# Patient Record
Sex: FEMALE | Race: WHITE | NOT HISPANIC OR LATINO | Employment: FULL TIME | URBAN - METROPOLITAN AREA
[De-identification: names, ages, dates, MRNs, and addresses within clinical notes are randomized per-mention and may not be internally consistent; named-entity substitution may affect disease eponyms.]

---

## 2017-01-30 ENCOUNTER — OFFICE VISIT (OUTPATIENT)
Dept: FAMILY MEDICINE | Facility: CLINIC | Age: 57
End: 2017-01-30
Payer: COMMERCIAL

## 2017-01-30 VITALS
HEIGHT: 61 IN | HEART RATE: 83 BPM | SYSTOLIC BLOOD PRESSURE: 124 MMHG | OXYGEN SATURATION: 97 % | DIASTOLIC BLOOD PRESSURE: 75 MMHG | TEMPERATURE: 99.2 F

## 2017-01-30 DIAGNOSIS — J01.90 ACUTE SINUSITIS WITH SYMPTOMS > 10 DAYS: Primary | ICD-10-CM

## 2017-01-30 DIAGNOSIS — B37.31 CANDIDIASIS OF VAGINA: ICD-10-CM

## 2017-01-30 PROCEDURE — 99213 OFFICE O/P EST LOW 20 MIN: CPT | Performed by: PHYSICIAN ASSISTANT

## 2017-01-30 RX ORDER — FLUCONAZOLE 150 MG/1
150 TABLET ORAL ONCE
Qty: 1 TABLET | Refills: 0 | Status: SHIPPED | OUTPATIENT
Start: 2017-01-30 | End: 2017-01-30

## 2017-01-30 RX ORDER — DOXYCYCLINE 100 MG/1
100 CAPSULE ORAL 2 TIMES DAILY
Qty: 20 CAPSULE | Refills: 0 | Status: SHIPPED | OUTPATIENT
Start: 2017-01-30 | End: 2017-05-30

## 2017-01-30 NOTE — MR AVS SNAPSHOT
"              After Visit Summary   1/30/2017    Nidia Acosta    MRN: [id number]           Patient Information     Date Of Birth          1960        Visit Information        Provider Department      1/30/2017 3:00 PM Lillian Harden PA-C [facility]        Today's Diagnoses     Acute sinusitis with symptoms > 10 days    -  1     Candidiasis of vagina            Follow-ups after your visit        Who to contact     Normal or non-critical lab and imaging results will be communicated to you by wireLawyerhart, letter or phone within 4 business days after the clinic has received the results. If you do not hear from us within 7 days, please contact the clinic through [employer] or phone. If you have a critical or abnormal lab result, we will notify you by phone as soon as possible.  Submit refill requests through [employer] or call your pharmacy and they will forward the refill request to us. Please allow 3 business days for your refill to be completed.          If you need to speak with a  for additional information , please call: [phone]             Additional Information About Your Visit        [employer] Information     [employer] gives you secure access to your electronic health record. If you see a primary care provider, you can also send messages to your care team and make appointments. If you have questions, please call your primary care clinic.  If you do not have a primary care provider, please call [phone] and they will assist you.        Care EveryWhere ID     This is your Care EveryWhere ID. This could be used by other organizations to access your [facility] medical records  [id number]        Your Vitals Were     Pulse Temperature Height Pulse Oximetry Last Period       83 99.2  F (37.3  C) (Tympanic) 5' 1\" (1.549 m) 97% 07/29/2002        Blood Pressure from Last 3 Encounters:   01/30/17 124/75   05/04/16 120/76   07/31/15 122/69    Weight from Last 3 Encounters: "   05/04/16 201 lb (91.173 kg)   05/22/15 203 lb (92.08 kg)   04/14/15 203 lb (92.08 kg)              Today, you had the following     No orders found for display         Today's Medication Changes          These changes are accurate as of: 1/30/17  3:27 PM.  If you have any questions, ask your nurse or doctor.               Start taking these medicines.        Dose/Directions    doxycycline 100 MG capsule   Commonly known as:  VIBRAMYCIN   Used for:  Acute sinusitis with symptoms > 10 days   Started by:  Lillian Harden PA-C        Dose:  100 mg   Take 1 capsule (100 mg) by mouth 2 times daily   Quantity:  20 capsule   Refills:  0         These medicines have changed or have updated prescriptions.        Dose/Directions    * fluconazole 150 MG tablet   Commonly known as:  DIFLUCAN   This may have changed:  Another medication with the same name was added. Make sure you understand how and when to take each.   Used for:  Candidal vulvovaginitis   Changed by:  Merlene Reyes MD        Take 1 at onset of symptoms and 1 at end of antibiotics   Quantity:  2 tablet   Refills:  0       * fluconazole 150 MG tablet   Commonly known as:  DIFLUCAN   This may have changed:  You were already taking a medication with the same name, and this prescription was added. Make sure you understand how and when to take each.   Used for:  Candidiasis of vagina   Changed by:  Lillian Harden PA-C        Dose:  150 mg   Take 1 tablet (150 mg) by mouth once for 1 dose   Quantity:  1 tablet   Refills:  0       * Notice:  This list has 2 medication(s) that are the same as other medications prescribed for you. Read the directions carefully, and ask your doctor or other care provider to review them with you.         Where to get your medicines      These medications were sent to [facility] - [place], [place] - [address] [address]     Phone:  [phone]    - doxycycline 100  MG capsule  - fluconazole 150 MG tablet             Primary Care Provider Office Phone # Fax #    Merlene Reyes [phone]       [facility] [address] [facility] [address]        Thank you!     Thank you for choosing [facility]  for your care. Our goal is always to provide you with excellent care. Hearing back from our patients is one way we can continue to improve our services. Please take a few minutes to complete the written survey that you may receive in the mail after your visit with us. Thank you!             Your Updated Medication List - Protect others around you: Learn how to safely use, store and throw away your medicines at www.disposemymeds.org.          This list is accurate as of: 1/30/17  3:27 PM.  Always use your most recent med list.                   Brand Name Dispense Instructions for use    citalopram 20 MG tablet    celeXA    90 tablet    Take 1 tablet (20 mg) by mouth daily       CLARITIN PO      AllerClear Brand       doxycycline 100 MG capsule    VIBRAMYCIN    20 capsule    Take 1 capsule (100 mg) by mouth 2 times daily       * fluconazole 150 MG tablet    DIFLUCAN    2 tablet    Take 1 at onset of symptoms and 1 at end of antibiotics       * fluconazole 150 MG tablet    DIFLUCAN    1 tablet    Take 1 tablet (150 mg) by mouth once for 1 dose       levothyroxine 100 MCG tablet    SYNTHROID    90 tablet    Take 1 tablet (100 mcg) by mouth every morning       Multi-vitamin Tabs tablet   Generic drug:  multivitamin, therapeutic with minerals      Take 1 tablet by mouth daily. When remembers       simvastatin 20 MG tablet    ZOCOR    90 tablet    Take 1 tablet (20 mg) by mouth At Bedtime       valACYclovir 500 MG tablet    VALTREX    90 tablet    Take 1 tablet (500 mg) by mouth daily Use as needed daily prn       * Notice:  This list has 2 medication(s) that are the same as other medications prescribed for you. Read the directions carefully, and ask  your doctor or other care provider to review them with you.

## 2017-01-30 NOTE — PROGRESS NOTES
SUBJECTIVE:                                                    Nidia Acosta is a 56 year old female who presents to clinic today for the following health issues:      ENT Symptoms             Symptoms: cc Present Absent Comment   Fever/Chills  x  Possible, off and on    Fatigue  x     Muscle Aches  x  Upper back, from coughing    Eye Irritation  x  Pressure    Sneezing  x     Nasal Danny/Drg  x     Sinus Pressure/Pain  x     Loss of smell  x     Dental pain  x     Sore Throat  x  Mild   Swollen Glands   x    Ear Pain/Fullness  x  Off and on    Cough  x     Wheeze   x    Chest Pain       Shortness of breath  x  Sometimes    Rash   x    Other   x      Symptom duration:  1 month    Symptom severity:  moderate    Treatments tried:  Mucinex, essential oils    Contacts:  Yes     Has tried several over the counter medications other than sudafed as it makes her feel jittery  No improvement, getting irritable because she has been feeling sick for so long  Entire office has had similar symptoms but hers are hanging on      She also has a knot on her left thumb, pain in the knuckle when she touches it. It has grown in size recently, she has had it for almost a year.   She did apply firm pressure at one point and it disappeared but then recurred a while later bigger in size  Not painful  Just figured she would mention it while here    Problem list and histories reviewed & adjusted, as indicated.  Additional history: as documented    Current Outpatient Prescriptions   Medication Sig Dispense Refill     citalopram (CELEXA) 20 MG tablet Take 1 tablet (20 mg) by mouth daily 90 tablet 1     Loratadine (CLARITIN PO) AllerClear Brand       simvastatin (ZOCOR) 20 MG tablet Take 1 tablet (20 mg) by mouth At Bedtime 90 tablet 3     levothyroxine (SYNTHROID) 100 MCG tablet Take 1 tablet (100 mcg) by mouth every morning 90 tablet 3     valACYclovir (VALTREX) 500 MG tablet Take 1 tablet (500 mg) by mouth daily Use as needed daily  "prn 90 tablet 3     fluconazole (DIFLUCAN) 150 MG tablet Take 1 at onset of symptoms and 1 at end of antibiotics 2 tablet 0     Multiple Vitamin (MULTI-VITAMIN) per tablet Take 1 tablet by mouth daily. When remembers       Allergies   Allergen Reactions     Bees Anaphylaxis     Codeine Difficulty breathing     Penicillin [Esters] Difficulty breathing     Seasonal Allergies        ROS:  Remainder of ROS obtained and found to be negative other than that which was documented above      OBJECTIVE:                                                    /75 mmHg  Pulse 83  Temp(Src) 99.2  F (37.3  C) (Tympanic)  Ht 5' 1\" (1.549 m)  Wt   SpO2 97%  LMP 07/29/2002  There is no weight on file to calculate BMI.  GENERAL: healthy, alert and no distress  EYES: Eyes grossly normal to inspection  HENT: ear canals and TM's normal, nose and mouth without ulcers or lesions  NECK: no adenopathy  RESP: lungs clear to auscultation - no rales, rhonchi or wheezes  CV: regular rates and rhythm, normal S1 S2, no S3 or S4 and no murmur, click or rub    Diagnostic Test Results:  none      ASSESSMENT/PLAN:                                                    (J01.90) Acute sinusitis with symptoms > 10 days  (primary encounter diagnosis)  Comment: given sx duration with lack of improvement, treat with abx and continue symptomatic management  Plan: doxycycline (VIBRAMYCIN) 100 MG capsule            (B37.3) Candidiasis of vagina  Comment: prone to yeast infections with abx  Plan: fluconazole (DIFLUCAN) 150 MG tablet                Lillian Harden PA-C  [facility]    "

## 2017-04-21 ENCOUNTER — TELEPHONE (OUTPATIENT)
Dept: FAMILY MEDICINE | Facility: CLINIC | Age: 57
End: 2017-04-21

## 2017-04-21 NOTE — TELEPHONE ENCOUNTER
Panel Management Review      Patient has the following on her problem list:     Depression / Dysthymia review  PHQ-9 SCORE 1/6/2014 3/12/2015 5/4/2016   Total Score 5 3 -   Total Score - - 6     CALI-7 SCORE 1/6/2014 5/4/2016   Total Score 2 -   Total Score - 7      Patient is due for:  PHQ9 and CALI      Composite cancer screening  Chart review shows that this patient is due/due soon for the following Mammogram  Summary:    Patient is due/failing the following:   MAMMOGRAM and PHQ9    Action needed:   Patient needs to do PHQ9. and Patient needs referral/order: Mammogram    Type of outreach:    Sent [employer] message.    Questions for provider review:    None                                                                                                                                    Bebe Anne CMA

## 2017-05-10 ENCOUNTER — HOSPITAL ENCOUNTER (OUTPATIENT)
Dept: MAMMOGRAPHY | Facility: CLINIC | Age: 57
Discharge: HOME | End: 2017-05-10
Attending: FAMILY MEDICINE | Admitting: FAMILY MEDICINE
Payer: COMMERCIAL

## 2017-05-10 DIAGNOSIS — Z12.31 VISIT FOR SCREENING MAMMOGRAM: ICD-10-CM

## 2017-05-10 PROCEDURE — G0202 SCR MAMMO BI INCL CAD: HCPCS

## 2017-05-10 NOTE — TELEPHONE ENCOUNTER
PHQ-9 SCORE 3/12/2015 5/4/2016 5/10/2017   Total Score 3 - -   Total Score MyChart - - 3 (Minimal depression)   Total Score - 6 -     CALI-7 SCORE 1/6/2014 5/4/2016 5/10/2017   Total Score 2 - -   Total Score - - 5 (mild anxiety)   Total Score - 7 -       Bebe Anne CMA

## 2017-05-30 ENCOUNTER — OFFICE VISIT (OUTPATIENT)
Dept: FAMILY MEDICINE | Facility: CLINIC | Age: 57
End: 2017-05-30
Payer: COMMERCIAL

## 2017-05-30 VITALS
BODY MASS INDEX: 39.08 KG/M2 | WEIGHT: 207 LBS | HEART RATE: 79 BPM | DIASTOLIC BLOOD PRESSURE: 78 MMHG | HEIGHT: 61 IN | SYSTOLIC BLOOD PRESSURE: 116 MMHG

## 2017-05-30 DIAGNOSIS — B37.31 CANDIDAL VULVOVAGINITIS: ICD-10-CM

## 2017-05-30 DIAGNOSIS — A60.00 HERPES SIMPLEX INFECTION OF GENITOURINARY SYSTEM: ICD-10-CM

## 2017-05-30 DIAGNOSIS — F43.10 PTSD (POST-TRAUMATIC STRESS DISORDER): ICD-10-CM

## 2017-05-30 DIAGNOSIS — Z00.00 ROUTINE GENERAL MEDICAL EXAMINATION AT A HEALTH CARE FACILITY: ICD-10-CM

## 2017-05-30 DIAGNOSIS — E03.4 HYPOTHYROIDISM DUE TO ACQUIRED ATROPHY OF THYROID: ICD-10-CM

## 2017-05-30 DIAGNOSIS — E78.5 HYPERLIPIDEMIA LDL GOAL <130: ICD-10-CM

## 2017-05-30 DIAGNOSIS — Z11.59 NEED FOR HEPATITIS C SCREENING TEST: Primary | ICD-10-CM

## 2017-05-30 DIAGNOSIS — F43.21 ADJUSTMENT DISORDER WITH DEPRESSED MOOD: ICD-10-CM

## 2017-05-30 LAB
LDLC SERPL DIRECT ASSAY-MCNC: 101 MG/DL
TSH SERPL DL<=0.005 MIU/L-ACNC: 1.19 MU/L (ref 0.4–4)

## 2017-05-30 PROCEDURE — 36415 COLL VENOUS BLD VENIPUNCTURE: CPT | Performed by: FAMILY MEDICINE

## 2017-05-30 PROCEDURE — 99396 PREV VISIT EST AGE 40-64: CPT | Performed by: FAMILY MEDICINE

## 2017-05-30 PROCEDURE — 84443 ASSAY THYROID STIM HORMONE: CPT | Performed by: FAMILY MEDICINE

## 2017-05-30 PROCEDURE — 83721 ASSAY OF BLOOD LIPOPROTEIN: CPT | Performed by: FAMILY MEDICINE

## 2017-05-30 PROCEDURE — 86803 HEPATITIS C AB TEST: CPT | Performed by: FAMILY MEDICINE

## 2017-05-30 RX ORDER — SIMVASTATIN 20 MG
20 TABLET ORAL AT BEDTIME
Qty: 90 TABLET | Refills: 3 | Status: SHIPPED | OUTPATIENT
Start: 2017-05-30 | End: 2018-06-25

## 2017-05-30 RX ORDER — LEVOTHYROXINE SODIUM 100 UG/1
100 TABLET ORAL EVERY MORNING
Qty: 90 TABLET | Refills: 3 | Status: SHIPPED | OUTPATIENT
Start: 2017-05-30 | End: 2018-06-17

## 2017-05-30 RX ORDER — FLUCONAZOLE 150 MG/1
TABLET ORAL
Qty: 2 TABLET | Refills: 0 | Status: SHIPPED | OUTPATIENT
Start: 2017-05-30 | End: 2018-01-21

## 2017-05-30 RX ORDER — CITALOPRAM HYDROBROMIDE 20 MG/1
20 TABLET ORAL DAILY
Qty: 90 TABLET | Refills: 1 | Status: SHIPPED | OUTPATIENT
Start: 2017-05-30 | End: 2018-06-25

## 2017-05-30 RX ORDER — VALACYCLOVIR HYDROCHLORIDE 500 MG/1
500 TABLET, FILM COATED ORAL DAILY
Qty: 90 TABLET | Refills: 3 | Status: SHIPPED | OUTPATIENT
Start: 2017-05-30 | End: 2018-06-17

## 2017-05-30 NOTE — PATIENT INSTRUCTIONS

## 2017-05-30 NOTE — MR AVS SNAPSHOT
After Visit Summary   5/30/2017    Nidia Acosta    MRN: [id number]           Patient Information     Date Of Birth          1960        Visit Information        Provider Department      5/30/2017 1:30 PM Merlene Reyes MD [facility]        Today's Diagnoses     Need for hepatitis C screening test    -  1    Routine general medical examination at a health care facility        PTSD (post-traumatic stress disorder)        Adjustment disorder with depressed mood        Hyperlipidemia LDL goal <130        Hypothyroidism due to acquired atrophy of thyroid        Herpes simplex infection of genitourinary system        Candidal vulvovaginitis          Care Instructions      Preventive Health Recommendations  Female Ages 50 - 64    Yearly exam: See your health care provider every year in order to  o Review health changes.   o Discuss preventive care.    o Review your medicines if your doctor has prescribed any.      Get a Pap test every three years (unless you have an abnormal result and your provider advises testing more often).    If you get Pap tests with HPV test, you only need to test every 5 years, unless you have an abnormal result.     You do not need a Pap test if your uterus was removed (hysterectomy) and you have not had cancer.    You should be tested each year for STDs (sexually transmitted diseases) if you're at risk.     Have a mammogram every 1 to 2 years.    Have a colonoscopy at age 50, or have a yearly FIT test (stool test). These exams screen for colon cancer.      Have a cholesterol test every 5 years, or more often if advised.    Have a diabetes test (fasting glucose) every three years. If you are at risk for diabetes, you should have this test more often.     If you are at risk for osteoporosis (brittle bone disease), think about having a bone density scan (DEXA).    Shots: Get a flu shot each year. Get a tetanus shot every 10 years.    Nutrition:     Eat at  least 5 servings of fruits and vegetables each day.    Eat whole-grain bread, whole-wheat pasta and brown rice instead of white grains and rice.    Talk to your provider about Calcium and Vitamin D.     Lifestyle    Exercise at least 150 minutes a week (30 minutes a day, 5 days a week). This will help you control your weight and prevent disease.    Limit alcohol to one drink per day.    No smoking.     Wear sunscreen to prevent skin cancer.     See your dentist every six months for an exam and cleaning.    See your eye doctor every 1 to 2 years.    Will send the labs when they are back with any recommendations.           Follow-ups after your visit        Who to contact     Normal or non-critical lab and imaging results will be communicated to you by Asteel[employer], letter or phone within 4 business days after the clinic has received the results. If you do not hear from us within 7 days, please contact the clinic through [employer] or phone. If you have a critical or abnormal lab result, we will notify you by phone as soon as possible.  Submit refill requests through [employer] or call your pharmacy and they will forward the refill request to us. Please allow 3 business days for your refill to be completed.          If you need to speak with a  for additional information , please call: [phone]             Additional Information About Your Visit        [employer] Information     [employer] gives you secure access to your electronic health record. If you see a primary care provider, you can also send messages to your care team and make appointments. If you have questions, please call your primary care clinic.  If you do not have a primary care provider, please call [phone] and they will assist you.        Care EveryWhere ID     This is your Care EveryWhere ID. This could be used by other organizations to access your [facility] medical records  [id number]        Your Vitals Were     Pulse Height Last Period BMI  "(Body Mass Index)          79 5' 1\" (1.549 m) 07/29/2002 39.11 kg/m2         Blood Pressure from Last 3 Encounters:   05/30/17 116/78   01/30/17 124/75   05/04/16 120/76    Weight from Last 3 Encounters:   05/30/17 207 lb (93.9 kg)   05/04/16 201 lb (91.2 kg)   05/22/15 203 lb (92.1 kg)              We Performed the Following     Hepatitis C Screen Reflex to HCV RNA Quant and Genotype     LDL cholesterol direct     TSH WITH FREE T4 REFLEX          Where to get your medicines      These medications were sent to [id number] - [place], [place] - [address]   [address] [facility] [address]     Phone:  [phone]     citalopram 20 MG tablet    fluconazole 150 MG tablet    levothyroxine 100 MCG tablet    simvastatin 20 MG tablet    valACYclovir 500 MG tablet          Primary Care Provider Office Phone # Fax #    Merlene Reyes [phone]       [facility] [address] [facility] [address]        Thank you!     Thank you for choosing [facility]  for your care. Our goal is always to provide you with excellent care. Hearing back from our patients is one way we can continue to improve our services. Please take a few minutes to complete the written survey that you may receive in the mail after your visit with us. Thank you!             Your Updated Medication List - Protect others around you: Learn how to safely use, store and throw away your medicines at www.disposemymeds.org.          This list is accurate as of: 5/30/17  2:19 PM.  Always use your most recent med list.                   Brand Name Dispense Instructions for use    citalopram 20 MG tablet    celeXA    90 tablet    Take 1 tablet (20 mg) by mouth daily       CLARITIN PO      AllerClear Brand       fluconazole 150 MG tablet    DIFLUCAN    2 tablet    Take 1 at onset of symptoms and 1 at end of antibiotics       levothyroxine 100 MCG tablet    SYNTHROID    90 tablet    Take 1 tablet (100 mcg) by " mouth every morning       * MISC NATURAL PRODUCTS PO      PD 80/20       * MISC NATURAL PRODUCTS PO      All natural for bones, ligaments, muscles etc..       * MISC NATURAL PRODUCTS PO      Essential Oils for calming, stress       simvastatin 20 MG tablet    ZOCOR    90 tablet    Take 1 tablet (20 mg) by mouth At Bedtime       SULFAZINE PO      Over all health OTC all natural medication       valACYclovir 500 MG tablet    VALTREX    90 tablet    Take 1 tablet (500 mg) by mouth daily Use as needed daily prn       * Notice:  This list has 3 medication(s) that are the same as other medications prescribed for you. Read the directions carefully, and ask your doctor or other care provider to review them with you.

## 2017-05-30 NOTE — PROGRESS NOTES
SUBJECTIVE:     CC: Nidia Acosta is an 57 year old woman who presents for preventive health visit.     Answers for HPI/ROS submitted by the patient on 5/30/2017   Annual Exam:  Getting at least 3 servings of Calcium per day:: NO  Bi-annual eye exam:: Yes  Dental care twice a year:: Yes  Sleep apnea or symptoms of sleep apnea:: Daytime drowsiness  Diet:: Regular (no restrictions)  Taking medications regularly:: Yes  Additional concerns today:: No  PHQ-2 Score: 0      Back on the celexa she is doing some natural products her son has recommended she feels the best she has in a long time     Today's PHQ-2 Score:   PHQ-2 ( 1999 Pfizer) 5/30/2017 5/4/2016   Q1: Little interest or pleasure in doing things 0 1   Q2: Feeling down, depressed or hopeless 0 1   PHQ-2 Score 0 2   Q1: Little interest or pleasure in doing things Not at all -   Q2: Feeling down, depressed or hopeless Not at all -   PHQ-2 Score 0 -       Abuse: Current or Past(Physical, Sexual or Emotional)- No  Do you feel safe in your environment - Yes    Social History   Substance Use Topics     Smoking status: Former Smoker     Packs/day: 0.50     Years: 5.00     Types: Cigarettes     Quit date: 1/1/1986     Smokeless tobacco: Never Used     Alcohol use Yes      Comment: 1-5 drinks weekly     The patient does not drink >3 drinks per day nor >7 drinks per week.    Recent Labs   Lab Test  03/20/15   0900  01/06/14   0850   CHOL  166  178   HDL  51  47*   LDL  81  103   TRIG  170*  138   CHOLHDLRATIO  3.3  4.0       Reviewed orders with patient.  Reviewed health maintenance and updated orders accordingly - Yes    Mammo Decision Support:  Patient over age 50, mutual decision to screen reflected in health maintenance.    Pertinent mammograms are reviewed under the imaging tab.    Lab Results   Component Value Date    PAP NIL 08/23/2007    PAP NIL 08/26/2005     History of abnormal Pap smear: Status post benign hysterectomy. Health Maintenance and Surgical  History updated.    Reviewed and updated as needed this visit by clinical staff  Tobacco  Allergies  Meds  Med Hx  Surg Hx  Fam Hx  Soc Hx        Reviewed and updated as needed this visit by Provider        Past Medical History:   Diagnosis Date     Arthritis     paternal & maternal     Backache, unspecified     lumbar back pain, degen disk disease     Congestive heart failure (H)     paternal     COPD (chronic obstructive pulmonary disease) (H)     I get bronchitis frequently     Depressive disorder     numerous yrs ago diagnosed with PTSD     Diabetes (H)     maternal & paternal     Heart disease     Paternal side     Hypertension     Paternal side     Leiomyoma of uterus, unspecified 2003    s/p LAVH     Other genital herpes     HSV-2     Thyroid disease     Me     Unspecified sinusitis (chronic)       Past Surgical History:   Procedure Laterality Date     COLONOSCOPY  4/5/2013    Procedure: COLONOSCOPY;  Colonoscopy  ;  Surgeon: Laura Ruff MD;  Location: [facility]     HYSTERECTOMY, PAP NO LONGER INDICATED  7/2003    [facility] for fibroids     LAPAROSCOPIC LYSIS ADHESIONS  1985     LASIK  10/2004     TONSILLECTOMY & ADENOIDECTOMY  age 18     TUBAL LIGATION  4/1994       ROS:  C: NEGATIVE for fever, chills, change in weight  I: NEGATIVE for worrisome rashes, moles or lesions  E: NEGATIVE for vision changes or irritation  ENT: NEGATIVE for ear, mouth and throat problems  R: NEGATIVE for significant cough or SOB  B: NEGATIVE for masses, tenderness or discharge  CV: NEGATIVE for chest pain, palpitations or peripheral edema  GI: NEGATIVE for nausea, abdominal pain, heartburn, or change in bowel habits  : NEGATIVE for unusual urinary or vaginal symptoms. No vaginal bleeding.  M: NEGATIVE for significant arthralgias or myalgia  N: NEGATIVE for weakness, dizziness or paresthesias  P: NEGATIVE for changes in mood or affect     Problem list, Medication list, Allergies, and Medical/Social/Surgical histories  "reviewed in [facility] and updated as appropriate.  OBJECTIVE:     /78  Pulse 79  Ht 5' 1\" (1.549 m)  Wt 207 lb (93.9 kg)  LMP 07/29/2002  BMI 39.11 kg/m2  EXAM:  GENERAL APPEARANCE: healthy, alert and no distress  HENT: ear canals and TM's normal and nose and mouth without ulcers or lesions  NECK: no adenopathy, no asymmetry, masses, or scars and thyroid normal to palpation  RESP: lungs clear to auscultation - no rales, rhonchi or wheezes  BREAST: normal without masses, tenderness or nipple discharge and no palpable axillary masses or adenopathy  CV: regular rates and rhythm, normal S1 S2, no S3 or S4 and no murmur, click or rub  ABDOMEN: soft, nontender, without hepatosplenomegaly or masses and bowel sounds normal  MS: extremities normal- no gross deformities noted  SKIN: no suspicious lesions or rashes  PSYCH: mentation appears normal and affect normal/bright  MENTAL STATUS EXAM:  Appearance/Behavior: No apparent distress, Neatly groomed, Dressed appropriately for weather and Appears stated age  Speech: Normal  Mood/Affect: normal affect  Insight: Adequate    ASSESSMENT/PLAN:         ICD-10-CM    1. Need for hepatitis C screening test Z11.59 Hepatitis C Screen Reflex to HCV RNA Quant and Genotype   2. Routine general medical examination at a health care facility Z00.00    3. PTSD (post-traumatic stress disorder) F43.10 citalopram (CELEXA) 20 MG tablet   4. Adjustment disorder with depressed mood F43.21 citalopram (CELEXA) 20 MG tablet   5. Hyperlipidemia LDL goal <130 E78.5 simvastatin (ZOCOR) 20 MG tablet     LDL cholesterol direct   6. Hypothyroidism due to acquired atrophy of thyroid E03.4 TSH WITH FREE T4 REFLEX     levothyroxine (SYNTHROID) 100 MCG tablet   7. Herpes simplex infection of genitourinary system A60.00 valACYclovir (VALTREX) 500 MG tablet   8. Candidal vulvovaginitis B37.3 fluconazole (DIFLUCAN) 150 MG tablet         COUNSELING:   Reviewed preventive health counseling, as reflected in " "patient instructions         reports that she quit smoking about 31 years ago. Her smoking use included Cigarettes. She has a 2.50 pack-year smoking history. She has never used smokeless tobacco.    Estimated body mass index is 39.11 kg/(m^2) as calculated from the following:    Height as of this encounter: 5' 1\" (1.549 m).    Weight as of this encounter: 207 lb (93.9 kg).   Weight management plan: Discussed healthy diet and exercise guidelines and patient will follow up in 12 months in clinic to re-evaluate.    Counseling Resources:  ATP IV Guidelines  Pooled Cohorts Equation Calculator  Breast Cancer Risk Calculator  FRAX Risk Assessment  ICSI Preventive Guidelines  Dietary Guidelines for Americans, 2010  USDA's MyPlate  ASA Prophylaxis  Lung CA Screening    Merlene Reyes MD  [facility]  "

## 2017-05-31 LAB — HCV AB SERPL QL IA: NORMAL

## 2017-06-01 NOTE — PROGRESS NOTES
Nidia,  Your lab results were normal/stable. Please feel free to my chart or call the office with questions. Merlene Reyes M.D.

## 2017-06-28 ENCOUNTER — OFFICE VISIT (OUTPATIENT)
Dept: ORTHOPEDICS | Facility: CLINIC | Age: 57
End: 2017-06-28
Payer: OTHER MISCELLANEOUS

## 2017-06-28 VITALS
HEIGHT: 61 IN | BODY MASS INDEX: 39.08 KG/M2 | DIASTOLIC BLOOD PRESSURE: 79 MMHG | SYSTOLIC BLOOD PRESSURE: 131 MMHG | WEIGHT: 207 LBS

## 2017-06-28 DIAGNOSIS — M77.51 RETROCALCANEAL BURSITIS, RIGHT: Primary | ICD-10-CM

## 2017-06-28 DIAGNOSIS — M70.62 TROCHANTERIC BURSITIS OF LEFT HIP: ICD-10-CM

## 2017-06-28 DIAGNOSIS — M76.61 ACHILLES TENDINITIS, RIGHT LEG: ICD-10-CM

## 2017-06-28 PROCEDURE — 99204 OFFICE O/P NEW MOD 45 MIN: CPT | Performed by: FAMILY MEDICINE

## 2017-06-28 NOTE — PROGRESS NOTES
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    Injury: Gradual onset of worsening right achilles tendon over last 3 weeks (6/3/17) after falling and landing on left hip/ankle while at work.  Pain located over right achilles tendon body/insertion, nonradiating.  Additional Features:  Positive: swelling, grinding and painful walking.  Symptoms are better with Ibuprofen and Rest.  Symptoms are worse with: going from sit to stand, prolonged walking, ankle DF.  Other evaluation and/or treatments so far consists of: Ice, Ibuprofen, Rest and chiropractic.  Prior imaging: No recent imaging completed.  Prior History of related problems: none    Second complaint of left lateral hip pain following above mentioned fall.  Pain located over deep lateral hip.  Pain is worse with lying on left side, going from sit to stand.  Currently treating with chiropractic and rest with intermittent relief.  Previous h/o radiating low back pain and hip following a fall ~ 1 year ago - improved with physical therapy.    Social History: works as  @ [employer]    Review of Systems  Musculoskeletal: as above  Remainder of review of systems is negative including constitutional, CV, pulmonary, GI, Skin and Neurologic except as noted in HPI or medical history.    Past Medical History:   Diagnosis Date     Arthritis     paternal & maternal     Backache, unspecified     lumbar back pain, degen disk disease     Congestive heart failure (H)     paternal     COPD (chronic obstructive pulmonary disease) (H)     I get bronchitis frequently     Depressive disorder     numerous yrs ago diagnosed with PTSD     Diabetes (H)     maternal & paternal     Heart disease     Paternal side     Hypertension     Paternal side     Leiomyoma of uterus, unspecified  "2003    s/p LAVH     Other genital herpes     HSV-2     Thyroid disease     Me     Unspecified sinusitis (chronic)      Past Surgical History:   Procedure Laterality Date     COLONOSCOPY  4/5/2013    Procedure: COLONOSCOPY;  Colonoscopy  ;  Surgeon: Laura Ruff MD;  Location: [facility]     HYSTERECTOMY, PAP NO LONGER INDICATED  7/2003    [facility] for fibroids     LAPAROSCOPIC LYSIS ADHESIONS  1985     LASIK  10/2004     TONSILLECTOMY & ADENOIDECTOMY  age 18     TUBAL LIGATION  4/1994     Objective  /79  Ht 5' 1\" (1.549 m)  Wt 207 lb (93.9 kg)  LMP 07/29/2002  BMI 39.11 kg/m2    General: healthy, alert and in no distress    HEENT: no scleral icterus or conjunctival erythema   Skin: no suspicious lesions or rash. No jaundice.   CV: regular rhythm by palpation, 2+ distal pulses, no pedal edema    Resp: normal respiratory effort without conversational dyspnea   Psych: normal mood and affect    Gait: ***antalgic, appropriate coordination and balance   Neuro: normal light touch sensory exam of the extremities. Motor strength as noted below     {RIGHT/LEFT:061518::\"Bilateral\"} Ankle/Foot Exam:    Inspection:  {ankle inspection:037158::\"     no visible ecchymosis\",\"     no visible edema or effusion\"}    Foot inspection:  {foot inspection:103604::\"     no deformity noted\"}    ROM:   {ankle ROM:731029::\"     full ROM with dorsiflexion, plantarflexion, inversion and eversion\"}    Tender:  {ankel tender:464731}    Non-Tender:  {ankle nontender:187441::\"     remainder of foot and ankle\"}    Skin:  {skin tests:137677::\"     capillary refill less than 2 seconds\",\"     well perfused\"}    Special Tests:  {ankle special tests:178103}    Gait:  {gait:395840}    Proprioception:   {propriorception:650137}      Radiology:  ***    Assessment:  No diagnosis found.    Plan:  Discussed the assessment with the patient.  {FSOC Plan:514137::\"Follow up: ***\"}        "

## 2017-06-28 NOTE — MR AVS SNAPSHOT
After Visit Summary   6/28/2017    Nidia Acosta    MRN: [id number]           Patient Information     Date Of Birth          1960        Visit Information        Provider Department      6/28/2017 5:20 PM Joel Rascon DO [facility]        Today's Diagnoses     Retrocalcaneal bursitis, right    -  1    Achilles tendinitis, right leg        Trochanteric bursitis of left hip          Care Instructions    Wear boot full time for one week  Consider crutches vs cane vs knee-scooter in future if needed  Consider Physical Therapy based on improvement, will see her next week for close follow up  Can also use crutches only without boot if pain-free  Be aware the boot may irritate your left hip, so consider using a cane on your left side  See my attached handouts for information on your diagnoses  Consider Voltaren (diclofenac) gel in the future, similar to ibuprofen but no side effects  Consider injection into retrocalcaneal bursa +/- hip bursa if symptoms do not improve              Follow-ups after your visit        Your next 10 appointments already scheduled     Jul 06, 2017  4:00 PM CDT   Return Visit with Joel Rascon DO   [facility] ([facility])    [address] [address] [phone]              Who to contact     If you have questions or need follow up information about today's clinic visit or your schedule please contact [facility] directly at [phone].  Normal or non-critical lab and imaging results will be communicated to you by MyChart, letter or phone within 4 business days after the clinic has received the results. If you do not hear from us within 7 days, please contact the clinic through MyChart or phone. If you have a critical or abnormal lab result, we will notify you by phone as soon as possible.  Submit refill  "requests through [employer] or call your pharmacy and they will forward the refill request to us. Please allow 3 business days for your refill to be completed.          Additional Information About Your Visit        [employer] Information     [employer] gives you secure access to your electronic health record. If you see a primary care provider, you can also send messages to your care team and make appointments. If you have questions, please call your primary care clinic.  If you do not have a primary care provider, please call [phone] and they will assist you.        Care EveryWhere ID     This is your Care EveryWhere ID. This could be used by other organizations to access your [facility] medical records  [id number]        Your Vitals Were     Height Last Period BMI (Body Mass Index)             5' 1\" (1.549 m) 07/29/2002 39.11 kg/m2          Blood Pressure from Last 3 Encounters:   06/28/17 131/79   05/30/17 116/78   01/30/17 124/75    Weight from Last 3 Encounters:   06/28/17 207 lb (93.9 kg)   05/30/17 207 lb (93.9 kg)   05/04/16 201 lb (91.2 kg)              Today, you had the following     No orders found for display       Primary Care Provider Office Phone # Fax #    Merlene Reyes [phone]       [facility] [address] [facility] [address]        Equal Access to Services     KODY SUN AH: Hadii aad ku hadasho Soomaali, waaxda luqadaha, qaybta kaalmada adeegyada, joaquin figuerao. So [facility] [phone].    ATENCIÓN: Si habla español, tiene a shea disposición servicios gratuitos de asistencia lingüística. Abdon al [phone].    We comply with applicable federal civil rights laws and [place] laws. We do not discriminate on the basis of race, color, national origin, age, disability sex, sexual orientation or gender identity.            Thank you!     Thank you for choosing [facility]  for your care. Our goal is always to " provide you with excellent care. Hearing back from our patients is one way we can continue to improve our services. Please take a few minutes to complete the written survey that you may receive in the mail after your visit with us. Thank you!             Your Updated Medication List - Protect others around you: Learn how to safely use, store and throw away your medicines at www.disposemymeds.org.          This list is accurate as of: 6/28/17  6:20 PM.  Always use your most recent med list.                   Brand Name Dispense Instructions for use Diagnosis    citalopram 20 MG tablet    celeXA    90 tablet    Take 1 tablet (20 mg) by mouth daily    PTSD (post-traumatic stress disorder), Adjustment disorder with depressed mood       CLARITIN PO      AllerClear Brand        fluconazole 150 MG tablet    DIFLUCAN    2 tablet    Take 1 at onset of symptoms and 1 at end of antibiotics    Candidal vulvovaginitis       levothyroxine 100 MCG tablet    SYNTHROID    90 tablet    Take 1 tablet (100 mcg) by mouth every morning    Hypothyroidism due to acquired atrophy of thyroid       * MISC NATURAL PRODUCTS PO      PD 80/20        * MISC NATURAL PRODUCTS PO      All natural for bones, ligaments, muscles etc..        * MISC NATURAL PRODUCTS PO      Essential Oils for calming, stress        simvastatin 20 MG tablet    ZOCOR    90 tablet    Take 1 tablet (20 mg) by mouth At Bedtime    Hyperlipidemia LDL goal <130       SULFAZINE PO      Over all health OTC all natural medication        valACYclovir 500 MG tablet    VALTREX    90 tablet    Take 1 tablet (500 mg) by mouth daily Use as needed daily prn    Herpes simplex infection of genitourinary system       * Notice:  This list has 3 medication(s) that are the same as other medications prescribed for you. Read the directions carefully, and ask your doctor or other care provider to review them with you.

## 2017-06-28 NOTE — NURSING NOTE
"Chief Complaint   Patient presents with     Musculoskeletal Problem     right achilles tendon pain > 3.5 weeks       Initial /79  Ht 5' 1\" (1.549 m)  Wt 207 lb (93.9 kg)  LMP 07/29/2002  BMI 39.11 kg/m2 Estimated body mass index is 39.11 kg/(m^2) as calculated from the following:    Height as of this encounter: 5' 1\" (1.549 m).    Weight as of this encounter: 207 lb (93.9 kg).  Medication Reconciliation: complete     Pola Rosenberg, ANGY  "

## 2017-06-28 NOTE — PATIENT INSTRUCTIONS
Wear boot full time for one week  Consider crutches vs cane vs knee-scooter in future if needed  Consider Physical Therapy based on improvement, will see her next week for close follow up  Can also use crutches only without boot if pain-free  Be aware the boot may irritate your left hip, so consider using a cane on your left side  See my attached handouts for information on your diagnoses  Consider Voltaren (diclofenac) gel in the future, similar to ibuprofen but no side effects  Consider injection into retrocalcaneal bursa +/- hip bursa if symptoms do not improve  Try tumeric with Black pepper as a natural antiinflammatory

## 2017-07-03 ENCOUNTER — TELEPHONE (OUTPATIENT)
Dept: ORTHOPEDICS | Facility: CLINIC | Age: 57
End: 2017-07-03

## 2017-07-03 NOTE — PROGRESS NOTES
Nidia Acosta  :  1960  DOS: 2017  MRN: [id number]    Sports Medicine Clinic Visit    PCP: Mrelene Reyes    Nidia Acosta is a 57 year old female who is seen as an AIC patient presenting with right achilles tendon pain.    Injury: Gradual onset of worsening right achilles tendon over last 3 weeks (6/3/17) after falling and landing on left hip/ankle while at work.  Pain located over right achilles tendon body/insertion, nonradiating.  Additional Features:  Positive: swelling, grinding and painful walking.  Symptoms are better with Ibuprofen and Rest.  Symptoms are worse with: going from sit to stand, prolonged walking, ankle DF.  Other evaluation and/or treatments so far consists of: Ice, Ibuprofen, Rest and chiropractic.  Prior imaging: No recent imaging completed.  Prior History of related problems: none    Second complaint of left lateral hip pain following above mentioned fall.  Pain located over deep lateral hip.  Pain is worse with lying on left side, going from sit to stand.  Currently treating with chiropractic and rest with intermittent relief.  Previous h/o radiating low back pain and hip following a fall ~ 1 year ago - improved with physical therapy.    Social History: works as  @ [employer]    Review of Systems  Musculoskeletal: as above  Remainder of review of systems is negative including constitutional, CV, pulmonary, GI, Skin and Neurologic except as noted in HPI or medical history.    Past Medical History:   Diagnosis Date     Arthritis     paternal & maternal     Backache, unspecified     lumbar back pain, degen disk disease     Congestive heart failure (H)     paternal     COPD (chronic obstructive pulmonary disease) (H)     I get bronchitis frequently     Depressive disorder     numerous yrs ago diagnosed with PTSD     Diabetes (H)     maternal & paternal     Heart disease     Paternal side     Hypertension     Paternal side     Leiomyoma of uterus, unspecified  "2003    s/p LAVH     Other genital herpes     HSV-2     Thyroid disease     Me     Unspecified sinusitis (chronic)      Past Surgical History:   Procedure Laterality Date     COLONOSCOPY  4/5/2013    Procedure: COLONOSCOPY;  Colonoscopy  ;  Surgeon: Laura Ruff MD;  Location: [facility]     HYSTERECTOMY, PAP NO LONGER INDICATED  7/2003    [facility] for fibro[facility]     LAPAROSCOPIC LYSIS ADHESIONS  1985     LASIK  10/2004     TONSILLECTOMY & ADENOIDECTOMY  age 18     TUBAL LIGATION  4/1994     Objective  /79  Ht 5' 1\" (1.549 m)  Wt 207 lb (93.9 kg)  LMP 07/29/2002  BMI 39.11 kg/m2    General: healthy, alert and in no distress    HEENT: no scleral icterus or conjunctival erythema   Skin: no suspicious lesions or rash. No jaundice.   CV: regular rhythm by palpation, 2+ distal pulses, no pedal edema    Resp: normal respiratory effort without conversational dyspnea   Psych: normal mood and affect    Gait: antalgic, appropriate coordination and balance   Neuro: normal light touch sensory exam of the extremities. Motor strength as noted below     Right Ankle/Foot Exam:    Inspection:       no visible ecchymosis       no visible edema or effusion    Foot inspection:       no deformity noted       pes planus, mild    ROM:        full ROM with dorsiflexion, plantarflexion, inversion and eversion, painful passive and active dorsiflexion    Tender:       Achilles tendon and retrocalcaneal bursa    Non-Tender:       remainder of foot and ankle       lateral malleolus       lateral ankle ligaments       deltoid ligament       posterior edge of lateral malleolus       proximal 5th metatarsal       dorsal tibiotalar joint       tarsal navicular       medial malleolus       distal tibiofibular joint       proximal 1st and 2nd intermetatarsal ligament    Skin:       well perfused       capillary refill less than 2 seconds    Special Tests:       neg (-) anterior drawer        neg (-) talar tilt        neg (-) external " rotation testing        Neg beasley test    Gait:       antalgic gait    Proprioception:        Deferred    Left hip exam    Inspection:        no edema or ecchymosis in hip area    ROM:       Full active and passive ROM       painful adduction and ER, mild and localized over lateral hip    Strength:        flexion 5/5       extension 5/5       abduction 5/5       adduction 4/5 due to pain    Tender:        greater trochanter       SI joint mild b/l but chronic, known and unrelated    Non Tender:        remainder of hip area       illiac crest       ASIS       pubis    Sensation:        grossly intact in hip and thigh    Skin:       well perfused       capillary refill brisk    Special Tests:        neg (-) J LUIS       neg (-) FADIR       neg (-) scour       positive (+) Santo    Radiology:  No imaging performed today    Assessment:  1. Retrocalcaneal bursitis, right    2. Achilles tendinitis, right leg    3. Trochanteric bursitis of left hip        Plan:  Discussed the assessment with the patient.  Follow up: 1-2 weeks, based on improvement  Footwear choices reviewed  Use of short 1-2 wk course of NSAIDs reviewed, dosing and precautions reviewed if utilized  RICE reviewed  Hip and ankle exercises provided for when improving  Low impact activity reviewed  Walking boot and assistive device use reviewed for immobilization, rest  Consider US guided retrocalcaneal bursa injection or injection over greater trochanteric bursa in future if not improved or worsening  Offered PT today, defer for now but likely to use in the future  We discussed modified progressive pain-free activity as tolerated  Home handouts provided and supportive care reviewed  All questions were answered today  Contact us with additional questions or concerns  Signs and sx of concern reviewed      Joel Rascon DO, CAJOSH  Primary Care Sports Medicine  [facility]

## 2017-07-03 NOTE — TELEPHONE ENCOUNTER
Called pt to follow up on her AIC visit for R achilles tendon and L hip pain. She reports that she has been wearing the ortho boot on R foot, and estimates her pain is about 90% improved, and she has been using the diclofenac also for pain relief. She did say that the boot tends to irritate her L hip, but said that it's impractical to use a cane to compensate as she is a  for her part time job. She seemed pleased with her progress so far, and said she plans to come to her follow up appt. 7/6.

## 2017-07-06 ENCOUNTER — OFFICE VISIT (OUTPATIENT)
Dept: ORTHOPEDICS | Facility: CLINIC | Age: 57
End: 2017-07-06
Payer: OTHER MISCELLANEOUS

## 2017-07-06 VITALS
DIASTOLIC BLOOD PRESSURE: 76 MMHG | BODY MASS INDEX: 39.08 KG/M2 | HEIGHT: 61 IN | WEIGHT: 207 LBS | SYSTOLIC BLOOD PRESSURE: 130 MMHG

## 2017-07-06 DIAGNOSIS — M77.51 RETROCALCANEAL BURSITIS, RIGHT: Primary | ICD-10-CM

## 2017-07-06 DIAGNOSIS — M70.62 TROCHANTERIC BURSITIS OF LEFT HIP: ICD-10-CM

## 2017-07-06 DIAGNOSIS — M76.61 ACHILLES TENDINITIS, RIGHT LEG: ICD-10-CM

## 2017-07-06 PROCEDURE — 20610 DRAIN/INJ JOINT/BURSA W/O US: CPT | Mod: LT | Performed by: FAMILY MEDICINE

## 2017-07-06 PROCEDURE — 99214 OFFICE O/P EST MOD 30 MIN: CPT | Mod: 25 | Performed by: FAMILY MEDICINE

## 2017-07-06 NOTE — PATIENT INSTRUCTIONS
You have been to [facility] physical therapy, [phone].  Please schedule with Lul Jordan or Monae Waldron.    Trochanteric bursa injection completed today.  Work restrictions provided.

## 2017-07-06 NOTE — LETTER
July 6, 2017      Nidia Acosta  [address] [facility] [address]        Nidia was seen in my office today for follow up from an injury that occurred at work on 6/3/2017. She can continue to work at this time, but I am recommending only sitting or desk work for the next 2 weeks until our next follow up visit.  She will also start in physical therapy when approved for her achilles injury and bursitis.  Steroid injection is an option in the future for symptoms that do not improve.  Updated recommendations will be provided as needed based on her progress.        Joel Soares, , CAQ  Primary Care Sports Medicine  [facility]

## 2017-07-06 NOTE — PROGRESS NOTES
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    Injury: Gradual onset of worsening right achilles tendon over last 3 weeks (6/3/17) after falling and landing on left hip/ankle while at work.  Pain located over right achilles tendon body/insertion, nonradiating.  Additional Features:  Positive: swelling, grinding and painful walking.  Symptoms are better with Ibuprofen and Rest.  Symptoms are worse with: going from sit to stand, prolonged walking, ankle DF.  Other evaluation and/or treatments so far consists of: Ice, Ibuprofen, Rest and chiropractic.  Prior imaging: No recent imaging completed.  Prior History of related problems: none    Second complaint of left lateral hip pain following above mentioned fall.  Pain located over deep lateral hip.  Pain is worse with lying on left side, going from sit to stand.  Currently treating with chiropractic and rest with intermittent relief.  Previous h/o radiating low back pain and hip following a fall ~ 1 year ago - improved with physical therapy.    Social History: works as  @ [employer]    Interim History - 2017  Since last visit on 17 found patient has ~ 90% relief of her achilles tendon pain.  She has been compliant with CAM boot.  No interim injury.       Review of Systems  Musculoskeletal: as above  Remainder of review of systems is negative including constitutional, CV, pulmonary, GI, Skin and Neurologic except as noted in HPI or medical history.    Past Medical History:   Diagnosis Date     Arthritis     paternal & maternal     Backache, unspecified     lumbar back pain, degen disk disease     Congestive heart failure (H)     paternal     COPD (chronic obstructive pulmonary disease) (H)     I get bronchitis frequently     Depressive disorder      "numerous yrs ago diagnosed with PTSD     Diabetes (H)     maternal & paternal     Heart disease     Paternal side     Hypertension     Paternal side     Leiomyoma of uterus, unspecified 2003    s/p LAVH     Other genital herpes     HSV-2     Thyroid disease     Me     Unspecified sinusitis (chronic)      Past Surgical History:   Procedure Laterality Date     COLONOSCOPY  4/5/2013    Procedure: COLONOSCOPY;  Colonoscopy  ;  Surgeon: Laura Ruff MD;  Location: [facility]     HYSTERECTOMY, PAP NO LONGER INDICATED  7/2003    LAVH for fibroids     LAPAROSCOPIC LYSIS ADHESIONS  1985     LASIK  10/2004     TONSILLECTOMY & ADENOIDECTOMY  age 18     TUBAL LIGATION  4/1994     Objective  /76  Ht 5' 1\" (1.549 m)  Wt 207 lb (93.9 kg)  LMP 07/29/2002  BMI 39.11 kg/m2    General: healthy, alert and in no distress    HEENT: no scleral icterus or conjunctival erythema   Skin: no suspicious lesions or rash. No jaundice.   CV: regular rhythm by palpation, 2+ distal pulses, no pedal edema    Resp: normal respiratory effort without conversational dyspnea   Psych: normal mood and affect    Gait: antalgic, appropriate coordination and balance   Neuro: normal light touch sensory exam of the extremities. Motor strength as noted below     Right Ankle/Foot Exam:    Inspection:       no visible ecchymosis       no visible edema or effusion    Foot inspection:       no deformity noted       pes planus, mild    ROM:        full ROM with dorsiflexion, plantarflexion, inversion and eversion, painful passive and active dorsiflexion, improved    Tender:       Achilles tendon and retrocalcaneal bursa, improved but still present    Non-Tender:       remainder of foot and ankle       lateral malleolus       lateral ankle ligaments       deltoid ligament       posterior edge of lateral malleolus       proximal 5th metatarsal       dorsal tibiotalar joint       tarsal navicular       medial malleolus       distal tibiofibular " joint       proximal 1st and 2nd intermetatarsal ligament    Skin:       well perfused       capillary refill less than 2 seconds    Special Tests:       neg (-) anterior drawer        neg (-) talar tilt        neg (-) external rotation testing        Neg beasley test    Gait:       nonantalgic gait in walking boot    Proprioception:        Deferred    Left hip exam    Inspection:        no edema or ecchymosis in hip area    ROM:       Full active and passive ROM       painful adduction and ER, mild and localized over lateral hip    Strength:        flexion 5/5       extension 5/5       abduction 5/5       adduction 4/5 due to pain    Tender:        greater trochanter       SI joint mild b/l but chronic, known and unrelated    Non Tender:        remainder of hip area       illiac crest       ASIS       pubis    Sensation:        grossly intact in hip and thigh    Skin:       well perfused       capillary refill brisk    Special Tests:        neg (-) J LUIS       neg (-) FADIR       neg (-) scour       positive (+) Santo    Procedure  After risks, benefits and complications of steroid injection were discussed with the patient, a consent form was signed and the patient elected to proceed.  Using sterile technique, the area was first prepped with betadyne and an alcohol swab.  A 25 gauge  needle was used to inject a mixture of 40 mg Kenalog 2 ml of 0.5% marcaine and 2 ml of 1% lidocaine into the greater trochanteric bursa on the left.  The patient tolerated the procedure well without complications.      Radiology:  No imaging performed today    Assessment:  1. Retrocalcaneal bursitis, right    2. Achilles tendinitis, right leg    3. Trochanteric bursitis of left hip        Plan:  Discussed the assessment with the patient.  Follow up: 2-3 weeks  Footwear choices reviewed again, start to wean boot progressively as tolerated  Limit NSAIDs if possible  RICE reviewed  Hip and ankle exercises reviewed for when improving, PT  ordered  Low impact activity reviewed  Walking boot and assistive device use reviewed for immobilization, rest, pain control  Consider US guided retrocalcaneal bursa injection for labile sx  CSI to greater trochanteric bursa today  Work letter provided with restrictions  Supportive care reviewed  All questions were answered today  Contact us with additional questions or concerns  Signs and sx of concern reviewed      Joel Rascon DO, CAQ  Primary Care Sports Medicine  [facility]

## 2017-07-06 NOTE — NURSING NOTE
"Chief Complaint   Patient presents with     Musculoskeletal Problem     f/u right achilles tendon and LBP        Initial /76  Ht 5' 1\" (1.549 m)  Wt 207 lb (93.9 kg)  LMP 07/29/2002  BMI 39.11 kg/m2 Estimated body mass index is 39.11 kg/(m^2) as calculated from the following:    Height as of this encounter: 5' 1\" (1.549 m).    Weight as of this encounter: 207 lb (93.9 kg).  Medication Reconciliation: complete     Pola Rosenberg ATC  "

## 2017-07-06 NOTE — MR AVS SNAPSHOT
"              After Visit Summary   7/6/2017    Nidia Acosta    MRN: [id number]           Patient Information     Date Of Birth          1960        Visit Information        Provider Department      7/6/2017 4:00 PM Joel Rascon, DO [facility]        Today's Diagnoses     Retrocalcaneal bursitis, right    -  1    Achilles tendinitis, right leg        Trochanteric bursitis of left hip          Care Instructions    You have been to [facility] physical therapy, [phone].  Please schedule with Lul Jordan or Monae Waldron.    Trochanteric bursa injection completed today.  Work restrictions provided.          Follow-ups after your visit        Additional Services     PHYSICAL THERAPY REFERRAL       *This therapy referral will be filtered to a centralized scheduling office at [facility] and the patient will receive a call to schedule an appointment at a [facility] location most convenient for them. *     [facility] provides Physical Therapy evaluation and treatment and many specialty services across the [facility] system.  If requesting a specialty program, please choose from the list below.    If you have not heard from the scheduling office within 2 business days, please call [phone] for all locations, with the exception of [place], please call [phone].  Treatment: Evaluation & Treatment  Special Instructions/Modalities: work with Lul Jordan or Monae Waldron  Special Programs: None    Please be aware that coverage of these services is subject to the terms and limitations of your health insurance plan.  Call member services at your health plan with any benefit or coverage questions.      **Note to Provider:  If you are referring outside of [facility] for the therapy appointment, please list the name of the location in the \"special instructions\" above, print the referral and give to the patient to schedule the " "appointment.                  Who to contact     If you have questions or need follow up information about today's clinic visit or your schedule please contact [facility] directly at [phone].  Normal or non-critical lab and imaging results will be communicated to you by MyChart, letter or phone within 4 business days after the clinic has received the results. If you do not hear from us within 7 days, please contact the clinic through MyChart or phone. If you have a critical or abnormal lab result, we will notify you by phone as soon as possible.  Submit refill requests through [employer] or call your pharmacy and they will forward the refill request to us. Please allow 3 business days for your refill to be completed.          Additional Information About Your Visit        [employer] Information     [employer] gives you secure access to your electronic health record. If you see a primary care provider, you can also send messages to your care team and make appointments. If you have questions, please call your primary care clinic.  If you do not have a primary care provider, please call [phone] and they will assist you.        Care EveryWhere ID     This is your Care EveryWhere ID. This could be used by other organizations to access your [place] medical records  [id number]        Your Vitals Were     Height Last Period BMI (Body Mass Index)             5' 1\" (1.549 m) 07/29/2002 39.11 kg/m2          Blood Pressure from Last 3 Encounters:   07/06/17 130/76   06/28/17 131/79   05/30/17 116/78    Weight from Last 3 Encounters:   07/06/17 207 lb (93.9 kg)   06/28/17 207 lb (93.9 kg)   05/30/17 207 lb (93.9 kg)              We Performed the Following     PHYSICAL THERAPY REFERRAL        Primary Care Provider Office Phone # Fax #    Merlene Reyes [phone]       [facility] [address] [facility] [address]        Equal Access to Services     KODY SUN" AH: Hadii lora ayalamarieyocasta Checo, waaxda luqadaha, qaybta kawill feliz, joaquin gerin hayaamicaela milesoneil allen joe figueroa. So [facility] [phone].    ATENCIÓN: Si cherylla jonnathan, tiene a shea disposición servicios gratuitos de asistencia lingüística. Llame al [phone].    We comply with applicable federal civil rights laws and [place] laws. We do not discriminate on the basis of race, color, national origin, age, disability sex, sexual orientation or gender identity.            Thank you!     Thank you for choosing [facility]  for your care. Our goal is always to provide you with excellent care. Hearing back from our patients is one way we can continue to improve our services. Please take a few minutes to complete the written survey that you may receive in the mail after your visit with us. Thank you!             Your Updated Medication List - Protect others around you: Learn how to safely use, store and throw away your medicines at www.disposemymeds.org.          This list is accurate as of: 7/6/17  5:10 PM.  Always use your most recent med list.                   Brand Name Dispense Instructions for use Diagnosis    citalopram 20 MG tablet    celeXA    90 tablet    Take 1 tablet (20 mg) by mouth daily    PTSD (post-traumatic stress disorder), Adjustment disorder with depressed mood       CLARITIN PO      AllerClear Brand        fluconazole 150 MG tablet    DIFLUCAN    2 tablet    Take 1 at onset of symptoms and 1 at end of antibiotics    Candidal vulvovaginitis       levothyroxine 100 MCG tablet    SYNTHROID    90 tablet    Take 1 tablet (100 mcg) by mouth every morning    Hypothyroidism due to acquired atrophy of thyroid       * MISC NATURAL PRODUCTS PO      PD 80/20        * MISC NATURAL PRODUCTS PO      All natural for bones, ligaments, muscles etc..        * MISC NATURAL PRODUCTS PO      Essential Oils for calming, stress        simvastatin 20 MG tablet    ZOCOR    90 tablet     Take 1 tablet (20 mg) by mouth At Bedtime    Hyperlipidemia LDL goal <130       SULFAZINE PO      Over all health OTC all natural medication        valACYclovir 500 MG tablet    VALTREX    90 tablet    Take 1 tablet (500 mg) by mouth daily Use as needed daily prn    Herpes simplex infection of genitourinary system       * Notice:  This list has 3 medication(s) that are the same as other medications prescribed for you. Read the directions carefully, and ask your doctor or other care provider to review them with you.

## 2017-07-07 RX ORDER — TRIAMCINOLONE ACETONIDE 40 MG/ML
40 INJECTION, SUSPENSION INTRA-ARTICULAR; INTRAMUSCULAR ONCE
Qty: 1 ML | Refills: 0 | OUTPATIENT
Start: 2017-07-07 | End: 2017-07-07

## 2017-07-14 ENCOUNTER — HOSPITAL ENCOUNTER (OUTPATIENT)
Dept: PHYSICAL THERAPY | Facility: CLINIC | Age: 57
Setting detail: THERAPIES SERIES
End: 2017-07-14
Attending: FAMILY MEDICINE
Payer: OTHER MISCELLANEOUS

## 2017-07-14 PROCEDURE — 97161 PT EVAL LOW COMPLEX 20 MIN: CPT | Mod: GP | Performed by: PHYSICAL THERAPIST

## 2017-07-14 PROCEDURE — 97110 THERAPEUTIC EXERCISES: CPT | Mod: GP | Performed by: PHYSICAL THERAPIST

## 2017-07-14 PROCEDURE — 40000718 ZZHC STATISTIC PT DEPARTMENT ORTHO VISIT: Performed by: PHYSICAL THERAPIST

## 2017-07-14 NOTE — PROGRESS NOTES
" PHYSICAL THERAPY INITIAL EVALUATION  07/14/17 1300   General Information   Type of Visit Initial OP Ortho PT Evaluation   Start of Care Date 07/14/17   Referring Physician Dr. Rascon   Patient/Family Goals Statement \"ready to be done\"    Orders Evaluate and Treat   Date of Order 07/06/17   Insurance Type Other   Insurance Comments/Visits Authorized Work Comp   Medical Diagnosis retrocalcaneal bursitis, right. achilles tendonitis, right. 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 Was going up the stairs and fell landing on left hip and shoulder. Had a cortisone injection in the left hip last week and now able to lay on L side without pain. States she was in walking boot on June 6th all day. Since last week, starting to wean out of it. Went hour and half without today.     Impairments A. Pain;B. Decreased WB tolerance;C. Swelling;H. Impaired gait   Functional Limitations perform activities of daily living;perform required work activities;perform desired leisure / sports activities   Symptom Location R foot, L hip   How/Where did it occur At work   Onset date of current episode/exacerbation 06/03/17   Chronicity New   Pain rating (0-10 point scale) Best (/10);Worst (/10)   Best (/10) 2-3   Worst (/10) 5   Pain quality C. Aching;F. Stabbing;D. Burning   Pain/symptoms exacerbated by B. Walking;M. Other   Pain exacerbation comment stairs, elliptical, exercise ball activities    Prior Level of Function   Prior Level of Function-Mobility independent   Prior Level of Function-ADLs independent   Current Level of Function   Patient role/employment history A. Employed   Employment Comments still working, no-walking restriction " until 7/20   Current equipment-Gait/Locomotion None   Fall Risk Screen   Fall screen completed by PT   Per patient - Fall 2 or more times in past year? Yes   Per patient - Fall with injury in past year? No   Timed Up and Go score (seconds) 9.6   Is patient a fall risk? No   Functional Scales   Functional Scales Other   Other Scales  LEFS: 34/80   Ankle/Foot Objective Findings   Side (if bilateral, select both right and left) Right;Left   Gait/Locomotion antalgic, decreased WB R, decrease step length left, decrease stance time right   Balance/ Proprioception (Single Leg Stance) normal   Windlass Test -   Anterior Drawer Test -   Posterior Drawer Test -   Talar Tilt Test -   Palpation very tender midsubstance achilles R, prox calf R, ATFL left    Accessory Motion/Joint Mobility mild hypomobile rear foot R   Right DF (Knee Ext) AROM -3, pain achilles   Right PF AROM 45, pain achilles   Right Calcanceal Inversion AROM 20, pain medial ankle   Right Calcaneal Eversion AROM 17   Left DF (Knee Ext) AROM 10   Left PF AROM 60   Left Calcanceal Inversion AROM 40   Left Calcaneal Eversion AROM 20   Hip Objective Findings   Side (if bilateral, select both right and left) Right;Left   Femoral Nerve Stretch Test -   Gluteal Tendopathy Test + 20 deg abduction   Straight Leg Raise Test + for low back pain    Scour Test -   J LUIS Test caused ant groin pain   FADIR Test -   Palpation tender iliopsoas B, gerater trochanter bursae, glute med/min tendon and muscle bellies   Johann Flexibility Test + L   Obers/ITB Flexibility mild tight L   Right Hamstring Flexibility mild tight   Right Piriformis Flexibility mild tight   Right Prone Quad Flexibility mod tight   Left Hip Flexion PROM WNL   Left Hip Abduction PROM WNL   Left Hip Adduction PROM WNL   Left Hip ER PROM WNL   Left Hip IR PROM WNL   Left Hip Ext PROM WNL   Left Hip Abduction Strength 4/5, mild pain   Left Hamstring Flexibility mild tight   Left Piriformis Flexibility mild tight    Left Prone Quad Flexibility mod tight   Planned Therapy Interventions   Planned Therapy Interventions balance training;gait training;joint mobilization;manual therapy;neuromuscular re-education;ROM;strengthening;stretching   Clinical Impression   Criteria for Skilled Therapeutic Interventions Met yes, treatment indicated   PT Diagnosis left hip pain, right ankle pain   Influenced by the following impairments pain, decreased ROM, decreased strength, impaired gait   Functional limitations due to impairments walking, stairs, squatting   Clinical Presentation Stable/Uncomplicated   Clinical Presentation Rationale improving symptoms   Clinical Decision Making (Complexity) Low complexity   Therapy Frequency 2 times/Week   Predicted Duration of Therapy Intervention (days/wks) 2-3 weeks tapering to 1x/week for 3 weeks, 1x every other week for 2 weeks   Risk & Benefits of therapy have been explained Yes   Patient, Family & other staff in agreement with plan of care Yes   Clinical Impression Comments Patient presenting with signs and symptoms consistent with R achilles midsubstance tendonitis, retrocalcaneal bursae not tender today, and left hip greater trochanteric bursitis   Education Assessment   Preferred Learning Style Listening;Demonstration;Pictures/video   Barriers to Learning No barriers   ORTHO GOALS   PT Ortho Eval Goals 1;2;3;4   Ortho Goal 1   Goal Identifier 1   Goal Description Patient will be able to walk 2 blocks without a limp and pain 2/10 or less.   Target Date 08/11/17   Ortho Goal 2   Goal Identifier 2   Goal Description Patient will be able to walk 1 mile for fitness without a limp and pain 2/10 or less.   Target Date 09/08/17   Ortho Goal 3   Goal Identifier 3   Goal Description Patient will be able to ascend/descend stairs, alternating pattern, no rail, demonstrating normal gait mechanics and pain 2/10 or less.   Target Date 08/25/17   Ortho Goal 4   Goal Identifier 4   Goal Description Patient will  be able to return to full unrestricted work with pain 2/10 or less at end of day.   Target Date 08/11/17   Total Evaluation Time   Total Evaluation Time 35       Please contact me with any questions or concerns.  Thank you for your referral.    Monae Waldron, PT, DPT, OCS  Physical Therapist, Orthopedic Certified Specialist  [facility]  [phone]

## 2017-07-17 ENCOUNTER — HOSPITAL ENCOUNTER (OUTPATIENT)
Dept: PHYSICAL THERAPY | Facility: CLINIC | Age: 57
Setting detail: THERAPIES SERIES
End: 2017-07-17
Attending: FAMILY MEDICINE
Payer: OTHER MISCELLANEOUS

## 2017-07-17 PROCEDURE — 97110 THERAPEUTIC EXERCISES: CPT | Mod: GP | Performed by: PHYSICAL THERAPIST

## 2017-07-17 PROCEDURE — 40000718 ZZHC STATISTIC PT DEPARTMENT ORTHO VISIT: Performed by: PHYSICAL THERAPIST

## 2017-07-17 PROCEDURE — 97140 MANUAL THERAPY 1/> REGIONS: CPT | Mod: GP | Performed by: PHYSICAL THERAPIST

## 2017-07-20 ENCOUNTER — OFFICE VISIT (OUTPATIENT)
Dept: ORTHOPEDICS | Facility: CLINIC | Age: 57
End: 2017-07-20
Payer: OTHER MISCELLANEOUS

## 2017-07-20 VITALS
HEIGHT: 61 IN | SYSTOLIC BLOOD PRESSURE: 128 MMHG | WEIGHT: 207 LBS | DIASTOLIC BLOOD PRESSURE: 78 MMHG | BODY MASS INDEX: 39.08 KG/M2

## 2017-07-20 DIAGNOSIS — M70.62 TROCHANTERIC BURSITIS OF LEFT HIP: ICD-10-CM

## 2017-07-20 DIAGNOSIS — M77.51 RETROCALCANEAL BURSITIS, RIGHT: ICD-10-CM

## 2017-07-20 DIAGNOSIS — M76.61 ACHILLES TENDINITIS, RIGHT LEG: Primary | ICD-10-CM

## 2017-07-20 PROCEDURE — 99213 OFFICE O/P EST LOW 20 MIN: CPT | Performed by: FAMILY MEDICINE

## 2017-07-20 NOTE — LETTER
[facility]  [address]  [address]  [place] [address]  Phone: [phone]  Fax: [phone]    July 20, 2017      Nidia Acosta  [address] [facility] [address]        Nidia was seen in my office today for follow up from an injury that occurred at work on 6/3/2017. She can continue to work at this time, but I am recommending only sitting or desk work for the next 4 weeks on Sundays only.  She can return to full duty without restriction on Tuesdays and Saturdays.  After 4 weeks we will plan for her to return to full duty on all three days.  She will continue in physical therapy for now as well.  She will follow up with me in 4 weeks if needed.  Updated recommendations will be provided as needed based on her progress.  If she does not return to me within 3 months, than her case can be closed with determination that she has met MMI without partial or permanent disability.        Joel Soares, , CAQ  Primary Care Sports Medicine  [facility]

## 2017-07-20 NOTE — PROGRESS NOTES
Nidia Acosta  :  1960  DOS: 2017  MRN: [id number]    Sports Medicine Clinic Visit    PCP: Merlene Reyes    Nidia Acosta is a 57 year old female who is seen as an AIC patient presenting with right achilles tendon pain.    Injury: Gradual onset of worsening right achilles tendon over last 3 weeks (6/3/17) after falling and landing on left hip/ankle while at work.  Pain located over right achilles tendon body/insertion, nonradiating.  Additional Features:  Positive: swelling, grinding and painful walking.  Symptoms are better with Ibuprofen and Rest.  Symptoms are worse with: going from sit to stand, prolonged walking, ankle DF.  Other evaluation and/or treatments so far consists of: Ice, Ibuprofen, Rest and chiropractic.  Prior imaging: No recent imaging completed.  Prior History of related problems: none    Second complaint of left lateral hip pain following above mentioned fall.  Pain located over deep lateral hip.  Pain is worse with lying on left side, going from sit to stand.  Currently treating with chiropractic and rest with intermittent relief.  Previous h/o radiating low back pain and hip following a fall ~ 1 year ago - improved with physical therapy.    Social History: works as  @ [email or web address]    Interim History - 2017  Since last visit on 17 found patient has ~ 90% relief of her achilles tendon pain.  She has been compliant with CAM boot.  No interim injury.       Interim History - 2017  Since last visit on 2017 patient has only mild right achilles tendon pain today, minimal left hip pain.  Left hip trochanteric bursa injection completed on  provided good relief.  Notes that she has been weaning from CAM boot over the last 5 days.  No new injury in the interim.      Review of Systems  Musculoskeletal: as above  Remainder of review of systems is negative including constitutional, CV, pulmonary, GI, Skin and Neurologic except as noted in HPI or  "medical history.    Past Medical History:   Diagnosis Date     Arthritis     paternal & maternal     Backache, unspecified     lumbar back pain, degen disk disease     Congestive heart failure (H)     paternal     COPD (chronic obstructive pulmonary disease) (H)     I get bronchitis frequently     Depressive disorder     numerous yrs ago diagnosed with PTSD     Diabetes (H)     maternal & paternal     Heart disease     Paternal side     Hypertension     Paternal side     Leiomyoma of uterus, unspecified 2003    s/p LAVH     Other genital herpes     HSV-2     Thyroid disease     Me     Unspecified sinusitis (chronic)      Past Surgical History:   Procedure Laterality Date     COLONOSCOPY  4/5/2013    Procedure: COLONOSCOPY;  Colonoscopy  ;  Surgeon: Laura Ruff MD;  Location: [facility]     HYSTERECTOMY, PAP NO LONGER INDICATED  7/2003    LAVH for fibroids     LAPAROSCOPIC LYSIS ADHESIONS  1985     LASIK  10/2004     TONSILLECTOMY & ADENOIDECTOMY  age 18     TUBAL LIGATION  4/1994     Objective  /78  Ht 5' 1\" (1.549 m)  Wt 207 lb (93.9 kg)  LMP 07/29/2002  BMI 39.11 kg/m2    General: healthy, alert and in no distress    HEENT: no scleral icterus or conjunctival erythema   Skin: no suspicious lesions or rash. No jaundice.   CV: regular rhythm by palpation, 2+ distal pulses, no pedal edema    Resp: normal respiratory effort without conversational dyspnea   Psych: normal mood and affect    Gait: minimally antalgic, appropriate coordination and balance   Neuro: normal light touch sensory exam of the extremities. Motor strength as noted below     Right Ankle/Foot Exam:    Inspection:       no visible ecchymosis       no visible edema or effusion    Foot inspection:       no deformity noted       pes planus, mild    ROM:        full ROM with dorsiflexion, plantarflexion, inversion and eversion, painful passive and active dorsiflexion, improved    Tender:       Achilles tendon and retrocalcaneal bursa, " improved, very mild over bursa only    Non-Tender:       remainder of foot and ankle       lateral malleolus       lateral ankle ligaments       deltoid ligament       posterior edge of lateral malleolus       proximal 5th metatarsal       dorsal tibiotalar joint       tarsal navicular       medial malleolus       distal tibiofibular joint       proximal 1st and 2nd intermetatarsal ligament    Skin:       well perfused       capillary refill less than 2 seconds    Special Tests:       neg (-) anterior drawer        neg (-) talar tilt        neg (-) external rotation testing        Neg beasley test    Gait:       Minimally antalgic gait out of walking boot    Proprioception:        Deferred    Left hip exam    Inspection:        no edema or ecchymosis in hip area    ROM:       Full active and passive ROM       painful adduction and ER, mild and localized over lateral hip, improved    Strength:        flexion 5/5       extension 5/5       abduction 5/5       adduction 4/5 due to pain improved    Tender:        greater trochanter improved       SI joint mild b/l but chronic, known and unrelated    Non Tender:        remainder of hip area       illiac crest       ASIS       pubis    Sensation:        grossly intact in hip and thigh    Skin:       well perfused       capillary refill brisk    Special Tests:        neg (-) J LUIS       neg (-) FADIR       neg (-) scour    Radiology:  No imaging performed today    Assessment:  1. Achilles tendinitis, right leg    2. Retrocalcaneal bursitis, right    3. Trochanteric bursitis of left hip        Plan:  Discussed the assessment with the patient.  Follow up: 1-2 mo prn  Footwear choices reviewed again, start to wean boot progressively as tolerated  Limit NSAIDs if possible  RICE reviewed  Hip and ankle exercises reviewed for when improving, PT ongoing  Low impact activity reviewed  Walking boot and assistive device use reviewed for immobilization, rest, pain control, no longer  needed  Consider US guided retrocalcaneal bursa injection for labile sx, but doubt will be needed  CSI to greater trochanteric bursa had good relief  Work letter provided with restrictions, which were decreased and can return to full duty in 4 weeks if she continues to improve  Supportive care reviewed  All questions were answered today  Contact us with additional questions or concerns  Signs and sx of concern reviewed      Joel Rascon DO, CAQ  Primary Care Sports Medicine  [facility]

## 2017-07-20 NOTE — NURSING NOTE
"Chief Complaint   Patient presents with     Musculoskeletal Problem     f/u right achilles tendon and left lateral hip pain       Initial /78  Ht 5' 1\" (1.549 m)  Wt 207 lb (93.9 kg)  LMP 07/29/2002  BMI 39.11 kg/m2 Estimated body mass index is 39.11 kg/(m^2) as calculated from the following:    Height as of this encounter: 5' 1\" (1.549 m).    Weight as of this encounter: 207 lb (93.9 kg).  Medication Reconciliation: complete     Pola Rosenberg, ANGY  "

## 2017-07-20 NOTE — MR AVS SNAPSHOT
After Visit Summary   7/20/2017    Nidia Acosta    MRN: [id number]           Patient Information     Date Of Birth          1960        Visit Information        Provider Department      7/20/2017 4:20 PM Joel Rascon,  [facility]        Today's Diagnoses     Achilles tendinitis, right leg    -  1    Retrocalcaneal bursitis, right        Trochanteric bursitis of left hip           Follow-ups after your visit        Your next 10 appointments already scheduled     Jul 25, 2017  4:30 PM CDT   Ortho Treatment with Monae Waldron, PT   [facility] ([facility])    [address]  [address]  [place] [address]   [phone]            Jul 27, 2017  2:30 PM CDT   Ortho Treatment with Monae Waldron, PT   [facility] ([facility])    [address]  [address]  [place] [address]   [phone]            Aug 01, 2017  4:30 PM CDT   Ortho Treatment with Monae Waldron PT   [facility] ([facility])    [address]  [address]  [place] [address]   [phone]            Aug 03, 2017  4:30 PM CDT   Ortho Treatment with Monae Waldron, PT   [facility] ([facility])    [address]  [address]  [place] [address]   [phone]              Who to contact     If you have questions or need follow up information about today's clinic visit or your schedule please contact [facility] directly at [phone].  Normal or non-critical lab and imaging results will be communicated to you by MyChart, letter or phone within 4 business days after the clinic has received the results. If you do not hear from us within 7 days, please contact the clinic through MyChart or phone. If you have a critical or abnormal lab result, we will notify you by phone as soon as  "possible.  Submit refill requests through [employer] or call your pharmacy and they will forward the refill request to us. Please allow 3 business days for your refill to be completed.          Additional Information About Your Visit        [facility] Information     [employer] gives you secure access to your electronic health record. If you see a primary care provider, you can also send messages to your care team and make appointments. If you have questions, please call your primary care clinic.  If you do not have a primary care provider, please call [phone] and they will assist you.        Care EveryWhere ID     This is your Care EveryWhere ID. This could be used by other organizations to access your [facility] medical records  [id number]        Your Vitals Were     Height Last Period BMI (Body Mass Index)             5' 1\" (1.549 m) 07/29/2002 39.11 kg/m2          Blood Pressure from Last 3 Encounters:   07/20/17 128/78   07/06/17 130/76   06/28/17 131/79    Weight from Last 3 Encounters:   07/20/17 207 lb (93.9 kg)   07/06/17 207 lb (93.9 kg)   06/28/17 207 lb (93.9 kg)              Today, you had the following     No orders found for display       Primary Care Provider Office Phone # Fax #    Merlene Reyes [phone]       [facility] [address] [facility] [address]        Equal Access to Services     KODY SUN AH: Hadii lora moraeso Sotin, waaxda luqadaha, qaybta kaalmada trini, joaquin diaz . So [facility] [phone].    ATENCIÓN: Si habla español, tiene a shea disposición servicios gratuitos de asistencia lingüística. Abdon al [phone].    We comply with applicable federal civil rights laws and [place] laws. We do not discriminate on the basis of race, color, national origin, age, disability sex, sexual orientation or gender identity.            Thank you!     Thank you for choosing [facility]  for your " care. Our goal is always to provide you with excellent care. Hearing back from our patients is one way we can continue to improve our services. Please take a few minutes to complete the written survey that you may receive in the mail after your visit with us. Thank you!             Your Updated Medication List - Protect others around you: Learn how to safely use, store and throw away your medicines at www.disposemymeds.org.          This list is accurate as of: 7/20/17 11:59 PM.  Always use your most recent med list.                   Brand Name Dispense Instructions for use Diagnosis    citalopram 20 MG tablet    celeXA    90 tablet    Take 1 tablet (20 mg) by mouth daily    PTSD (post-traumatic stress disorder), Adjustment disorder with depressed mood       CLARITIN PO      AllerClear Brand        fluconazole 150 MG tablet    DIFLUCAN    2 tablet    Take 1 at onset of symptoms and 1 at end of antibiotics    Candidal vulvovaginitis       levothyroxine 100 MCG tablet    SYNTHROID    90 tablet    Take 1 tablet (100 mcg) by mouth every morning    Hypothyroidism due to acquired atrophy of thyroid       * MISC NATURAL PRODUCTS PO      PD 80/20        * MISC NATURAL PRODUCTS PO      All natural for bones, ligaments, muscles etc..        * MISC NATURAL PRODUCTS PO      Essential Oils for calming, stress        simvastatin 20 MG tablet    ZOCOR    90 tablet    Take 1 tablet (20 mg) by mouth At Bedtime    Hyperlipidemia LDL goal <130       SULFAZINE PO      Over all health OTC all natural medication        valACYclovir 500 MG tablet    VALTREX    90 tablet    Take 1 tablet (500 mg) by mouth daily Use as needed daily prn    Herpes simplex infection of genitourinary system       * Notice:  This list has 3 medication(s) that are the same as other medications prescribed for you. Read the directions carefully, and ask your doctor or other care provider to review them with you.

## 2017-07-21 ENCOUNTER — HOSPITAL ENCOUNTER (OUTPATIENT)
Dept: PHYSICAL THERAPY | Facility: CLINIC | Age: 57
Setting detail: THERAPIES SERIES
End: 2017-07-21
Attending: FAMILY MEDICINE
Payer: OTHER MISCELLANEOUS

## 2017-07-21 PROCEDURE — 40000718 ZZHC STATISTIC PT DEPARTMENT ORTHO VISIT: Performed by: PHYSICAL THERAPIST

## 2017-07-21 PROCEDURE — 97140 MANUAL THERAPY 1/> REGIONS: CPT | Mod: GP | Performed by: PHYSICAL THERAPIST

## 2017-07-21 PROCEDURE — 97110 THERAPEUTIC EXERCISES: CPT | Mod: GP | Performed by: PHYSICAL THERAPIST

## 2017-07-25 ENCOUNTER — HOSPITAL ENCOUNTER (OUTPATIENT)
Dept: PHYSICAL THERAPY | Facility: CLINIC | Age: 57
Setting detail: THERAPIES SERIES
End: 2017-07-25
Attending: FAMILY MEDICINE
Payer: OTHER MISCELLANEOUS

## 2017-07-25 PROCEDURE — 97110 THERAPEUTIC EXERCISES: CPT | Mod: GP | Performed by: PHYSICAL THERAPIST

## 2017-07-25 PROCEDURE — 97140 MANUAL THERAPY 1/> REGIONS: CPT | Mod: GP | Performed by: PHYSICAL THERAPIST

## 2017-07-25 PROCEDURE — 40000718 ZZHC STATISTIC PT DEPARTMENT ORTHO VISIT: Performed by: PHYSICAL THERAPIST

## 2017-07-27 ENCOUNTER — HOSPITAL ENCOUNTER (OUTPATIENT)
Dept: PHYSICAL THERAPY | Facility: CLINIC | Age: 57
Setting detail: THERAPIES SERIES
End: 2017-07-27
Attending: FAMILY MEDICINE
Payer: OTHER MISCELLANEOUS

## 2017-07-27 PROCEDURE — 97140 MANUAL THERAPY 1/> REGIONS: CPT | Mod: GP | Performed by: PHYSICAL THERAPIST

## 2017-07-27 PROCEDURE — 97112 NEUROMUSCULAR REEDUCATION: CPT | Mod: GP | Performed by: PHYSICAL THERAPIST

## 2017-07-27 PROCEDURE — 40000718 ZZHC STATISTIC PT DEPARTMENT ORTHO VISIT: Performed by: PHYSICAL THERAPIST

## 2017-08-01 ENCOUNTER — HOSPITAL ENCOUNTER (OUTPATIENT)
Dept: PHYSICAL THERAPY | Facility: CLINIC | Age: 57
Setting detail: THERAPIES SERIES
End: 2017-08-01
Attending: FAMILY MEDICINE
Payer: OTHER MISCELLANEOUS

## 2017-08-01 PROCEDURE — 40000718 ZZHC STATISTIC PT DEPARTMENT ORTHO VISIT: Performed by: PHYSICAL THERAPIST

## 2017-08-01 PROCEDURE — 97140 MANUAL THERAPY 1/> REGIONS: CPT | Mod: GP | Performed by: PHYSICAL THERAPIST

## 2017-08-01 PROCEDURE — 97110 THERAPEUTIC EXERCISES: CPT | Mod: GP | Performed by: PHYSICAL THERAPIST

## 2017-08-03 ENCOUNTER — HOSPITAL ENCOUNTER (OUTPATIENT)
Dept: PHYSICAL THERAPY | Facility: CLINIC | Age: 57
Setting detail: THERAPIES SERIES
End: 2017-08-03
Attending: FAMILY MEDICINE
Payer: OTHER MISCELLANEOUS

## 2017-08-03 PROCEDURE — 40000718 ZZHC STATISTIC PT DEPARTMENT ORTHO VISIT: Performed by: PHYSICAL THERAPIST

## 2017-08-03 PROCEDURE — 97140 MANUAL THERAPY 1/> REGIONS: CPT | Mod: GP | Performed by: PHYSICAL THERAPIST

## 2017-08-16 ENCOUNTER — HOSPITAL ENCOUNTER (OUTPATIENT)
Dept: PHYSICAL THERAPY | Facility: CLINIC | Age: 57
Setting detail: THERAPIES SERIES
End: 2017-08-16
Attending: FAMILY MEDICINE
Payer: OTHER MISCELLANEOUS

## 2017-08-16 PROCEDURE — 97140 MANUAL THERAPY 1/> REGIONS: CPT | Mod: GP | Performed by: PHYSICAL THERAPIST

## 2017-08-16 PROCEDURE — 97110 THERAPEUTIC EXERCISES: CPT | Mod: GP | Performed by: PHYSICAL THERAPIST

## 2017-08-16 PROCEDURE — 40000718 ZZHC STATISTIC PT DEPARTMENT ORTHO VISIT: Performed by: PHYSICAL THERAPIST

## 2017-08-23 ENCOUNTER — HOSPITAL ENCOUNTER (OUTPATIENT)
Dept: PHYSICAL THERAPY | Facility: CLINIC | Age: 57
Setting detail: THERAPIES SERIES
End: 2017-08-23
Attending: FAMILY MEDICINE
Payer: OTHER MISCELLANEOUS

## 2017-08-23 PROCEDURE — 97140 MANUAL THERAPY 1/> REGIONS: CPT | Mod: GP | Performed by: PHYSICAL THERAPIST

## 2017-08-23 PROCEDURE — 40000718 ZZHC STATISTIC PT DEPARTMENT ORTHO VISIT: Performed by: PHYSICAL THERAPIST

## 2017-08-23 PROCEDURE — 97110 THERAPEUTIC EXERCISES: CPT | Mod: GP | Performed by: PHYSICAL THERAPIST

## 2017-12-26 NOTE — ADDENDUM NOTE
Encounter addended by: Monae Waldron, PT on: 12/26/2017  4:09 PM<BR>     Actions taken: Sign clinical note, Flowsheet accepted, Episode resolved

## 2017-12-26 NOTE — PROGRESS NOTES
PHYSICAL THERAPY DISCHARGE NOTE  08/23/17 1600   Signing Clinician's Name / Credentials   Signing clinician's name / credentials Monae Waldron, PT, DPT, OCS   Session Number   Session Number 9/9 WC   Progress Note/Recertification   Progress Note Due Date 09/08/17   Adult Goals   PT Ortho Eval Goals 1;2;3;4   Ortho Goal 1   Goal Identifier 1   Goal Description Patient will be able to walk 2 blocks without a limp and pain 2/10 or less.   Target Date 08/11/17   Date Met 08/03/17   Ortho Goal 2   Goal Identifier 2   Goal Description Patient will be able to walk 1 mile for fitness without a limp and pain 2/10 or less.   Target Date 09/08/17   Date Met (has not yet tried)   Ortho Goal 3   Goal Identifier 3   Goal Description Patient will be able to ascend/descend stairs, alternating pattern, no rail, demonstrating normal gait mechanics and pain 2/10 or less.   Target Date 08/25/17   Date Met 08/16/17   Ortho Goal 4   Goal Identifier 4   Goal Description Patient will be able to return to full unrestricted work with pain 2/10 or less at end of day.   Target Date 08/11/17   Date Met 08/16/17   Subjective Report   Subjective Report Patient reports working yesterday, no pain in the ankle today unless she pushes on one tender spot. Overall she feels she is about 90% better.    Objective Measures   Objective Measures Objective Measure 1   Objective Measure 1   Objective Measure Palpation   Details no longer tender to palpation of the achilles tendon    Treatment Interventions   Interventions Therapeutic Procedure/Exercise;Manual Therapy;Neuromuscular Re-education   Therapeutic Procedure/exercise   Minutes 20   Skilled Intervention HEP instruction, strength to decrease pain and improve function   Patient Response tolerated well without soreness   Treatment Detail progressed SL clamshell to addition of RTB x 20 B, standing wall gastroc stretch 30' x 2 B, and standing soleus stretch 30' x 2. Eccentricl heel raises x 20.  Progressed ankle 4 way to GTB x 15 each.   Manual Therapy   Minutes 10   Skilled Intervention STM, CFM to increase blood flow and tissue extensibility to decrease pain with walking    Patient Response non-tender today, minimal tightness   Treatment Detail longitudinal CFM to achilles tendon, STM to gastroc-soleus complex   Education   Learner Patient   Readiness Eager   Method Booklet/handout;Explanation;Demonstration   Response Verbalizes Understanding;Demonstrates Understanding   Plan   Home program above ex   Updates to plan of care patient is doing really well, overall about 90% better and no longer tender along achilles tendon. Pt will continue exercises on her own and call PT if more visits needed   Plan for next session hold chart 30 days   Total Session Time   Timed Code Treatment Minutes 30   Total Treatment Time (sum of timed and untimed services) 30, te mt     UPDATE 12/26/17: patient did not need to return to physical therapy. Discharge from skilled PT services.        Please contact me with any questions or concerns.  Thank you for your referral.    Monae Waldron, PT, DPT, OCS  Physical Therapist, Orthopedic Certified Specialist  [facility]  [phone]

## 2018-01-21 ENCOUNTER — APPOINTMENT (OUTPATIENT)
Dept: GENERAL RADIOLOGY | Facility: CLINIC | Age: 58
End: 2018-01-21
Attending: EMERGENCY MEDICINE
Payer: COMMERCIAL

## 2018-01-21 ENCOUNTER — HOSPITAL ENCOUNTER (EMERGENCY)
Facility: CLINIC | Age: 58
Discharge: HOME | End: 2018-01-21
Attending: EMERGENCY MEDICINE | Admitting: EMERGENCY MEDICINE
Payer: COMMERCIAL

## 2018-01-21 VITALS
OXYGEN SATURATION: 98 % | SYSTOLIC BLOOD PRESSURE: 133 MMHG | RESPIRATION RATE: 16 BRPM | TEMPERATURE: 98.1 F | DIASTOLIC BLOOD PRESSURE: 75 MMHG

## 2018-01-21 DIAGNOSIS — M54.50 ACUTE LEFT-SIDED LOW BACK PAIN WITHOUT SCIATICA: ICD-10-CM

## 2018-01-21 DIAGNOSIS — R07.89 LEFT-SIDED CHEST WALL PAIN: ICD-10-CM
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
## 2018-01-21 PROCEDURE — 71101 X-RAY EXAM UNILAT RIBS/CHEST: CPT | Mod: LT

## 2018-01-21 PROCEDURE — 99284 EMERGENCY DEPT VISIT MOD MDM: CPT | Mod: Z6 | Performed by: EMERGENCY MEDICINE

## 2018-01-21 PROCEDURE — 99283 EMERGENCY DEPT VISIT LOW MDM: CPT | Mod: 25 | Performed by: EMERGENCY MEDICINE

## 2018-01-21 ASSESSMENT — ENCOUNTER SYMPTOMS
SHORTNESS OF BREATH: 0
ABDOMINAL PAIN: 0
FEVER: 0

## 2018-01-21 NOTE — LETTER
January 21, 2018      To Whom It May Concern:      Nidia Acosta was seen in our Emergency Department today, 01/21/18.  I expect her condition to improve over the next 2-3 days.  She may return to work/school when improved.    Sincerely,        Kwabena Mancuso MD
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
## 2018-01-21 NOTE — ED AVS SNAPSHOT
[facility] Emergency Department    [address] [address]    Phone:  [phone]    Fax:  [phone]                                       Nidia Acosta   MRN: [id number]    Department:  [facility] Emergency Department   Date of Visit:  1/21/2018           After Visit Summary Signature Page     I have received my discharge instructions, and my questions have been answered. I have discussed any challenges I see with this plan with the nurse or doctor.    ..........................................................................................................................................  Patient/Patient Representative Signature      ..........................................................................................................................................  Patient Representative Print Name and Relationship to Patient    ..................................................               ................................................  Date                                            Time    ..........................................................................................................................................  Reviewed by Signature/Title    ...................................................              ..............................................  Date                                                            Time
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
## 2018-06-25 ENCOUNTER — OFFICE VISIT (OUTPATIENT)
Dept: FAMILY MEDICINE | Facility: CLINIC | Age: 58
End: 2018-06-25
Payer: COMMERCIAL

## 2018-06-25 VITALS
DIASTOLIC BLOOD PRESSURE: 75 MMHG | WEIGHT: 203 LBS | HEART RATE: 81 BPM | BODY MASS INDEX: 38.33 KG/M2 | SYSTOLIC BLOOD PRESSURE: 124 MMHG | HEIGHT: 61 IN | TEMPERATURE: 98.3 F

## 2018-06-25 DIAGNOSIS — Z01.818 PREOP GENERAL PHYSICAL EXAM: Primary | ICD-10-CM

## 2018-06-25 DIAGNOSIS — E03.4 HYPOTHYROIDISM DUE TO ACQUIRED ATROPHY OF THYROID: ICD-10-CM

## 2018-06-25 DIAGNOSIS — M48.02 CERVICAL STENOSIS OF SPINAL CANAL: ICD-10-CM

## 2018-06-25 LAB
ERYTHROCYTE [DISTWIDTH] IN BLOOD BY AUTOMATED COUNT: 12.2 % (ref 10–15)
HCT VFR BLD AUTO: 40.8 % (ref 35–47)
HGB BLD-MCNC: 13.5 G/DL (ref 11.7–15.7)
MCH RBC QN AUTO: 30.1 PG (ref 26.5–33)
MCHC RBC AUTO-ENTMCNC: 33.1 G/DL (ref 31.5–36.5)
MCV RBC AUTO: 91 FL (ref 78–100)
PLATELET # BLD AUTO: 260 10E9/L (ref 150–450)
RBC # BLD AUTO: 4.49 10E12/L (ref 3.8–5.2)
WBC # BLD AUTO: 8.1 10E9/L (ref 4–11)

## 2018-06-25 PROCEDURE — 99214 OFFICE O/P EST MOD 30 MIN: CPT | Performed by: FAMILY MEDICINE

## 2018-06-25 PROCEDURE — 85027 COMPLETE CBC AUTOMATED: CPT | Performed by: FAMILY MEDICINE

## 2018-06-25 PROCEDURE — 84439 ASSAY OF FREE THYROXINE: CPT | Performed by: FAMILY MEDICINE

## 2018-06-25 PROCEDURE — 84443 ASSAY THYROID STIM HORMONE: CPT | Performed by: FAMILY MEDICINE

## 2018-06-25 PROCEDURE — 36415 COLL VENOUS BLD VENIPUNCTURE: CPT | Performed by: FAMILY MEDICINE

## 2018-06-25 PROCEDURE — 93000 ELECTROCARDIOGRAM COMPLETE: CPT | Performed by: FAMILY MEDICINE

## 2018-06-25 NOTE — LETTER
[facility]  [address] [facility] [address]  [phone]        November 5, 2018    Nidia Acosta  [address] [facility] [address]              Dear Nidia Acosta    This is to remind you that your lab is due.    You may call our office at [phone] to schedule an appointment.    Please disregard this notice if you have already had your labs drawn or made an appointment.        Sincerely,        Merlene Reyes MD
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
## 2018-06-25 NOTE — MR AVS SNAPSHOT
After Visit Summary   6/25/2018    Nidia Acosta    MRN: [id number]           Patient Information     Date Of Birth          1960        Visit Information        Provider Department      6/25/2018 5:30 PM Merlene Reyes MD [facility]        Today's Diagnoses     Preop general physical exam    -  1    Cervical stenosis of spinal canal        BMI 38.0-38.9,adult        Hypothyroidism due to acquired atrophy of thyroid          Care Instructions      Before Your Surgery      Call your surgeon if there is any change in your health. This includes signs of a cold or flu (such as a sore throat, runny nose, cough, rash or fever).    Do not smoke, drink alcohol or take over the counter medicine (unless your surgeon or primary care doctor tells you to) for the 24 hours before and after surgery.    If you take prescribed drugs: Follow your doctor s orders about which medicines to take and which to stop until after surgery.    Eating and drinking prior to surgery: follow the instructions from your surgeon    Take a shower or bath the night before surgery. Use the soap your surgeon gave you to gently clean your skin. If you do not have soap from your surgeon, use your regular soap. Do not shave or scrub the surgery site.  Wear clean pajamas and have clean sheets on your bed.           Follow-ups after your visit        Who to contact     Normal or non-critical lab and imaging results will be communicated to you by "[employer]hart, letter or phone within 4 business days after the clinic has received the results. If you do not hear from us within 7 days, please contact the clinic through "[employer]hart or phone. If you have a critical or abnormal lab result, we will notify you by phone as soon as possible.  Submit refill requests through [employer] or call your pharmacy and they will forward the refill request to us. Please allow 3 business days for your refill to be completed.          If you need to speak  "with a  for additional information , please call: [phone]             Additional Information About Your Visit        [employer] Information     [employer] gives you secure access to your electronic health record. If you see a primary care provider, you can also send messages to your care team and make appointments. If you have questions, please call your primary care clinic.  If you do not have a primary care provider, please call [phone] and they will assist you.        Care EveryWhere ID     This is your Care EveryWhere ID. This could be used by other organizations to access your [facility] medical records  [id number]        Your Vitals Were     Pulse Temperature Height Last Period BMI (Body Mass Index)       81 98.3  F (36.8  C) (Tympanic) 5' 1\" (1.549 m) 07/29/2002 38.36 kg/m2        Blood Pressure from Last 3 Encounters:   06/25/18 124/75   01/21/18 133/75   07/20/17 128/78    Weight from Last 3 Encounters:   06/25/18 203 lb (92.1 kg)   07/20/17 207 lb (93.9 kg)   07/06/17 207 lb (93.9 kg)              We Performed the Following     CBC with platelets     EKG 12-lead complete w/read - Clinics     TSH with free T4 reflex          Today's Medication Changes          These changes are accurate as of 6/25/18  6:44 PM.  If you have any questions, ask your nurse or doctor.               These medicines have changed or have updated prescriptions.        Dose/Directions    * MISC NATURAL PRODUCTS PO   This may have changed:  Another medication with the same name was removed. Continue taking this medication, and follow the directions you see here.   Changed by:  Merlene Reyes MD        Dose:  1 capsule   Take 1 capsule by mouth daily PD 80/20 - supports endocrine function   Refills:  0       * MISC NATURAL PRODUCTS PO   This may have changed:  Another medication with the same name was removed. Continue taking this medication, and follow the directions you see here.   Changed by:  Merlene Reyes " MD [place]        Dose:  1 capsule   Take 1 capsule by mouth daily Agiles - All natural for bones, ligaments, muscles etc..   Refills:  0       * Notice:  This list has 2 medication(s) that are the same as other medications prescribed for you. Read the directions carefully, and ask your doctor or other care provider to review them with you.             Primary Care Provider Office Phone # Fax #    Merlene Reyes [phone] [address] [facility] [address]        Equal Access to Services     [facility] CenterRICHARD : Hadii aad ku hadasho Soomaali, waaxda luqadaha, qaybta kaalmada adeegyada, waxay idiin hayaan adeoneil niñoaraomkar diaz . So [facility] [phone].    ATENCIÓN: Si habla español, tiene a shea disposición servicios gratuitos de asistencia lingüística. Llame al [phone].    We comply with applicable federal civil rights laws and [place] laws. We do not discriminate on the basis of race, color, national origin, age, disability, sex, sexual orientation, or gender identity.            Thank you!     Thank you for choosing [facility] [place] [facility]  for your care. Our goal is always to provide you with excellent care. Hearing back from our patients is one way we can continue to improve our services. Please take a few minutes to complete the written survey that you may receive in the mail after your visit with us. Thank you!             Your Updated Medication List - Protect others around you: Learn how to safely use, store and throw away your medicines at www.disposemymeds.org.          This list is accurate as of 6/25/18  6:44 PM.  Always use your most recent med list.                   Brand Name Dispense Instructions for use Diagnosis    CLARITIN PO      Take 10 mg by mouth daily AllerClear Brand        levothyroxine 100 MCG tablet    SYNTHROID/LEVOTHROID    30 tablet    TAKE ONE TABLET BY MOUTH IN THE MORNING    Hypothyroidism due to acquired atrophy of thyroid       * MISC NATURAL PRODUCTS PO       Take 1 capsule by mouth daily PD 80/20 - supports endocrine function        * MISC NATURAL PRODUCTS PO      Take 1 capsule by mouth daily Agiles - All natural for bones, ligaments, muscles etc..        * NONFORMULARY      Take 4 capsules by mouth daily Sulfurzyme -  combines wolfberry with MSM, a naturally occurring organic form of dietary sulfur needed by our bodies every day to maintain the structure of proteins, protect cells and cell membranes, replenish the connections between cells, and preserve the molecular framework of connective tissue.        * NONFORMULARY      Take 1 tablet by mouth daily Thyromine - The compounds that make Thyromine such an efficient thyroid supplement are: Adrenal powder from bovine, Rhea extract, Guggal tree extract, Suzanne extract, Piper Longum extract, thyroid powder from bovine and L-Tyrosine.        valACYclovir 500 MG tablet    VALTREX    30 tablet    Take 1 tablet (500 mg) by mouth daily NO REFILLS UNTIL OFFICE VISIT    Herpes simplex infection of genitourinary system       * Notice:  This list has 4 medication(s) that are the same as other medications prescribed for you. Read the directions carefully, and ask your doctor or other care provider to review them with you.

## 2018-06-26 LAB
T4 FREE SERPL-MCNC: 1.35 NG/DL (ref 0.76–1.46)
TSH SERPL DL<=0.005 MIU/L-ACNC: 4.63 MU/L (ref 0.4–4)

## 2018-07-01 RX ORDER — LEVOTHYROXINE SODIUM 112 UG/1
112 TABLET ORAL DAILY
Qty: 90 TABLET | Refills: 3 | Status: SHIPPED | OUTPATIENT
Start: 2018-07-01 | End: 2019-10-23

## 2018-07-29 DIAGNOSIS — A60.00 HERPES SIMPLEX INFECTION OF GENITOURINARY SYSTEM: ICD-10-CM

## 2018-07-30 NOTE — TELEPHONE ENCOUNTER
"ValACYclovir 500MG TAB        Last Written Prescription Date:  6/20/18  Last Fill Quantity: 30,   # refills: 0  Last Office Visit: 6/25/18  Future Office visit:       Requested Prescriptions   Pending Prescriptions Disp Refills     valACYclovir (VALTREX) 500 MG tablet [Pharmacy Med Name: ValACYclovir 500MG  TAB] 30 tablet 0     Sig: TAKE 1 TABLET BY MOUTH ONCE DAILY NEEDS  APPOINTMENT  FOR  FURTHER  REFILLS    Antivirals for Herpes Protocol Failed    7/29/2018 10:06 AM       Failed - Normal serum creatinine on file in past 12 months    Recent Labs   Lab Test  03/20/15   0900   CR  0.64            Passed - Patient is age 12 or older       Passed - Recent (12 mo) or future (30 days) visit within the authorizing provider's specialty    Patient had office visit in the last 12 months or has a visit in the next 30 days with authorizing provider or within the authorizing provider's specialty.  See \"Patient Info\" tab in inbasket, or \"Choose Columns\" in Meds & Orders section of the refill encounter.              "

## 2018-07-31 ENCOUNTER — HOSPITAL ENCOUNTER (OUTPATIENT)
Dept: MAMMOGRAPHY | Facility: CLINIC | Age: 58
Discharge: HOME | End: 2018-07-31
Attending: FAMILY MEDICINE | Admitting: FAMILY MEDICINE
Payer: COMMERCIAL

## 2018-07-31 DIAGNOSIS — Z12.31 VISIT FOR SCREENING MAMMOGRAM: ICD-10-CM

## 2018-07-31 PROCEDURE — 77067 SCR MAMMO BI INCL CAD: CPT

## 2018-08-01 RX ORDER — VALACYCLOVIR HYDROCHLORIDE 500 MG/1
500 TABLET, FILM COATED ORAL DAILY
Qty: 90 TABLET | Refills: 1 | Status: SHIPPED | OUTPATIENT
Start: 2018-08-01 | End: 2019-02-11

## 2018-09-05 ENCOUNTER — E-VISIT (OUTPATIENT)
Dept: FAMILY MEDICINE | Facility: CLINIC | Age: 58
End: 2018-09-05
Payer: COMMERCIAL

## 2018-09-05 ENCOUNTER — MYC MEDICAL ADVICE (OUTPATIENT)
Dept: FAMILY MEDICINE | Facility: CLINIC | Age: 58
End: 2018-09-05

## 2018-09-05 DIAGNOSIS — F32.0 MILD MAJOR DEPRESSION (H): Primary | ICD-10-CM

## 2018-09-05 PROCEDURE — 99444 ZZC PHYSICIAN ONLINE EVALUATION & MANAGEMENT SERVICE: CPT | Performed by: FAMILY MEDICINE

## 2018-09-05 RX ORDER — VENLAFAXINE HYDROCHLORIDE 37.5 MG/1
CAPSULE, EXTENDED RELEASE ORAL
Qty: 46 CAPSULE | Refills: 1 | Status: SHIPPED | OUTPATIENT
Start: 2018-09-05 | End: 2018-12-27

## 2018-09-05 ASSESSMENT — ANXIETY QUESTIONNAIRES
GAD7 TOTAL SCORE: 6
6. BECOMING EASILY ANNOYED OR IRRITABLE: SEVERAL DAYS
7. FEELING AFRAID AS IF SOMETHING AWFUL MIGHT HAPPEN: NOT AT ALL
GAD7 TOTAL SCORE: 6
3. WORRYING TOO MUCH ABOUT DIFFERENT THINGS: SEVERAL DAYS
7. FEELING AFRAID AS IF SOMETHING AWFUL MIGHT HAPPEN: NOT AT ALL
GAD7 TOTAL SCORE: 6
2. NOT BEING ABLE TO STOP OR CONTROL WORRYING: SEVERAL DAYS
5. BEING SO RESTLESS THAT IT IS HARD TO SIT STILL: SEVERAL DAYS
1. FEELING NERVOUS, ANXIOUS, OR ON EDGE: SEVERAL DAYS
4. TROUBLE RELAXING: SEVERAL DAYS

## 2018-09-05 ASSESSMENT — PATIENT HEALTH QUESTIONNAIRE - PHQ9
10. IF YOU CHECKED OFF ANY PROBLEMS, HOW DIFFICULT HAVE THESE PROBLEMS MADE IT FOR YOU TO DO YOUR WORK, TAKE CARE OF THINGS AT HOME, OR GET ALONG WITH OTHER PEOPLE: SOMEWHAT DIFFICULT
SUM OF ALL RESPONSES TO PHQ QUESTIONS 1-9: 4
SUM OF ALL RESPONSES TO PHQ QUESTIONS 1-9: 4

## 2018-09-06 ASSESSMENT — PATIENT HEALTH QUESTIONNAIRE - PHQ9: SUM OF ALL RESPONSES TO PHQ QUESTIONS 1-9: 4

## 2018-09-06 ASSESSMENT — ANXIETY QUESTIONNAIRES: GAD7 TOTAL SCORE: 6

## 2018-12-10 ENCOUNTER — TELEPHONE (OUTPATIENT)
Dept: FAMILY MEDICINE | Facility: CLINIC | Age: 58
End: 2018-12-10

## 2018-12-18 ENCOUNTER — TELEPHONE (OUTPATIENT)
Dept: FAMILY MEDICINE | Facility: CLINIC | Age: 58
End: 2018-12-18

## 2018-12-18 DIAGNOSIS — E03.4 HYPOTHYROIDISM DUE TO ACQUIRED ATROPHY OF THYROID: ICD-10-CM

## 2018-12-18 DIAGNOSIS — F32.0 MILD MAJOR DEPRESSION (H): Primary | ICD-10-CM

## 2018-12-18 NOTE — TELEPHONE ENCOUNTER
Reason for Call:  Other appointment    Detailed comments:  Nidia is due for labs but wondering does she just need labs or does she need to be seen also.  Last office visit was 6/25/18 for a pre op.  She doesn't want to come in twice for labs and then to find out she needs office visit.  Please review and advise. Thank you..Tita Vann    Phone Number Patient can be reached at: Home number on file:  [phone]    Best Time: any time    Can we leave a detailed message on this number? YES - Personal office phone you can leave detailed message.      Call taken on 12/18/2018 at 11:04 AM by Tita Vann

## 2018-12-18 NOTE — TELEPHONE ENCOUNTER
I will order the labs she will need to be seen also . If she can get the lab 1-2 days before the office visit they will be back and we can discuss change dose of medications et., Merlene Reyes M.D.

## 2018-12-21 ENCOUNTER — TELEPHONE (OUTPATIENT)
Dept: FAMILY MEDICINE | Facility: CLINIC | Age: 58
End: 2018-12-21

## 2018-12-21 DIAGNOSIS — F32.0 MILD MAJOR DEPRESSION (H): ICD-10-CM

## 2018-12-21 DIAGNOSIS — E03.4 HYPOTHYROIDISM DUE TO ACQUIRED ATROPHY OF THYROID: ICD-10-CM

## 2018-12-21 LAB
ANION GAP SERPL CALCULATED.3IONS-SCNC: 5 MMOL/L (ref 3–14)
BUN SERPL-MCNC: 14 MG/DL (ref 7–30)
CALCIUM SERPL-MCNC: 8.6 MG/DL (ref 8.5–10.1)
CHLORIDE SERPL-SCNC: 106 MMOL/L (ref 94–109)
CO2 SERPL-SCNC: 30 MMOL/L (ref 20–32)
CREAT SERPL-MCNC: 0.76 MG/DL (ref 0.52–1.04)
GFR SERPL CREATININE-BSD FRML MDRD: 88 ML/MIN/{1.73_M2}
GLUCOSE SERPL-MCNC: 88 MG/DL (ref 70–99)
POTASSIUM SERPL-SCNC: 4.5 MMOL/L (ref 3.4–5.3)
SODIUM SERPL-SCNC: 141 MMOL/L (ref 133–144)
TSH SERPL DL<=0.005 MIU/L-ACNC: 0.68 MU/L (ref 0.4–4)

## 2018-12-21 PROCEDURE — 36415 COLL VENOUS BLD VENIPUNCTURE: CPT | Performed by: FAMILY MEDICINE

## 2018-12-21 PROCEDURE — 80048 BASIC METABOLIC PNL TOTAL CA: CPT | Performed by: FAMILY MEDICINE

## 2018-12-21 PROCEDURE — 84443 ASSAY THYROID STIM HORMONE: CPT | Performed by: FAMILY MEDICINE

## 2018-12-21 NOTE — TELEPHONE ENCOUNTER
Nidia notified and scheduled for labs today and appointment with Dr. Reyes on 27th of December..Tita Vann

## 2018-12-27 ENCOUNTER — OFFICE VISIT (OUTPATIENT)
Dept: FAMILY MEDICINE | Facility: CLINIC | Age: 58
End: 2018-12-27
Payer: COMMERCIAL

## 2018-12-27 VITALS
WEIGHT: 206.4 LBS | BODY MASS INDEX: 38.97 KG/M2 | TEMPERATURE: 98.2 F | DIASTOLIC BLOOD PRESSURE: 64 MMHG | HEIGHT: 61 IN | SYSTOLIC BLOOD PRESSURE: 108 MMHG | HEART RATE: 88 BPM

## 2018-12-27 DIAGNOSIS — N76.0 BACTERIAL VAGINOSIS: ICD-10-CM

## 2018-12-27 DIAGNOSIS — B96.89 BACTERIAL VAGINOSIS: ICD-10-CM

## 2018-12-27 DIAGNOSIS — R39.89 URINARY PROBLEM: Primary | ICD-10-CM

## 2018-12-27 DIAGNOSIS — F32.0 MILD MAJOR DEPRESSION (H): ICD-10-CM

## 2018-12-27 DIAGNOSIS — F41.1 GAD (GENERALIZED ANXIETY DISORDER): ICD-10-CM

## 2018-12-27 LAB
ALBUMIN UR-MCNC: NEGATIVE MG/DL
APPEARANCE UR: CLEAR
BACTERIA #/AREA URNS HPF: ABNORMAL /HPF
BILIRUB UR QL STRIP: NEGATIVE
COLOR UR AUTO: YELLOW
GLUCOSE UR STRIP-MCNC: NEGATIVE MG/DL
HGB UR QL STRIP: NEGATIVE
KETONES UR STRIP-MCNC: NEGATIVE MG/DL
LEUKOCYTE ESTERASE UR QL STRIP: ABNORMAL
NITRATE UR QL: NEGATIVE
PH UR STRIP: 7 PH (ref 5–7)
RBC #/AREA URNS AUTO: ABNORMAL /HPF
SOURCE: ABNORMAL
SP GR UR STRIP: 1.01 (ref 1–1.03)
SPECIMEN SOURCE: ABNORMAL
UROBILINOGEN UR STRIP-ACNC: 0.2 EU/DL (ref 0.2–1)
WBC #/AREA URNS AUTO: ABNORMAL /HPF
WET PREP SPEC: ABNORMAL

## 2018-12-27 PROCEDURE — 99214 OFFICE O/P EST MOD 30 MIN: CPT | Performed by: FAMILY MEDICINE

## 2018-12-27 PROCEDURE — 87210 SMEAR WET MOUNT SALINE/INK: CPT | Performed by: FAMILY MEDICINE

## 2018-12-27 PROCEDURE — 81001 URINALYSIS AUTO W/SCOPE: CPT | Performed by: FAMILY MEDICINE

## 2018-12-27 RX ORDER — METRONIDAZOLE 7.5 MG/G
1 GEL VAGINAL 2 TIMES DAILY
Qty: 70 G | Refills: 0 | Status: SHIPPED | OUTPATIENT
Start: 2018-12-27 | End: 2019-03-05

## 2018-12-27 RX ORDER — VENLAFAXINE HYDROCHLORIDE 37.5 MG/1
CAPSULE, EXTENDED RELEASE ORAL
Qty: 91 CAPSULE | Refills: 1 | Status: SHIPPED | OUTPATIENT
Start: 2018-12-27 | End: 2018-12-28

## 2018-12-27 ASSESSMENT — ANXIETY QUESTIONNAIRES
2. NOT BEING ABLE TO STOP OR CONTROL WORRYING: SEVERAL DAYS
7. FEELING AFRAID AS IF SOMETHING AWFUL MIGHT HAPPEN: NOT AT ALL
GAD7 TOTAL SCORE: 6
1. FEELING NERVOUS, ANXIOUS, OR ON EDGE: SEVERAL DAYS
5. BEING SO RESTLESS THAT IT IS HARD TO SIT STILL: NOT AT ALL
6. BECOMING EASILY ANNOYED OR IRRITABLE: MORE THAN HALF THE DAYS
3. WORRYING TOO MUCH ABOUT DIFFERENT THINGS: SEVERAL DAYS

## 2018-12-27 ASSESSMENT — PATIENT HEALTH QUESTIONNAIRE - PHQ9
SUM OF ALL RESPONSES TO PHQ QUESTIONS 1-9: 1
5. POOR APPETITE OR OVEREATING: SEVERAL DAYS

## 2018-12-27 ASSESSMENT — MIFFLIN-ST. JEOR: SCORE: 1463.6

## 2018-12-27 NOTE — PROGRESS NOTES
SUBJECTIVE:                                                    Nidia Acosta is a 58 year old female who presents to clinic today for the following health issues:    *Having some urinary concerns, urine leaking 5-6 days after urinating.     Depression and Anxiety Follow-Up    Status since last visit: Improved but ran out a couple weeks ago     Other associated symptoms:None    Complicating factors: work stress    Significant life event: No     Current substance abuse: None    PHQ 5/4/2016 9/5/2018 12/27/2018   PHQ-9 Total Score 6 4 1   Q9: Suicide Ideation Not at all Not at all Not at all     CALI-7 SCORE 5/10/2017 9/5/2018 12/27/2018   Total Score - - -   Total Score 5 (mild anxiety) 6 (mild anxiety) -   Total Score - 6 6     In the past two weeks have you had thoughts of suicide or self-harm?  No.    Do you have concerns about your personal safety or the safety of others?   No  PHQ-9  English  PHQ-9   Any Language  CALI-7  Suicide Assessment Five-step Evaluation and Treatment (SAFE-T)  Hypothyroidism Follow-up      Since last visit, patient describes the following symptoms: Weight stable, no hair loss, no skin changes, no constipation, no loose stools          Problem list and histories reviewed & adjusted, as indicated.  Additional history:   Has had the urinary   Has not had pain   Has odor feels like she is leaking       Patient Active Problem List   Diagnosis     Other genital herpes     Disturbance in sleep behavior     Hypothyroidism due to acquired atrophy of thyroid     Allergic rhinitis     Obesity     Decreased libido     Subdural hemorrhage (H)     Mild major depression (H)     Hyperlipidemia LDL goal <130     Insomnia     H/O: hysterectomy     POD (perioral dermatitis)     PTSD (post-traumatic stress disorder)     BMI 38.0-38.9,adult     Past Surgical History:   Procedure Laterality Date     COLONOSCOPY  4/5/2013    Procedure: COLONOSCOPY;  Colonoscopy  ;  Surgeon: Laura Ruff MD;   "Location: [facility]     HYSTERECTOMY, PAP NO LONGER INDICATED  2003    LAV for fibroids     LAPAROSCOPIC LYSIS ADHESIONS  1985     LASIK  10/2004     TONSILLECTOMY & ADENOIDECTOMY  age 18     TUBAL LIGATION  1994       Social History     Tobacco Use     Smoking status: Former Smoker     Packs/day: 0.50     Years: 5.00     Pack years: 2.50     Types: Cigarettes     Last attempt to quit: 1986     Years since quittin.0     Smokeless tobacco: Never Used   Substance Use Topics     Alcohol use: Yes     Comment: 1-5 drinks weekly     Family History   Problem Relation Age of Onset     Arthritis Mother      Allergies Mother         otc meds     Depression Mother      Alzheimer Disease Mother      Hyperlipidemia Mother      Osteoporosis Mother      Diabetes Mother      Hypertension Father         on med     C.A.D. Father         has had 2 MI\"S, possible surgery     Alcohol/Drug Father      Arthritis Father      Hyperlipidemia Father      Substance Abuse Father      Alcohol/Drug Brother      Substance Abuse Brother      Heart Disease Maternal Grandmother      Osteoporosis Maternal Grandmother      Heart Disease Maternal Grandfather      Heart Disease Paternal Grandmother      Diabetes Paternal Grandmother      Obesity Paternal Grandmother      Heart Disease Paternal Grandfather      Alcohol/Drug Son         son in recovery     Hypertension Brother      Hyperlipidemia Brother      Substance Abuse Other         Son           ROS:  Constitutional, HEENT, cardiovascular, pulmonary, gi and gu systems are negative, except as otherwise noted.    OBJECTIVE:                                                    /64   Pulse 88   Temp 98.2  F (36.8  C) (Tympanic)   Ht 1.549 m (5' 1\")   Wt 93.6 kg (206 lb 6.4 oz)   LMP 2002   BMI 39.00 kg/m   Body mass index is 39 kg/m .   GENERAL: healthy, alert, well nourished, well hydrated, no distress  ABDOMEN: soft, no tenderness, no  hepatosplenomegaly, no masses, normal " bowel sounds     MENTAL STATUS EXAM:    1. Clinical observations: Nidia was clean and was adequately groomed. Nidia's emotional presentation was open and cooperative. She spoke clear and articulate. She maintained good eye contact and she was cooperative in answering questions.   2. She appeared to be well-oriented in all spheres with coherent, logical, goal directed and relevent thinking.   3. Thought content: She denies no abnormal thought process.   4. Affect and mood: Nidia's affect is described as normal/appropriate and tearful and her emotional attitude was open and cooperative. She reports the following sypmtoms: sad feeling or depressed, gain of weight, too much worry, fear of losing control, nervous or tense feeling and panicky.    5. Sensorium and cognition: She was in contact with reality and oriented to time, place and person.  She demonstrated no impairment in immediate, recent, or remote memory. Her insight was adequate and her  intelligence appeared to be average.      Results for orders placed or performed in visit on 12/27/18   UA reflex to Microscopic and Culture   Result Value Ref Range    Color Urine Yellow     Appearance Urine Clear     Glucose Urine Negative NEG^Negative mg/dL    Bilirubin Urine Negative NEG^Negative    Ketones Urine Negative NEG^Negative mg/dL    Specific Gravity Urine 1.010 1.003 - 1.035    Blood Urine Negative NEG^Negative    pH Urine 7.0 5.0 - 7.0 pH    Protein Albumin Urine Negative NEG^Negative mg/dL    Urobilinogen Urine 0.2 0.2 - 1.0 EU/dL    Nitrite Urine Negative NEG^Negative    Leukocyte Esterase Urine Small (A) NEG^Negative    Source Midstream Urine    Urine Microscopic   Result Value Ref Range    WBC Urine 0 - 5 OTO5^0 - 5 /HPF    RBC Urine O - 2 OTO2^O - 2 /HPF    Bacteria Urine Few (A) NEG^Negative /HPF   Wet prep   Result Value Ref Range    Specimen Description Vagina     Wet Prep No Trichomonas seen     Wet Prep No yeast seen     Wet Prep Clue cells seen (A)         ASSESSMENT/PLAN:                                                      1. Urinary problem    - UA reflex to Microscopic and Culture  - Urine Microscopic  - Wet prep    2. Bacterial vaginosis  Will treat   - metroNIDAZOLE (METROGEL) 0.75 % vaginal gel; Place 1 applicator (5 g) vaginally 2 times daily for 5 days  Dispense: 70 g; Refill: 0    3. Mild major depression (H)  gpo back on this   - venlafaxine (EFFEXOR-XR) 37.5 MG 24 hr capsule; Take 1 capsule daily for 14 days  Dispense: 91 capsule; Refill: 1    4. CALI (generalized anxiety disorder)    - venlafaxine (EFFEXOR-XR) 37.5 MG 24 hr capsule; Take 1 capsule daily for 14 days  Dispense: 91 capsule; Refill: 1    5. BMI 38.0-38.9,adult  Working on it        reports that she quit smoking about 33 years ago. Her smoking use included cigarettes. She has a 2.50 pack-year smoking history. she has never used smokeless tobacco.      Weight management plan: Discussed healthy diet and exercise guidelines    Merlene Reyes M.D.  [facility]

## 2018-12-28 DIAGNOSIS — F41.1 GAD (GENERALIZED ANXIETY DISORDER): ICD-10-CM

## 2018-12-28 DIAGNOSIS — F32.0 MILD MAJOR DEPRESSION (H): ICD-10-CM

## 2018-12-28 RX ORDER — VENLAFAXINE HYDROCHLORIDE 37.5 MG/1
37.5 CAPSULE, EXTENDED RELEASE ORAL DAILY
Qty: 91 CAPSULE | Refills: 1 | Status: SHIPPED | OUTPATIENT
Start: 2018-12-28 | End: 2019-06-11

## 2018-12-29 ASSESSMENT — ANXIETY QUESTIONNAIRES: GAD7 TOTAL SCORE: 6

## 2019-02-04 ENCOUNTER — MYC MEDICAL ADVICE (OUTPATIENT)
Dept: FAMILY MEDICINE | Facility: CLINIC | Age: 59
End: 2019-02-04

## 2019-02-04 DIAGNOSIS — B96.89 BV (BACTERIAL VAGINOSIS): Primary | ICD-10-CM

## 2019-02-04 DIAGNOSIS — N76.0 BV (BACTERIAL VAGINOSIS): Primary | ICD-10-CM

## 2019-02-04 RX ORDER — METRONIDAZOLE 500 MG/1
500 TABLET ORAL 2 TIMES DAILY
Qty: 14 TABLET | Refills: 0 | Status: SHIPPED | OUTPATIENT
Start: 2019-02-04 | End: 2019-03-05

## 2019-02-11 DIAGNOSIS — A60.00 HERPES SIMPLEX INFECTION OF GENITOURINARY SYSTEM: ICD-10-CM

## 2019-02-12 RX ORDER — VALACYCLOVIR HYDROCHLORIDE 500 MG/1
TABLET, FILM COATED ORAL
Qty: 90 TABLET | Refills: 1 | Status: SHIPPED | OUTPATIENT
Start: 2019-02-12 | End: 2019-10-25

## 2019-02-12 NOTE — TELEPHONE ENCOUNTER
"ValACYclovir 500MG  TAB      Last Written Prescription Date:  8/1/18  Last Fill Quantity: 90,   # refills: 1  Last Office Visit: 12/27/18  Future Office visit:       Requested Prescriptions   Pending Prescriptions Disp Refills     valACYclovir (VALTREX) 500 MG tablet [Pharmacy Med Name: ValACYclovir 500MG  TAB] 90 tablet 1     Sig: TAKE 1 TABLET BY MOUTH ONCE DAILY    Antivirals for Herpes Protocol Passed - 2/11/2019  5:37 PM       Passed - Patient is age 12 or older       Passed - Recent (12 mo) or future (30 days) visit within the authorizing provider's specialty    Patient had office visit in the last 12 months or has a visit in the next 30 days with authorizing provider or within the authorizing provider's specialty.  See \"Patient Info\" tab in inbasket, or \"Choose Columns\" in Meds & Orders section of the refill encounter.             Passed - Medication is active on med list       Passed - Normal serum creatinine on file in past 12 months    Recent Labs   Lab Test 12/21/18  1128   CR 0.76               "

## 2019-03-05 ENCOUNTER — OFFICE VISIT (OUTPATIENT)
Dept: FAMILY MEDICINE | Facility: CLINIC | Age: 59
End: 2019-03-05
Payer: COMMERCIAL

## 2019-03-05 VITALS
TEMPERATURE: 98.1 F | WEIGHT: 206 LBS | DIASTOLIC BLOOD PRESSURE: 73 MMHG | SYSTOLIC BLOOD PRESSURE: 120 MMHG | HEIGHT: 61 IN | HEART RATE: 74 BPM | RESPIRATION RATE: 14 BRPM | BODY MASS INDEX: 38.89 KG/M2

## 2019-03-05 DIAGNOSIS — R82.90 NONSPECIFIC FINDING ON EXAMINATION OF URINE: ICD-10-CM

## 2019-03-05 DIAGNOSIS — L91.8 SKIN TAG: ICD-10-CM

## 2019-03-05 DIAGNOSIS — N94.89 VAGINAL BURNING: ICD-10-CM

## 2019-03-05 DIAGNOSIS — N76.0 BV (BACTERIAL VAGINOSIS): Primary | ICD-10-CM

## 2019-03-05 DIAGNOSIS — R35.0 URINE FREQUENCY: ICD-10-CM

## 2019-03-05 DIAGNOSIS — B96.89 BV (BACTERIAL VAGINOSIS): Primary | ICD-10-CM

## 2019-03-05 LAB
ALBUMIN UR-MCNC: NEGATIVE MG/DL
APPEARANCE UR: CLEAR
BACTERIA #/AREA URNS HPF: ABNORMAL /HPF
BILIRUB UR QL STRIP: NEGATIVE
COLOR UR AUTO: YELLOW
GLUCOSE UR STRIP-MCNC: NEGATIVE MG/DL
HGB UR QL STRIP: NEGATIVE
KETONES UR STRIP-MCNC: NEGATIVE MG/DL
LEUKOCYTE ESTERASE UR QL STRIP: ABNORMAL
NITRATE UR QL: NEGATIVE
NON-SQ EPI CELLS #/AREA URNS LPF: ABNORMAL /LPF
PH UR STRIP: 7 PH (ref 5–7)
RBC #/AREA URNS AUTO: ABNORMAL /HPF
SOURCE: ABNORMAL
SP GR UR STRIP: 1.01 (ref 1–1.03)
SPECIMEN SOURCE: ABNORMAL
UROBILINOGEN UR STRIP-ACNC: 0.2 EU/DL (ref 0.2–1)
WBC #/AREA URNS AUTO: ABNORMAL /HPF
WET PREP SPEC: ABNORMAL

## 2019-03-05 PROCEDURE — 81001 URINALYSIS AUTO W/SCOPE: CPT | Performed by: PHYSICIAN ASSISTANT

## 2019-03-05 PROCEDURE — 99214 OFFICE O/P EST MOD 30 MIN: CPT | Mod: 25 | Performed by: PHYSICIAN ASSISTANT

## 2019-03-05 PROCEDURE — 87086 URINE CULTURE/COLONY COUNT: CPT | Performed by: PHYSICIAN ASSISTANT

## 2019-03-05 PROCEDURE — 87210 SMEAR WET MOUNT SALINE/INK: CPT | Performed by: PHYSICIAN ASSISTANT

## 2019-03-05 PROCEDURE — 11200 RMVL SKIN TAGS UP TO&INC 15: CPT | Performed by: PHYSICIAN ASSISTANT

## 2019-03-05 RX ORDER — METRONIDAZOLE 7.5 MG/G
1 GEL VAGINAL DAILY
Qty: 70 G | Refills: 0 | Status: SHIPPED | OUTPATIENT
Start: 2019-03-05 | End: 2019-08-19

## 2019-03-05 ASSESSMENT — MIFFLIN-ST. JEOR: SCORE: 1461.79

## 2019-03-05 NOTE — PROGRESS NOTES
SUBJECTIVE:   Nidia Acosta is a 58 year old female who presents to clinic today for the following health issues:      Vaginal Symptoms  Onset: Last week sometime     Description:  Vaginal Discharge: white   Itching (Pruritis): no   Burning sensation:  YES  Odor: no     Accompanying Signs & Symptoms:  Pain with Urination: YES  Abdominal Pain: no   Fever: no     History:   Sexually active: YES  New Partner: no   Possibility of Pregnancy:  No    Precipitating factors:   Recent Antibiotic Use: no     Alleviating factors:  None    Therapies Tried and outcome: Diflucan     Concern - Skin Tag   Onset: Had it for years     Description:   Skin tag on her left upper thigh     Intensity: moderate    Progression of Symptoms:  worsening    Accompanying Signs & Symptoms:  None    Previous history of similar problem:   Yes    Precipitating factors:   Worsened by: None    Alleviating factors:  Improved by: None    Therapies Tried and outcome: None    Problem list and histories reviewed & adjusted, as indicated.  Additional history:      Was just treated for a BV infection in December - had been to [place] and in the hot tub so wondering if that was the cause  She just got back from the [place] so was doing a lot of swimming there  Did well with the metrogel - did not like the oral version of hte medication    Current Outpatient Medications   Medication Sig Dispense Refill     levothyroxine (SYNTHROID/LEVOTHROID) 112 MCG tablet Take 1 tablet (112 mcg) by mouth daily 90 tablet 3     Loratadine (CLARITIN PO) Take 10 mg by mouth daily AllerClear Brand        metroNIDAZOLE (METROGEL) 0.75 % vaginal gel Place 1 applicator (5 g) vaginally daily 70 g 0     MISC NATURAL PRODUCTS PO Take 1 capsule by mouth daily PD 80/20 - supports endocrine function       MISC NATURAL PRODUCTS PO Take 1 capsule by mouth daily Agiles - All natural for bones, ligaments, muscles etc..       NONFORMULARY Take 4 capsules by mouth  "daily Sulfurzyme -  combines wolfberry with MSM, a naturally occurring organic form of dietary sulfur needed by our bodies every day to maintain the structure of proteins, protect cells and cell membranes, replenish the connections between cells, and preserve the molecular framework of connective tissue.       NONFORMULARY Take 1 tablet by mouth daily Thyromine - The compounds that make Thyromine such an efficient thyroid supplement are: Adrenal powder from bovine, Ginger extract, Guggal tree extract, Suzanne extract, Piper Longum extract, thyroid powder from bovine and L-Tyrosine.       valACYclovir (VALTREX) 500 MG tablet TAKE 1 TABLET BY MOUTH ONCE DAILY 90 tablet 1     venlafaxine (EFFEXOR-XR) 37.5 MG 24 hr capsule Take 1 capsule (37.5 mg) by mouth daily 91 capsule 1     Allergies   Allergen Reactions     Bees Anaphylaxis     Codeine Difficulty breathing     Penicillin [Penicillins] Difficulty breathing     Seasonal Allergies      BP Readings from Last 3 Encounters:   03/05/19 120/73   12/27/18 108/64   06/25/18 124/75    Wt Readings from Last 3 Encounters:   03/05/19 93.4 kg (206 lb)   12/27/18 93.6 kg (206 lb 6.4 oz)   06/25/18 92.1 kg (203 lb)                    Reviewed and updated as needed this visit by clinical staff       Reviewed and updated as needed this visit by Provider         ROS:  Remainder of ROS obtained and found to be negative other than that which was documented above      OBJECTIVE:     /73   Pulse 74   Temp 98.1  F (36.7  C) (Tympanic)   Resp 14   Ht 1.549 m (5' 1\")   Wt 93.4 kg (206 lb)   LMP 07/29/2002   BMI 38.92 kg/m    Body mass index is 38.92 kg/m .  GENERAL: healthy, alert and no distress  ABDOMEN: soft, nontender and bowel sounds normal  SKIN: small skin tag on inner left thigh    Diagnostic Test Results:  UA RESULTS:  Recent Labs   Lab Test 03/05/19  1134   COLOR Yellow   APPEARANCE Clear   URINEGLC Negative   URINEBILI Negative   URINEKETONE Negative   SG 1.015   UBLD " Negative   URINEPH 7.0   PROTEIN Negative   UROBILINOGEN 0.2   NITRITE Negative   LEUKEST Large*   RBCU O - 2   WBCU 0 - 5     Wet prep: clue cells present    ASSESSMENT/PLAN:     (N76.0,  B96.89) BV (bacterial vaginosis)  (primary encounter diagnosis)  Comment:   Plan: metroNIDAZOLE (METROGEL) 0.75 % vaginal gel            (R35.0) Urine frequency  Comment:   Plan: *UA reflex to Microscopic and Culture ([place]         and [facility] (except [place] and         [place]), Urine Microscopic            (N94.9) Vaginal burning  Comment:   Plan: Wet prep            (R82.90) Nonspecific finding on examination of urine  Comment:   Plan: Urine Culture Aerobic Bacterial            (L91.8) Skin tag  Comment: small skin tag on inner left thigh. Patient requesting to have it removed as it was getting caught on things and irritated  Area cleaned. Skin tag grabbed with tweezers and using scissors cut off at base. Trace drop of blood. Covered with bandaid  Plan: REMOVAL OF SKIN TAGS, FIRST 15                Lillian Harden PA-C  [facility]

## 2019-03-06 LAB
BACTERIA SPEC CULT: NORMAL
Lab: NORMAL
SPECIMEN SOURCE: NORMAL

## 2019-06-08 DIAGNOSIS — F32.0 MILD MAJOR DEPRESSION (H): ICD-10-CM

## 2019-06-08 DIAGNOSIS — F41.1 GAD (GENERALIZED ANXIETY DISORDER): ICD-10-CM

## 2019-06-10 NOTE — TELEPHONE ENCOUNTER
"VENLAFAXINE ER 37.5MG CAP      Last Written Prescription Date:  12/28/18  Last Fill Quantity: 91,   # refills: 1  Last Office Visit: 3/5/19  Future Office visit:       Requested Prescriptions   Pending Prescriptions Disp Refills     venlafaxine (EFFEXOR-XR) 37.5 MG 24 hr capsule [Pharmacy Med Name: VENLAFAXINE ER 37.5MG CAP] 91 capsule 1     Sig: TAKE 2 CAPSULES BY MOUTH ONCE DAILY       Serotonin-Norepinephrine Reuptake Inhibitors  Passed - 6/8/2019  7:49 AM        Passed - Blood pressure under 140/90 in past 12 months     BP Readings from Last 3 Encounters:   03/05/19 120/73   12/27/18 108/64   06/25/18 124/75                 Passed - PHQ-9 score of less than 5 in past 6 months     Please review last PHQ-9 score.           Passed - Medication is active on med list        Passed - Patient is age 18 or older        Passed - No active pregnancy on record        Passed - Normal serum creatinine on file in past 12 months     Recent Labs   Lab Test 12/21/18  1128   CR 0.76             Passed - No positive pregnancy test in past 12 months        Passed - Recent (6 mo) or future (30 days) visit within the authorizing provider's specialty     Patient had office visit in the last 6 months or has a visit in the next 30 days with authorizing provider or within the authorizing provider's specialty.  See \"Patient Info\" tab in inbasket, or \"Choose Columns\" in Meds & Orders section of the refill encounter.              "

## 2019-06-10 NOTE — TELEPHONE ENCOUNTER
Pt was ordered the following at [facility] 12/28/18:  3. Mild major depression (H)  gpo back on this   - venlafaxine (EFFEXOR-XR) 37.5 MG 24 hr capsule; Take 1 capsule daily for 14 days  Dispense: 91 capsule; Refill: 1    Request below is to take 2 caps (75mg) po daily.    PHQ-9 SCORE 5/10/2017 9/5/2018 12/27/2018   PHQ-9 Total Score - - -   PHQ-9 Total Score MyChart 3 (Minimal depression) 4 (Minimal depression) -   PHQ-9 Total Score - 4 1     CALI-7 SCORE 5/10/2017 9/5/2018 12/27/2018   Total Score - - -   Total Score 5 (mild anxiety) 6 (mild anxiety) -   Total Score - 6 6       Pt is due for Physical Exam around 6/27/19.  [employer] message sent to pt.    Meredith GERONIMO RN

## 2019-06-17 RX ORDER — VENLAFAXINE HYDROCHLORIDE 37.5 MG/1
CAPSULE, EXTENDED RELEASE ORAL
Qty: 91 CAPSULE | Refills: 1 | Status: SHIPPED | OUTPATIENT
Start: 2019-06-17 | End: 2019-06-17

## 2019-07-22 ENCOUNTER — NURSE TRIAGE (OUTPATIENT)
Dept: FAMILY MEDICINE | Facility: CLINIC | Age: 59
End: 2019-07-22

## 2019-07-22 ENCOUNTER — APPOINTMENT (OUTPATIENT)
Dept: ULTRASOUND IMAGING | Facility: CLINIC | Age: 59
End: 2019-07-22
Attending: EMERGENCY MEDICINE
Payer: COMMERCIAL

## 2019-07-22 ENCOUNTER — HOSPITAL ENCOUNTER (EMERGENCY)
Facility: CLINIC | Age: 59
Discharge: HOME | End: 2019-07-22
Attending: EMERGENCY MEDICINE | Admitting: EMERGENCY MEDICINE
Payer: COMMERCIAL

## 2019-07-22 VITALS
SYSTOLIC BLOOD PRESSURE: 141 MMHG | WEIGHT: 206 LBS | HEART RATE: 87 BPM | OXYGEN SATURATION: 99 % | TEMPERATURE: 98.6 F | BODY MASS INDEX: 38.92 KG/M2 | DIASTOLIC BLOOD PRESSURE: 78 MMHG | RESPIRATION RATE: 18 BRPM

## 2019-07-22 DIAGNOSIS — K80.20 CALCULUS OF GALLBLADDER WITHOUT CHOLECYSTITIS WITHOUT OBSTRUCTION: ICD-10-CM

## 2019-07-22 DIAGNOSIS — K80.50 BILIARY COLIC: ICD-10-CM

## 2019-07-22 LAB
ALBUMIN SERPL-MCNC: 3.7 G/DL (ref 3.4–5)
ALBUMIN UR-MCNC: NEGATIVE MG/DL
ALP SERPL-CCNC: 93 U/L (ref 40–150)
ALT SERPL W P-5'-P-CCNC: 28 U/L (ref 0–50)
ANION GAP SERPL CALCULATED.3IONS-SCNC: 7 MMOL/L (ref 3–14)
APPEARANCE UR: CLEAR
AST SERPL W P-5'-P-CCNC: 18 U/L (ref 0–45)
BASOPHILS # BLD AUTO: 0.1 10E9/L (ref 0–0.2)
BASOPHILS NFR BLD AUTO: 0.7 %
BILIRUB SERPL-MCNC: 0.3 MG/DL (ref 0.2–1.3)
BILIRUB UR QL STRIP: NEGATIVE
BUN SERPL-MCNC: 13 MG/DL (ref 7–30)
CALCIUM SERPL-MCNC: 8.8 MG/DL (ref 8.5–10.1)
CHLORIDE SERPL-SCNC: 106 MMOL/L (ref 94–109)
CO2 SERPL-SCNC: 26 MMOL/L (ref 20–32)
COLOR UR AUTO: YELLOW
CREAT SERPL-MCNC: 0.64 MG/DL (ref 0.52–1.04)
DIFFERENTIAL METHOD BLD: NORMAL
EOSINOPHIL # BLD AUTO: 0.1 10E9/L (ref 0–0.7)
EOSINOPHIL NFR BLD AUTO: 1 %
ERYTHROCYTE [DISTWIDTH] IN BLOOD BY AUTOMATED COUNT: 11.9 % (ref 10–15)
GFR SERPL CREATININE-BSD FRML MDRD: >90 ML/MIN/{1.73_M2}
GLUCOSE SERPL-MCNC: 105 MG/DL (ref 70–99)
GLUCOSE UR STRIP-MCNC: NEGATIVE MG/DL
HCT VFR BLD AUTO: 41.6 % (ref 35–47)
HGB BLD-MCNC: 13.1 G/DL (ref 11.7–15.7)
HGB UR QL STRIP: NEGATIVE
IMM GRANULOCYTES # BLD: 0 10E9/L (ref 0–0.4)
IMM GRANULOCYTES NFR BLD: 0.4 %
KETONES UR STRIP-MCNC: NEGATIVE MG/DL
LEUKOCYTE ESTERASE UR QL STRIP: NEGATIVE
LIPASE SERPL-CCNC: 101 U/L (ref 73–393)
LYMPHOCYTES # BLD AUTO: 2.6 10E9/L (ref 0.8–5.3)
LYMPHOCYTES NFR BLD AUTO: 28.4 %
MCH RBC QN AUTO: 28.3 PG (ref 26.5–33)
MCHC RBC AUTO-ENTMCNC: 31.5 G/DL (ref 31.5–36.5)
MCV RBC AUTO: 90 FL (ref 78–100)
MONOCYTES # BLD AUTO: 0.6 10E9/L (ref 0–1.3)
MONOCYTES NFR BLD AUTO: 6.4 %
NEUTROPHILS # BLD AUTO: 5.7 10E9/L (ref 1.6–8.3)
NEUTROPHILS NFR BLD AUTO: 63.1 %
NITRATE UR QL: NEGATIVE
NRBC # BLD AUTO: 0 10*3/UL
NRBC BLD AUTO-RTO: 0 /100
PH UR STRIP: 6 PH (ref 5–7)
PLATELET # BLD AUTO: 271 10E9/L (ref 150–450)
POTASSIUM SERPL-SCNC: 4 MMOL/L (ref 3.4–5.3)
PROT SERPL-MCNC: 7.2 G/DL (ref 6.8–8.8)
RBC # BLD AUTO: 4.63 10E12/L (ref 3.8–5.2)
SODIUM SERPL-SCNC: 139 MMOL/L (ref 133–144)
SOURCE: NORMAL
SP GR UR STRIP: 1.02 (ref 1–1.03)
UROBILINOGEN UR STRIP-MCNC: 0 MG/DL (ref 0–2)
WBC # BLD AUTO: 9.1 10E9/L (ref 4–11)

## 2019-07-22 PROCEDURE — 96375 TX/PRO/DX INJ NEW DRUG ADDON: CPT | Performed by: EMERGENCY MEDICINE

## 2019-07-22 PROCEDURE — 81003 URINALYSIS AUTO W/O SCOPE: CPT | Performed by: EMERGENCY MEDICINE

## 2019-07-22 PROCEDURE — 96361 HYDRATE IV INFUSION ADD-ON: CPT | Performed by: EMERGENCY MEDICINE

## 2019-07-22 PROCEDURE — 85025 COMPLETE CBC W/AUTO DIFF WBC: CPT | Performed by: EMERGENCY MEDICINE

## 2019-07-22 PROCEDURE — 99285 EMERGENCY DEPT VISIT HI MDM: CPT | Mod: Z6 | Performed by: EMERGENCY MEDICINE

## 2019-07-22 PROCEDURE — 76705 ECHO EXAM OF ABDOMEN: CPT

## 2019-07-22 PROCEDURE — 25000128 H RX IP 250 OP 636: Performed by: EMERGENCY MEDICINE

## 2019-07-22 PROCEDURE — 80053 COMPREHEN METABOLIC PANEL: CPT | Performed by: EMERGENCY MEDICINE

## 2019-07-22 PROCEDURE — 96374 THER/PROPH/DIAG INJ IV PUSH: CPT | Performed by: EMERGENCY MEDICINE

## 2019-07-22 PROCEDURE — 99285 EMERGENCY DEPT VISIT HI MDM: CPT | Mod: 25 | Performed by: EMERGENCY MEDICINE

## 2019-07-22 PROCEDURE — 83690 ASSAY OF LIPASE: CPT | Performed by: EMERGENCY MEDICINE

## 2019-07-22 RX ORDER — ONDANSETRON 2 MG/ML
4 INJECTION INTRAMUSCULAR; INTRAVENOUS ONCE
Status: COMPLETED | OUTPATIENT
Start: 2019-07-22 | End: 2019-07-22

## 2019-07-22 RX ORDER — KETOROLAC TROMETHAMINE 30 MG/ML
30 INJECTION, SOLUTION INTRAMUSCULAR; INTRAVENOUS ONCE
Status: COMPLETED | OUTPATIENT
Start: 2019-07-22 | End: 2019-07-22

## 2019-07-22 RX ORDER — HYDROMORPHONE HYDROCHLORIDE 1 MG/ML
0.5 INJECTION, SOLUTION INTRAMUSCULAR; INTRAVENOUS; SUBCUTANEOUS ONCE
Status: COMPLETED | OUTPATIENT
Start: 2019-07-22 | End: 2019-07-22

## 2019-07-22 RX ORDER — HYDROCODONE BITARTRATE AND ACETAMINOPHEN 5; 325 MG/1; MG/1
1-2 TABLET ORAL EVERY 4 HOURS PRN
Qty: 15 TABLET | Refills: 0 | Status: SHIPPED | OUTPATIENT
Start: 2019-07-22 | End: 2019-11-11

## 2019-07-22 RX ORDER — SODIUM CHLORIDE, SODIUM LACTATE, POTASSIUM CHLORIDE, CALCIUM CHLORIDE 600; 310; 30; 20 MG/100ML; MG/100ML; MG/100ML; MG/100ML
1000 INJECTION, SOLUTION INTRAVENOUS CONTINUOUS
Status: DISCONTINUED | OUTPATIENT
Start: 2019-07-22 | End: 2019-07-22 | Stop reason: HOSPADM

## 2019-07-22 RX ORDER — SODIUM CHLORIDE 9 MG/ML
1000 INJECTION, SOLUTION INTRAVENOUS CONTINUOUS
Status: DISCONTINUED | OUTPATIENT
Start: 2019-07-22 | End: 2019-07-22 | Stop reason: HOSPADM

## 2019-07-22 RX ADMIN — KETOROLAC TROMETHAMINE 30 MG: 30 INJECTION, SOLUTION INTRAMUSCULAR at 09:50

## 2019-07-22 RX ADMIN — ONDANSETRON 4 MG: 2 INJECTION INTRAMUSCULAR; INTRAVENOUS at 09:48

## 2019-07-22 RX ADMIN — HYDROMORPHONE HYDROCHLORIDE 0.5 MG: 1 INJECTION, SOLUTION INTRAMUSCULAR; INTRAVENOUS; SUBCUTANEOUS at 09:53

## 2019-07-22 RX ADMIN — SODIUM CHLORIDE 1000 ML: 9 INJECTION, SOLUTION INTRAVENOUS at 09:45

## 2019-07-22 NOTE — TELEPHONE ENCOUNTER
"    Reason for Disposition    SEVERE abdominal pain (e.g., excruciating)    Additional Information    Negative: Passed out (i.e., fainted, collapsed and was not responding)    Negative: Shock suspected (e.g., cold/pale/clammy skin, too weak to stand, low BP, rapid pulse)    Negative: Sounds like a life-threatening emergency to the triager    Negative: Chest pain    Negative: Pain is mainly in upper abdomen (if needed ask: 'is it mainly above the belly button?')    Negative: Abdominal pain and pregnant > 20 weeks    Negative: Abdominal pain and pregnant < 20 weeks    Answer Assessment - Initial Assessment Questions  1. LOCATION: \"Where does it hurt?\"       Right sided abdominal pain below the ribs  2. RADIATION: \"Does the pain shoot anywhere else?\" (e.g., chest, back)      no  3. ONSET: \"When did the pain begin?\" (e.g., minutes, hours or days ago)       Started suddenly at 3 am this morning, constant pain described as sharp  4. SUDDEN: \"Gradual or sudden onset?\"      sudden  5. PATTERN \"Does the pain come and go, or is it constant?\"     - If constant: \"Is it getting better, staying the same, or worsening?\"       (Note: Constant means the pain never goes away completely; most serious pain is constant and it progresses)      - If intermittent: \"How long does it last?\" \"Do you have pain now?\"      (Note: Intermittent means the pain goes away completely between bouts)      constant  6. SEVERITY: \"How bad is the pain?\"  (e.g., Scale 1-10; mild, moderate, or severe)    - MILD (1-3): doesn't interfere with normal activities, abdomen soft and not tender to touch     - MODERATE (4-7): interferes with normal activities or awakens from sleep, tender to touch     - SEVERE (8-10): excruciating pain, doubled over, unable to do any normal activities       Rates 9/10  7. RECURRENT SYMPTOM: \"Have you ever had this type of abdominal pain before?\" If so, ask: \"When was the last time?\" and \"What happened that time?\"       Yes reports " "this is the 3rd episode, reports last episode was last December, thought it was related to eating fried foods  8. CAUSE: \"What do you think is causing the abdominal pain?\"      Patient ate fried foods yesterday, thinks it could be from this  9. RELIEVING/AGGRAVATING FACTORS: \"What makes it better or worse?\" (e.g., movement, antacids, bowel movement)      Ongoing pain, states she did have 3 small bowel movements last night, but pain still not better  10. OTHER SYMPTOMS: \"Has there been any vomiting, diarrhea, constipation, or urine problems?\"        Feels like she has the dry heaves, no emeisi  11. PREGNANCY: \"Is there any chance you are pregnant?\" \"When was your last menstrual period?\"        na    Protocols used: ABDOMINAL PAIN - FEMALE-A-OH      "

## 2019-07-22 NOTE — ED PROVIDER NOTES
History     Chief Complaint   Patient presents with     Abdominal Pain     righ mid abd pain since 0300     HPI  Nidia Acosta is a 59 year old female who presents emergency department for evaluation of right upper quadrant abdominal pain. Sudden onset of sharp, severe, well localized and nonradiating pain at ~ 0300 am this morning. No pain relief with essential oils.  Similar episodes x 3 past, with no previous evaluation. Last episode was several months ago and resolved spontaneously after several hours.  No fever chills or vomiting.  Has felt constipated but last BM this morning.  No signs or symptoms of GI bleeding.  No UTI signs or symptoms or hematuria.  No back or flank pain.    Allergies:  Allergies   Allergen Reactions     Bees Anaphylaxis     Codeine Difficulty breathing     Penicillin [Penicillins] Difficulty breathing     Seasonal Allergies        Problem List:    Patient Active Problem List    Diagnosis Date Noted     BMI 38.0-38.9,adult 06/25/2018     Priority: Medium     PTSD (post-traumatic stress disorder) 04/02/2014     Priority: Medium     Traumatic experiences related to murder of her sister a number of years ago, contributing to her depression.         H/O: hysterectomy 03/18/2013     Priority: Medium     Benign reasons.  Ovaries in tact.         POD (perioral dermatitis) 03/18/2013     Priority: Medium     Hyperlipidemia LDL goal <130 11/21/2011     Priority: Medium     Insomnia 11/21/2011     Priority: Medium     Mild major depression (H) 04/04/2011     Priority: Medium     Subdural hemorrhage (H) 08/26/2009     Priority: Medium     Bilateral, drained. Summer 2009.  See notes in EPIC.  Subacute.   Diagnosis updated by automated process. Provider to review and confirm.       Decreased libido 11/10/2006     Priority: Medium     Obesity 08/28/2006     Priority: Medium     Problem list name updated by automated process. Provider to review       Allergic rhinitis 01/23/2006     Priority:  "Medium     Problem list name updated by automated process. Provider to review       Other genital herpes 07/22/2005     Priority: Medium     Disturbance in sleep behavior 07/22/2005     Priority: Medium     Problem list name updated by automated process. Provider to review       Hypothyroidism due to acquired atrophy of thyroid 07/22/2005     Priority: Medium     Problem list name updated by automated process. Provider to review          Past Medical History:    Past Medical History:   Diagnosis Date     Arthritis      Backache, unspecified      Congestive heart failure (H)      COPD (chronic obstructive pulmonary disease) (H)      Depressive disorder      Diabetes (H)      Heart disease      Hypertension      Leiomyoma of uterus, unspecified 2003     Other genital herpes      Thyroid disease      Unspecified sinusitis (chronic)        Past Surgical History:    Past Surgical History:   Procedure Laterality Date     COLONOSCOPY  4/5/2013    Procedure: COLONOSCOPY;  Colonoscopy  ;  Surgeon: Laura Ruff MD;  Location: [facility]     HYSTERECTOMY, PAP NO LONGER INDICATED  7/2003    [facility] for fibroids     LAPAROSCOPIC LYSIS ADHESIONS  1985     LASIK  10/2004     TONSILLECTOMY & ADENOIDECTOMY  age 18     TUBAL LIGATION  4/1994       Family History:    Family History   Problem Relation Age of Onset     Arthritis Mother      Allergies Mother         otc meds     Depression Mother      Alzheimer Disease Mother      Hyperlipidemia Mother      Osteoporosis Mother      Diabetes Mother      Hypertension Father         on med     C.A.D. Father         has had 2 MI\"S, possible surgery     Alcohol/Drug Father      Arthritis Father      Hyperlipidemia Father      Substance Abuse Father      Alcohol/Drug Brother      Substance Abuse Brother      Heart Disease Maternal Grandmother      Osteoporosis Maternal Grandmother      Heart Disease Maternal Grandfather      Heart Disease Paternal Grandmother      Diabetes Paternal " Grandmother      Obesity Paternal Grandmother      Heart Disease Paternal Grandfather      Alcohol/Drug Son         son in recovery     Hypertension Brother      Hyperlipidemia Brother      Substance Abuse Other         Son       Social History:  Marital Status:   [4]  Social History     Tobacco Use     Smoking status: Former Smoker     Packs/day: 0.50     Years: 5.00     Pack years: 2.50     Types: Cigarettes     Last attempt to quit: 1986     Years since quittin.5     Smokeless tobacco: Never Used   Substance Use Topics     Alcohol use: Yes     Comment: 1-5 drinks weekly     Drug use: No        Medications:      HYDROcodone-acetaminophen (NORCO) 5-325 MG tablet   levothyroxine (SYNTHROID/LEVOTHROID) 112 MCG tablet   Loratadine (CLARITIN PO)   metroNIDAZOLE (METROGEL) 0.75 % vaginal gel   MISC NATURAL PRODUCTS PO   MISC NATURAL PRODUCTS PO   NONFORMULARY   NONFORMULARY   valACYclovir (VALTREX) 500 MG tablet   venlafaxine (EFFEXOR-XR) 37.5 MG 24 hr capsule         Review of Systems  As mentioned above in the history present illness.  All other systems were reviewed and are negative.      Physical Exam   BP: 141/78  Pulse: 87  Temp: 98.6  F (37  C)  Resp: 18  Weight: 93.4 kg (206 lb)  SpO2: 99 %      Physical Exam   Constitutional: She is oriented to person, place, and time. She appears well-developed and well-nourished. No distress.   HENT:   Head: Normocephalic and atraumatic.   Mouth/Throat: Oropharynx is clear and moist.   Eyes: Conjunctivae and EOM are normal. No scleral icterus.   Neck: Normal range of motion. Neck supple. No tracheal deviation present.   Cardiovascular: Normal rate, regular rhythm and normal heart sounds. Exam reveals no gallop and no friction rub.   No murmur heard.  Pulmonary/Chest: Effort normal and breath sounds normal. No respiratory distress. She has no wheezes. She has no rales.   Abdominal: Soft. Bowel sounds are normal. She exhibits no distension, no abdominal bruit  and no pulsatile midline mass. There is tenderness in the right upper quadrant. There is positive Leyva's sign. There is no rigidity, no rebound, no guarding, no CVA tenderness and no tenderness at McBurney's point.   Obese   Musculoskeletal: Normal range of motion. She exhibits no edema or tenderness.   Neurological: She is alert and oriented to person, place, and time. Coordination normal.   Skin: Skin is warm and dry. No rash noted. She is not diaphoretic. No erythema. No pallor.   Psychiatric: She has a normal mood and affect. Her behavior is normal.   Nursing note and vitals reviewed.      ED Course        Procedures          Results for orders placed or performed during the hospital encounter of 07/22/19   US Abdomen Limited    Narrative    RIGHT UPPER QUADRANT ULTRASOUND 7/22/2019 10:51 AM    HISTORY:  Right upper quadrant pain.    COMPARISON: None.    FINDINGS:    Gallbladder: Gallstones are present, but there is no evidence of  cholecystitis.    Bile ducts:   CHD is normal diameter.  No intrahepatic biliary  dilatation.    Liver:   Normal.     Pancreas:  Obscured by intestinal gas.     Right kidney:   Normal.       Impression    IMPRESSION:  Cholelithiasis without sonographic evidence of acute  cholecystitis.     WAI MCGHEE MD   CBC with platelets differential   Result Value Ref Range    WBC 9.1 4.0 - 11.0 10e9/L    RBC Count 4.63 3.8 - 5.2 10e12/L    Hemoglobin 13.1 11.7 - 15.7 g/dL    Hematocrit 41.6 35.0 - 47.0 %    MCV 90 78 - 100 fl    MCH 28.3 26.5 - 33.0 pg    MCHC 31.5 31.5 - 36.5 g/dL    RDW 11.9 10.0 - 15.0 %    Platelet Count 271 150 - 450 10e9/L    Diff Method Automated Method     % Neutrophils 63.1 %    % Lymphocytes 28.4 %    % Monocytes 6.4 %    % Eosinophils 1.0 %    % Basophils 0.7 %    % Immature Granulocytes 0.4 %    Nucleated RBCs 0 0 /100    Absolute Neutrophil 5.7 1.6 - 8.3 10e9/L    Absolute Lymphocytes 2.6 0.8 - 5.3 10e9/L    Absolute Monocytes 0.6 0.0 - 1.3 10e9/L    Absolute  Eosinophils 0.1 0.0 - 0.7 10e9/L    Absolute Basophils 0.1 0.0 - 0.2 10e9/L    Abs Immature Granulocytes 0.0 0 - 0.4 10e9/L    Absolute Nucleated RBC 0.0    Comprehensive metabolic panel   Result Value Ref Range    Sodium 139 133 - 144 mmol/L    Potassium 4.0 3.4 - 5.3 mmol/L    Chloride 106 94 - 109 mmol/L    Carbon Dioxide 26 20 - 32 mmol/L    Anion Gap 7 3 - 14 mmol/L    Glucose 105 (H) 70 - 99 mg/dL    Urea Nitrogen 13 7 - 30 mg/dL    Creatinine 0.64 0.52 - 1.04 mg/dL    GFR Estimate >90 >60 mL/min/[1.73_m2]    GFR Estimate If Black >90 >60 mL/min/[1.73_m2]    Calcium 8.8 8.5 - 10.1 mg/dL    Bilirubin Total 0.3 0.2 - 1.3 mg/dL    Albumin 3.7 3.4 - 5.0 g/dL    Protein Total 7.2 6.8 - 8.8 g/dL    Alkaline Phosphatase 93 40 - 150 U/L    ALT 28 0 - 50 U/L    AST 18 0 - 45 U/L   Lipase   Result Value Ref Range    Lipase 101 73 - 393 U/L   UA reflex to Microscopic   Result Value Ref Range    Color Urine Yellow     Appearance Urine Clear     Glucose Urine Negative NEG^Negative mg/dL    Bilirubin Urine Negative NEG^Negative    Ketones Urine Negative NEG^Negative mg/dL    Specific Gravity Urine 1.019 1.003 - 1.035    Blood Urine Negative NEG^Negative    pH Urine 6.0 5.0 - 7.0 pH    Protein Albumin Urine Negative NEG^Negative mg/dL    Urobilinogen mg/dL 0.0 0.0 - 2.0 mg/dL    Nitrite Urine Negative NEG^Negative    Leukocyte Esterase Urine Negative NEG^Negative    Source Midstream Urine        Medications   0.9% sodium chloride BOLUS (0 mLs Intravenous Stopped 7/22/19 3150)   HYDROmorphone (PF) (DILAUDID) injection 0.5 mg (0.5 mg Intravenous Given 7/22/19 0993)   ondansetron (ZOFRAN) injection 4 mg (4 mg Intravenous Given 7/22/19 0906)   ketorolac (TORADOL) injection 30 mg (30 mg Intravenous Given 7/22/19 4636)       Assessments & Plan (with Medical Decision Making)   Episodic right upper quadrant abdominal pain with recurrence this morning with evaluation remarkable for right upper quad ultrasound evaluation with  gallstones but no evidence of cholecystitis or biliary obstruction.  LFTs and lipase normal.  Pain adequately controlled with Dilaudid and Toradol.  She is comfortable with discharge home with instructions for supportive care and surgery clinic follow-up in the near future.  Rx Norco use for pain refractory to NSAID.  She will return for worsening symptoms, fever, or new problems or concerns.    I have reviewed the nursing notes.    I have reviewed the findings, diagnosis, plan and need for follow up with the patient.       Medication List      Started    HYDROcodone-acetaminophen 5-325 MG tablet  Commonly known as:  NORCO  1-2 tablets, Oral, EVERY 4 HOURS PRN, maximum 6 tablet(s) per day            Final diagnoses:   Calculus of gallbladder without cholecystitis without obstruction   Biliary colic       7/22/2019   [facility] EMERGENCY DEPARTMENT     Mihir Neal MD  07/28/19 8897

## 2019-07-22 NOTE — ED AVS SNAPSHOT
[facility] Emergency Department  [address] [address]  Phone:  [phone]  Fax:  [phone]                                    Nidia Acosta   MRN: [id number]    Department:  [facility] Emergency Department   Date of Visit:  7/22/2019           After Visit Summary Signature Page    I have received my discharge instructions, and my questions have been answered. I have discussed any challenges I see with this plan with the nurse or doctor.    ..........................................................................................................................................  Patient/Patient Representative Signature      ..........................................................................................................................................  Patient Representative Print Name and Relationship to Patient    ..................................................               ................................................  Date                                   Time    ..........................................................................................................................................  Reviewed by Signature/Title    ...................................................              ..............................................  Date                                               Time          22EPIC Rev 08/18

## 2019-08-04 ENCOUNTER — HOSPITAL ENCOUNTER (EMERGENCY)
Facility: CLINIC | Age: 59
Discharge: HOME | End: 2019-08-05
Attending: EMERGENCY MEDICINE | Admitting: EMERGENCY MEDICINE
Payer: COMMERCIAL

## 2019-08-04 DIAGNOSIS — K80.50 BILIARY COLIC: ICD-10-CM

## 2019-08-04 PROCEDURE — 76705 ECHO EXAM OF ABDOMEN: CPT | Mod: 26 | Performed by: EMERGENCY MEDICINE

## 2019-08-04 PROCEDURE — 96361 HYDRATE IV INFUSION ADD-ON: CPT | Performed by: EMERGENCY MEDICINE

## 2019-08-04 PROCEDURE — 99285 EMERGENCY DEPT VISIT HI MDM: CPT | Mod: 25 | Performed by: EMERGENCY MEDICINE

## 2019-08-04 PROCEDURE — 76705 ECHO EXAM OF ABDOMEN: CPT | Performed by: EMERGENCY MEDICINE

## 2019-08-04 PROCEDURE — 96374 THER/PROPH/DIAG INJ IV PUSH: CPT | Performed by: EMERGENCY MEDICINE

## 2019-08-04 ASSESSMENT — MIFFLIN-ST. JEOR: SCORE: 1456.79

## 2019-08-04 NOTE — ED AVS SNAPSHOT
[facility] Emergency Department  [address] [address]  Phone:  [phone]  Fax:  [phone]                                    Nidia Acosta   MRN: [id number]    Department:  [facility] Emergency Department   Date of Visit:  8/4/2019           After Visit Summary Signature Page    I have received my discharge instructions, and my questions have been answered. I have discussed any challenges I see with this plan with the nurse or doctor.    ..........................................................................................................................................  Patient/Patient Representative Signature      ..........................................................................................................................................  Patient Representative Print Name and Relationship to Patient    ..................................................               ................................................  Date                                   Time    ..........................................................................................................................................  Reviewed by Signature/Title    ...................................................              ..............................................  Date                                               Time          22EPIC Rev 08/18

## 2019-08-05 VITALS
BODY MASS INDEX: 38.89 KG/M2 | HEART RATE: 85 BPM | WEIGHT: 206 LBS | HEIGHT: 61 IN | RESPIRATION RATE: 16 BRPM | DIASTOLIC BLOOD PRESSURE: 75 MMHG | TEMPERATURE: 98.3 F | SYSTOLIC BLOOD PRESSURE: 136 MMHG | OXYGEN SATURATION: 97 %

## 2019-08-05 LAB
ALBUMIN SERPL-MCNC: 4 G/DL (ref 3.4–5)
ALBUMIN UR-MCNC: NEGATIVE MG/DL
ALP SERPL-CCNC: 94 U/L (ref 40–150)
ALT SERPL W P-5'-P-CCNC: 30 U/L (ref 0–50)
ANION GAP SERPL CALCULATED.3IONS-SCNC: 7 MMOL/L (ref 3–14)
APPEARANCE UR: CLEAR
AST SERPL W P-5'-P-CCNC: 20 U/L (ref 0–45)
BASOPHILS # BLD AUTO: 0.1 10E9/L (ref 0–0.2)
BASOPHILS NFR BLD AUTO: 0.6 %
BILIRUB SERPL-MCNC: 0.2 MG/DL (ref 0.2–1.3)
BILIRUB UR QL STRIP: NEGATIVE
BUN SERPL-MCNC: 18 MG/DL (ref 7–30)
CALCIUM SERPL-MCNC: 9.2 MG/DL (ref 8.5–10.1)
CHLORIDE SERPL-SCNC: 107 MMOL/L (ref 94–109)
CO2 SERPL-SCNC: 27 MMOL/L (ref 20–32)
COLOR UR AUTO: YELLOW
CREAT SERPL-MCNC: 0.87 MG/DL (ref 0.52–1.04)
DIFFERENTIAL METHOD BLD: NORMAL
EOSINOPHIL # BLD AUTO: 0.2 10E9/L (ref 0–0.7)
EOSINOPHIL NFR BLD AUTO: 2.3 %
ERYTHROCYTE [DISTWIDTH] IN BLOOD BY AUTOMATED COUNT: 11.9 % (ref 10–15)
GFR SERPL CREATININE-BSD FRML MDRD: 74 ML/MIN/{1.73_M2}
GLUCOSE SERPL-MCNC: 98 MG/DL (ref 70–99)
GLUCOSE UR STRIP-MCNC: NEGATIVE MG/DL
HCT VFR BLD AUTO: 41.9 % (ref 35–47)
HGB BLD-MCNC: 13.3 G/DL (ref 11.7–15.7)
HGB UR QL STRIP: NEGATIVE
IMM GRANULOCYTES # BLD: 0 10E9/L (ref 0–0.4)
IMM GRANULOCYTES NFR BLD: 0.3 %
KETONES UR STRIP-MCNC: NEGATIVE MG/DL
LEUKOCYTE ESTERASE UR QL STRIP: ABNORMAL
LIPASE SERPL-CCNC: 169 U/L (ref 73–393)
LYMPHOCYTES # BLD AUTO: 3.6 10E9/L (ref 0.8–5.3)
LYMPHOCYTES NFR BLD AUTO: 45.6 %
MCH RBC QN AUTO: 28.5 PG (ref 26.5–33)
MCHC RBC AUTO-ENTMCNC: 31.7 G/DL (ref 31.5–36.5)
MCV RBC AUTO: 90 FL (ref 78–100)
MONOCYTES # BLD AUTO: 0.6 10E9/L (ref 0–1.3)
MONOCYTES NFR BLD AUTO: 7.1 %
NEUTROPHILS # BLD AUTO: 3.5 10E9/L (ref 1.6–8.3)
NEUTROPHILS NFR BLD AUTO: 44.1 %
NITRATE UR QL: NEGATIVE
NRBC # BLD AUTO: 0 10*3/UL
NRBC BLD AUTO-RTO: 0 /100
PH UR STRIP: 6 PH (ref 5–7)
PLATELET # BLD AUTO: 259 10E9/L (ref 150–450)
POTASSIUM SERPL-SCNC: 3.9 MMOL/L (ref 3.4–5.3)
PROT SERPL-MCNC: 7.4 G/DL (ref 6.8–8.8)
RBC # BLD AUTO: 4.66 10E12/L (ref 3.8–5.2)
RBC #/AREA URNS AUTO: 2 /HPF (ref 0–2)
SODIUM SERPL-SCNC: 141 MMOL/L (ref 133–144)
SOURCE: ABNORMAL
SP GR UR STRIP: 1.02 (ref 1–1.03)
UROBILINOGEN UR STRIP-MCNC: 0 MG/DL (ref 0–2)
WBC # BLD AUTO: 7.9 10E9/L (ref 4–11)
WBC #/AREA URNS AUTO: 47 /HPF (ref 0–5)

## 2019-08-05 PROCEDURE — 81003 URINALYSIS AUTO W/O SCOPE: CPT | Performed by: EMERGENCY MEDICINE

## 2019-08-05 PROCEDURE — 80053 COMPREHEN METABOLIC PANEL: CPT | Performed by: EMERGENCY MEDICINE

## 2019-08-05 PROCEDURE — 25800030 ZZH RX IP 258 OP 636: Performed by: EMERGENCY MEDICINE

## 2019-08-05 PROCEDURE — 25000128 H RX IP 250 OP 636: Performed by: EMERGENCY MEDICINE

## 2019-08-05 PROCEDURE — 85025 COMPLETE CBC W/AUTO DIFF WBC: CPT | Performed by: EMERGENCY MEDICINE

## 2019-08-05 PROCEDURE — 87086 URINE CULTURE/COLONY COUNT: CPT | Performed by: EMERGENCY MEDICINE

## 2019-08-05 PROCEDURE — 83690 ASSAY OF LIPASE: CPT | Performed by: EMERGENCY MEDICINE

## 2019-08-05 RX ORDER — HYDROMORPHONE HYDROCHLORIDE 1 MG/ML
0.5 INJECTION, SOLUTION INTRAMUSCULAR; INTRAVENOUS; SUBCUTANEOUS
Status: DISCONTINUED | OUTPATIENT
Start: 2019-08-05 | End: 2019-08-05 | Stop reason: HOSPADM

## 2019-08-05 RX ADMIN — SODIUM CHLORIDE, POTASSIUM CHLORIDE, SODIUM LACTATE AND CALCIUM CHLORIDE 1000 ML: 600; 310; 30; 20 INJECTION, SOLUTION INTRAVENOUS at 01:11

## 2019-08-05 RX ADMIN — HYDROMORPHONE HYDROCHLORIDE 0.5 MG: 1 INJECTION, SOLUTION INTRAMUSCULAR; INTRAVENOUS; SUBCUTANEOUS at 01:11

## 2019-08-05 NOTE — ED PROVIDER NOTES
History     Chief Complaint   Patient presents with     Abdominal Pain     RUQ pain 2 hrs/ Dx with Gallstones 2 weeks ago     HPI  Nidia Acosta is a 59 year old female who presents for right upper quadrant abdominal pain.  Symptoms started about 2 hours prior to arrival.  She has felt hot and cold but has not taken her temperature.  She has nausea but no vomiting.  She was seen 2 weeks ago and diagnosed with biliary colic and she says this feels similar.  She had a prior tubal ligation, no other abdominal surgeries.  She denies chest pain, difficulty breathing, dysuria, urinary frequency, or rash.    Allergies:  Allergies   Allergen Reactions     Bees Anaphylaxis     Codeine Difficulty breathing     Penicillin [Penicillins] Difficulty breathing     Seasonal Allergies        Problem List:    Patient Active Problem List    Diagnosis Date Noted     BMI 38.0-38.9,adult 06/25/2018     Priority: Medium     PTSD (post-traumatic stress disorder) 04/02/2014     Priority: Medium     Traumatic experiences related to murder of her sister a number of years ago, contributing to her depression.         H/O: hysterectomy 03/18/2013     Priority: Medium     Benign reasons.  Ovaries in tact.         POD (perioral dermatitis) 03/18/2013     Priority: Medium     Hyperlipidemia LDL goal <130 11/21/2011     Priority: Medium     Insomnia 11/21/2011     Priority: Medium     Mild major depression (H) 04/04/2011     Priority: Medium     Subdural hemorrhage (H) 08/26/2009     Priority: Medium     Bilateral, drained. Summer 2009.  See notes in EPIC.  Subacute.   Diagnosis updated by automated process. Provider to review and confirm.       Decreased libido 11/10/2006     Priority: Medium     Obesity 08/28/2006     Priority: Medium     Problem list name updated by automated process. Provider to review       Allergic rhinitis 01/23/2006     Priority: Medium     Problem list name updated by automated process. Provider to review        "Other genital herpes 07/22/2005     Priority: Medium     Disturbance in sleep behavior 07/22/2005     Priority: Medium     Problem list name updated by automated process. Provider to review       Hypothyroidism due to acquired atrophy of thyroid 07/22/2005     Priority: Medium     Problem list name updated by automated process. Provider to review          Past Medical History:    Past Medical History:   Diagnosis Date     Arthritis      Backache, unspecified      Congestive heart failure (H)      COPD (chronic obstructive pulmonary disease) (H)      Depressive disorder      Diabetes (H)      Heart disease      Hypertension      Leiomyoma of uterus, unspecified 2003     Other genital herpes      Thyroid disease      Unspecified sinusitis (chronic)        Past Surgical History:    Past Surgical History:   Procedure Laterality Date     COLONOSCOPY  4/5/2013    Procedure: COLONOSCOPY;  Colonoscopy  ;  Surgeon: Laura Ruff MD;  Location: [facility]     HYSTERECTOMY, PAP NO LONGER INDICATED  7/2003    LAVH for fibroids     LAPAROSCOPIC LYSIS ADHESIONS  1985     LASIK  10/2004     TONSILLECTOMY & ADENOIDECTOMY  age 18     TUBAL LIGATION  4/1994       Family History:    Family History   Problem Relation Age of Onset     Arthritis Mother      Allergies Mother         otc meds     Depression Mother      Alzheimer Disease Mother      Hyperlipidemia Mother      Osteoporosis Mother      Diabetes Mother      Hypertension Father         on med     C.A.D. Father         has had 2 MI\"S, possible surgery     Alcohol/Drug Father      Arthritis Father      Hyperlipidemia Father      Substance Abuse Father      Alcohol/Drug Brother      Substance Abuse Brother      Heart Disease Maternal Grandmother      Osteoporosis Maternal Grandmother      Heart Disease Maternal Grandfather      Heart Disease Paternal Grandmother      Diabetes Paternal Grandmother      Obesity Paternal Grandmother      Heart Disease Paternal Grandfather  " "    Alcohol/Drug Son         son in recovery     Hypertension Brother      Hyperlipidemia Brother      Substance Abuse Other         Son       Social History:  Marital Status:   [4]  Social History     Tobacco Use     Smoking status: Former Smoker     Packs/day: 0.50     Years: 5.00     Pack years: 2.50     Types: Cigarettes     Last attempt to quit: 1986     Years since quittin.6     Smokeless tobacco: Never Used   Substance Use Topics     Alcohol use: Yes     Comment: 1-5 drinks weekly     Drug use: No        Medications:      HYDROcodone-acetaminophen (NORCO) 5-325 MG tablet   levothyroxine (SYNTHROID/LEVOTHROID) 112 MCG tablet   Loratadine (CLARITIN PO)   metroNIDAZOLE (METROGEL) 0.75 % vaginal gel   MISC NATURAL PRODUCTS PO   MISC NATURAL PRODUCTS PO   NONFORMULARY   NONFORMULARY   valACYclovir (VALTREX) 500 MG tablet   venlafaxine (EFFEXOR-XR) 37.5 MG 24 hr capsule         Review of Systems  A 4 point review of systems was performed. All pertinent positives and negatives were listed in the HPI and rest of ROS were otherwise negative.    Physical Exam   BP: (!) 147/82  Pulse: 85  Heart Rate: 89  Temp: 98.3  F (36.8  C)  Resp: 20  Height: 154.9 cm (5' 1\")  Weight: 93.4 kg (206 lb)  SpO2: 98 %      Physical Exam   Constitutional: She is oriented to person, place, and time. She appears well-developed and well-nourished. She appears distressed.   HENT:   Head: Normocephalic and atraumatic.   Right Ear: External ear normal.   Left Ear: External ear normal.   Nose: Nose normal.   Eyes: Conjunctivae are normal. No scleral icterus.   Neck: Normal range of motion.   Cardiovascular: Normal rate and regular rhythm.   Pulmonary/Chest: Effort normal. No stridor. No respiratory distress.   Abdominal: Soft. She exhibits no distension. There is tenderness in the right upper quadrant and epigastric area. There is guarding and positive Leyva's sign. There is no rigidity.   Neurological: She is alert and " oriented to person, place, and time.   Skin: Skin is warm and dry. She is not diaphoretic.   Psychiatric: She has a normal mood and affect. Her behavior is normal.   Nursing note and vitals reviewed.      ED Course        Procedures    Results for orders placed during the hospital encounter of 08/04/19   POC US ABDOMEN [facility] Procedure Note      Limited Bedside ED Gallbladder  Ultrasound:    PROCEDURE: PERFORMED BY: Dr. Chandana Gonzales  INDICATIONS:  RUQ/Epigastric Pain  PROBE:  Low frequency convex probe  BODY LOCATION: Abdomen  FINDINGS:   An ultrasound of the gallbladder was performed using longitudinal and transverse views.  Gallstone(s):  Present  Gallbladder sludge:  Absent  Sonographic Leyva's sign:  Present  Gallbladder wall thickening (greater than 4 mm):  Absent  Pericholecystic fluid: Absent  Common bile duct (dilated if internal diameter greater than 6 mm): 4 mm   INTERPRETATION:  Gallstones are present and there is a positive sonographic Leyva's sign.  IMAGE DOCUMENTATION: Images were archived to PACs system.              Critical Care time:  none               Results for orders placed or performed during the hospital encounter of 08/04/19 (from the past 24 hour(s))   POC US ABDOMEN [facility] Procedure Note      Limited Bedside ED Gallbladder  Ultrasound:    PROCEDURE: PERFORMED BY: Dr. Chandana Gonzales  INDICATIONS:  RUQ/Epigastric Pain  PROBE:  Low frequency convex probe  BODY LOCATION: Abdomen  FINDINGS:   An ultrasound of the gallbladder was performed using longitudinal and transverse views.  Gallstone(s):  Present  Gallbladder sludge:  Absent  Sonographic Leyva's sign:  Present  Gallbladder wall thickening (greater than 4 mm):  Absent  Pericholecystic fluid: Absent  Common bile duct (dilated if internal diameter greater than 6 mm): 4 mm   INTERPRETATION:  Gallstones are present and there is a positive sonographic  Leyva's sign.  IMAGE DOCUMENTATION: Images were archived to PACs system.    CBC with platelets differential   Result Value Ref Range    WBC 7.9 4.0 - 11.0 10e9/L    RBC Count 4.66 3.8 - 5.2 10e12/L    Hemoglobin 13.3 11.7 - 15.7 g/dL    Hematocrit 41.9 35.0 - 47.0 %    MCV 90 78 - 100 fl    MCH 28.5 26.5 - 33.0 pg    MCHC 31.7 31.5 - 36.5 g/dL    RDW 11.9 10.0 - 15.0 %    Platelet Count 259 150 - 450 10e9/L    Diff Method Automated Method     % Neutrophils 44.1 %    % Lymphocytes 45.6 %    % Monocytes 7.1 %    % Eosinophils 2.3 %    % Basophils 0.6 %    % Immature Granulocytes 0.3 %    Nucleated RBCs 0 0 /100    Absolute Neutrophil 3.5 1.6 - 8.3 10e9/L    Absolute Lymphocytes 3.6 0.8 - 5.3 10e9/L    Absolute Monocytes 0.6 0.0 - 1.3 10e9/L    Absolute Eosinophils 0.2 0.0 - 0.7 10e9/L    Absolute Basophils 0.1 0.0 - 0.2 10e9/L    Abs Immature Granulocytes 0.0 0 - 0.4 10e9/L    Absolute Nucleated RBC 0.0    Comprehensive metabolic panel   Result Value Ref Range    Sodium 141 133 - 144 mmol/L    Potassium 3.9 3.4 - 5.3 mmol/L    Chloride 107 94 - 109 mmol/L    Carbon Dioxide 27 20 - 32 mmol/L    Anion Gap 7 3 - 14 mmol/L    Glucose 98 70 - 99 mg/dL    Urea Nitrogen 18 7 - 30 mg/dL    Creatinine 0.87 0.52 - 1.04 mg/dL    GFR Estimate 74 >60 mL/min/[1.73_m2]    GFR Estimate If Black 86 >60 mL/min/[1.73_m2]    Calcium 9.2 8.5 - 10.1 mg/dL    Bilirubin Total 0.2 0.2 - 1.3 mg/dL    Albumin 4.0 3.4 - 5.0 g/dL    Protein Total 7.4 6.8 - 8.8 g/dL    Alkaline Phosphatase 94 40 - 150 U/L    ALT 30 0 - 50 U/L    AST 20 0 - 45 U/L   Lipase   Result Value Ref Range    Lipase 169 73 - 393 U/L   UA reflex to Microscopic and Culture   Result Value Ref Range    Color Urine Yellow     Appearance Urine Clear     Glucose Urine Negative NEG^Negative mg/dL    Bilirubin Urine Negative NEG^Negative    Ketones Urine Negative NEG^Negative mg/dL    Specific Gravity Urine 1.020 1.003 - 1.035    Blood Urine Negative NEG^Negative    pH Urine 6.0  5.0 - 7.0 pH    Protein Albumin Urine Negative NEG^Negative mg/dL    Urobilinogen mg/dL 0.0 0.0 - 2.0 mg/dL    Nitrite Urine Negative NEG^Negative    Leukocyte Esterase Urine Moderate (A) NEG^Negative    Source Midstream Urine     RBC Urine 2 0 - 2 /HPF    WBC Urine 47 (H) 0 - 5 /HPF       Medications   HYDROmorphone (PF) (DILAUDID) injection 0.5 mg (0.5 mg Intravenous Given 8/5/19 0111)   lactated ringers BOLUS 1,000 mL (0 mLs Intravenous Stopped 8/5/19 0204)       Assessments & Plan (with Medical Decision Making)   59-year-old female who presents for abdominal pain nausea.  Heart rate 89, temperature is 98.3  F, SPO2 is 98% on room air.  She is given IV hydromorphone and fluids for the symptoms.  Bedside ultrasound shows cholelithiasis with a positive Leyva sign but no signs of cholecystitis.  White blood cell count is 7.9.  Hemoglobin is 13.3.  Electrolytes are within normal limits.  LFTs normal, no signs of hepatitis.  Lipase normal, no signs of pancreatitis.  Urinalysis is positive for white blood cells but negative nitrite.  She has no symptoms suggestive of urinary tract infection, I do not believe that this represents pyelonephritis or cystitis.  On recheck she is feeling much better, tolerating oral intake, ambulating without pain, discharged with instructions to return if worse, otherwise follow-up in surgery clinic for discussion of cholecystectomy.    I have reviewed the nursing notes.    I have reviewed the findings, diagnosis, plan and need for follow up with the patient.          Medication List      There are no discharge medications for this visit.         Final diagnoses:   Biliary colic       8/4/2019   [facility] EMERGENCY DEPARTMENT     Chandana Gonzales MD  08/05/19 0206

## 2019-08-05 NOTE — DISCHARGE INSTRUCTIONS
Return to the emergency department for worsening symptoms, fever, repeated vomiting, or other concerns.  Otherwise follow-up in clinic for a recheck and follow-up in surgery clinic to discuss surgery for your gallbladder.

## 2019-08-06 LAB
BACTERIA SPEC CULT: NORMAL
Lab: NORMAL
SPECIMEN SOURCE: NORMAL

## 2019-08-06 NOTE — RESULT ENCOUNTER NOTE
Final urine culture report is NEGATIVE per Given ED Lab Result protocol.    If NEGATIVE result, no change in treatment, per Given ED Lab Result protocol.

## 2019-08-12 ENCOUNTER — OFFICE VISIT (OUTPATIENT)
Dept: SURGERY | Facility: CLINIC | Age: 59
End: 2019-08-12
Payer: COMMERCIAL

## 2019-08-12 VITALS
TEMPERATURE: 97.5 F | HEART RATE: 75 BPM | DIASTOLIC BLOOD PRESSURE: 70 MMHG | SYSTOLIC BLOOD PRESSURE: 126 MMHG | RESPIRATION RATE: 18 BRPM | WEIGHT: 205 LBS | BODY MASS INDEX: 38.71 KG/M2 | HEIGHT: 61 IN

## 2019-08-12 DIAGNOSIS — K80.20 CALCULUS OF GALLBLADDER WITHOUT CHOLECYSTITIS WITHOUT OBSTRUCTION: Primary | ICD-10-CM

## 2019-08-12 PROCEDURE — 99203 OFFICE O/P NEW LOW 30 MIN: CPT | Performed by: SURGERY

## 2019-08-12 ASSESSMENT — MIFFLIN-ST. JEOR: SCORE: 1452.25

## 2019-08-12 NOTE — PROGRESS NOTES
PCP:  Merlene Reyes    Chief complaint: Gallstones    History of Present Illness: Nidia is a 59-year-old female who presents to the surgical department for evaluation and treatment of gallstones.  Her history is that starting in June she had an attack of abdominal pain that was quite severe.  She was not sure what it was and it finally went away so she never got it looked at.  However, within the last 2 weeks she has had 2 more attacks that brought her to the emergency room.  She developed a sudden onset of right upper quadrant pain in the middle of the night.  The pain was 10 out of 10 in severity.  The pain radiated to her back.  She had associated nausea and bloating.  No change in color of her urine or stools.    All of her liver functions were normal at both visits.  She had a formal ultrasound done on her first visit in late July that showed multiple gallstones.  The common duct was normal.  No other abnormalities detected.    At her second visit a point-of-care ultrasound was done which also showed gallstones and no other significant findings.    Her only previous abdominal surgery includes a tubal ligation, tonsillectomy, hysterectomy and recently a cervical fusion.    Her medical history was reviewed as below.    Histories:  Past Medical History:   Diagnosis Date     Arthritis     paternal & maternal     Backache, unspecified     lumbar back pain, degen disk disease     Congestive heart failure (H)     paternal     COPD (chronic obstructive pulmonary disease) (H)     I get bronchitis frequently     Depressive disorder     numerous yrs ago diagnosed with PTSD     Diabetes (H)     maternal & paternal     Heart disease     Paternal side     Hypertension     Paternal side     Leiomyoma of uterus, unspecified 2003    s/p LAVH     Other genital herpes     HSV-2     Thyroid disease     Me     Unspecified sinusitis (chronic)        Past Surgical History:   Procedure Laterality Date     COLONOSCOPY  4/5/2013     "Procedure: COLONOSCOPY;  Colonoscopy  ;  Surgeon: Laura Ruff MD;  Location: [facility]     HYSTERECTOMY, PAP NO LONGER INDICATED  2003    LA[facility] for fibroids     LAPAROSCOPIC LYSIS ADHESIONS  1985     LASIK  10/2004     TONSILLECTOMY & ADENOIDECTOMY  age 18     TUBAL LIGATION  1994       Family History   Problem Relation Age of Onset     Arthritis Mother      Allergies Mother         otc meds     Depression Mother      Alzheimer Disease Mother      Hyperlipidemia Mother      Osteoporosis Mother      Diabetes Mother      Hypertension Father         on med     C.A.D. Father         has had 2 MI\"S, possible surgery     Alcohol/Drug Father      Arthritis Father      Hyperlipidemia Father      Substance Abuse Father      Alcohol/Drug Brother      Substance Abuse Brother      Heart Disease Maternal Grandmother      Osteoporosis Maternal Grandmother      Heart Disease Maternal Grandfather      Heart Disease Paternal Grandmother      Diabetes Paternal Grandmother      Obesity Paternal Grandmother      Heart Disease Paternal Grandfather      Alcohol/Drug Son         son in recovery     Hypertension Brother      Hyperlipidemia Brother      Substance Abuse Other         Son       Social History     Tobacco Use     Smoking status: Former Smoker     Packs/day: 0.50     Years: 5.00     Pack years: 2.50     Types: Cigarettes     Last attempt to quit: 1986     Years since quittin.6     Smokeless tobacco: Never Used   Substance Use Topics     Alcohol use: Yes     Comment: 1-5 drinks weekly       Current Outpatient Medications   Medication Sig Dispense Refill     levothyroxine (SYNTHROID/LEVOTHROID) 112 MCG tablet Take 1 tablet (112 mcg) by mouth daily 90 tablet 3     Loratadine (CLARITIN PO) Take 10 mg by mouth daily AllerClear Brand        metroNIDAZOLE (METROGEL) 0.75 % vaginal gel Place 1 applicator (5 g) vaginally daily 70 g 0     MISC NATURAL PRODUCTS PO Take 1 capsule by mouth daily PD 80/20 - supports " endocrine function       MISC NATURAL PRODUCTS PO Take 1 capsule by mouth daily Agiles - All natural for bones, ligaments, muscles etc..       NONFORMULARY Take 4 capsules by mouth daily Sulfurzyme -  combines wolfberry with MSM, a naturally occurring organic form of dietary sulfur needed by our bodies every day to maintain the structure of proteins, protect cells and cell membranes, replenish the connections between cells, and preserve the molecular framework of connective tissue.       NONFORMULARY Take 1 tablet by mouth daily Thyromine - The compounds that make Thyromine such an efficient thyroid supplement are: Adrenal powder from bovine, Ginger extract, Guggal tree extract, Suzanne extract, Piper Longum extract, thyroid powder from bovine and L-Tyrosine.       valACYclovir (VALTREX) 500 MG tablet TAKE 1 TABLET BY MOUTH ONCE DAILY 90 tablet 1     venlafaxine (EFFEXOR-XR) 37.5 MG 24 hr capsule Take 1 capsule (37.5 mg) by mouth daily 91 capsule 1     HYDROcodone-acetaminophen (NORCO) 5-325 MG tablet Take 1-2 tablets by mouth every 4 hours as needed for moderate to severe pain or severe pain maximum 6 tablet(s) per day (Patient not taking: Reported on 8/12/2019) 15 tablet 0       Allergies   Allergen Reactions     Bees Anaphylaxis     Codeine Difficulty breathing     Penicillin [Penicillins] Difficulty breathing     Seasonal Allergies        Images:  Recent Results (from the past 744 hour(s))   US Abdomen Limited    Narrative    RIGHT UPPER QUADRANT ULTRASOUND 7/22/2019 10:51 AM    HISTORY:  Right upper quadrant pain.    COMPARISON: None.    FINDINGS:    Gallbladder: Gallstones are present, but there is no evidence of  cholecystitis.    Bile ducts:   CHD is normal diameter.  No intrahepatic biliary  dilatation.    Liver:   Normal.     Pancreas:  Obscured by intestinal gas.     Right kidney:   Normal.       Impression    IMPRESSION:  Cholelithiasis without sonographic evidence of acute  cholecystitis.     WAI RAMOS  MD TAZ   POC US ABDOMEN LIMITED    [facility] Procedure Note      Limited Bedside ED Gallbladder  Ultrasound:    PROCEDURE: PERFORMED BY: Dr. Chandana Gonzales  INDICATIONS:  RUQ/Epigastric Pain  PROBE:  Low frequency convex probe  BODY LOCATION: Abdomen  FINDINGS:   An ultrasound of the gallbladder was performed using longitudinal and transverse views.  Gallstone(s):  Present  Gallbladder sludge:  Absent  Sonographic Leyva's sign:  Present  Gallbladder wall thickening (greater than 4 mm):  Absent  Pericholecystic fluid: Absent  Common bile duct (dilated if internal diameter greater than 6 mm): 4 mm   INTERPRETATION:  Gallstones are present and there is a positive sonographic Leyva's sign.  IMAGE DOCUMENTATION: Images were archived to PACs system.        Labs:  Results for orders placed or performed during the hospital encounter of 08/04/19   POC US ABDOMEN LIMITED    [facility] Procedure Note      Limited Bedside ED Gallbladder  Ultrasound:    PROCEDURE: PERFORMED BY: Dr. Chandana Gonzales  INDICATIONS:  RUQ/Epigastric Pain  PROBE:  Low frequency convex probe  BODY LOCATION: Abdomen  FINDINGS:   An ultrasound of the gallbladder was performed using longitudinal and transverse views.  Gallstone(s):  Present  Gallbladder sludge:  Absent  Sonographic Leyva's sign:  Present  Gallbladder wall thickening (greater than 4 mm):  Absent  Pericholecystic fluid: Absent  Common bile duct (dilated if internal diameter greater than 6 mm): 4 mm   INTERPRETATION:  Gallstones are present and there is a positive sonographic Leyva's sign.  IMAGE DOCUMENTATION: Images were archived to PACs system.    CBC with platelets differential   Result Value Ref Range    WBC 7.9 4.0 - 11.0 10e9/L    RBC Count 4.66 3.8 - 5.2 10e12/L    Hemoglobin 13.3 11.7 - 15.7 g/dL    Hematocrit 41.9 35.0 - 47.0 %    MCV 90 78 - 100 fl    MCH 28.5 26.5 - 33.0 pg    MCHC 31.7 31.5 - 36.5 g/dL    RDW 11.9 10.0  - 15.0 %    Platelet Count 259 150 - 450 10e9/L    Diff Method Automated Method     % Neutrophils 44.1 %    % Lymphocytes 45.6 %    % Monocytes 7.1 %    % Eosinophils 2.3 %    % Basophils 0.6 %    % Immature Granulocytes 0.3 %    Nucleated RBCs 0 0 /100    Absolute Neutrophil 3.5 1.6 - 8.3 10e9/L    Absolute Lymphocytes 3.6 0.8 - 5.3 10e9/L    Absolute Monocytes 0.6 0.0 - 1.3 10e9/L    Absolute Eosinophils 0.2 0.0 - 0.7 10e9/L    Absolute Basophils 0.1 0.0 - 0.2 10e9/L    Abs Immature Granulocytes 0.0 0 - 0.4 10e9/L    Absolute Nucleated RBC 0.0    Comprehensive metabolic panel   Result Value Ref Range    Sodium 141 133 - 144 mmol/L    Potassium 3.9 3.4 - 5.3 mmol/L    Chloride 107 94 - 109 mmol/L    Carbon Dioxide 27 20 - 32 mmol/L    Anion Gap 7 3 - 14 mmol/L    Glucose 98 70 - 99 mg/dL    Urea Nitrogen 18 7 - 30 mg/dL    Creatinine 0.87 0.52 - 1.04 mg/dL    GFR Estimate 74 >60 mL/min/[1.73_m2]    GFR Estimate If Black 86 >60 mL/min/[1.73_m2]    Calcium 9.2 8.5 - 10.1 mg/dL    Bilirubin Total 0.2 0.2 - 1.3 mg/dL    Albumin 4.0 3.4 - 5.0 g/dL    Protein Total 7.4 6.8 - 8.8 g/dL    Alkaline Phosphatase 94 40 - 150 U/L    ALT 30 0 - 50 U/L    AST 20 0 - 45 U/L   Lipase   Result Value Ref Range    Lipase 169 73 - 393 U/L   UA reflex to Microscopic and Culture   Result Value Ref Range    Color Urine Yellow     Appearance Urine Clear     Glucose Urine Negative NEG^Negative mg/dL    Bilirubin Urine Negative NEG^Negative    Ketones Urine Negative NEG^Negative mg/dL    Specific Gravity Urine 1.020 1.003 - 1.035    Blood Urine Negative NEG^Negative    pH Urine 6.0 5.0 - 7.0 pH    Protein Albumin Urine Negative NEG^Negative mg/dL    Urobilinogen mg/dL 0.0 0.0 - 2.0 mg/dL    Nitrite Urine Negative NEG^Negative    Leukocyte Esterase Urine Moderate (A) NEG^Negative    Source Midstream Urine     RBC Urine 2 0 - 2 /HPF    WBC Urine 47 (H) 0 - 5 /HPF   Urine Culture Aerobic Bacterial   Result Value Ref Range    Specimen  "Description Midstream Urine     Special Requests Specimen received in preservative     Culture Micro <10,000 colonies/mL  mixed urogenital juan          ROS:  Constitutional - Denies fevers, weight loss, malaise, lethargy  Neuro - Denies tremors or seizures  Pulmon - Denies SOB, dyspnea, hemoptysis, chronic cough or use of an inhaler  CV - Denies CP, SOB, lower extremity edema, difficulty w/ stairs  GI - Denies hematemesis, BRBPR, melena, chronic diarrhea   - Denies hematuria, difficulty voiding, h/o STDs  Hematology - Denies blood clotting disorders, chronic anemias  Dermatology - No melanomas or skin cancers  Rheumatology - No h/o RA  Pysch - Denies depression, bipolar d/o or schizophrenia    /70 (BP Location: Right arm, Patient Position: Chair, Cuff Size: Adult Regular)   Pulse 75   Temp 97.5  F (36.4  C) (Tympanic)   Resp 18   Ht 1.549 m (5' 1\")   Wt 93 kg (205 lb)   LMP 07/29/2002   BMI 38.73 kg/m      Exam:  General - Alert and Oriented X4, NAD, well nourished  HEENT - Normocephalic, atraumatic  Neck - supple  Lungs -respirations unlabored, chest wall excursion normal    Heart RRR  Abdomen - Soft, non-tender, +BS, no hepatosplenomegaly, no palpable masses, normal umbilicus without hernia  Groins -deferred  Rectal -deferred  Neuro - Full ROM, Strength 5/5 and major muscle groups, sensation intact  Extremities - No cyanosis, clubbing or edema.      Assessment and Plan: Symptomatic cholelithiasis.  She seems to have escalating symptoms so she would like the gallbladder out as soon as possible.  I offered her this week Wednesday but she thinks that might be difficult.  We have then decided to go for a week from Wednesday which would be the 21st.    The plan is for laparoscopic cholecystectomy.  We talked about the anatomy the pathophysiology the technical aspects of the procedure including risks, benefits, and alternatives of the procedure.  She seemed very knowledgeable and asked appropriate " questions.  All of her questions were answered.    She was advised of the need for a formal history and physical.  We also talked about the postop recovery anticipated return to work.  She is agreeable and arrangements were made to proceed electively.        Kwabena Stephenson MD FACS

## 2019-08-12 NOTE — NURSING NOTE
"Initial /70 (BP Location: Right arm, Patient Position: Chair, Cuff Size: Adult Regular)   Pulse 75   Temp 97.5  F (36.4  C) (Tympanic)   Resp 18   Ht 1.549 m (5' 1\")   Wt 93 kg (205 lb)   LMP 07/29/2002   BMI 38.73 kg/m   Estimated body mass index is 38.73 kg/m  as calculated from the following:    Height as of this encounter: 1.549 m (5' 1\").    Weight as of this encounter: 93 kg (205 lb). .      "

## 2019-08-12 NOTE — LETTER
8/12/2019         RE: Nidia Acosta  [address] [facility] [address]        Dear Colleague,    Thank you for referring your patient, Nidia Acosta, to the [facility]. Please see a copy of my visit note below.    PCP:  Merlene Reyes    Chief complaint: Gallstones    History of Present Illness: Nidia is a 59-year-old female who presents to the surgical department for evaluation and treatment of gallstones.  Her history is that starting in June she had an attack of abdominal pain that was quite severe.  She was not sure what it was and it finally went away so she never got it looked at.  However, within the last 2 weeks she has had 2 more attacks that brought her to the emergency room.  She developed a sudden onset of right upper quadrant pain in the middle of the night.  The pain was 10 out of 10 in severity.  The pain radiated to her back.  She had associated nausea and bloating.  No change in color of her urine or stools.    All of her liver functions were normal at both visits.  She had a formal ultrasound done on her first visit in late July that showed multiple gallstones.  The common duct was normal.  No other abnormalities detected.    At her second visit a point-of-care ultrasound was done which also showed gallstones and no other significant findings.    Her only previous abdominal surgery includes a tubal ligation, tonsillectomy, hysterectomy and recently a cervical fusion.    Her medical history was reviewed as below.    Histories:  Past Medical History:   Diagnosis Date     Arthritis     paternal & maternal     Backache, unspecified     lumbar back pain, degen disk disease     Congestive heart failure (H)     paternal     COPD (chronic obstructive pulmonary disease) (H)     I get bronchitis frequently     Depressive disorder     numerous yrs ago diagnosed with PTSD     Diabetes (H)     maternal & paternal     Heart disease     Paternal side     Hypertension     Paternal  "side     Leiomyoma of uterus, unspecified     s/p LAVH     Other genital herpes     HSV-2     Thyroid disease     Me     Unspecified sinusitis (chronic)        Past Surgical History:   Procedure Laterality Date     COLONOSCOPY  2013    Procedure: COLONOSCOPY;  Colonoscopy  ;  Surgeon: Laura Ruff MD;  Location: [facility]     HYSTERECTOMY, PAP NO LONGER INDICATED  2003    LAVH for fibroids     LAPAROSCOPIC LYSIS ADHESIONS  1985     LASIK  10/2004     TONSILLECTOMY & ADENOIDECTOMY  age 18     TUBAL LIGATION  1994       Family History   Problem Relation Age of Onset     Arthritis Mother      Allergies Mother         otc meds     Depression Mother      Alzheimer Disease Mother      Hyperlipidemia Mother      Osteoporosis Mother      Diabetes Mother      Hypertension Father         on med     C.A.D. Father         has had 2 MI\"S, possible surgery     Alcohol/Drug Father      Arthritis Father      Hyperlipidemia Father      Substance Abuse Father      Alcohol/Drug Brother      Substance Abuse Brother      Heart Disease Maternal Grandmother      Osteoporosis Maternal Grandmother      Heart Disease Maternal Grandfather      Heart Disease Paternal Grandmother      Diabetes Paternal Grandmother      Obesity Paternal Grandmother      Heart Disease Paternal Grandfather      Alcohol/Drug Son         son in recovery     Hypertension Brother      Hyperlipidemia Brother      Substance Abuse Other         Son       Social History     Tobacco Use     Smoking status: Former Smoker     Packs/day: 0.50     Years: 5.00     Pack years: 2.50     Types: Cigarettes     Last attempt to quit: 1986     Years since quittin.6     Smokeless tobacco: Never Used   Substance Use Topics     Alcohol use: Yes     Comment: 1-5 drinks weekly       Current Outpatient Medications   Medication Sig Dispense Refill     levothyroxine (SYNTHROID/LEVOTHROID) 112 MCG tablet Take 1 tablet (112 mcg) by mouth daily 90 tablet 3     " Loratadine (CLARITIN PO) Take 10 mg by mouth daily AllerClear Brand        metroNIDAZOLE (METROGEL) 0.75 % vaginal gel Place 1 applicator (5 g) vaginally daily 70 g 0     MISC NATURAL PRODUCTS PO Take 1 capsule by mouth daily PD 80/20 - supports endocrine function       MISC NATURAL PRODUCTS PO Take 1 capsule by mouth daily Agiles - All natural for bones, ligaments, muscles etc..       NONFORMULARY Take 4 capsules by mouth daily Sulfurzyme -  combines wolfberry with MSM, a naturally occurring organic form of dietary sulfur needed by our bodies every day to maintain the structure of proteins, protect cells and cell membranes, replenish the connections between cells, and preserve the molecular framework of connective tissue.       NONFORMULARY Take 1 tablet by mouth daily Thyromine - The compounds that make Thyromine such an efficient thyroid supplement are: Adrenal powder from bovine, Ginger extract, Guggal tree extract, Suzanne extract, Piper Longum extract, thyroid powder from bovine and L-Tyrosine.       valACYclovir (VALTREX) 500 MG tablet TAKE 1 TABLET BY MOUTH ONCE DAILY 90 tablet 1     venlafaxine (EFFEXOR-XR) 37.5 MG 24 hr capsule Take 1 capsule (37.5 mg) by mouth daily 91 capsule 1     HYDROcodone-acetaminophen (NORCO) 5-325 MG tablet Take 1-2 tablets by mouth every 4 hours as needed for moderate to severe pain or severe pain maximum 6 tablet(s) per day (Patient not taking: Reported on 8/12/2019) 15 tablet 0       Allergies   Allergen Reactions     Bees Anaphylaxis     Codeine Difficulty breathing     Penicillin [Penicillins] Difficulty breathing     Seasonal Allergies        Images:  Recent Results (from the past 744 hour(s))   US Abdomen Limited    Narrative    RIGHT UPPER QUADRANT ULTRASOUND 7/22/2019 10:51 AM    HISTORY:  Right upper quadrant pain.    COMPARISON: None.    FINDINGS:    Gallbladder: Gallstones are present, but there is no evidence of  cholecystitis.    Bile ducts:   CHD is normal diameter.   No intrahepatic biliary  dilatation.    Liver:   Normal.     Pancreas:  Obscured by intestinal gas.     Right kidney:   Normal.       Impression    IMPRESSION:  Cholelithiasis without sonographic evidence of acute  cholecystitis.     WAI MCGHEE MD   POC US ABDOMEN LIMITED    [facility] Procedure Note      Limited Bedside ED Gallbladder  Ultrasound:    PROCEDURE: PERFORMED BY: Dr. Chandana Gonzales  INDICATIONS:  RUQ/Epigastric Pain  PROBE:  Low frequency convex probe  BODY LOCATION: Abdomen  FINDINGS:   An ultrasound of the gallbladder was performed using longitudinal and transverse views.  Gallstone(s):  Present  Gallbladder sludge:  Absent  Sonographic Leyva's sign:  Present  Gallbladder wall thickening (greater than 4 mm):  Absent  Pericholecystic fluid: Absent  Common bile duct (dilated if internal diameter greater than 6 mm): 4 mm   INTERPRETATION:  Gallstones are present and there is a positive sonographic Leyva's sign.  IMAGE DOCUMENTATION: Images were archived to PACs system.        Labs:  Results for orders placed or performed during the hospital encounter of 08/04/19   POC US ABDOMEN LIMITED    [facility] Procedure Note      Limited Bedside ED Gallbladder  Ultrasound:    PROCEDURE: PERFORMED BY: Dr. Chandana Gonzales  INDICATIONS:  RUQ/Epigastric Pain  PROBE:  Low frequency convex probe  BODY LOCATION: Abdomen  FINDINGS:   An ultrasound of the gallbladder was performed using longitudinal and transverse views.  Gallstone(s):  Present  Gallbladder sludge:  Absent  Sonographic Leyva's sign:  Present  Gallbladder wall thickening (greater than 4 mm):  Absent  Pericholecystic fluid: Absent  Common bile duct (dilated if internal diameter greater than 6 mm): 4 mm   INTERPRETATION:  Gallstones are present and there is a positive sonographic Leyva's sign.  IMAGE DOCUMENTATION: Images were archived to PACs system.    CBC with platelets differential   Result  Value Ref Range    WBC 7.9 4.0 - 11.0 10e9/L    RBC Count 4.66 3.8 - 5.2 10e12/L    Hemoglobin 13.3 11.7 - 15.7 g/dL    Hematocrit 41.9 35.0 - 47.0 %    MCV 90 78 - 100 fl    MCH 28.5 26.5 - 33.0 pg    MCHC 31.7 31.5 - 36.5 g/dL    RDW 11.9 10.0 - 15.0 %    Platelet Count 259 150 - 450 10e9/L    Diff Method Automated Method     % Neutrophils 44.1 %    % Lymphocytes 45.6 %    % Monocytes 7.1 %    % Eosinophils 2.3 %    % Basophils 0.6 %    % Immature Granulocytes 0.3 %    Nucleated RBCs 0 0 /100    Absolute Neutrophil 3.5 1.6 - 8.3 10e9/L    Absolute Lymphocytes 3.6 0.8 - 5.3 10e9/L    Absolute Monocytes 0.6 0.0 - 1.3 10e9/L    Absolute Eosinophils 0.2 0.0 - 0.7 10e9/L    Absolute Basophils 0.1 0.0 - 0.2 10e9/L    Abs Immature Granulocytes 0.0 0 - 0.4 10e9/L    Absolute Nucleated RBC 0.0    Comprehensive metabolic panel   Result Value Ref Range    Sodium 141 133 - 144 mmol/L    Potassium 3.9 3.4 - 5.3 mmol/L    Chloride 107 94 - 109 mmol/L    Carbon Dioxide 27 20 - 32 mmol/L    Anion Gap 7 3 - 14 mmol/L    Glucose 98 70 - 99 mg/dL    Urea Nitrogen 18 7 - 30 mg/dL    Creatinine 0.87 0.52 - 1.04 mg/dL    GFR Estimate 74 >60 mL/min/[1.73_m2]    GFR Estimate If Black 86 >60 mL/min/[1.73_m2]    Calcium 9.2 8.5 - 10.1 mg/dL    Bilirubin Total 0.2 0.2 - 1.3 mg/dL    Albumin 4.0 3.4 - 5.0 g/dL    Protein Total 7.4 6.8 - 8.8 g/dL    Alkaline Phosphatase 94 40 - 150 U/L    ALT 30 0 - 50 U/L    AST 20 0 - 45 U/L   Lipase   Result Value Ref Range    Lipase 169 73 - 393 U/L   UA reflex to Microscopic and Culture   Result Value Ref Range    Color Urine Yellow     Appearance Urine Clear     Glucose Urine Negative NEG^Negative mg/dL    Bilirubin Urine Negative NEG^Negative    Ketones Urine Negative NEG^Negative mg/dL    Specific Gravity Urine 1.020 1.003 - 1.035    Blood Urine Negative NEG^Negative    pH Urine 6.0 5.0 - 7.0 pH    Protein Albumin Urine Negative NEG^Negative mg/dL    Urobilinogen mg/dL 0.0 0.0 - 2.0 mg/dL    Nitrite  "Urine Negative NEG^Negative    Leukocyte Esterase Urine Moderate (A) NEG^Negative    Source Midstream Urine     RBC Urine 2 0 - 2 /HPF    WBC Urine 47 (H) 0 - 5 /HPF   Urine Culture Aerobic Bacterial   Result Value Ref Range    Specimen Description Midstream Urine     Special Requests Specimen received in preservative     Culture Micro <10,000 colonies/mL  mixed urogenital juan          ROS:  Constitutional - Denies fevers, weight loss, malaise, lethargy  Neuro - Denies tremors or seizures  Pulmon - Denies SOB, dyspnea, hemoptysis, chronic cough or use of an inhaler  CV - Denies CP, SOB, lower extremity edema, difficulty w/ stairs  GI - Denies hematemesis, BRBPR, melena, chronic diarrhea   - Denies hematuria, difficulty voiding, h/o STDs  Hematology - Denies blood clotting disorders, chronic anemias  Dermatology - No melanomas or skin cancers  Rheumatology - No h/o RA  Pysch - Denies depression, bipolar d/o or schizophrenia    /70 (BP Location: Right arm, Patient Position: Chair, Cuff Size: Adult Regular)   Pulse 75   Temp 97.5  F (36.4  C) (Tympanic)   Resp 18   Ht 1.549 m (5' 1\")   Wt 93 kg (205 lb)   LMP 07/29/2002   BMI 38.73 kg/m       Exam:  General - Alert and Oriented X4, NAD, well nourished  HEENT - Normocephalic, atraumatic  Neck - supple  Lungs -respirations unlabored, chest wall excursion normal    Heart RRR  Abdomen - Soft, non-tender, +BS, no hepatosplenomegaly, no palpable masses, normal umbilicus without hernia  Groins -deferred  Rectal -deferred  Neuro - Full ROM, Strength 5/5 and major muscle groups, sensation intact  Extremities - No cyanosis, clubbing or edema.      Assessment and Plan: Symptomatic cholelithiasis.  She seems to have escalating symptoms so she would like the gallbladder out as soon as possible.  I offered her this week Wednesday but she thinks that might be difficult.  We have then decided to go for a week from Wednesday which would be the 21st.    The plan is for " laparoscopic cholecystectomy.  We talked about the anatomy the pathophysiology the technical aspects of the procedure including risks, benefits, and alternatives of the procedure.  She seemed very knowledgeable and asked appropriate questions.  All of her questions were answered.    She was advised of the need for a formal history and physical.  We also talked about the postop recovery anticipated return to work.  She is agreeable and arrangements were made to proceed electively.        Kwabena Stephenson MD FACS          Again, thank you for allowing me to participate in the care of your patient.        Sincerely,        Kwabena Stephenson MD

## 2019-08-19 ENCOUNTER — OFFICE VISIT (OUTPATIENT)
Dept: FAMILY MEDICINE | Facility: CLINIC | Age: 59
End: 2019-08-19
Payer: COMMERCIAL

## 2019-08-19 VITALS
HEIGHT: 61 IN | WEIGHT: 207 LBS | DIASTOLIC BLOOD PRESSURE: 72 MMHG | RESPIRATION RATE: 14 BRPM | SYSTOLIC BLOOD PRESSURE: 126 MMHG | BODY MASS INDEX: 39.08 KG/M2 | HEART RATE: 79 BPM | TEMPERATURE: 98.5 F

## 2019-08-19 DIAGNOSIS — K80.20 CALCULUS OF GALLBLADDER WITHOUT CHOLECYSTITIS WITHOUT OBSTRUCTION: ICD-10-CM

## 2019-08-19 DIAGNOSIS — Z01.818 PREOP GENERAL PHYSICAL EXAM: Primary | ICD-10-CM

## 2019-08-19 PROCEDURE — 93000 ELECTROCARDIOGRAM COMPLETE: CPT | Performed by: PHYSICIAN ASSISTANT

## 2019-08-19 PROCEDURE — 99214 OFFICE O/P EST MOD 30 MIN: CPT | Performed by: PHYSICIAN ASSISTANT

## 2019-08-19 ASSESSMENT — MIFFLIN-ST. JEOR: SCORE: 1461.33

## 2019-08-19 NOTE — H&P (VIEW-ONLY)
[facility]  [address] [facility] [address]  [phone]  Dept: [phone]    PRE-OP EVALUATION:  Today's date: 2019    Nidia Acosta (: 1960) presents for pre-operative evaluation assessment as requested by Dr. Stephenson. He requires evaluation and anesthesia risk assessment prior to undergoing surgery/procedure for treatment of cholelithiasis without cholecystitis .    Proposed Surgery/ Procedure: Cholecystectomy   Date of Surgery/ Procedure: 19  Time of Surgery/ Procedure: 8:45 am   Hospital/Surgical Facility: [facility]   Primary Physician: Merlene Reyes  Type of Anesthesia Anticipated: General    Patient has a Health Care Directive or Living Will:  NO, wanting packet today     1. NO - Do you have a history of heart attack, stroke, stent, bypass or surgery on an artery in the head, neck, heart or legs?  2. NO - Do you ever have any pain or discomfort in your chest?  3. NO - Do you have a history of  Heart Failure?  4. NO - Are you troubled by shortness of breath when: walking on the level, up a slight hill or at night?  5. NO - Do you currently have a cold, bronchitis or other respiratory infection?  6. NO - Do you have a cough, shortness of breath or wheezing?  7. NO - Do you sometimes get pains in the calves of your legs when you walk?  8. NO - Do you or anyone in your family have previous history of blood clots?  9. NO - Do you or does anyone in your family have a serious bleeding problem such as prolonged bleeding following surgeries or cuts?  10. NO - Have you ever had problems with anemia or been told to take iron pills?  11. NO - Have you had any abnormal blood loss such as black, tarry or bloody stools, or abnormal vaginal bleeding?  12. NO - Have you ever had a blood transfusion?  13. NO - Have you or any of your relatives ever had problems with anesthesia?  14. YES - Do you have sleep apnea, excessive snoring or daytime drowsiness?  Snoring   15. NO - Do you have any prosthetic heart valves?  16. NO - Do you have prosthetic joints?  17. NO - Is there any chance that you may be pregnant?      HPI:     HPI related to upcoming procedure: Severe abdominal pain in June - eventually subsided without evaluation. Had 2 recurrent attacks more recently leading her to an ED visit where imaging showed gallstones wtihout cholecystitis.       See problem list for active medical problems.  Problems all longstanding and stable, except as noted/documented.  See ROS for pertinent symptoms related to these conditions.      MEDICAL HISTORY:     Patient Active Problem List    Diagnosis Date Noted     BMI 38.0-38.9,adult 06/25/2018     Priority: Medium     PTSD (post-traumatic stress disorder) 04/02/2014     Priority: Medium     Traumatic experiences related to murder of her sister a number of years ago, contributing to her depression.         H/O: hysterectomy 03/18/2013     Priority: Medium     Benign reasons.  Ovaries in tact.         POD (perioral dermatitis) 03/18/2013     Priority: Medium     Hyperlipidemia LDL goal <130 11/21/2011     Priority: Medium     Insomnia 11/21/2011     Priority: Medium     Mild major depression (H) 04/04/2011     Priority: Medium     Subdural hemorrhage (H) 08/26/2009     Priority: Medium     Bilateral, drained. Summer 2009.  See notes in EPIC.  Subacute.   Diagnosis updated by automated process. Provider to review and confirm.       Decreased libido 11/10/2006     Priority: Medium     Obesity 08/28/2006     Priority: Medium     Problem list name updated by automated process. Provider to review       Allergic rhinitis 01/23/2006     Priority: Medium     Problem list name updated by automated process. Provider to review       Other genital herpes 07/22/2005     Priority: Medium     Disturbance in sleep behavior 07/22/2005     Priority: Medium     Problem list name updated by automated process. Provider to review       Hypothyroidism  due to acquired atrophy of thyroid 07/22/2005     Priority: Medium     Problem list name updated by automated process. Provider to review        Past Medical History:   Diagnosis Date     Arthritis     paternal & maternal     Backache, unspecified     lumbar back pain, degen disk disease     Congestive heart failure (H)     paternal     COPD (chronic obstructive pulmonary disease) (H)     I get bronchitis frequently     Depressive disorder     numerous yrs ago diagnosed with PTSD     Diabetes (H)     maternal & paternal     Heart disease     Paternal side     Hypertension     Paternal side     Leiomyoma of uterus, unspecified 2003    s/p LAVH     Other genital herpes     HSV-2     Thyroid disease     Me     Unspecified sinusitis (chronic)      Past Surgical History:   Procedure Laterality Date     COLONOSCOPY  4/5/2013    Procedure: COLONOSCOPY;  Colonoscopy  ;  Surgeon: Laura Ruff MD;  Location: [facility]     HYSTERECTOMY, PAP NO LONGER INDICATED  7/2003    LAV for fibroids     LAPAROSCOPIC LYSIS ADHESIONS  1985     LASIK  10/2004     TONSILLECTOMY & ADENOIDECTOMY  age 18     TUBAL LIGATION  4/1994     Current Outpatient Medications   Medication Sig Dispense Refill     levothyroxine (SYNTHROID/LEVOTHROID) 112 MCG tablet Take 1 tablet (112 mcg) by mouth daily 90 tablet 3     Loratadine (CLARITIN PO) Take 10 mg by mouth daily AllerClear Brand        MISC NATURAL PRODUCTS PO Take 1 capsule by mouth daily PD 80/20 - supports endocrine function       MISC NATURAL PRODUCTS PO Take 1 capsule by mouth daily Agiles - All natural for bones, ligaments, muscles etc..       NONFORMULARY Take 4 capsules by mouth daily Sulfurzyme -  combines wolfberry with MSM, a naturally occurring organic form of dietary sulfur needed by our bodies every day to maintain the structure of proteins, protect cells and cell membranes, replenish the connections between cells, and preserve the molecular framework of connective tissue.        NONFORMULARY Take 1 tablet by mouth daily Thyromine - The compounds that make Thyromine such an efficient thyroid supplement are: Adrenal powder from bovine, Ginger extract, Guggal tree extract, Suzanne extract, Piper Longum extract, thyroid powder from bovine and L-Tyrosine.       valACYclovir (VALTREX) 500 MG tablet TAKE 1 TABLET BY MOUTH ONCE DAILY 90 tablet 1     venlafaxine (EFFEXOR-XR) 37.5 MG 24 hr capsule Take 1 capsule (37.5 mg) by mouth daily 91 capsule 1     HYDROcodone-acetaminophen (NORCO) 5-325 MG tablet Take 1-2 tablets by mouth every 4 hours as needed for moderate to severe pain or severe pain maximum 6 tablet(s) per day (Patient not taking: Reported on 2019) 15 tablet 0     OTC products: None, except as noted above    Allergies   Allergen Reactions     Adhesive Tape      Bees Anaphylaxis     Codeine Difficulty breathing     Penicillin [Penicillins] Difficulty breathing     Seasonal Allergies       Latex Allergy: NO    Social History     Tobacco Use     Smoking status: Former Smoker     Packs/day: 0.50     Years: 5.00     Pack years: 2.50     Types: Cigarettes     Last attempt to quit: 1986     Years since quittin.6     Smokeless tobacco: Never Used   Substance Use Topics     Alcohol use: Yes     Comment: 1-5 drinks weekly     History   Drug Use No       REVIEW OF SYSTEMS:   CONSTITUTIONAL: NEGATIVE for fever, chills, change in weight  INTEGUMENTARY/SKIN: NEGATIVE for worrisome rashes, moles or lesions  EYES: NEGATIVE for vision changes or irritation  ENT/MOUTH: NEGATIVE for ear, mouth and throat problems  RESP: NEGATIVE for significant cough or SOB  BREAST: NEGATIVE for masses, tenderness or discharge  CV: NEGATIVE for chest pain, palpitations or peripheral edema  GI: NEGATIVE for nausea, abdominal pain, heartburn, or change in bowel habits  : NEGATIVE for frequency, dysuria, or hematuria  MUSCULOSKELETAL: NEGATIVE for significant arthralgias or myalgia  NEURO: NEGATIVE for  "weakness, dizziness or paresthesias  ENDOCRINE: NEGATIVE for temperature intolerance, skin/hair changes  HEME: NEGATIVE for bleeding problems  PSYCHIATRIC: NEGATIVE for changes in mood or affect    EXAM:   /72   Pulse 79   Temp 98.5  F (36.9  C) (Tympanic)   Resp 14   Ht 1.549 m (5' 1\")   Wt 93.9 kg (207 lb)   LMP 07/29/2002   BMI 39.11 kg/m      GENERAL APPEARANCE: healthy, alert and no distress     EYES: EOMI, PERRL     HENT: ear canals and TM's normal and nose and mouth without ulcers or lesions     NECK: no adenopathy, no asymmetry, masses, or scars and thyroid normal to palpation     RESP: lungs clear to auscultation - no rales, rhonchi or wheezes     CV: regular rates and rhythm, normal S1 S2, no S3 or S4 and no murmur, click or rub     ABDOMEN:  soft, nontender, no HSM or masses and bowel sounds normal     MS: extremities normal- no gross deformities noted, no evidence of inflammation in joints, FROM in all extremities.     SKIN: no suspicious lesions or rashes     NEURO: Normal strength and tone, sensory exam grossly normal, mentation intact and speech normal     PSYCH: mentation appears normal. and affect normal/bright    DIAGNOSTICS:   EKG: appears normal, NSR, unchanged from previous tracings    Recent Labs   Lab Test 08/05/19  0009 07/22/19  0942   HGB 13.3 13.1    271    139   POTASSIUM 3.9 4.0   CR 0.87 0.64        IMPRESSION:   Reason for surgery/procedure: cholelithiasis  Diagnosis/reason for consult: pre op    The proposed surgical procedure is considered INTERMEDIATE risk.    REVISED CARDIAC RISK INDEX  The patient has the following serious cardiovascular risks for perioperative complications such as (MI, PE, VFib and 3  AV Block):  No serious cardiac risks  INTERPRETATION: 0 risks: Class I (very low risk - 0.4% complication rate)    The patient has the following additional risks for perioperative complications:  No identified additional risks      ICD-10-CM    1. Preop " general physical exam Z01.818 EKG 12-lead complete w/read - Clinics   2. Calculus of gallbladder without cholecystitis without obstruction K80.20        RECOMMENDATIONS:       Obstructive Sleep Apnea (or suspected sleep apnea)  Hospital staff are advised to monitor for sleep related oxygen desaturations due to suspicion of KENNY      APPROVAL GIVEN to proceed with proposed procedure, without further diagnostic evaluation       Signed Electronically by: Lillian Harden PA-C    Copy of this evaluation report is provided to requesting physician.    [facility] Preop Guidelines    Revised Cardiac Risk Index

## 2019-08-19 NOTE — PROGRESS NOTES
[facility]  [address] [facility] [address]  [phone]  Dept: [phone]    PRE-OP EVALUATION:  Today's date: 2019    Nidia Acosta (: 1960) presents for pre-operative evaluation assessment as requested by Dr. Stephenson. He requires evaluation and anesthesia risk assessment prior to undergoing surgery/procedure for treatment of cholelithiasis without cholecystitis .    Proposed Surgery/ Procedure: Cholecystectomy   Date of Surgery/ Procedure: 19  Time of Surgery/ Procedure: 8:45 am   Hospital/Surgical Facility: [facility]   Primary Physician: Merlene Reyes  Type of Anesthesia Anticipated: General    Patient has a Health Care Directive or Living Will:  NO, wanting packet today     1. NO - Do you have a history of heart attack, stroke, stent, bypass or surgery on an artery in the head, neck, heart or legs?  2. NO - Do you ever have any pain or discomfort in your chest?  3. NO - Do you have a history of  Heart Failure?  4. NO - Are you troubled by shortness of breath when: walking on the level, up a slight hill or at night?  5. NO - Do you currently have a cold, bronchitis or other respiratory infection?  6. NO - Do you have a cough, shortness of breath or wheezing?  7. NO - Do you sometimes get pains in the calves of your legs when you walk?  8. NO - Do you or anyone in your family have previous history of blood clots?  9. NO - Do you or does anyone in your family have a serious bleeding problem such as prolonged bleeding following surgeries or cuts?  10. NO - Have you ever had problems with anemia or been told to take iron pills?  11. NO - Have you had any abnormal blood loss such as black, tarry or bloody stools, or abnormal vaginal bleeding?  12. NO - Have you ever had a blood transfusion?  13. NO - Have you or any of your relatives ever had problems with anesthesia?  14. YES - Do you have sleep apnea, excessive snoring or daytime drowsiness?  Snoring   15. NO - Do you have any prosthetic heart valves?  16. NO - Do you have prosthetic joints?  17. NO - Is there any chance that you may be pregnant?      HPI:     HPI related to upcoming procedure: Severe abdominal pain in June - eventually subsided without evaluation. Had 2 recurrent attacks more recently leading her to an ED visit where imaging showed gallstones wtihout cholecystitis.       See problem list for active medical problems.  Problems all longstanding and stable, except as noted/documented.  See ROS for pertinent symptoms related to these conditions.      MEDICAL HISTORY:     Patient Active Problem List    Diagnosis Date Noted     BMI 38.0-38.9,adult 06/25/2018     Priority: Medium     PTSD (post-traumatic stress disorder) 04/02/2014     Priority: Medium     Traumatic experiences related to murder of her sister a number of years ago, contributing to her depression.         H/O: hysterectomy 03/18/2013     Priority: Medium     Benign reasons.  Ovaries in tact.         POD (perioral dermatitis) 03/18/2013     Priority: Medium     Hyperlipidemia LDL goal <130 11/21/2011     Priority: Medium     Insomnia 11/21/2011     Priority: Medium     Mild major depression (H) 04/04/2011     Priority: Medium     Subdural hemorrhage (H) 08/26/2009     Priority: Medium     Bilateral, drained. Summer 2009.  See notes in EPIC.  Subacute.   Diagnosis updated by automated process. Provider to review and confirm.       Decreased libido 11/10/2006     Priority: Medium     Obesity 08/28/2006     Priority: Medium     Problem list name updated by automated process. Provider to review       Allergic rhinitis 01/23/2006     Priority: Medium     Problem list name updated by automated process. Provider to review       Other genital herpes 07/22/2005     Priority: Medium     Disturbance in sleep behavior 07/22/2005     Priority: Medium     Problem list name updated by automated process. Provider to review       Hypothyroidism  due to acquired atrophy of thyroid 07/22/2005     Priority: Medium     Problem list name updated by automated process. Provider to review        Past Medical History:   Diagnosis Date     Arthritis     paternal & maternal     Backache, unspecified     lumbar back pain, degen disk disease     Congestive heart failure (H)     paternal     COPD (chronic obstructive pulmonary disease) (H)     I get bronchitis frequently     Depressive disorder     numerous yrs ago diagnosed with PTSD     Diabetes (H)     maternal & paternal     Heart disease     Paternal side     Hypertension     Paternal side     Leiomyoma of uterus, unspecified 2003    s/p LAVH     Other genital herpes     HSV-2     Thyroid disease     Me     Unspecified sinusitis (chronic)      Past Surgical History:   Procedure Laterality Date     COLONOSCOPY  4/5/2013    Procedure: COLONOSCOPY;  Colonoscopy  ;  Surgeon: Laura Ruff MD;  Location: [facility]     HYSTERECTOMY, PAP NO LONGER INDICATED  7/2003    LAV for fibroids     LAPAROSCOPIC LYSIS ADHESIONS  1985     LASIK  10/2004     TONSILLECTOMY & ADENOIDECTOMY  age 18     TUBAL LIGATION  4/1994     Current Outpatient Medications   Medication Sig Dispense Refill     levothyroxine (SYNTHROID/LEVOTHROID) 112 MCG tablet Take 1 tablet (112 mcg) by mouth daily 90 tablet 3     Loratadine (CLARITIN PO) Take 10 mg by mouth daily AllerClear Brand        MISC NATURAL PRODUCTS PO Take 1 capsule by mouth daily PD 80/20 - supports endocrine function       MISC NATURAL PRODUCTS PO Take 1 capsule by mouth daily Agiles - All natural for bones, ligaments, muscles etc..       NONFORMULARY Take 4 capsules by mouth daily Sulfurzyme -  combines wolfberry with MSM, a naturally occurring organic form of dietary sulfur needed by our bodies every day to maintain the structure of proteins, protect cells and cell membranes, replenish the connections between cells, and preserve the molecular framework of connective tissue.        NONFORMULARY Take 1 tablet by mouth daily Thyromine - The compounds that make Thyromine such an efficient thyroid supplement are: Adrenal powder from bovine, Ginger extract, Guggal tree extract, Suzanne extract, Piper Longum extract, thyroid powder from bovine and L-Tyrosine.       valACYclovir (VALTREX) 500 MG tablet TAKE 1 TABLET BY MOUTH ONCE DAILY 90 tablet 1     venlafaxine (EFFEXOR-XR) 37.5 MG 24 hr capsule Take 1 capsule (37.5 mg) by mouth daily 91 capsule 1     HYDROcodone-acetaminophen (NORCO) 5-325 MG tablet Take 1-2 tablets by mouth every 4 hours as needed for moderate to severe pain or severe pain maximum 6 tablet(s) per day (Patient not taking: Reported on 2019) 15 tablet 0     OTC products: None, except as noted above    Allergies   Allergen Reactions     Adhesive Tape      Bees Anaphylaxis     Codeine Difficulty breathing     Penicillin [Penicillins] Difficulty breathing     Seasonal Allergies       Latex Allergy: NO    Social History     Tobacco Use     Smoking status: Former Smoker     Packs/day: 0.50     Years: 5.00     Pack years: 2.50     Types: Cigarettes     Last attempt to quit: 1986     Years since quittin.6     Smokeless tobacco: Never Used   Substance Use Topics     Alcohol use: Yes     Comment: 1-5 drinks weekly     History   Drug Use No       REVIEW OF SYSTEMS:   CONSTITUTIONAL: NEGATIVE for fever, chills, change in weight  INTEGUMENTARY/SKIN: NEGATIVE for worrisome rashes, moles or lesions  EYES: NEGATIVE for vision changes or irritation  ENT/MOUTH: NEGATIVE for ear, mouth and throat problems  RESP: NEGATIVE for significant cough or SOB  BREAST: NEGATIVE for masses, tenderness or discharge  CV: NEGATIVE for chest pain, palpitations or peripheral edema  GI: NEGATIVE for nausea, abdominal pain, heartburn, or change in bowel habits  : NEGATIVE for frequency, dysuria, or hematuria  MUSCULOSKELETAL: NEGATIVE for significant arthralgias or myalgia  NEURO: NEGATIVE for  "weakness, dizziness or paresthesias  ENDOCRINE: NEGATIVE for temperature intolerance, skin/hair changes  HEME: NEGATIVE for bleeding problems  PSYCHIATRIC: NEGATIVE for changes in mood or affect    EXAM:   /72   Pulse 79   Temp 98.5  F (36.9  C) (Tympanic)   Resp 14   Ht 1.549 m (5' 1\")   Wt 93.9 kg (207 lb)   LMP 07/29/2002   BMI 39.11 kg/m      GENERAL APPEARANCE: healthy, alert and no distress     EYES: EOMI, PERRL     HENT: ear canals and TM's normal and nose and mouth without ulcers or lesions     NECK: no adenopathy, no asymmetry, masses, or scars and thyroid normal to palpation     RESP: lungs clear to auscultation - no rales, rhonchi or wheezes     CV: regular rates and rhythm, normal S1 S2, no S3 or S4 and no murmur, click or rub     ABDOMEN:  soft, nontender, no HSM or masses and bowel sounds normal     MS: extremities normal- no gross deformities noted, no evidence of inflammation in joints, FROM in all extremities.     SKIN: no suspicious lesions or rashes     NEURO: Normal strength and tone, sensory exam grossly normal, mentation intact and speech normal     PSYCH: mentation appears normal. and affect normal/bright    DIAGNOSTICS:   EKG: appears normal, NSR, unchanged from previous tracings    Recent Labs   Lab Test 08/05/19  0009 07/22/19  0942   HGB 13.3 13.1    271    139   POTASSIUM 3.9 4.0   CR 0.87 0.64        IMPRESSION:   Reason for surgery/procedure: cholelithiasis  Diagnosis/reason for consult: pre op    The proposed surgical procedure is considered INTERMEDIATE risk.    REVISED CARDIAC RISK INDEX  The patient has the following serious cardiovascular risks for perioperative complications such as (MI, PE, VFib and 3  AV Block):  No serious cardiac risks  INTERPRETATION: 0 risks: Class I (very low risk - 0.4% complication rate)    The patient has the following additional risks for perioperative complications:  No identified additional risks      ICD-10-CM    1. Preop " general physical exam Z01.818 EKG 12-lead complete w/read - Clinics   2. Calculus of gallbladder without cholecystitis without obstruction K80.20        RECOMMENDATIONS:       Obstructive Sleep Apnea (or suspected sleep apnea)  Hospital staff are advised to monitor for sleep related oxygen desaturations due to suspicion of KENYN      APPROVAL GIVEN to proceed with proposed procedure, without further diagnostic evaluation       Signed Electronically by: Lillian Harden PA-C    Copy of this evaluation report is provided to requesting physician.    [facility] Preop Guidelines    Revised Cardiac Risk Index

## 2019-08-20 ENCOUNTER — ANESTHESIA EVENT (OUTPATIENT)
Dept: SURGERY | Facility: CLINIC | Age: 59
End: 2019-08-20
Payer: COMMERCIAL

## 2019-08-20 ASSESSMENT — COPD QUESTIONNAIRES: COPD: 1

## 2019-08-20 ASSESSMENT — LIFESTYLE VARIABLES: TOBACCO_USE: 1

## 2019-08-20 NOTE — ANESTHESIA PREPROCEDURE EVALUATION
Anesthesia Pre-Procedure Evaluation    Patient: Nidia Acosta   MRN: [id number] : 1960          Preoperative Diagnosis: gallbladder    Procedure(s):  CHOLECYSTECTOMY, LAPAROSCOPIC    Past Medical History:   Diagnosis Date     Arthritis     paternal & maternal     Backache, unspecified     lumbar back pain, degen disk disease     Congestive heart failure (H)     paternal     COPD (chronic obstructive pulmonary disease) (H)     I get bronchitis frequently     Depressive disorder     numerous yrs ago diagnosed with PTSD     Diabetes (H)     maternal & paternal     Heart disease     Paternal side     Hypertension     Paternal side     Leiomyoma of uterus, unspecified     s/p LAVH     Other genital herpes     HSV-2     Thyroid disease     Me     Unspecified sinusitis (chronic)      Past Surgical History:   Procedure Laterality Date     COLONOSCOPY  2013    Procedure: COLONOSCOPY;  Colonoscopy  ;  Surgeon: Laura Ruff MD;  Location: [facility]     HYSTERECTOMY, PAP NO LONGER INDICATED  2003    LAVH for fibroids     LAPAROSCOPIC LYSIS ADHESIONS       LASIK  10/2004     TONSILLECTOMY & ADENOIDECTOMY  age 18     TUBAL LIGATION  1994       Anesthesia Evaluation     . Pt has had prior anesthetic. Type: General and MAC    No history of anesthetic complications          ROS/MED HX    ENT/Pulmonary: Comment: Sinusitis    (+)KENNY risk factors obese, allergic rhinitis, tobacco use, Past use moderate COPD, , . .    Neurologic:     (+)other neuro hx of subdural hemorrhage     Cardiovascular:     (+) Dyslipidemia, ----. : . . . :. . Previous cardiac testing Echodate:14results:Left Ventricle  The left ventricle is normal in size.  There is normal left ventricular wall thickness.  Left ventricular systolic function is normal.  The visual ejection fraction is estimated at 55-60%.  No regional wall motion abnormalities noted.  There is no thrombus seen in the left ventricle.     Right  Ventricle  The right ventricle is normal in structure, function and size.  There is no mass or thrombus in the right ventricle.     Atria  The left atrium is borderline dilated.  Right atrial size is normal.  There is no atrial shunt seen.     Mitral Valve  The mitral valve leaflets appear normal. There is no evidence of stenosis,   fluttering, or prolapse.  There is no mitral regurgitation noted.  There is no mitral valve stenosis.     Tricuspid Valve  Normal tricuspid valve.  There is trace tricuspid regurgitation.  Right ventricular systolic pressure could not be approximated due to   inadequate tricuspid regurgitation.  There is no tricuspid stenosis.     Aortic Valve  The aortic valve is trileaflet.  No aortic regurgitation is present.  No aortic stenosis is present.     Pulmonic Valve  The pulmonic valve is not well seen, but is grossly normal.  There is trace pulmonic valvular regurgitation.  There is no pulmonic valvular stenosis.     Vessels  The aortic root is normal size.  Normal size ascending aorta.  The IVC is normal in size and reactivity with respiration, suggesting normal   central venous pressure.  The pulmonary artery is normal size.     Pericardium  The pericardium appears normal.  There is no pleural effusion.     Rhythm  The rhythm was normal sinus.     Procedure  Complete Echo Adult.     MMode 2D Measurements & Calculations  IVSd: 1.0 cm  LVIDd: 4.6 cm  LVIDs: 2.7 cm  LVPWd: 1.0 cm  FS: 40 %  LV mass(C)d: 162 grams  LA dimension: 4.0 cm  Doppler Measurements & Calculations  MV E point: 75 cm/sec  MV A point: 63 cm/sec  MV E/A: 1.2   MV dec time: 0.19 sec  Ao V2 max: 125 cm/sec  Ao max P.3 mmHg  Ao V2 mean: 76 cm/sec  Ao mean P.7 mmHg  Ao V2 VTI: 21 cm  LV V1 max: 103 cm/sec  LV V1 VTI: 17 cm     Interpreting Physician:  Tom Rojo,  electronically signed on   2014 09:04:19  Resulting Agency [facility]      Specimen Collected: 14 07:57 Last Resulted: 14 09:04 Lab  Flowsheet Order Details View Encounter Lab and Collection Details Routing Result History          date: results:ECG reviewed date:8/19/19 results:Sinus Rhythm   -Prominent R(V1) -nonspecific.    - Nonspecific T-abnormality.     ABNORMAL    date: results:          METS/Exercise Tolerance:  >4 METS   Hematologic:     (+) History of blood clots pt is not anticoagulated, -      Musculoskeletal:   (+) arthritis,  other musculoskeletal- DDD, s/p C5-6 fusion      GI/Hepatic:  - neg GI/hepatic ROS       Renal/Genitourinary:  - ROS Renal section negative       Endo:     (+) thyroid problem hypothyroidism, Obesity, Other Endocrine Disorder morbid obesity.      Psychiatric:     (+) psychiatric history depression and other (comment) (PTSD)      Infectious Disease:  - neg infectious disease ROS       Malignancy:      - no malignancy   Other:    - neg other ROS                      Physical Exam  Normal systems: cardiovascular and pulmonary    Airway   Mallampati: I    Dental   Comment: R lower bridge    Cardiovascular       Pulmonary             Lab Results   Component Value Date    WBC 7.9 08/05/2019    HGB 13.3 08/05/2019    HCT 41.9 08/05/2019     08/05/2019    SED 9 07/13/2009     08/05/2019    POTASSIUM 3.9 08/05/2019    CHLORIDE 107 08/05/2019    CO2 27 08/05/2019    BUN 18 08/05/2019    CR 0.87 08/05/2019    GLC 98 08/05/2019    ROHAN 9.2 08/05/2019    PHOS 3.9 08/01/2009    MAG 2.3 08/01/2009    ALBUMIN 4.0 08/05/2019    PROTTOTAL 7.4 08/05/2019    ALT 30 08/05/2019    AST 20 08/05/2019    ALKPHOS 94 08/05/2019    BILITOTAL 0.2 08/05/2019    LIPASE 169 08/05/2019    PTT 30 07/13/2009    INR 0.94 07/29/2009    TSH 0.68 12/21/2018    T4 1.35 06/25/2018       Preop Vitals  BP Readings from Last 3 Encounters:   08/19/19 126/72   08/12/19 126/70   08/05/19 136/75    Pulse Readings from Last 3 Encounters:   08/19/19 79   08/12/19 75   08/05/19 85      Resp Readings from Last 3 Encounters:   08/19/19 14   08/12/19 18  "  08/05/19 16    SpO2 Readings from Last 3 Encounters:   08/05/19 97%   07/22/19 99%   01/21/18 98%      Temp Readings from Last 1 Encounters:   08/19/19 36.9  C (98.5  F) (Tympanic)    Ht Readings from Last 1 Encounters:   08/19/19 1.549 m (5' 1\")      Wt Readings from Last 1 Encounters:   08/19/19 93.9 kg (207 lb)    Estimated body mass index is 39.11 kg/m  as calculated from the following:    Height as of 8/19/19: 1.549 m (5' 1\").    Weight as of 8/19/19: 93.9 kg (207 lb).       Anesthesia Plan      History & Physical Review  History and physical reviewed and following examination; no interval change.    ASA Status:  3 .    NPO Status:  > 6 hours    Plan for General and ETT with Propofol and Intravenous induction. Maintenance will be Inhalation and Balanced.    PONV prophylaxis:  Ondansetron (or other 5HT-3) and Dexamethasone or Solumedrol  Additional equipment: Videolaryngoscope      Postoperative Care  Postoperative pain management:  IV analgesics and Oral pain medications.      Consents  Anesthetic plan, risks, benefits and alternatives discussed with:  Patient..                 ELOISE Stanford CRNA  "

## 2019-08-21 ENCOUNTER — HOSPITAL ENCOUNTER (OUTPATIENT)
Facility: CLINIC | Age: 59
Discharge: HOME | End: 2019-08-21
Attending: SURGERY | Admitting: SURGERY
Payer: COMMERCIAL

## 2019-08-21 ENCOUNTER — ANESTHESIA (OUTPATIENT)
Dept: SURGERY | Facility: CLINIC | Age: 59
End: 2019-08-21
Payer: COMMERCIAL

## 2019-08-21 VITALS
SYSTOLIC BLOOD PRESSURE: 116 MMHG | BODY MASS INDEX: 39.08 KG/M2 | DIASTOLIC BLOOD PRESSURE: 73 MMHG | HEART RATE: 107 BPM | RESPIRATION RATE: 15 BRPM | TEMPERATURE: 96.8 F | OXYGEN SATURATION: 93 % | HEIGHT: 61 IN | WEIGHT: 207 LBS

## 2019-08-21 DIAGNOSIS — G89.18 POST-OP PAIN: Primary | ICD-10-CM

## 2019-08-21 PROCEDURE — 37000009 ZZH ANESTHESIA TECHNICAL FEE, EACH ADDTL 15 MIN: Performed by: SURGERY

## 2019-08-21 PROCEDURE — 37000008 ZZH ANESTHESIA TECHNICAL FEE, 1ST 30 MIN: Performed by: SURGERY

## 2019-08-21 PROCEDURE — 25000125 ZZHC RX 250: Performed by: NURSE ANESTHETIST, CERTIFIED REGISTERED

## 2019-08-21 PROCEDURE — 25000125 ZZHC RX 250: Performed by: SURGERY

## 2019-08-21 PROCEDURE — 88304 TISSUE EXAM BY PATHOLOGIST: CPT | Mod: 26 | Performed by: SURGERY

## 2019-08-21 PROCEDURE — 36000058 ZZH SURGERY LEVEL 3 EA 15 ADDTL MIN: Performed by: SURGERY

## 2019-08-21 PROCEDURE — 47562 LAPAROSCOPIC CHOLECYSTECTOMY: CPT | Mod: AS | Performed by: PHYSICIAN ASSISTANT

## 2019-08-21 PROCEDURE — 25800030 ZZH RX IP 258 OP 636: Performed by: NURSE ANESTHETIST, CERTIFIED REGISTERED

## 2019-08-21 PROCEDURE — 27210794 ZZH OR GENERAL SUPPLY STERILE: Performed by: SURGERY

## 2019-08-21 PROCEDURE — 88304 TISSUE EXAM BY PATHOLOGIST: CPT | Performed by: SURGERY

## 2019-08-21 PROCEDURE — 25000128 H RX IP 250 OP 636: Performed by: SURGERY

## 2019-08-21 PROCEDURE — 36000056 ZZH SURGERY LEVEL 3 1ST 30 MIN: Performed by: SURGERY

## 2019-08-21 PROCEDURE — 25000132 ZZH RX MED GY IP 250 OP 250 PS 637: Performed by: NURSE ANESTHETIST, CERTIFIED REGISTERED

## 2019-08-21 PROCEDURE — 47562 LAPAROSCOPIC CHOLECYSTECTOMY: CPT | Performed by: SURGERY

## 2019-08-21 PROCEDURE — 71000014 ZZH RECOVERY PHASE 1 LEVEL 2 FIRST HR: Performed by: SURGERY

## 2019-08-21 PROCEDURE — 71000027 ZZH RECOVERY PHASE 2 EACH 15 MINS: Performed by: SURGERY

## 2019-08-21 PROCEDURE — 25000128 H RX IP 250 OP 636: Performed by: NURSE ANESTHETIST, CERTIFIED REGISTERED

## 2019-08-21 PROCEDURE — 25800025 ZZH RX 258: Performed by: SURGERY

## 2019-08-21 PROCEDURE — 40000306 ZZH STATISTIC PRE PROC ASSESS II: Performed by: SURGERY

## 2019-08-21 PROCEDURE — 25000566 ZZH SEVOFLURANE, EA 15 MIN: Performed by: SURGERY

## 2019-08-21 RX ORDER — METOCLOPRAMIDE HYDROCHLORIDE 5 MG/ML
10 INJECTION INTRAMUSCULAR; INTRAVENOUS EVERY 6 HOURS PRN
Status: DISCONTINUED | OUTPATIENT
Start: 2019-08-21 | End: 2019-08-21 | Stop reason: HOSPADM

## 2019-08-21 RX ORDER — MEPERIDINE HYDROCHLORIDE 25 MG/ML
12.5 INJECTION INTRAMUSCULAR; INTRAVENOUS; SUBCUTANEOUS
Status: DISCONTINUED | OUTPATIENT
Start: 2019-08-21 | End: 2019-08-21 | Stop reason: HOSPADM

## 2019-08-21 RX ORDER — HYDROCODONE BITARTRATE AND ACETAMINOPHEN 5; 325 MG/1; MG/1
1-2 TABLET ORAL EVERY 4 HOURS PRN
Status: DISCONTINUED | OUTPATIENT
Start: 2019-08-21 | End: 2019-08-21 | Stop reason: HOSPADM

## 2019-08-21 RX ORDER — FENTANYL CITRATE 50 UG/ML
25-50 INJECTION, SOLUTION INTRAMUSCULAR; INTRAVENOUS
Status: DISCONTINUED | OUTPATIENT
Start: 2019-08-21 | End: 2019-08-21 | Stop reason: HOSPADM

## 2019-08-21 RX ORDER — SODIUM CHLORIDE, SODIUM LACTATE, POTASSIUM CHLORIDE, CALCIUM CHLORIDE 600; 310; 30; 20 MG/100ML; MG/100ML; MG/100ML; MG/100ML
INJECTION, SOLUTION INTRAVENOUS CONTINUOUS
Status: DISCONTINUED | OUTPATIENT
Start: 2019-08-21 | End: 2019-08-21 | Stop reason: HOSPADM

## 2019-08-21 RX ORDER — METOCLOPRAMIDE 10 MG/1
10 TABLET ORAL EVERY 6 HOURS PRN
Status: DISCONTINUED | OUTPATIENT
Start: 2019-08-21 | End: 2019-08-21 | Stop reason: HOSPADM

## 2019-08-21 RX ORDER — NALOXONE HYDROCHLORIDE 0.4 MG/ML
.1-.4 INJECTION, SOLUTION INTRAMUSCULAR; INTRAVENOUS; SUBCUTANEOUS
Status: DISCONTINUED | OUTPATIENT
Start: 2019-08-21 | End: 2019-08-21 | Stop reason: HOSPADM

## 2019-08-21 RX ORDER — HYDROXYZINE HYDROCHLORIDE 50 MG/1
50 TABLET, FILM COATED ORAL EVERY 6 HOURS PRN
Status: DISCONTINUED | OUTPATIENT
Start: 2019-08-21 | End: 2019-08-21 | Stop reason: HOSPADM

## 2019-08-21 RX ORDER — PROPOFOL 10 MG/ML
INJECTION, EMULSION INTRAVENOUS PRN
Status: DISCONTINUED | OUTPATIENT
Start: 2019-08-21 | End: 2019-08-21

## 2019-08-21 RX ORDER — HYDROCODONE BITARTRATE AND ACETAMINOPHEN 5; 325 MG/1; MG/1
1-2 TABLET ORAL EVERY 4 HOURS PRN
Qty: 20 TABLET | Refills: 0 | Status: SHIPPED | OUTPATIENT
Start: 2019-08-21 | End: 2019-11-11

## 2019-08-21 RX ORDER — ONDANSETRON 4 MG/1
4 TABLET, ORALLY DISINTEGRATING ORAL EVERY 30 MIN PRN
Status: DISCONTINUED | OUTPATIENT
Start: 2019-08-21 | End: 2019-08-21 | Stop reason: HOSPADM

## 2019-08-21 RX ORDER — ONDANSETRON 2 MG/ML
4 INJECTION INTRAMUSCULAR; INTRAVENOUS EVERY 30 MIN PRN
Status: DISCONTINUED | OUTPATIENT
Start: 2019-08-21 | End: 2019-08-21 | Stop reason: HOSPADM

## 2019-08-21 RX ORDER — DIPHENHYDRAMINE HYDROCHLORIDE 50 MG/ML
INJECTION INTRAMUSCULAR; INTRAVENOUS PRN
Status: DISCONTINUED | OUTPATIENT
Start: 2019-08-21 | End: 2019-08-21

## 2019-08-21 RX ORDER — DIMENHYDRINATE 50 MG/ML
25 INJECTION, SOLUTION INTRAMUSCULAR; INTRAVENOUS
Status: DISCONTINUED | OUTPATIENT
Start: 2019-08-21 | End: 2019-08-21 | Stop reason: HOSPADM

## 2019-08-21 RX ORDER — HYDROXYZINE HYDROCHLORIDE 25 MG/1
25 TABLET, FILM COATED ORAL EVERY 6 HOURS PRN
Status: DISCONTINUED | OUTPATIENT
Start: 2019-08-21 | End: 2019-08-21 | Stop reason: HOSPADM

## 2019-08-21 RX ORDER — INDOCYANINE GREEN AND WATER 25 MG
2.5 KIT INJECTION ONCE
Status: COMPLETED | OUTPATIENT
Start: 2019-08-21 | End: 2019-08-21

## 2019-08-21 RX ORDER — ACETAMINOPHEN 325 MG/1
975 TABLET ORAL ONCE
Status: COMPLETED | OUTPATIENT
Start: 2019-08-21 | End: 2019-08-21

## 2019-08-21 RX ORDER — DEXAMETHASONE SODIUM PHOSPHATE 4 MG/ML
INJECTION, SOLUTION INTRA-ARTICULAR; INTRALESIONAL; INTRAMUSCULAR; INTRAVENOUS; SOFT TISSUE PRN
Status: DISCONTINUED | OUTPATIENT
Start: 2019-08-21 | End: 2019-08-21

## 2019-08-21 RX ORDER — GLYCOPYRROLATE 0.2 MG/ML
INJECTION, SOLUTION INTRAMUSCULAR; INTRAVENOUS PRN
Status: DISCONTINUED | OUTPATIENT
Start: 2019-08-21 | End: 2019-08-21

## 2019-08-21 RX ORDER — ONDANSETRON 2 MG/ML
INJECTION INTRAMUSCULAR; INTRAVENOUS PRN
Status: DISCONTINUED | OUTPATIENT
Start: 2019-08-21 | End: 2019-08-21

## 2019-08-21 RX ORDER — ALBUTEROL SULFATE 0.83 MG/ML
2.5 SOLUTION RESPIRATORY (INHALATION) EVERY 4 HOURS PRN
Status: DISCONTINUED | OUTPATIENT
Start: 2019-08-21 | End: 2019-08-21 | Stop reason: HOSPADM

## 2019-08-21 RX ORDER — FENTANYL CITRATE 50 UG/ML
INJECTION, SOLUTION INTRAMUSCULAR; INTRAVENOUS PRN
Status: DISCONTINUED | OUTPATIENT
Start: 2019-08-21 | End: 2019-08-21

## 2019-08-21 RX ORDER — BUPIVACAINE HYDROCHLORIDE AND EPINEPHRINE 5; 5 MG/ML; UG/ML
INJECTION, SOLUTION PERINEURAL PRN
Status: DISCONTINUED | OUTPATIENT
Start: 2019-08-21 | End: 2019-08-21 | Stop reason: HOSPADM

## 2019-08-21 RX ORDER — LEVOFLOXACIN 5 MG/ML
500 INJECTION, SOLUTION INTRAVENOUS ONCE
Status: COMPLETED | OUTPATIENT
Start: 2019-08-21 | End: 2019-08-21

## 2019-08-21 RX ORDER — KETOROLAC TROMETHAMINE 30 MG/ML
INJECTION, SOLUTION INTRAMUSCULAR; INTRAVENOUS PRN
Status: DISCONTINUED | OUTPATIENT
Start: 2019-08-21 | End: 2019-08-21

## 2019-08-21 RX ORDER — LIDOCAINE 40 MG/G
CREAM TOPICAL
Status: DISCONTINUED | OUTPATIENT
Start: 2019-08-21 | End: 2019-08-21 | Stop reason: HOSPADM

## 2019-08-21 RX ORDER — GABAPENTIN 300 MG/1
300 CAPSULE ORAL ONCE
Status: COMPLETED | OUTPATIENT
Start: 2019-08-21 | End: 2019-08-21

## 2019-08-21 RX ORDER — LIDOCAINE HYDROCHLORIDE 10 MG/ML
INJECTION, SOLUTION INFILTRATION; PERINEURAL PRN
Status: DISCONTINUED | OUTPATIENT
Start: 2019-08-21 | End: 2019-08-21

## 2019-08-21 RX ORDER — HYDROMORPHONE HYDROCHLORIDE 1 MG/ML
.3-.5 INJECTION, SOLUTION INTRAMUSCULAR; INTRAVENOUS; SUBCUTANEOUS EVERY 10 MIN PRN
Status: DISCONTINUED | OUTPATIENT
Start: 2019-08-21 | End: 2019-08-21 | Stop reason: HOSPADM

## 2019-08-21 RX ADMIN — SUGAMMADEX 100 MG: 100 INJECTION, SOLUTION INTRAVENOUS at 10:49

## 2019-08-21 RX ADMIN — LIDOCAINE HYDROCHLORIDE 100 MG: 10 INJECTION, SOLUTION INFILTRATION; PERINEURAL at 09:38

## 2019-08-21 RX ADMIN — ROCURONIUM BROMIDE 25 MG: 10 INJECTION INTRAVENOUS at 09:38

## 2019-08-21 RX ADMIN — INDOCYANINE GREEN 2.5 MG: 25 INJECTION, POWDER, LYOPHILIZED, FOR SOLUTION INTRAVENOUS at 07:48

## 2019-08-21 RX ADMIN — FENTANYL CITRATE 100 MCG: 50 INJECTION, SOLUTION INTRAMUSCULAR; INTRAVENOUS at 09:48

## 2019-08-21 RX ADMIN — GLYCOPYRROLATE 0.1 MG: 0.2 INJECTION, SOLUTION INTRAMUSCULAR; INTRAVENOUS at 09:32

## 2019-08-21 RX ADMIN — MIDAZOLAM 2 MG: 1 INJECTION INTRAMUSCULAR; INTRAVENOUS at 09:32

## 2019-08-21 RX ADMIN — GLYCOPYRROLATE 0.1 MG: 0.2 INJECTION, SOLUTION INTRAMUSCULAR; INTRAVENOUS at 09:36

## 2019-08-21 RX ADMIN — FENTANYL CITRATE 100 MCG: 50 INJECTION, SOLUTION INTRAMUSCULAR; INTRAVENOUS at 10:02

## 2019-08-21 RX ADMIN — LIDOCAINE HYDROCHLORIDE 1 ML: 10 INJECTION, SOLUTION EPIDURAL; INFILTRATION; INTRACAUDAL; PERINEURAL at 07:46

## 2019-08-21 RX ADMIN — KETOROLAC TROMETHAMINE 30 MG: 30 INJECTION, SOLUTION INTRAMUSCULAR at 10:24

## 2019-08-21 RX ADMIN — SODIUM CHLORIDE, POTASSIUM CHLORIDE, SODIUM LACTATE AND CALCIUM CHLORIDE: 600; 310; 30; 20 INJECTION, SOLUTION INTRAVENOUS at 10:14

## 2019-08-21 RX ADMIN — SODIUM CHLORIDE, POTASSIUM CHLORIDE, SODIUM LACTATE AND CALCIUM CHLORIDE: 600; 310; 30; 20 INJECTION, SOLUTION INTRAVENOUS at 07:46

## 2019-08-21 RX ADMIN — ACETAMINOPHEN 975 MG: 325 TABLET, FILM COATED ORAL at 07:47

## 2019-08-21 RX ADMIN — DEXAMETHASONE SODIUM PHOSPHATE 4 MG: 4 INJECTION, SOLUTION INTRA-ARTICULAR; INTRALESIONAL; INTRAMUSCULAR; INTRAVENOUS; SOFT TISSUE at 09:38

## 2019-08-21 RX ADMIN — GABAPENTIN 300 MG: 300 CAPSULE ORAL at 07:47

## 2019-08-21 RX ADMIN — HYDROMORPHONE HYDROCHLORIDE 0.5 MG: 1 INJECTION, SOLUTION INTRAMUSCULAR; INTRAVENOUS; SUBCUTANEOUS at 11:31

## 2019-08-21 RX ADMIN — DIPHENHYDRAMINE HYDROCHLORIDE 25 MG: 50 INJECTION, SOLUTION INTRAMUSCULAR; INTRAVENOUS at 10:08

## 2019-08-21 RX ADMIN — FENTANYL CITRATE 150 MCG: 50 INJECTION, SOLUTION INTRAMUSCULAR; INTRAVENOUS at 09:38

## 2019-08-21 RX ADMIN — PROPOFOL 200 MG: 10 INJECTION, EMULSION INTRAVENOUS at 09:38

## 2019-08-21 RX ADMIN — ONDANSETRON 4 MG: 2 INJECTION INTRAMUSCULAR; INTRAVENOUS at 10:24

## 2019-08-21 RX ADMIN — LEVOFLOXACIN 500 MG: 5 INJECTION, SOLUTION INTRAVENOUS at 09:49

## 2019-08-21 ASSESSMENT — MIFFLIN-ST. JEOR: SCORE: 1461.33

## 2019-08-21 ASSESSMENT — COPD QUESTIONNAIRES: CAT_SEVERITY: MODERATE

## 2019-08-21 NOTE — ANESTHESIA CARE TRANSFER NOTE
Patient: Nidia Acosta    Procedure(s):  CHOLECYSTECTOMY, LAPAROSCOPIC    Diagnosis: gallbladder  Diagnosis Additional Information: No value filed.    Anesthesia Type:   General, ETT     Note:  Airway :Nasal Cannula and Oral Airway  Patient transferred to:PACU  Handoff Report: Identifed the Patient, Identified the Reponsible Provider, Reviewed the pertinent medical history, Discussed the surgical course, Reviewed Intra-OP anesthesia mangement and issues during anesthesia, Set expectations for post-procedure period and Allowed opportunity for questions and acknowledgement of understanding      Vitals: (Last set prior to Anesthesia Care Transfer)    CRNA VITALS  8/21/2019 1023 - 8/21/2019 1100      8/21/2019             Pulse:  132    SpO2:  96 %    Resp Rate (observed):  5  (Abnormal)                 Electronically Signed By: ELOISE Eagle CRNA  August 21, 2019  11:00 AM

## 2019-08-21 NOTE — OR NURSING
Pt has chigger bites on feet and a few on arms. Itchy preop at times. Pt had hysterectomy. preop meds given as per dr alonzo.

## 2019-08-21 NOTE — OP NOTE
Procedure Date: 08/21/2019      PREOPERATIVE DIAGNOSIS:  Symptomatic cholelithiasis.      POSTOPERATIVE DIAGNOSIS:  Symptomatic cholelithiasis.      PROCEDURES PERFORMED:  Laparoscopic cholecystectomy.      SURGEON:  Kwabena Stephenson MD      ASSISTANT:  KARSON Goldman.  His assistance was necessary for camera management, laparoscopic instrumentation, and assistance with wound closure.      ANESTHESIA:  General.      INDICATIONS:  This 59-year-old female presented with gallstones.  She was in the emergency room at least once.  She requested cholecystectomy.  Prior to the procedure, the patient was counseled about risks, benefits and alternatives of laparoscopic cholecystectomy and she agreed to proceed.  All of her questions were answered.      DESCRIPTION OF PROCEDURE:  The patient was brought to the operating room and placed on the table in the supine position.  After induction of adequate general endotracheal anesthesia, the patient's abdomen was prepped and draped in the usual sterile fashion.  At this point, a surgical timeout was performed to identify both the patient and the proposed procedure.  All present were in agreement.      The patient had had a previous laparoscopic procedure, and we saw a small infraumbilical midline incision, so we elected to reuse that.  This incision was opened and dissection carried down through subcutaneous tissues to the level of the fascia.  The fascia was incised in the midline and two stay sutures of 0 Vicryl were placed.  The fascia was further elevated and the peritoneal cavity bluntly entered.  The Elian trocar was placed and the abdomen insufflated to 15 mmHg pressure with CO2 gas.  Three further 5 mm ports were placed along the right upper quadrant under direct vision.  The patient was then placed in a steep reverse Trendelenburg position with the left side rotated downward.  The gallbladder was located easily.  It was grasped and retracted superiorly and anteriorly.   There were some adhesions to the duodenum, which were taken down carefully without using any cautery.  The duodenum was inspected and there was no injury.  A second grasper was placed on the infundibulum at this point and the cystic duct and cystic artery were dissected free.  We used indocyanine green given preoperatively to identify the cystic duct and the common bile duct.  We took the cystic duct right at the gallbladder, preserving with absolute certainty the common bile duct.  The cystic artery and duct were taken with clips and divided with the scissors.  The gallbladder was then removed from the gallbladder bed using cautery.  Bleeders were cauterized as they were encountered.  The gallbladder was then placed into an Endobag and retrieved through the infraumbilical trocar site.  The Elian trocar was replaced and the abdomen reinsufflated.  The right upper quadrant was irrigated and aspirated.  There were a couple of small bleeders on the liver edge that were cauterized and at the end of this, there was no further bleeding noted.  The ports were then removed under direct vision.  No bleeding was seen.  The Elian was removed.  The infraumbilical fascia was approximated with a couple of interrupted 0 Vicryl sutures.  The previously placed stay sutures were tied as well.  All the wounds were infiltrated with Marcaine with epinephrine and closed with subcuticular 4-0 Vicryl.  Exofin was placed on the wounds to act as a watertight dressing.        The patient was then awakened from her anesthetic and returned to the recovery room and following that to same day surgery.  The patient tolerated the procedure well.  There were no complications.  Needle, sponge, and instrument counts were correct x 2.      ESTIMATED BLOOD LOSS:  5 mL.         KRISTI BUSTOS MD             D: 2019   T: 2019   MT: HEATHER      Name:     HUGO SHAIKH   MRN:      [id number]        Account:        [id number]   :       1960           Procedure Date: 08/21/2019      Document: [id number]

## 2019-08-21 NOTE — OR NURSING
Sleeps off and on with occas snoring. Oral airway removed. On nc postop.denies pain. Denies itching. States benadryl making her sleepy. Vss. Will cont to reassess. brown ice chips well.

## 2019-08-21 NOTE — ANESTHESIA POSTPROCEDURE EVALUATION
Patient: Nidia Acosta    Procedure(s):  CHOLECYSTECTOMY, LAPAROSCOPIC    Diagnosis:gallbladder  Diagnosis Additional Information: No value filed.    Anesthesia Type:  General, ETT    Note:  Anesthesia Post Evaluation    Patient location during evaluation: Phase 2  Patient participation: Able to fully participate in evaluation  Level of consciousness: awake and alert  Pain management: adequate  Airway patency: patent  Cardiovascular status: acceptable and hemodynamically stable  Respiratory status: acceptable, spontaneous ventilation and nasal cannula  Hydration status: acceptable  PONV: none     Anesthetic complications: None          Last vitals:  Vitals:    08/21/19 1115 08/21/19 1130 08/21/19 1145   BP: (!) 143/75 (!) 150/82 129/77   Pulse:  110    Resp: 10 14 10   Temp:      SpO2: 97% 98% 97%         Electronically Signed By: ELOISE Eagle CRNA  August 21, 2019  11:58 AM

## 2019-08-21 NOTE — OR NURSING
C/o bellybutton burning. Dilaudid iv for pain. Pt brown ice well no n/v. abd soft sites d/i. Heart rate tapers down postop. Vss. Dozes after meds given. Will cont to reassess.

## 2019-08-21 NOTE — BRIEF OP NOTE
[facility]   Brief Operative Note    Pre-operative diagnosis: gallbladder   Post-operative diagnosis cholelithiasis     Procedure: Procedure(s):  CHOLECYSTECTOMY, LAPAROSCOPIC   Surgeon(s): Surgeon(s) and Role:     * Kwabena Stephenson MD - Primary   Estimated blood loss: * No values recorded between 8/21/2019  9:50 AM and 8/21/2019 10:30 AM *    Specimens: ID Type Source Tests Collected by Time Destination   A :  Organ Gallbladder and Contents SURGICAL PATHOLOGY EXAM Kwabena Stephenson MD 8/21/2019  9:55 AM       Findings: 1. Multiple stones in gallbladder  2. Mildly inflamed  3. CBD positively identified and preserved  4. EBL <5cc  5. Pt tolerated well

## 2019-08-21 NOTE — DISCHARGE INSTRUCTIONS
Wash incisions daily with soap and water. Some mild redness or swelling is expected. If draining, cover with dry gauze.    No lifting restrictions.  You may lift as comfort permits.    Okay to use ice pack over wound as necessary for comfort.    Use pain medication as necessary but avoid constipating side effects. Ibuprofen okay but avoid Tylenol as your pain medication already contains this.    Diet as tolerated. No restrictions.    Follow up with Dr Stephenson in 1-2 weeks.    You may return to work you feel up to it.  It is important that you do not drive while taking narcotic pain medication.  For a non-physical job most patients return to work in 1 to 2 weeks.                      Same Day Surgery Discharge Instructions  Special Precautions After Surgery - Adult    1. It is not unusual to feel lightheaded or faint, up to 24 hours after surgery or while taking pain medication.  If you have these symptoms; sit for a few minutes before standing and have someone assist you when getting up.  2. You should rest and relax for the next 24 hours and must have someone stay with you for at least 24 hours after your discharge.  3. DO NOT DRIVE any vehicle or operate mechanical equipment for 24 hours following the end of your surgery.  DO NOT DRIVE while taking narcotic pain medications that have been prescribed by your physician.  If you had a limb operated on, you must be able to use it fully to drive.  4. DO NOT drink alcoholic beverages for 24 hours following surgery or while taking prescription pain medication.  5. Drink clear liquids (apple juice, ginger ale, broth, 7-Up, etc.).  Progress to your regular diet as you feel able.  6. Any questions call your physician and do not make important decisions for 24 hours.      [facility]:  [phone]     Same Day Surgery [phone], Monday thru Friday 6am-9pm.    Break through Bleeding  As instructed per Surgeon or Nurse.    Post Op Infection  Be alert for signs  of infection: redness, swelling, heat, drainage of pus, and/or elevated temperature.  Contact your surgeon if these occur.    Nausea   If post op nausea occurs, at first rest your stomach for a few hours by eating nothing solid and sipping only clear liquids.  Call your Surgeon if nausea does not resolve in 24 hours.

## 2019-08-23 LAB — COPATH REPORT: NORMAL

## 2019-08-30 ENCOUNTER — DOCUMENTATION ONLY (OUTPATIENT)
Dept: OTHER | Facility: CLINIC | Age: 59
End: 2019-08-30

## 2019-08-30 ENCOUNTER — OFFICE VISIT (OUTPATIENT)
Dept: SURGERY | Facility: CLINIC | Age: 59
End: 2019-08-30
Payer: COMMERCIAL

## 2019-08-30 VITALS
BODY MASS INDEX: 39.08 KG/M2 | HEART RATE: 78 BPM | WEIGHT: 207 LBS | HEIGHT: 61 IN | DIASTOLIC BLOOD PRESSURE: 78 MMHG | TEMPERATURE: 98.4 F | SYSTOLIC BLOOD PRESSURE: 111 MMHG

## 2019-08-30 DIAGNOSIS — Z98.890 POSTOPERATIVE STATE: Primary | ICD-10-CM

## 2019-08-30 PROCEDURE — 99024 POSTOP FOLLOW-UP VISIT: CPT | Performed by: SURGERY

## 2019-08-30 ASSESSMENT — MIFFLIN-ST. JEOR: SCORE: 1461.33

## 2019-08-30 NOTE — PROGRESS NOTES
Nidia comes in for a postop check.  She is now 9 days out from a laparoscopic cholecystectomy.  She was having frequent gallbladder attacks before surgery and has had none since.  She feels great.  She has minimal pain.  She actually went to work on Monday only 5 days from surgery.    On exam: Wounds are healing fine.  Her abdomen is benign.    I went through the pathology report on her gallbladder which was unremarkable for stones.  Otherwise pathology was normal.    I went through her operative photos with her and discussed the anatomy.    Impression: Satisfactory postoperative course    Recommendation: Return to usual activities as tolerated.  There was a little bit of drainage from the superior aspect of her umbilical incision.  It looks like it is healing fine so I advised her to just place a Band-Aid with some Neosporin on it.  If she should feel a knot from the underlying suture I would suggest that she lifted up and cut it off or return to the clinic for us to take care of it.    Kwabena Stephenson MD FACS

## 2019-08-30 NOTE — NURSING NOTE
"Initial /78 (BP Location: Right arm, Patient Position: Sitting, Cuff Size: Adult Regular)   Pulse 78   Temp 98.4  F (36.9  C) (Tympanic)   Ht 1.549 m (5' 1\")   Wt 93.9 kg (207 lb)   LMP 07/29/2002   BMI 39.11 kg/m   Estimated body mass index is 39.11 kg/m  as calculated from the following:    Height as of this encounter: 1.549 m (5' 1\").    Weight as of this encounter: 93.9 kg (207 lb). .    Keyana Perez MA    "

## 2019-08-30 NOTE — LETTER
8/30/2019         RE: Nidia Acosta  [address] [facility] [place] [address]        Dear Colleague,    Thank you for referring your patient, Nidia Acosta, to the [facility]. Please see a copy of my visit note below.    Nidia comes in for a postop check.  She is now 9 days out from a laparoscopic cholecystectomy.  She was having frequent gallbladder attacks before surgery and has had none since.  She feels great.  She has minimal pain.  She actually went to work on Monday only 5 days from surgery.    On exam: Wounds are healing fine.  Her abdomen is benign.    I went through the pathology report on her gallbladder which was unremarkable for stones.  Otherwise pathology was normal.    I went through her operative photos with her and discussed the anatomy.    Impression: Satisfactory postoperative course    Recommendation: Return to usual activities as tolerated.  There was a little bit of drainage from the superior aspect of her umbilical incision.  It looks like it is healing fine so I advised her to just place a Band-Aid with some Neosporin on it.  If she should feel a knot from the underlying suture I would suggest that she lifted up and cut it off or return to the clinic for us to take care of it.    Kwabena Stephenson MD FACS    Again, thank you for allowing me to participate in the care of your patient.        Sincerely,        Kwabena Stephenson MD

## 2019-09-24 ENCOUNTER — OFFICE VISIT (OUTPATIENT)
Dept: FAMILY MEDICINE | Facility: CLINIC | Age: 59
End: 2019-09-24
Payer: COMMERCIAL

## 2019-09-24 VITALS
HEART RATE: 77 BPM | SYSTOLIC BLOOD PRESSURE: 116 MMHG | TEMPERATURE: 98.7 F | HEIGHT: 61 IN | BODY MASS INDEX: 38.71 KG/M2 | DIASTOLIC BLOOD PRESSURE: 72 MMHG | WEIGHT: 205 LBS

## 2019-09-24 DIAGNOSIS — E66.01 MORBID OBESITY (H): ICD-10-CM

## 2019-09-24 DIAGNOSIS — Z13.6 CARDIOVASCULAR SCREENING; LDL GOAL LESS THAN 160: ICD-10-CM

## 2019-09-24 DIAGNOSIS — Z00.00 ROUTINE GENERAL MEDICAL EXAMINATION AT A HEALTH CARE FACILITY: Primary | ICD-10-CM

## 2019-09-24 DIAGNOSIS — E03.4 HYPOTHYROIDISM DUE TO ACQUIRED ATROPHY OF THYROID: ICD-10-CM

## 2019-09-24 DIAGNOSIS — F32.0 MILD MAJOR DEPRESSION (H): ICD-10-CM

## 2019-09-24 PROCEDURE — 99396 PREV VISIT EST AGE 40-64: CPT | Performed by: PHYSICIAN ASSISTANT

## 2019-09-24 ASSESSMENT — ENCOUNTER SYMPTOMS
PALPITATIONS: 0
FEVER: 0
HEMATOCHEZIA: 0
ABDOMINAL PAIN: 0
DIARRHEA: 0
SHORTNESS OF BREATH: 1
DYSURIA: 0
SORE THROAT: 1
PARESTHESIAS: 0
WEAKNESS: 1
CONSTIPATION: 1
NERVOUS/ANXIOUS: 0
JOINT SWELLING: 0
HEARTBURN: 0
DIZZINESS: 0
EYE PAIN: 0
CHILLS: 0
HEMATURIA: 0
FREQUENCY: 0
HEADACHES: 0
MYALGIAS: 1
COUGH: 1
NAUSEA: 1
ARTHRALGIAS: 0
BREAST MASS: 0

## 2019-09-24 ASSESSMENT — PAIN SCALES - GENERAL: PAINLEVEL: NO PAIN (0)

## 2019-09-24 ASSESSMENT — MIFFLIN-ST. JEOR: SCORE: 1452.25

## 2019-09-24 NOTE — PROGRESS NOTES
SUBJECTIVE:   CC: Nidia Acosta is an 59 year old woman who presents for preventive health visit.     Answers for HPI/ROS submitted by the patient on 9/24/2019   Annual Exam:  Frequency of exercise:: None  Getting at least 3 servings of Calcium per day:: NO  Diet:: Regular (no restrictions), Breakfast skipped  Taking medications regularly:: Yes  Medication side effects:: None  Bi-annual eye exam:: NO  Dental care twice a year:: Yes  Sleep apnea or symptoms of sleep apnea:: Daytime drowsiness, Excessive snoring  abdominal pain: No  Blood in stool: No  Blood in urine: No  chest pain: No  chills: No  congestion: Yes  constipation: Yes  cough: Yes  diarrhea: No  dizziness: No  ear pain: No  eye pain: No  nervous/anxious: No  fever: No  frequency: No  genital sores: No  headaches: No  hearing loss: No  heartburn: No  arthralgias: No  joint swelling: No  peripheral edema: No  mood changes: No  myalgias: Yes  nausea: Yes  dysuria: No  palpitations: No  Skin sensation changes: No  sore throat: Yes  urgency: No  rash: No  shortness of breath: Yes  visual disturbance: No  weakness: Yes  pelvic pain: No  vaginal bleeding: No  vaginal discharge: No  tenderness: No  breast mass: No  breast discharge: No  Additional concerns today:: No      Lost her mom a week ago due to complications post heart surgery  Sad but mostly angry with how the medical team handled the entire situation  Doesn't feel her mom should have had surgery and then felt they kept her alive against her wishes  Admits she needs to process the events           Today's PHQ-2 Score:   PHQ-2 ( 1999 [employer]) 9/24/2019 12/27/2018   Q1: Little interest or pleasure in doing things 1 0   Q2: Feeling down, depressed or hopeless 1 0   PHQ-2 Score 2 0   Q1: Little interest or pleasure in doing things Several days -   Q2: Feeling down, depressed or hopeless Several days -   PHQ-2 Score 2 -       Abuse: Current or Past(Physical, Sexual or Emotional)- No  Do you feel  safe in your environment? Yes    Social History     Tobacco Use     Smoking status: Former Smoker     Packs/day: 0.50     Years: 5.00     Pack years: 2.50     Types: Cigarettes     Last attempt to quit: 1986     Years since quittin.7     Smokeless tobacco: Never Used   Substance Use Topics     Alcohol use: Yes     Comment: 1-5 drinks weekly     If you drink alcohol do you typically have >3 drinks per day or >7 drinks per week? No                     Reviewed orders with patient.  Reviewed health maintenance and updated orders accordingly - Yes  BP Readings from Last 3 Encounters:   19 116/72   19 111/78   19 116/73    Wt Readings from Last 3 Encounters:   19 93 kg (205 lb)   19 93.9 kg (207 lb)   19 93.9 kg (207 lb)                    Mammogram Screening: Patient over age 50, mutual decision to screen reflected in health maintenance.    Pertinent mammograms are reviewed under the imaging tab.  History of abnormal Pap smear: NO - age 30-65 PAP every 5 years with negative HPV co-testing recommended  PAP / HPV 2007   PAP NIL NIL     Reviewed and updated as needed this visit by clinical staff  Tobacco  Allergies  Meds  Med Hx  Surg Hx  Fam Hx  Soc Hx        Reviewed and updated as needed this visit by Provider            ROS:  CONSTITUTIONAL: NEGATIVE for fever, chills, change in weight  INTEGUMENTARY/SKIN: NEGATIVE for worrisome rashes, moles or lesions  EYES: NEGATIVE for vision changes or irritation  ENT: NEGATIVE for ear, mouth and throat problems  RESP: NEGATIVE for significant cough or SOB  BREAST: NEGATIVE for masses, tenderness or discharge  CV: NEGATIVE for chest pain, palpitations or peripheral edema  GI: NEGATIVE for nausea, abdominal pain, heartburn, or change in bowel habits  : NEGATIVE for unusual urinary or vaginal symptoms. No vaginal bleeding.  MUSCULOSKELETAL: NEGATIVE for significant arthralgias or myalgia  NEURO: NEGATIVE for  "weakness, dizziness or paresthesias  PSYCHIATRIC: NEGATIVE for changes in mood or affect     OBJECTIVE:   /72   Pulse 77   Temp 98.7  F (37.1  C) (Tympanic)   Ht 1.549 m (5' 1\")   Wt 93 kg (205 lb)   LMP 07/29/2002   BMI 38.73 kg/m    EXAM:  GENERAL: healthy, alert and no distress  EYES: Eyes grossly normal to inspection, PERRL and conjunctivae and sclerae normal  HENT: ear canals and TM's normal, nose and mouth without ulcers or lesions  NECK: no adenopathy, no asymmetry, masses, or scars and thyroid normal to palpation  RESP: lungs clear to auscultation - no rales, rhonchi or wheezes  BREAST: normal without masses, tenderness or nipple discharge and no palpable axillary masses or adenopathy  CV: regular rate and rhythm, normal S1 S2, no S3 or S4, no murmur, click or rub, no peripheral edema and peripheral pulses strong  ABDOMEN: soft, nontender, no hepatosplenomegaly, no masses and bowel sounds normal  MS: no gross musculoskeletal defects noted, no edema  SKIN: no suspicious lesions or rashes  NEURO: Normal strength and tone, mentation intact and speech normal  PSYCH: mentation appears normal, affect normal/bright    Diagnostic Test Results:  futured    ASSESSMENT/PLAN:     (Z00.00) Routine general medical examination at a health care facility  (primary encounter diagnosis)  Comment:   Plan: Lipid panel reflex to direct LDL Fasting            (F32.0) Mild major depression (H)  Comment:   Plan: recent life stressor with death of mom. Patient doing as well as can be expected. Processing everything right now. Knows she can reach out if additional support needed    (E66.01) Morbid obesity (H)  Comment:   Plan: healthy diet/exercise discussed    (E03.4) Hypothyroidism due to acquired atrophy of thyroid  Comment:   Plan: **TSH with free T4 reflex FUTURE anytime            (Z13.6) CARDIOVASCULAR SCREENING; LDL GOAL LESS THAN 160  Comment:   Plan: Lipid panel reflex to direct LDL Fasting        " "      COUNSELING:   Reviewed preventive health counseling, as reflected in patient instructions       Regular exercise       Healthy diet/nutrition    Estimated body mass index is 38.73 kg/m  as calculated from the following:    Height as of this encounter: 1.549 m (5' 1\").    Weight as of this encounter: 93 kg (205 lb).    Weight management plan: Discussed healthy diet and exercise guidelines     reports that she quit smoking about 33 years ago. Her smoking use included cigarettes. She has a 2.50 pack-year smoking history. She has never used smokeless tobacco.      Counseling Resources:  ATP IV Guidelines  Pooled Cohorts Equation Calculator  Breast Cancer Risk Calculator  FRAX Risk Assessment  ICSI Preventive Guidelines  Dietary Guidelines for Americans, 2010  USDA's MyPlate  ASA Prophylaxis  Lung CA Screening    Lillian Harden PA-C  [facility]  "

## 2019-09-24 NOTE — PATIENT INSTRUCTIONS
REturn for labs when you can      Preventive Health Recommendations  Female Ages 50 - 64    Yearly exam: See your health care provider every year in order to  o Review health changes.   o Discuss preventive care.    o Review your medicines if your doctor has prescribed any.      Get a Pap test every three years (unless you have an abnormal result and your provider advises testing more often).    If you get Pap tests with HPV test, you only need to test every 5 years, unless you have an abnormal result.     You do not need a Pap test if your uterus was removed (hysterectomy) and you have not had cancer.    You should be tested each year for STDs (sexually transmitted diseases) if you're at risk.     Have a mammogram every 1 to 2 years.    Have a colonoscopy at age 50, or have a yearly FIT test (stool test). These exams screen for colon cancer.      Have a cholesterol test every 5 years, or more often if advised.    Have a diabetes test (fasting glucose) every three years. If you are at risk for diabetes, you should have this test more often.     If you are at risk for osteoporosis (brittle bone disease), think about having a bone density scan (DEXA).    Shots: Get a flu shot each year. Get a tetanus shot every 10 years.    Nutrition:     Eat at least 5 servings of fruits and vegetables each day.    Eat whole-grain bread, whole-wheat pasta and brown rice instead of white grains and rice.    Get adequate Calcium and Vitamin D.     Lifestyle    Exercise at least 150 minutes a week (30 minutes a day, 5 days a week). This will help you control your weight and prevent disease.    Limit alcohol to one drink per day.    No smoking.     Wear sunscreen to prevent skin cancer.     See your dentist every six months for an exam and cleaning.    See your eye doctor every 1 to 2 years.

## 2019-09-25 DIAGNOSIS — E03.4 HYPOTHYROIDISM DUE TO ACQUIRED ATROPHY OF THYROID: ICD-10-CM

## 2019-09-25 DIAGNOSIS — Z00.00 ROUTINE GENERAL MEDICAL EXAMINATION AT A HEALTH CARE FACILITY: ICD-10-CM

## 2019-09-25 DIAGNOSIS — Z13.6 CARDIOVASCULAR SCREENING; LDL GOAL LESS THAN 160: ICD-10-CM

## 2019-09-25 LAB
CHOLEST SERPL-MCNC: 228 MG/DL
HDLC SERPL-MCNC: 51 MG/DL
LDLC SERPL CALC-MCNC: 145 MG/DL
NONHDLC SERPL-MCNC: 177 MG/DL
T4 FREE SERPL-MCNC: 0.73 NG/DL (ref 0.76–1.46)
TRIGL SERPL-MCNC: 160 MG/DL
TSH SERPL DL<=0.005 MIU/L-ACNC: 19.43 MU/L (ref 0.4–4)

## 2019-09-25 PROCEDURE — 80061 LIPID PANEL: CPT | Performed by: PHYSICIAN ASSISTANT

## 2019-09-25 PROCEDURE — 36415 COLL VENOUS BLD VENIPUNCTURE: CPT | Performed by: PHYSICIAN ASSISTANT

## 2019-09-25 PROCEDURE — 84439 ASSAY OF FREE THYROXINE: CPT | Performed by: PHYSICIAN ASSISTANT

## 2019-09-25 PROCEDURE — 84443 ASSAY THYROID STIM HORMONE: CPT | Performed by: PHYSICIAN ASSISTANT

## 2019-10-25 DIAGNOSIS — A60.00 HERPES SIMPLEX INFECTION OF GENITOURINARY SYSTEM: ICD-10-CM

## 2019-10-25 RX ORDER — VALACYCLOVIR HYDROCHLORIDE 500 MG/1
TABLET, FILM COATED ORAL
Qty: 90 TABLET | Refills: 1 | Status: SHIPPED | OUTPATIENT
Start: 2019-10-25 | End: 2020-06-10

## 2019-10-25 NOTE — TELEPHONE ENCOUNTER
Routing refill request to provider for review/approval because:  Would like Provider to decide this request for long term daily dose  Thank you.  Rolando Morfin RN

## 2019-10-25 NOTE — TELEPHONE ENCOUNTER
"Requested Prescriptions   Pending Prescriptions Disp Refills     valACYclovir (VALTREX) 500 MG tablet [Pharmacy Med Name: ValACYclovir 500MG  TAB]  Last Written Prescription Date:  2/12/19  Last Fill Quantity: 90,  # refills: 1   Last office visit: 9/24/2019 with prescribing provider:  juan   Future Office Visit:     90 tablet 1     Sig: TAKE 1 TABLET BY MOUTH ONCE DAILY       Antivirals for Herpes Protocol Passed - 10/25/2019  1:04 PM        Passed - Patient is age 12 or older        Passed - Recent (12 mo) or future (30 days) visit within the authorizing provider's specialty     Patient has had an office visit with the authorizing provider or a provider within the authorizing providers department within the previous 12 mos or has a future within next 30 days. See \"Patient Info\" tab in inbasket, or \"Choose Columns\" in Meds & Orders section of the refill encounter.              Passed - Medication is active on med list        Passed - Normal serum creatinine on file in past 12 months     Recent Labs   Lab Test 08/05/19  0009   CR 0.87               "

## 2019-11-03 ENCOUNTER — HEALTH MAINTENANCE LETTER (OUTPATIENT)
Age: 59
End: 2019-11-03

## 2019-11-11 ENCOUNTER — OFFICE VISIT (OUTPATIENT)
Dept: DERMATOLOGY | Facility: CLINIC | Age: 59
End: 2019-11-11
Payer: COMMERCIAL

## 2019-11-11 ENCOUNTER — HOSPITAL ENCOUNTER (OUTPATIENT)
Dept: MAMMOGRAPHY | Facility: CLINIC | Age: 59
Discharge: HOME | End: 2019-11-11
Attending: FAMILY MEDICINE | Admitting: FAMILY MEDICINE
Payer: COMMERCIAL

## 2019-11-11 VITALS
DIASTOLIC BLOOD PRESSURE: 94 MMHG | BODY MASS INDEX: 38.73 KG/M2 | RESPIRATION RATE: 18 BRPM | HEIGHT: 61 IN | HEART RATE: 87 BPM | SYSTOLIC BLOOD PRESSURE: 139 MMHG

## 2019-11-11 DIAGNOSIS — D48.5 NEOPLASM OF UNCERTAIN BEHAVIOR OF SKIN: Primary | ICD-10-CM

## 2019-11-11 DIAGNOSIS — Z12.31 VISIT FOR SCREENING MAMMOGRAM: ICD-10-CM

## 2019-11-11 DIAGNOSIS — E03.4 HYPOTHYROIDISM DUE TO ACQUIRED ATROPHY OF THYROID: ICD-10-CM

## 2019-11-11 LAB — TSH SERPL DL<=0.005 MIU/L-ACNC: 1.55 MU/L (ref 0.4–4)

## 2019-11-11 PROCEDURE — 84443 ASSAY THYROID STIM HORMONE: CPT | Performed by: PHYSICIAN ASSISTANT

## 2019-11-11 PROCEDURE — 88305 TISSUE EXAM BY PATHOLOGIST: CPT | Mod: TC | Performed by: PHYSICIAN ASSISTANT

## 2019-11-11 PROCEDURE — 11106 INCAL BX SKN SINGLE LES: CPT | Performed by: PHYSICIAN ASSISTANT

## 2019-11-11 PROCEDURE — 99213 OFFICE O/P EST LOW 20 MIN: CPT | Mod: 25 | Performed by: PHYSICIAN ASSISTANT

## 2019-11-11 PROCEDURE — 36415 COLL VENOUS BLD VENIPUNCTURE: CPT | Performed by: PHYSICIAN ASSISTANT

## 2019-11-11 PROCEDURE — 77067 SCR MAMMO BI INCL CAD: CPT

## 2019-11-11 NOTE — LETTER
"    11/11/2019         RE: Nidia Acosta  [address] [facility] [place] [address]        Dear Colleague,    Thank you for referring your patient, Nidia Acosta, to the [facility]. Please see a copy of my visit note below.    HPI:   Chief complaints: Nidia Acosta is a 59 year old female who presents for evaluation of a spot on the thigh. Area has been there for years but recently became irritated.   Condition present for:  Many years.   Previous treatments include: none  -no history of skin CA    Review Of Systems  Eyes: negative  Ears/Nose/Throat: negative  Respiratory: No shortness of breath, dyspnea on exertion, cough, or hemoptysis  Cardiovascular: negative  Gastrointestinal: negative  Genitourinary: negative  Musculoskeletal: negative  Neurologic: negative  Psychiatric: negative  Skin: positive for growth on the leg      PHYSICAL EXAM:    BP (!) 139/94   Pulse 87   Resp 18   Ht 1.549 m (5' 1\")   LMP 07/29/2002   BMI 38.73 kg/m     Skin exam performed as follows: Type 2 skin. Mood appropriate  Alert and Oriented X 3. Well developed, well nourished in no distress.  General appearance: Normal  Head including face: Normal  Eyes: conjunctiva and lids: Normal  Mouth: Lips, teeth, gums: Normal  Neck: Normal  Chest-breast/axillae: Normal  Back: Normal  Spleen and liver: Normal  Cardiovascular: Exam of peripheral vascular system by observation for swelling, varicosities, edema: Normal  Genitalia: groin, buttocks: Normal  Extremities: digits/nails (clubbing): Normal  Eccrine and Apocrine glands: Normal  Right upper extremity: Normal  Left upper extremity: Normal  Right lower extremity: Normal  Left lower extremity: Normal  Skin: Scalp and body hair: See below    1. 20 mm squishy tan plaque on the left upper inner thigh     ASSESSMENT/PLAN:     1. Nevus lipomatous vs lipoma vs other on the left upper inner thigh. Shave biopsy performed.  Area cleaned and anesthetized with 1% lidocaine with " epinephrine.  Dermablade used to remove the lesion and sent to pathology. Bleeding was cauterized. Pt tolerated procedure well with no complications.   2. Nidia to follow up with Primary Care provider regarding elevated blood pressure.        Follow-up: yearly FSE/PRN sooner  CC:   Scribed By: Ariadne Odell MS, TANISHA        Again, thank you for allowing me to participate in the care of your patient.        Sincerely,        Ariadne Odell PA-C

## 2019-11-11 NOTE — PROGRESS NOTES
"HPI:   Chief complaints: Nidia Acosta is a 59 year old female who presents for evaluation of a spot on the thigh. Area has been there for years but recently became irritated.   Condition present for:  Many years.   Previous treatments include: none  -no history of skin CA    Review Of Systems  Eyes: negative  Ears/Nose/Throat: negative  Respiratory: No shortness of breath, dyspnea on exertion, cough, or hemoptysis  Cardiovascular: negative  Gastrointestinal: negative  Genitourinary: negative  Musculoskeletal: negative  Neurologic: negative  Psychiatric: negative  Skin: positive for growth on the leg      PHYSICAL EXAM:    BP (!) 139/94   Pulse 87   Resp 18   Ht 1.549 m (5' 1\")   LMP 07/29/2002   BMI 38.73 kg/m    Skin exam performed as follows: Type 2 skin. Mood appropriate  Alert and Oriented X 3. Well developed, well nourished in no distress.  General appearance: Normal  Head including face: Normal  Eyes: conjunctiva and lids: Normal  Mouth: Lips, teeth, gums: Normal  Neck: Normal  Chest-breast/axillae: Normal  Back: Normal  Spleen and liver: Normal  Cardiovascular: Exam of peripheral vascular system by observation for swelling, varicosities, edema: Normal  Genitalia: groin, buttocks: Normal  Extremities: digits/nails (clubbing): Normal  Eccrine and Apocrine glands: Normal  Right upper extremity: Normal  Left upper extremity: Normal  Right lower extremity: Normal  Left lower extremity: Normal  Skin: Scalp and body hair: See below    1. 20 mm squishy tan plaque on the left upper inner thigh     ASSESSMENT/PLAN:     1. Nevus lipomatous vs lipoma vs other on the left upper inner thigh. Shave biopsy performed.  Area cleaned and anesthetized with 1% lidocaine with epinephrine.  Dermablade used to remove the lesion and sent to pathology. Bleeding was cauterized. Pt tolerated procedure well with no complications.   2. Nidia to follow up with Primary Care provider regarding elevated blood " pressure.        Follow-up: yearly FSE/PRN sooner  CC:   Scribed By: Ariadne Odell MS, PA-C

## 2019-11-11 NOTE — NURSING NOTE
"Initial BP (!) 139/94   Pulse 87   Resp 18   Ht 1.549 m (5' 1\")   LMP 07/29/2002   BMI 38.73 kg/m   Estimated body mass index is 38.73 kg/m  as calculated from the following:    Height as of this encounter: 1.549 m (5' 1\").    Weight as of 9/24/19: 93 kg (205 lb). .      "

## 2019-11-11 NOTE — PATIENT INSTRUCTIONS
Wound Care Instructions     FOR SUPERFICIAL WOUNDS     [facility] [phone]    [facility][1] [phone]                       AFTER 24 HOURS YOU SHOULD REMOVE THE BANDAGE AND BEGIN DAILY DRESSING CHANGES AS FOLLOWS:                1)         Remove Dressing.                2)         Clean and dry the area with tap water using a Q-tip or sterile gauze pad.                3)         Apply Vaseline, Aquaphor, Polysporin ointment or Bacitracin ointment over entire wound.  Do NOT use Neosporin ointment.                4)         Cover the wound with a band-aid, or a sterile non-stick gauze pad and micropore paper tape      REPEAT THESE INSTRUCTIONS AT LEAST ONCE A DAY UNTIL THE WOUND HAS COMPLETELY HEALED.    It is an old wives tale that a wound heals better when it is exposed to air and allowed to dry out. The wound will heal faster with a better cosmetic result if it is kept moist with ointment and covered with a bandage.    **Do not let the wound dry out.**      Supplies Needed:                            *Cotton tipped applicators (Q-tips)                          *Polysporin Ointment or Bacitracin Ointment (NOT NEOSPORIN)                          *Band-aids or non-stick gauze pads and micropore paper tape.      PATIENT INFORMATION:    During the healing process you will notice a number of changes. All wounds develop a small halo of redness surrounding the wound.  This means healing is occurring. Severe itching with extensive redness usually indicates sensitivity to the ointment or bandage tape used to dress the wound.  You should call our office if this develops.      Swelling  and/or discoloration around your surgical site is common, particularly when performed around the eye.    All wounds normally drain.  The larger the wound the more drainage there will be.  After 7-10 days, you will notice the wound beginning to shrink and new skin will begin to grow.  The wound is healed when you  can see skin has formed over the entire area.  A healed wound has a healthy, shiny look to the surface and is red to dark pink in color to normalize.  Wounds may take approximately 4-6 weeks to heal.  Larger wounds may take 6-8 weeks.  After the wound is healed you may discontinue dressing changes.    You may experience a sensation of tightness as your wound heals. This is normal and will gradually subside.    Your healed wound may be sensitive to temperature changes. This sensitivity improves with time, but if you re having a lot of discomfort, try to avoid temperature extremes.    Patients frequently experience itching after their wound appears to have healed because of the continue healing under the skin.  Plain Vaseline will help relieve the itching.        POSSIBLE COMPLICATIONS    BLEEDIN. Leave the bandage in place.  2. Use tightly rolled up gauze or a cloth to apply direct pressure over the bandage for 30  minutes.  3. Reapply pressure for an additional 30 minutes if necessary  4. Use additional gauze and tape to maintain pressure once the bleeding has stopped.

## 2019-11-13 ENCOUNTER — MYC REFILL (OUTPATIENT)
Dept: FAMILY MEDICINE | Facility: CLINIC | Age: 59
End: 2019-11-13

## 2019-11-13 DIAGNOSIS — A60.00 HERPES SIMPLEX INFECTION OF GENITOURINARY SYSTEM: ICD-10-CM

## 2019-11-13 LAB — COPATH REPORT: NORMAL

## 2019-11-13 RX ORDER — VALACYCLOVIR HYDROCHLORIDE 500 MG/1
500 TABLET, FILM COATED ORAL DAILY
Qty: 90 TABLET | Refills: 1 | Status: CANCELLED | OUTPATIENT
Start: 2019-11-13

## 2020-06-10 ENCOUNTER — OFFICE VISIT (OUTPATIENT)
Dept: FAMILY MEDICINE | Facility: CLINIC | Age: 60
End: 2020-06-10
Payer: COMMERCIAL

## 2020-06-10 VITALS
OXYGEN SATURATION: 98 % | BODY MASS INDEX: 40.01 KG/M2 | WEIGHT: 217.4 LBS | HEIGHT: 62 IN | TEMPERATURE: 97.7 F | SYSTOLIC BLOOD PRESSURE: 129 MMHG | DIASTOLIC BLOOD PRESSURE: 81 MMHG | HEART RATE: 78 BPM | RESPIRATION RATE: 20 BRPM

## 2020-06-10 DIAGNOSIS — E03.4 HYPOTHYROIDISM DUE TO ACQUIRED ATROPHY OF THYROID: ICD-10-CM

## 2020-06-10 DIAGNOSIS — L30.1 DYSHIDROTIC FOOT DERMATITIS: ICD-10-CM

## 2020-06-10 DIAGNOSIS — A60.00 HERPES SIMPLEX INFECTION OF GENITOURINARY SYSTEM: ICD-10-CM

## 2020-06-10 DIAGNOSIS — J45.991 COUGH VARIANT ASTHMA: Primary | ICD-10-CM

## 2020-06-10 PROCEDURE — 84443 ASSAY THYROID STIM HORMONE: CPT | Performed by: PHYSICIAN ASSISTANT

## 2020-06-10 PROCEDURE — 36415 COLL VENOUS BLD VENIPUNCTURE: CPT | Performed by: PHYSICIAN ASSISTANT

## 2020-06-10 PROCEDURE — 99214 OFFICE O/P EST MOD 30 MIN: CPT | Performed by: PHYSICIAN ASSISTANT

## 2020-06-10 RX ORDER — MONTELUKAST SODIUM 10 MG/1
10 TABLET ORAL AT BEDTIME
Qty: 90 TABLET | Refills: 3 | Status: SHIPPED | OUTPATIENT
Start: 2020-06-10 | End: 2021-03-24

## 2020-06-10 RX ORDER — TRIAMCINOLONE ACETONIDE 5 MG/G
CREAM TOPICAL 2 TIMES DAILY
Qty: 454 G | Refills: 0 | Status: SHIPPED | OUTPATIENT
Start: 2020-06-10 | End: 2023-07-11

## 2020-06-10 RX ORDER — VALACYCLOVIR HYDROCHLORIDE 500 MG/1
500 TABLET, FILM COATED ORAL DAILY
Qty: 90 TABLET | Refills: 3 | Status: SHIPPED | OUTPATIENT
Start: 2020-06-10 | End: 2021-03-24

## 2020-06-10 ASSESSMENT — MIFFLIN-ST. JEOR: SCORE: 1522.62

## 2020-06-10 NOTE — PROGRESS NOTES
Subjective     Nidia Acosta is a 60 year old female who presents to clinic today for the following health issues:    HPI   Skin Issue on Foot - right foot greater than left.  Toes, arch and outside  Onset: 4 months    Description:   Location: Right and Left foot, toes, arch and outside of foot  Character: raised, flakey  Itching (Pruritis): YES    Progression of Symptoms:  same    History:   Previous similar rash: YES- all the time    Precipitating factors:   Exposure to similar rash: no   New exposures: None   Recent travel: YES- [place] and [place] - back in March 2020    Alleviating factors:  Water makes it itch and burn more    Therapies Tried and outcome: otc foot cream and sprays, essential oils - nothing is helping  Chronic cough since an adolescent.  History of asthma and allergies.      Patient Active Problem List   Diagnosis     Other genital herpes     Disturbance in sleep behavior     Hypothyroidism due to acquired atrophy of thyroid     Allergic rhinitis     Obesity     Decreased libido     Subdural hemorrhage (H)     Mild major depression (H)     Hyperlipidemia LDL goal <130     Insomnia     H/O: hysterectomy     POD (perioral dermatitis)     PTSD (post-traumatic stress disorder)     BMI 38.0-38.9,adult     Obesity (BMI 35.0-39.9) with comorbidity (H)     Past Surgical History:   Procedure Laterality Date     COLONOSCOPY  4/5/2013    Procedure: COLONOSCOPY;  Colonoscopy  ;  Surgeon: Laura Ruff MD;  Location: [facility]     HYSTERECTOMY, PAP NO LONGER INDICATED  7/2003    [facility] for fibroids     LAPAROSCOPIC CHOLECYSTECTOMY N/A 8/21/2019    Procedure: CHOLECYSTECTOMY, LAPAROSCOPIC;  Surgeon: Kwabena Stephenson MD;  Location: [facility]     LAPAROSCOPIC LYSIS ADHESIONS  1985     LASIK  10/2004     TONSILLECTOMY & ADENOIDECTOMY  age 18     TUBAL LIGATION  4/1994       Social History     Tobacco Use     Smoking status: Former Smoker     Packs/day: 0.50     Years: 5.00     Pack years: 2.50      "Types: Cigarettes     Last attempt to quit: 1986     Years since quittin.4     Smokeless tobacco: Never Used   Substance Use Topics     Alcohol use: Yes     Comment: 1-5 drinks weekly     Family History   Problem Relation Age of Onset     Arthritis Mother      Allergies Mother         otc meds     Depression Mother      Alzheimer Disease Mother      Hyperlipidemia Mother      Osteoporosis Mother      Diabetes Mother      Heart Disease Mother 80        mitral valve issues     Hypertension Father         on med     C.A.D. Father         has had 2 MI\"S, possible surgery     Alcohol/Drug Father      Arthritis Father      Hyperlipidemia Father      Substance Abuse Father      Alcohol/Drug Brother      Substance Abuse Brother      Heart Disease Maternal Grandmother      Osteoporosis Maternal Grandmother      Heart Disease Maternal Grandfather      Heart Disease Paternal Grandmother      Diabetes Paternal Grandmother      Obesity Paternal Grandmother      Heart Disease Paternal Grandfather      Alcohol/Drug Son         son in recovery     Hypertension Brother      Hyperlipidemia Brother      Substance Abuse Other         Son         Current Outpatient Medications   Medication Sig Dispense Refill     levothyroxine (SYNTHROID/LEVOTHROID) 112 MCG tablet Take 1 tablet (112 mcg) by mouth daily 90 tablet 3     Loratadine (CLARITIN PO) Take 10 mg by mouth daily AllerClear Brand        montelukast (SINGULAIR) 10 MG tablet Take 1 tablet (10 mg) by mouth At Bedtime 90 tablet 3     triamcinolone (ARISTOCORT HP) 0.5 % external cream Apply topically 2 times daily 454 g 0     valACYclovir (VALTREX) 500 MG tablet Take 1 tablet (500 mg) by mouth daily 90 tablet 3     venlafaxine (EFFEXOR-XR) 37.5 MG 24 hr capsule Take 1 capsule (37.5 mg) by mouth daily 90 capsule 3     MISC NATURAL PRODUCTS PO Take 1 capsule by mouth daily PD 80/20 - supports endocrine function       MISC NATURAL PRODUCTS PO Take 1 capsule by mouth daily Agiles - " All natural for bones, ligaments, muscles etc..       NONFORMULARY Take 4 capsules by mouth daily Sulfurzyme -  combines wolfberry with MSM, a naturally occurring organic form of dietary sulfur needed by our bodies every day to maintain the structure of proteins, protect cells and cell membranes, replenish the connections between cells, and preserve the molecular framework of connective tissue.       NONFORMULARY Take 1 tablet by mouth daily Thyromine - The compounds that make Thyromine such an efficient thyroid supplement are: Adrenal powder from bovine, Ginger extract, Guggal tree extract, Suzanne extract, Piper Longum extract, thyroid powder from bovine and L-Tyrosine.       Allergies   Allergen Reactions     Adhesive Tape Itching     Bees Anaphylaxis     Codeine Difficulty breathing     Latex Dermatitis     Penicillin [Penicillins] Difficulty breathing     Seasonal Allergies      Recent Labs   Lab Test 11/11/19  1027 09/25/19  0851 08/05/19  0009 07/22/19  0942  05/30/17  1419  03/20/15  0900 01/06/14  0850   LDL  --  145*  --   --   --  101*  --  81 103   HDL  --  51  --   --   --   --   --  51 47*   TRIG  --  160*  --   --   --   --   --  170* 138   ALT  --   --  30 28  --   --   --   --   --    CR  --   --  0.87 0.64   < >  --   --  0.64 0.77   GFRESTIMATED  --   --  74 >90   < >  --   --  >90  Non  GFR Calc   79   GFRESTBLACK  --   --  86 >90   < >  --   --  >90   GFR Calc   >90   POTASSIUM  --   --  3.9 4.0   < >  --   --  4.0 4.0   TSH 1.55 19.43*  --   --    < > 1.19   < > 0.86 0.73    < > = values in this interval not displayed.      BP Readings from Last 3 Encounters:   06/10/20 129/81   11/11/19 (!) 139/94   09/24/19 116/72    Wt Readings from Last 3 Encounters:   06/10/20 98.6 kg (217 lb 6.4 oz)   09/24/19 93 kg (205 lb)   08/30/19 93.9 kg (207 lb)                      Reviewed and updated as needed this visit by Provider         Review of Systems   Constitutional, HEENT,  "cardiovascular, pulmonary, GI, , musculoskeletal, neuro, skin, endocrine and psych systems are negative, except as otherwise noted.      Objective    /81   Pulse 78   Temp 97.7  F (36.5  C) (Tympanic)   Resp 20   Ht 1.58 m (5' 2.21\")   Wt 98.6 kg (217 lb 6.4 oz)   LMP 07/29/2002   SpO2 98%   Breastfeeding No   BMI 39.50 kg/m    Body mass index is 39.5 kg/m .  Physical Exam     Eye exam - right eye normal lid, conjunctiva, cornea, pupil and fundus, left eye normal lid, conjunctiva, cornea, pupil and fundus.  ENT exam reveals - bilateral TM fluid noted, neck without nodes, throat normal without erythema or exudate, sinuses nontender, post nasal drip noted, nasal mucosa congested and nasal mucosa pale and congested.  CHEST:chest clear to IPPA, no tachypnea, retractions or cyanosis and S1, S2 normal, no murmur, no gallop, rate regular.  Red papular scaly rash on both feet.   Pruritic.     Nidia was seen today for derm problem.    Diagnoses and all orders for this visit:    Cough variant asthma  -     montelukast (SINGULAIR) 10 MG tablet; Take 1 tablet (10 mg) by mouth At Bedtime    Herpes simplex infection of genitourinary system  -     valACYclovir (VALTREX) 500 MG tablet; Take 1 tablet (500 mg) by mouth daily    Dyshidrotic foot dermatitis  -     triamcinolone (ARISTOCORT HP) 0.5 % external cream; Apply topically 2 times daily    Hypothyroidism due to acquired atrophy of thyroid  -     TSH      Advised supportive and symptomatic treatment.  Follow up with Provider - if condition persists or worsens.     "

## 2020-06-11 LAB — TSH SERPL DL<=0.005 MIU/L-ACNC: 8.97 MU/L (ref 0.4–4)

## 2020-06-23 ENCOUNTER — VIRTUAL VISIT (OUTPATIENT)
Dept: FAMILY MEDICINE | Facility: CLINIC | Age: 60
End: 2020-06-23
Payer: COMMERCIAL

## 2020-06-23 ENCOUNTER — MYC MEDICAL ADVICE (OUTPATIENT)
Dept: FAMILY MEDICINE | Facility: CLINIC | Age: 60
End: 2020-06-23

## 2020-06-23 DIAGNOSIS — J20.9 ACUTE BRONCHITIS, UNSPECIFIED ORGANISM: Primary | ICD-10-CM

## 2020-06-23 DIAGNOSIS — J01.00 ACUTE NON-RECURRENT MAXILLARY SINUSITIS: ICD-10-CM

## 2020-06-23 DIAGNOSIS — E03.4 HYPOTHYROIDISM DUE TO ACQUIRED ATROPHY OF THYROID: ICD-10-CM

## 2020-06-23 PROCEDURE — 99214 OFFICE O/P EST MOD 30 MIN: CPT | Mod: TEL | Performed by: PHYSICIAN ASSISTANT

## 2020-06-23 RX ORDER — PREDNISONE 20 MG/1
20 TABLET ORAL 2 TIMES DAILY
Qty: 10 TABLET | Refills: 0 | Status: SHIPPED | OUTPATIENT
Start: 2020-06-23 | End: 2020-06-28

## 2020-06-23 RX ORDER — LEVOTHYROXINE SODIUM 125 UG/1
125 TABLET ORAL DAILY
Qty: 60 TABLET | Refills: 1 | Status: SHIPPED | OUTPATIENT
Start: 2020-06-23 | End: 2020-10-23

## 2020-06-23 RX ORDER — ALBUTEROL SULFATE 90 UG/1
2 AEROSOL, METERED RESPIRATORY (INHALATION) EVERY 6 HOURS
Qty: 1 INHALER | Refills: 1 | Status: SHIPPED | OUTPATIENT
Start: 2020-06-23 | End: 2020-10-09

## 2020-06-23 RX ORDER — AZITHROMYCIN 250 MG/1
TABLET, FILM COATED ORAL
Qty: 6 TABLET | Refills: 0 | Status: SHIPPED | OUTPATIENT
Start: 2020-06-23 | End: 2021-03-24

## 2020-06-23 NOTE — PROGRESS NOTES
"Nidia Acosta is a 60 year old female who is being evaluated via a billable telephone visit.      The patient has been notified of following:     \"This telephone visit will be conducted via a call between you and your physician/provider. We have found that certain health care needs can be provided without the need for a physical exam.  This service lets us provide the care you need with a short phone conversation.  If a prescription is necessary we can send it directly to your pharmacy.  If lab work is needed we can place an order for that and you can then stop by our lab to have the test done at a later time.    Telephone visits are billed at different rates depending on your insurance coverage. During this emergency period, for some insurers they may be billed the same as an in-person visit.  Please reach out to your insurance provider with any questions.    If during the course of the call the physician/provider feels a telephone visit is not appropriate, you will not be charged for this service.\"    Patient has given verbal consent for Telephone visit?  Yes    What phone number would you like to be contacted at? [phone]    How would you like to obtain your AVS? Chucho    Subjective     Nidia Acosta is a 60 year old female who presents via phone visit today for the following health issues:    HPI  Following up on: cough    Last visit this was discussed: 6/10/20 with Rigoberto Jiménez    Progression of Symptoms:  worsening    Accompanying Signs & Symptoms: cough is worse    Taking Medication as prescribed: yes    Side Effects:  None    Medication Helping Symptoms:  Yes - helping at night     no fever. Allergies definitely playing a role. Noting nasal congestion. Some phlegm.  Some facial pain at times. Noting some wheezing.         Patient Active Problem List   Diagnosis     Other genital herpes     Disturbance in sleep behavior     Hypothyroidism due to acquired atrophy of thyroid     Allergic " "rhinitis     Obesity     Decreased libido     Subdural hemorrhage (H)     Mild major depression (H)     Hyperlipidemia LDL goal <130     Insomnia     H/O: hysterectomy     POD (perioral dermatitis)     PTSD (post-traumatic stress disorder)     BMI 38.0-38.9,adult     Obesity (BMI 35.0-39.9) with comorbidity (H)     Past Surgical History:   Procedure Laterality Date     COLONOSCOPY  2013    Procedure: COLONOSCOPY;  Colonoscopy  ;  Surgeon: Laura Ruff MD;  Location: [facility]     HYSTERECTOMY, PAP NO LONGER INDICATED  2003    [facility] for fibroids     LAPAROSCOPIC CHOLECYSTECTOMY N/A 2019    Procedure: CHOLECYSTECTOMY, LAPAROSCOPIC;  Surgeon: Kwabena Stephenson MD;  Location: [facility]     LAPAROSCOPIC LYSIS ADHESIONS  1985     LASIK  10/2004     TONSILLECTOMY & ADENOIDECTOMY  age 18     TUBAL LIGATION  1994       Social History     Tobacco Use     Smoking status: Former Smoker     Packs/day: 0.50     Years: 5.00     Pack years: 2.50     Types: Cigarettes     Last attempt to quit: 1986     Years since quittin.4     Smokeless tobacco: Never Used   Substance Use Topics     Alcohol use: Yes     Comment: 1-5 drinks weekly     Family History   Problem Relation Age of Onset     Arthritis Mother      Allergies Mother         otc meds     Depression Mother      Alzheimer Disease Mother      Hyperlipidemia Mother      Osteoporosis Mother      Diabetes Mother      Heart Disease Mother 80        mitral valve issues     Hypertension Father         on med     C.A.D. Father         has had 2 MI\"S, possible surgery     Alcohol/Drug Father      Arthritis Father      Hyperlipidemia Father      Substance Abuse Father      Alcohol/Drug Brother      Substance Abuse Brother      Heart Disease Maternal Grandmother      Osteoporosis Maternal Grandmother      Heart Disease Maternal Grandfather      Heart Disease Paternal Grandmother      Diabetes Paternal Grandmother      Obesity Paternal Grandmother      Heart " Disease Paternal Grandfather      Alcohol/Drug Son         son in recovery     Hypertension Brother      Hyperlipidemia Brother      Substance Abuse Other         Son         Current Outpatient Medications   Medication Sig Dispense Refill     levothyroxine (SYNTHROID/LEVOTHROID) 112 MCG tablet Take 1 tablet (112 mcg) by mouth daily 90 tablet 3     Loratadine (CLARITIN PO) Take 10 mg by mouth daily AllerClear Brand        MISC NATURAL PRODUCTS PO Take 1 capsule by mouth daily PD 80/20 - supports endocrine function       MISC NATURAL PRODUCTS PO Take 1 capsule by mouth daily Agiles - All natural for bones, ligaments, muscles etc..       montelukast (SINGULAIR) 10 MG tablet Take 1 tablet (10 mg) by mouth At Bedtime 90 tablet 3     NONFORMULARY Take 4 capsules by mouth daily Sulfurzyme -  combines wolfberry with MSM, a naturally occurring organic form of dietary sulfur needed by our bodies every day to maintain the structure of proteins, protect cells and cell membranes, replenish the connections between cells, and preserve the molecular framework of connective tissue.       NONFORMULARY Take 1 tablet by mouth daily Thyromine - The compounds that make Thyromine such an efficient thyroid supplement are: Adrenal powder from bovine, Rhea extract, Guggal tree extract, Suzanne extract, Piper Longum extract, thyroid powder from bovine and L-Tyrosine.       triamcinolone (ARISTOCORT HP) 0.5 % external cream Apply topically 2 times daily 454 g 0     valACYclovir (VALTREX) 500 MG tablet Take 1 tablet (500 mg) by mouth daily 90 tablet 3     venlafaxine (EFFEXOR-XR) 37.5 MG 24 hr capsule Take 1 capsule (37.5 mg) by mouth daily 90 capsule 3     Allergies   Allergen Reactions     Adhesive Tape Itching     Bees Anaphylaxis     Codeine Difficulty breathing     Latex Dermatitis     Penicillin [Penicillins] Difficulty breathing     Seasonal Allergies      Recent Labs   Lab Test 06/10/20  1602 11/11/19  1027 09/25/19  0851 08/05/19  0009  07/22/19  0942  05/30/17  1419  03/20/15  0900 01/06/14  0850   LDL  --   --  145*  --   --   --  101*  --  81 103   HDL  --   --  51  --   --   --   --   --  51 47*   TRIG  --   --  160*  --   --   --   --   --  170* 138   ALT  --   --   --  30 28  --   --   --   --   --    CR  --   --   --  0.87 0.64   < >  --   --  0.64 0.77   GFRESTIMATED  --   --   --  74 >90   < >  --   --  >90  Non  GFR Calc   79   GFRESTBLACK  --   --   --  86 >90   < >  --   --  >90   GFR Calc   >90   POTASSIUM  --   --   --  3.9 4.0   < >  --   --  4.0 4.0   TSH 8.97* 1.55 19.43*  --   --    < > 1.19   < > 0.86 0.73    < > = values in this interval not displayed.      BP Readings from Last 3 Encounters:   06/10/20 129/81   11/11/19 (!) 139/94   09/24/19 116/72    Wt Readings from Last 3 Encounters:   06/10/20 98.6 kg (217 lb 6.4 oz)   09/24/19 93 kg (205 lb)   08/30/19 93.9 kg (207 lb)                    Reviewed and updated as needed this visit by Provider         Review of Systems   Constitutional, HEENT, cardiovascular, pulmonary, GI, , musculoskeletal, neuro, skin, endocrine and psych systems are negative, except as otherwise noted.       Objective   Reported vitals:  [facility] 07/29/2002    healthy, alert and no distress  PSYCH: Alert and oriented times 3; coherent speech, normal   rate and volume, able to articulate logical thoughts, able   to abstract reason, no tangential thoughts, no hallucinations   or delusions  Her affect is normal  RESP: No cough, no audible wheezing, able to talk in full sentences  Remainder of exam unable to be completed due to telephone visits    Diagnostic Test Results:  Labs reviewed in Epic        Assessment/Plan:  1. Acute bronchitis, unspecified organism    - azithromycin (ZITHROMAX) 250 MG tablet; Two tablets first day, then one tablet daily for four days.  Dispense: 6 tablet; Refill: 0  - predniSONE (DELTASONE) 20 MG tablet; Take 1 tablet (20 mg) by mouth 2 times daily for  5 days  Dispense: 10 tablet; Refill: 0  - albuterol (PROAIR HFA/PROVENTIL HFA/VENTOLIN HFA) 108 (90 Base) MCG/ACT inhaler; Inhale 2 puffs into the lungs every 6 hours  Dispense: 1 Inhaler; Refill: 1    2. Acute non-recurrent maxillary sinusitis    - azithromycin (ZITHROMAX) 250 MG tablet; Two tablets first day, then one tablet daily for four days.  Dispense: 6 tablet; Refill: 0  - predniSONE (DELTASONE) 20 MG tablet; Take 1 tablet (20 mg) by mouth 2 times daily for 5 days  Dispense: 10 tablet; Refill: 0  Nidia was seen today for cough.    Diagnoses and all orders for this visit:    Acute bronchitis, unspecified organism  -     azithromycin (ZITHROMAX) 250 MG tablet; Two tablets first day, then one tablet daily for four days.  -     predniSONE (DELTASONE) 20 MG tablet; Take 1 tablet (20 mg) by mouth 2 times daily for 5 days  -     albuterol (PROAIR HFA/PROVENTIL HFA/VENTOLIN HFA) 108 (90 Base) MCG/ACT inhaler; Inhale 2 puffs into the lungs every 6 hours    Acute non-recurrent maxillary sinusitis  -     azithromycin (ZITHROMAX) 250 MG tablet; Two tablets first day, then one tablet daily for four days.  -     predniSONE (DELTASONE) 20 MG tablet; Take 1 tablet (20 mg) by mouth 2 times daily for 5 days    Hypothyroidism due to acquired atrophy of thyroid  -     levothyroxine (SYNTHROID/LEVOTHROID) 125 MCG tablet; Take 1 tablet (125 mcg) by mouth daily  -     TSH; Future        Advised supportive and symptomatic treatment.  Follow up with Provider - if condition persists or worsens.       Phone call duration:  11 minutes    Rigoberto Jiménez PA-C

## 2020-07-07 PROBLEM — J45.991 COUGH VARIANT ASTHMA: Status: ACTIVE | Noted: 2020-06-10

## 2020-07-07 PROBLEM — J45.991 COUGH VARIANT ASTHMA: Status: ACTIVE | Noted: 2020-07-07

## 2020-10-09 ENCOUNTER — MYC MEDICAL ADVICE (OUTPATIENT)
Dept: FAMILY MEDICINE | Facility: CLINIC | Age: 60
End: 2020-10-09

## 2020-10-09 DIAGNOSIS — J20.9 ACUTE BRONCHITIS, UNSPECIFIED ORGANISM: ICD-10-CM

## 2020-10-09 RX ORDER — ALBUTEROL SULFATE 90 UG/1
2 AEROSOL, METERED RESPIRATORY (INHALATION) EVERY 6 HOURS
Qty: 1 INHALER | Refills: 1 | Status: SHIPPED | OUTPATIENT
Start: 2020-10-09 | End: 2021-03-24

## 2020-10-09 NOTE — TELEPHONE ENCOUNTER
Patient seen by Rigoberto Jiménez for acute issues.  Will send message to PCP care team to advise.

## 2020-11-02 ENCOUNTER — MYC MEDICAL ADVICE (OUTPATIENT)
Dept: FAMILY MEDICINE | Facility: CLINIC | Age: 60
End: 2020-11-02

## 2020-11-03 ENCOUNTER — VIRTUAL VISIT (OUTPATIENT)
Dept: FAMILY MEDICINE | Facility: CLINIC | Age: 60
End: 2020-11-03
Payer: COMMERCIAL

## 2020-11-03 DIAGNOSIS — J31.0 CHRONIC RHINITIS: ICD-10-CM

## 2020-11-03 DIAGNOSIS — J45.991 COUGH VARIANT ASTHMA: Primary | ICD-10-CM

## 2020-11-03 PROCEDURE — 99214 OFFICE O/P EST MOD 30 MIN: CPT | Mod: TEL | Performed by: PHYSICIAN ASSISTANT

## 2020-11-03 NOTE — LETTER
[facility] [place]  [address]  [place] [address]  Phone: [phone]    November 3, 2020        Nidia Acosta  [address] [place] [address]          To whom it may concern:    RE: Nidia Jacob was seen and treated today at our clinic. Due to ongoing issues with her asthma and chronic rhinitis/sinusitis, she endures significant issues with dyspnea when having to wear a cloth face mask/covering. As such, I am advising she utilize a face shield while at work instead of a face mask/covering.     Please contact me for questions or concerns.      Sincerely,        Rigoberto Jiménez PA-C

## 2020-11-03 NOTE — PROGRESS NOTES
"Nidia Acosta is a 60 year old female who is being evaluated via a billable telephone visit.      The patient has been notified of following:     \"This telephone visit will be conducted via a call between you and your physician/provider. We have found that certain health care needs can be provided without the need for a physical exam.  This service lets us provide the care you need with a short phone conversation.  If a prescription is necessary we can send it directly to your pharmacy.  If lab work is needed we can place an order for that and you can then stop by our lab to have the test done at a later time.    Telephone visits are billed at different rates depending on your insurance coverage. During this emergency period, for some insurers they may be billed the same as an in-person visit.  Please reach out to your insurance provider with any questions.    If during the course of the call the physician/provider feels a telephone visit is not appropriate, you will not be charged for this service.\"    Patient has given verbal consent for Telephone visit?  Yes    What phone number would you like to be contacted at? [phone]    How would you like to obtain your AVS? MyChart    Subjective     Nidia Acosta is a 60 year old female who presents via phone visit today for the following health issues:    HPI     Shortness of breath while wearing mask  - would like to discuss this with Rigoberto    Ongoing issues with dyspnea when wearing a face covering. She has asthma and chronic rhinitis.   No chest pain/palps.   No rash.        Review of Systems   Constitutional, HEENT, cardiovascular, pulmonary, GI, , musculoskeletal, neuro, skin, endocrine and psych systems are negative, except as otherwise noted.       Objective          Vitals:  No vitals were obtained today due to virtual visit.    healthy, alert and no distress  PSYCH: Alert and oriented times 3; coherent speech, normal   rate and volume, able to " articulate logical thoughts, able   to abstract reason, no tangential thoughts, no hallucinations   or delusions  Her affect is normal  RESP: No cough, no audible wheezing, able to talk in full sentences  Remainder of exam unable to be completed due to telephone visits            Assessment/Plan:    Nidia was seen today for consult.    Diagnoses and all orders for this visit:    Cough variant asthma    Chronic rhinitis    .note written for patient to wear a face shield at work.    Advised supportive and symptomatic treatment.  Follow up with Provider - if condition persists or worsens.   work on lifestyle modification      Phone call duration:  12 minutes

## 2020-11-16 ENCOUNTER — HEALTH MAINTENANCE LETTER (OUTPATIENT)
Age: 60
End: 2020-11-16

## 2020-12-14 DIAGNOSIS — E03.4 HYPOTHYROIDISM DUE TO ACQUIRED ATROPHY OF THYROID: ICD-10-CM

## 2020-12-16 NOTE — TELEPHONE ENCOUNTER
Routing refill request to provider for review/approval because:  Roxi given x1 and patient did not follow up, please advise      Yaz Le RN

## 2020-12-17 RX ORDER — LEVOTHYROXINE SODIUM 125 UG/1
TABLET ORAL
Qty: 60 TABLET | Refills: 0 | Status: SHIPPED | OUTPATIENT
Start: 2020-12-17 | End: 2021-02-17

## 2021-02-16 DIAGNOSIS — E03.4 HYPOTHYROIDISM DUE TO ACQUIRED ATROPHY OF THYROID: ICD-10-CM

## 2021-02-17 NOTE — TELEPHONE ENCOUNTER
Routing refill request to provider for review/approval because:  Labs not current:  TSH  TSH   Date Value Ref Range Status   06/10/2020 8.97 (H) 0.40 - 4.00 mU/L Final     Nidia Clayton RN BSN  [facility]

## 2021-02-18 RX ORDER — LEVOTHYROXINE SODIUM 125 UG/1
TABLET ORAL
Qty: 30 TABLET | Refills: 0 | Status: SHIPPED | OUTPATIENT
Start: 2021-02-18 | End: 2021-03-26 | Stop reason: DRUGHIGH

## 2021-03-17 ASSESSMENT — ENCOUNTER SYMPTOMS
DIARRHEA: 0
COUGH: 1
WEAKNESS: 0
DYSURIA: 0
EYE PAIN: 0
FREQUENCY: 0
ABDOMINAL PAIN: 0
DIZZINESS: 0
FEVER: 0
HEMATOCHEZIA: 0
JOINT SWELLING: 1
NAUSEA: 0
HEMATURIA: 0
CHILLS: 0
PALPITATIONS: 0
HEADACHES: 1
HEARTBURN: 0
SHORTNESS OF BREATH: 1
MYALGIAS: 1
ARTHRALGIAS: 1
BREAST MASS: 0
SORE THROAT: 1
NERVOUS/ANXIOUS: 0
PARESTHESIAS: 0
CONSTIPATION: 1

## 2021-03-24 ENCOUNTER — OFFICE VISIT (OUTPATIENT)
Dept: FAMILY MEDICINE | Facility: CLINIC | Age: 61
End: 2021-03-24
Payer: COMMERCIAL

## 2021-03-24 VITALS
SYSTOLIC BLOOD PRESSURE: 126 MMHG | DIASTOLIC BLOOD PRESSURE: 89 MMHG | BODY MASS INDEX: 40.78 KG/M2 | OXYGEN SATURATION: 100 % | WEIGHT: 216 LBS | HEIGHT: 61 IN | TEMPERATURE: 97.2 F | HEART RATE: 89 BPM

## 2021-03-24 DIAGNOSIS — J20.9 ACUTE BRONCHITIS, UNSPECIFIED ORGANISM: ICD-10-CM

## 2021-03-24 DIAGNOSIS — Z12.31 ENCOUNTER FOR SCREENING MAMMOGRAM FOR MALIGNANT NEOPLASM OF BREAST: ICD-10-CM

## 2021-03-24 DIAGNOSIS — Z11.4 SCREENING FOR HIV (HUMAN IMMUNODEFICIENCY VIRUS): ICD-10-CM

## 2021-03-24 DIAGNOSIS — Z00.00 ROUTINE GENERAL MEDICAL EXAMINATION AT A HEALTH CARE FACILITY: Primary | ICD-10-CM

## 2021-03-24 DIAGNOSIS — F32.0 MILD MAJOR DEPRESSION (H): ICD-10-CM

## 2021-03-24 DIAGNOSIS — A60.00 HERPES SIMPLEX INFECTION OF GENITOURINARY SYSTEM: ICD-10-CM

## 2021-03-24 DIAGNOSIS — J45.991 COUGH VARIANT ASTHMA: ICD-10-CM

## 2021-03-24 DIAGNOSIS — E03.4 HYPOTHYROIDISM DUE TO ACQUIRED ATROPHY OF THYROID: ICD-10-CM

## 2021-03-24 LAB
T4 FREE SERPL-MCNC: 1.33 NG/DL (ref 0.76–1.46)
TSH SERPL DL<=0.005 MIU/L-ACNC: 0.05 MU/L (ref 0.4–4)

## 2021-03-24 PROCEDURE — 99213 OFFICE O/P EST LOW 20 MIN: CPT | Mod: 25 | Performed by: FAMILY MEDICINE

## 2021-03-24 PROCEDURE — 99396 PREV VISIT EST AGE 40-64: CPT | Performed by: FAMILY MEDICINE

## 2021-03-24 PROCEDURE — 84439 ASSAY OF FREE THYROXINE: CPT | Performed by: FAMILY MEDICINE

## 2021-03-24 PROCEDURE — 36415 COLL VENOUS BLD VENIPUNCTURE: CPT | Performed by: FAMILY MEDICINE

## 2021-03-24 PROCEDURE — 84443 ASSAY THYROID STIM HORMONE: CPT | Performed by: FAMILY MEDICINE

## 2021-03-24 RX ORDER — LEVOTHYROXINE SODIUM 125 UG/1
125 TABLET ORAL DAILY
Qty: 90 TABLET | Refills: 3 | Status: CANCELLED | OUTPATIENT
Start: 2021-03-24

## 2021-03-24 RX ORDER — MONTELUKAST SODIUM 10 MG/1
10 TABLET ORAL AT BEDTIME
Qty: 90 TABLET | Refills: 3 | Status: SHIPPED | OUTPATIENT
Start: 2021-03-24 | End: 2022-04-11

## 2021-03-24 RX ORDER — ALBUTEROL SULFATE 90 UG/1
2 AEROSOL, METERED RESPIRATORY (INHALATION) EVERY 6 HOURS PRN
Qty: 18 G | Refills: 3 | Status: SHIPPED | OUTPATIENT
Start: 2021-03-24 | End: 2021-09-27

## 2021-03-24 RX ORDER — ALBUTEROL SULFATE 90 UG/1
2 AEROSOL, METERED RESPIRATORY (INHALATION) EVERY 6 HOURS
Qty: 18 G | Refills: 3 | Status: SHIPPED | OUTPATIENT
Start: 2021-03-24 | End: 2022-04-11

## 2021-03-24 RX ORDER — FLUOXETINE 10 MG/1
10 CAPSULE ORAL DAILY
Qty: 14 CAPSULE | Refills: 0 | Status: SHIPPED | OUTPATIENT
Start: 2021-03-24 | End: 2021-09-27

## 2021-03-24 RX ORDER — VALACYCLOVIR HYDROCHLORIDE 500 MG/1
500 TABLET, FILM COATED ORAL DAILY
Qty: 90 TABLET | Refills: 3 | Status: SHIPPED | OUTPATIENT
Start: 2021-03-24 | End: 2022-04-11

## 2021-03-24 ASSESSMENT — ANXIETY QUESTIONNAIRES
5. BEING SO RESTLESS THAT IT IS HARD TO SIT STILL: NOT AT ALL
GAD7 TOTAL SCORE: 5
6. BECOMING EASILY ANNOYED OR IRRITABLE: MORE THAN HALF THE DAYS
1. FEELING NERVOUS, ANXIOUS, OR ON EDGE: MORE THAN HALF THE DAYS
7. FEELING AFRAID AS IF SOMETHING AWFUL MIGHT HAPPEN: NOT AT ALL
2. NOT BEING ABLE TO STOP OR CONTROL WORRYING: NOT AT ALL
IF YOU CHECKED OFF ANY PROBLEMS ON THIS QUESTIONNAIRE, HOW DIFFICULT HAVE THESE PROBLEMS MADE IT FOR YOU TO DO YOUR WORK, TAKE CARE OF THINGS AT HOME, OR GET ALONG WITH OTHER PEOPLE: NOT DIFFICULT AT ALL
3. WORRYING TOO MUCH ABOUT DIFFERENT THINGS: NOT AT ALL

## 2021-03-24 ASSESSMENT — ENCOUNTER SYMPTOMS
FEVER: 0
HEADACHES: 1
HEMATURIA: 0
DYSURIA: 0
NAUSEA: 0
PALPITATIONS: 0
PARESTHESIAS: 0
WEAKNESS: 0
JOINT SWELLING: 1
EYE PAIN: 0
ARTHRALGIAS: 1
HEARTBURN: 0
DIARRHEA: 0
SHORTNESS OF BREATH: 1
CONSTIPATION: 1
FREQUENCY: 0
HEMATOCHEZIA: 0
MYALGIAS: 1
DIZZINESS: 0
BREAST MASS: 0
COUGH: 1
CHILLS: 0
NERVOUS/ANXIOUS: 0
SORE THROAT: 1
ABDOMINAL PAIN: 0

## 2021-03-24 ASSESSMENT — PATIENT HEALTH QUESTIONNAIRE - PHQ9
SUM OF ALL RESPONSES TO PHQ QUESTIONS 1-9: 2
5. POOR APPETITE OR OVEREATING: SEVERAL DAYS

## 2021-03-24 ASSESSMENT — MIFFLIN-ST. JEOR: SCORE: 1501.11

## 2021-03-24 NOTE — PATIENT INSTRUCTIONS
Preventive Health Recommendations  Female Ages 50 - 64    Yearly exam: See your health care provider every year in order to  o Review health changes.   o Discuss preventive care.    o Review your medicines if your doctor has prescribed any.      Get a Pap test every three years (unless you have an abnormal result and your provider advises testing more often).    If you get Pap tests with HPV test, you only need to test every 5 years, unless you have an abnormal result.     You do not need a Pap test if your uterus was removed (hysterectomy) and you have not had cancer.    You should be tested each year for STDs (sexually transmitted diseases) if you're at risk.     Have a mammogram every 1 to 2 years.    Have a colonoscopy at age 50, or have a yearly FIT test (stool test). These exams screen for colon cancer.      Have a cholesterol test every 5 years, or more often if advised.    Have a diabetes test (fasting glucose) every three years. If you are at risk for diabetes, you should have this test more often.     If you are at risk for osteoporosis (brittle bone disease), think about having a bone density scan (DEXA).    Shots: Get a flu shot each year. Get a tetanus shot every 10 years.    Nutrition:     Eat at least 5 servings of fruits and vegetables each day.    Eat whole-grain bread, whole-wheat pasta and brown rice instead of white grains and rice.    Get adequate Calcium and Vitamin D.     Lifestyle    Exercise at least 150 minutes a week (30 minutes a day, 5 days a week). This will help you control your weight and prevent disease.    Limit alcohol to one drink per day.    No smoking.     Wear sunscreen to prevent skin cancer.     See your dentist every six months for an exam and cleaning.    See your eye doctor every 1 to 2 years.      I will send in the thyroid medicine when the lab is back   Please start the prozac take the 10 mg for 2 weeks   Then start the 20 mg   Send me a mychart update in 4-5 weeks

## 2021-03-24 NOTE — PROGRESS NOTES
SUBJECTIVE:   CC: Nidia Acosta is an 60 year old woman who presents for preventive health visit.     Patient has been advised of split billing requirements and indicates understanding: Yes     Healthy Habits:     Getting at least 3 servings of Calcium per day:  NO    Bi-annual eye exam:  Yes    Dental care twice a year:  Yes    Sleep apnea or symptoms of sleep apnea:  Excessive snoring    Diet:  Regular (no restrictions), Low salt and Low fat/cholesterol    Frequency of exercise:  2-3 days/week    Duration of exercise:  15-30 minutes    Taking medications regularly:  Yes    PHQ-2 Total Score: 0    Additional concerns today:  No    Anniversary of her sisters death   She has been on effexor for a while   She has a lot of family members on the medications           Depression and Anxiety Follow-Up - discuss different Medication     How are you doing with your depression since your last visit? Worsened     How are you doing with your anxiety since your last visit?  Worsened     Are you having other symptoms that might be associated with depression or anxiety?     Have you had a significant life event? No     Do you have any concerns with your use of alcohol or other drugs? No    Social History     Tobacco Use     Smoking status: Former Smoker     Packs/day: 0.50     Years: 1.00     Pack years: 0.50     Types: Cigarettes     Quit date: 1986     Years since quittin.2     Smokeless tobacco: Never Used   Substance Use Topics     Alcohol use: Yes     Comment: 1-5 drinks weekly     Drug use: No     PHQ 2018 2018 3/24/2021   PHQ-9 Total Score 4 1 2   Q9: Thoughts of better off dead/self-harm past 2 weeks Not at all Not at all Not at all     CALI-7 SCORE 2018 2018 3/24/2021   Total Score - - -   Total Score 6 (mild anxiety) - -   Total Score 6 6 5         Suicide Assessment Five-step Evaluation and Treatment (SAFE-T)    Asthma Follow-Up    Was ACT completed today?  Yes  No flowsheet data  found.       How many days per week do you miss taking your asthma controller medication?  I do not have an asthma controller medication    Please describe any recent triggers for your asthma: exercise or sports    Have you had any Emergency Room Visits, Urgent Care Visits, or Hospital Admissions since your last office visit?  No      Today's PHQ-2 Score:   PHQ-2 (  [employer]) 3/17/2021   Q1: Little interest or pleasure in doing things 0   Q2: Feeling down, depressed or hopeless 0   PHQ-2 Score 0   Q1: Little interest or pleasure in doing things Not at all   Q2: Feeling down, depressed or hopeless Not at all   PHQ-2 Score 0       Abuse: Current or Past (Physical, Sexual or Emotional) - No  Do you feel safe in your environment? No        Social History     Tobacco Use     Smoking status: Former Smoker     Packs/day: 0.50     Years: 1.00     Pack years: 0.50     Types: Cigarettes     Quit date: 1986     Years since quittin.2     Smokeless tobacco: Never Used   Substance Use Topics     Alcohol use: Yes     Comment: 1-5 drinks weekly     If you drink alcohol do you typically have >3 drinks per day or >7 drinks per week? No    No flowsheet data found.      Reviewed orders with patient.  Reviewed health maintenance and updated orders accordingly - Yes  Lab work is in process    Breast CA Risk Screening:  Breast CA Risk Assessment (FHS-7) 3/17/2021   Do you have a family history of breast, colon, or ovarian cancer? No / Unknown           Pertinent mammograms are reviewed under the imaging tab.    History of abnormal Pap smear: NO - age 30-65 PAP every 5 years with negative HPV co-testing recommended  PAP / HPV 2007   PAP NIL NIL     Reviewed and updated as needed this visit by clinical staff  Tobacco  Allergies    Med Hx  Surg Hx  Fam Hx  Soc Hx        Reviewed and updated as needed this visit by Provider                    Review of Systems   Constitutional: Negative for chills and fever.  "  HENT: Positive for congestion and sore throat. Negative for ear pain and hearing loss.    Eyes: Negative for pain and visual disturbance.   Respiratory: Positive for cough and shortness of breath.    Cardiovascular: Negative for chest pain, palpitations and peripheral edema.   Gastrointestinal: Positive for constipation. Negative for abdominal pain, diarrhea, heartburn, hematochezia and nausea.   Breasts:  Negative for tenderness, breast mass and discharge.   Genitourinary: Negative for dysuria, frequency, hematuria, pelvic pain, urgency, vaginal bleeding and vaginal discharge.   Musculoskeletal: Positive for arthralgias, joint swelling and myalgias.   Skin: Negative for rash.   Neurological: Positive for headaches. Negative for dizziness, weakness and paresthesias.   Psychiatric/Behavioral: Negative for mood changes. The patient is not nervous/anxious.      She has been off of her depression medications. She has had major life events and she is is angry about paying for an office visit. As we spoke more she did open up and realized that she did better on the med than off. She would like to restart this . She also is due for the tsh.   She reports that she is irritable and crying now that she is off of the medicine.  She is experienced several deaths over the past year and there is been a lot of change because of Covid.  I did agree with her that medical expenses are ridiculous especially when you are already paying in arm and the leg for insurance.  She did come to the realization that she really is anxious and she is depressed and crying.  As a stated before she was hesitant to answer any of the questions because she did not want to be charged for those.     OBJECTIVE:   /89   Pulse 89   Temp 97.2  F (36.2  C) (Tympanic)   Ht 1.556 m (5' 1.25\")   Wt 98 kg (216 lb)   LMP 07/29/2002   SpO2 100%   BMI 40.48 kg/m    Physical Exam  GENERAL: healthy, alert and no distress  HENT: ear canals and TM's " normal, nose and mouth without ulcers or lesions  NECK: no adenopathy, no asymmetry, masses, or scars and thyroid normal to palpation  RESP: lungs clear to auscultation - no rales, rhonchi or wheezes  CV: regular rate and rhythm, normal S1 S2, no S3 or S4, no murmur, click or rub, no peripheral edema and peripheral pulses strong  ABDOMEN: soft, nontender, no hepatosplenomegaly, no masses and bowel sounds normal  MS: no gross musculoskeletal defects noted, no edema  NEURO: Normal strength and tone, mentation intact and speech normal  PSYCH: mentation appears normal, tearful, anxious, appearance well groomed and frustrated    Diagnostic Test Results:  Labs reviewed in Epic  Results for orders placed or performed in visit on 03/24/21   TSH with free T4 reflex     Status: Abnormal   Result Value Ref Range    TSH 0.05 (L) 0.40 - 4.00 mU/L   T4 free     Status: None   Result Value Ref Range    T4 Free 1.33 0.76 - 1.46 ng/dL       ASSESSMENT/PLAN:       ICD-10-CM    1. Routine general medical examination at a health care facility  Z00.00    2. Screening for HIV (human immunodeficiency virus)  Z11.4    3. Acute bronchitis, unspecified organism  J20.9 albuterol (PROAIR HFA/PROVENTIL HFA/VENTOLIN HFA) 108 (90 Base) MCG/ACT inhaler   4. Hypothyroidism due to acquired atrophy of thyroid  E03.4 TSH with free T4 reflex   5. Cough variant asthma  J45.991 montelukast (SINGULAIR) 10 MG tablet   6. Herpes simplex infection of genitourinary system  A60.00 valACYclovir (VALTREX) 500 MG tablet   7. Mild major depression (H)  F32.0 FLUoxetine (PROZAC) 10 MG capsule     FLUoxetine (PROZAC) 20 MG capsule   8. Encounter for screening mammogram for malignant neoplasm of breast  Z12.31 *MA Screening Digital Bilateral     Follow-up by my chart in 4 to 5 weeks.  Patient has been advised of split billing requirements and indicates understanding: Yes  COUNSELING:  Reviewed preventive health counseling, as reflected in patient  "instructions    Estimated body mass index is 40.48 kg/m  as calculated from the following:    Height as of this encounter: 1.556 m (5' 1.25\").    Weight as of this encounter: 98 kg (216 lb).    Weight management plan: Discussed healthy diet and exercise guidelines    She reports that she quit smoking about 35 years ago. Her smoking use included cigarettes. She has a 0.50 pack-year smoking history. She has never used smokeless tobacco.      Counseling Resources:  ATP IV Guidelines  Pooled Cohorts Equation Calculator  Breast Cancer Risk Calculator  BRCA-Related Cancer Risk Assessment: FHS-7 Tool  FRAX Risk Assessment  ICSI Preventive Guidelines  Dietary Guidelines for Americans, 2010  USDA's MyPlate  ASA Prophylaxis  Lung CA Screening    Merlene Reyes MD  [facility]  "

## 2021-03-25 ASSESSMENT — ANXIETY QUESTIONNAIRES: GAD7 TOTAL SCORE: 5

## 2021-03-26 ENCOUNTER — MYC MEDICAL ADVICE (OUTPATIENT)
Dept: FAMILY MEDICINE | Facility: CLINIC | Age: 61
End: 2021-03-26

## 2021-03-26 DIAGNOSIS — E03.4 HYPOTHYROIDISM DUE TO ACQUIRED ATROPHY OF THYROID: Primary | ICD-10-CM

## 2021-03-26 RX ORDER — LEVOTHYROXINE SODIUM 112 UG/1
112 TABLET ORAL DAILY
Qty: 90 TABLET | Refills: 0 | Status: SHIPPED | OUTPATIENT
Start: 2021-03-26 | End: 2021-09-27

## 2021-03-29 RX ORDER — LEVOTHYROXINE SODIUM 125 UG/1
125 TABLET ORAL DAILY
Qty: 90 TABLET | Refills: 4 | Status: SHIPPED | OUTPATIENT
Start: 2021-03-29 | End: 2022-04-11

## 2021-04-12 ENCOUNTER — IMMUNIZATION (OUTPATIENT)
Dept: LAB | Facility: CLINIC | Age: 61
End: 2021-04-12
Payer: COMMERCIAL

## 2021-09-12 ENCOUNTER — HEALTH MAINTENANCE LETTER (OUTPATIENT)
Age: 61
End: 2021-09-12

## 2021-09-27 ENCOUNTER — MYC MEDICAL ADVICE (OUTPATIENT)
Dept: FAMILY MEDICINE | Facility: CLINIC | Age: 61
End: 2021-09-27

## 2021-09-27 ENCOUNTER — HOSPITAL ENCOUNTER (EMERGENCY)
Facility: CLINIC | Age: 61
Discharge: HOME | End: 2021-09-27
Attending: NURSE PRACTITIONER | Admitting: NURSE PRACTITIONER
Payer: COMMERCIAL

## 2021-09-27 VITALS
RESPIRATION RATE: 18 BRPM | OXYGEN SATURATION: 96 % | TEMPERATURE: 98.1 F | WEIGHT: 212 LBS | BODY MASS INDEX: 39.73 KG/M2 | HEART RATE: 78 BPM | SYSTOLIC BLOOD PRESSURE: 135 MMHG | DIASTOLIC BLOOD PRESSURE: 63 MMHG

## 2021-09-27 DIAGNOSIS — F43.10 PTSD (POST-TRAUMATIC STRESS DISORDER): ICD-10-CM

## 2021-09-27 DIAGNOSIS — R00.2 HEART PALPITATIONS: ICD-10-CM

## 2021-09-27 LAB
ALBUMIN SERPL-MCNC: 3.4 G/DL (ref 3.4–5)
ALP SERPL-CCNC: 90 U/L (ref 40–150)
ALT SERPL W P-5'-P-CCNC: 41 U/L (ref 0–50)
ANION GAP SERPL CALCULATED.3IONS-SCNC: 6 MMOL/L (ref 3–14)
AST SERPL W P-5'-P-CCNC: 30 U/L (ref 0–45)
BASOPHILS # BLD AUTO: 0.1 10E3/UL (ref 0–0.2)
BASOPHILS NFR BLD AUTO: 1 %
BILIRUB SERPL-MCNC: 0.3 MG/DL (ref 0.2–1.3)
BUN SERPL-MCNC: 11 MG/DL (ref 7–30)
CALCIUM SERPL-MCNC: 8.5 MG/DL (ref 8.5–10.1)
CHLORIDE BLD-SCNC: 108 MMOL/L (ref 94–109)
CO2 SERPL-SCNC: 24 MMOL/L (ref 20–32)
CREAT SERPL-MCNC: 0.78 MG/DL (ref 0.52–1.04)
D DIMER PPP FEU-MCNC: 0.34 UG/ML FEU (ref 0–0.5)
EOSINOPHIL # BLD AUTO: 0.1 10E3/UL (ref 0–0.7)
EOSINOPHIL NFR BLD AUTO: 1 %
ERYTHROCYTE [DISTWIDTH] IN BLOOD BY AUTOMATED COUNT: 12.2 % (ref 10–15)
GFR SERPL CREATININE-BSD FRML MDRD: 83 ML/MIN/1.73M2
GLUCOSE BLD-MCNC: 90 MG/DL (ref 70–99)
HCT VFR BLD AUTO: 39.1 % (ref 35–47)
HGB BLD-MCNC: 13.1 G/DL (ref 11.7–15.7)
IMM GRANULOCYTES # BLD: 0 10E3/UL
IMM GRANULOCYTES NFR BLD: 0 %
LYMPHOCYTES # BLD AUTO: 2.8 10E3/UL (ref 0.8–5.3)
LYMPHOCYTES NFR BLD AUTO: 35 %
MCH RBC QN AUTO: 30 PG (ref 26.5–33)
MCHC RBC AUTO-ENTMCNC: 33.5 G/DL (ref 31.5–36.5)
MCV RBC AUTO: 90 FL (ref 78–100)
MONOCYTES # BLD AUTO: 0.7 10E3/UL (ref 0–1.3)
MONOCYTES NFR BLD AUTO: 8 %
NEUTROPHILS # BLD AUTO: 4.3 10E3/UL (ref 1.6–8.3)
NEUTROPHILS NFR BLD AUTO: 55 %
NRBC # BLD AUTO: 0 10E3/UL
NRBC BLD AUTO-RTO: 0 /100
PLATELET # BLD AUTO: 218 10E3/UL (ref 150–450)
POTASSIUM BLD-SCNC: 4.1 MMOL/L (ref 3.4–5.3)
PROT SERPL-MCNC: 6.9 G/DL (ref 6.8–8.8)
RBC # BLD AUTO: 4.37 10E6/UL (ref 3.8–5.2)
SODIUM SERPL-SCNC: 138 MMOL/L (ref 133–144)
TROPONIN I SERPL-MCNC: <0.015 UG/L (ref 0–0.04)
TSH SERPL DL<=0.005 MIU/L-ACNC: 0.62 MU/L (ref 0.4–4)
WBC # BLD AUTO: 8 10E3/UL (ref 4–11)

## 2021-09-27 PROCEDURE — 85025 COMPLETE CBC W/AUTO DIFF WBC: CPT | Performed by: NURSE PRACTITIONER

## 2021-09-27 PROCEDURE — 36415 COLL VENOUS BLD VENIPUNCTURE: CPT | Performed by: NURSE PRACTITIONER

## 2021-09-27 PROCEDURE — 84484 ASSAY OF TROPONIN QUANT: CPT | Performed by: NURSE PRACTITIONER

## 2021-09-27 PROCEDURE — 99284 EMERGENCY DEPT VISIT MOD MDM: CPT | Mod: 25 | Performed by: NURSE PRACTITIONER

## 2021-09-27 PROCEDURE — 85379 FIBRIN DEGRADATION QUANT: CPT | Performed by: NURSE PRACTITIONER

## 2021-09-27 PROCEDURE — 93005 ELECTROCARDIOGRAM TRACING: CPT | Performed by: NURSE PRACTITIONER

## 2021-09-27 PROCEDURE — 84443 ASSAY THYROID STIM HORMONE: CPT | Performed by: NURSE PRACTITIONER

## 2021-09-27 PROCEDURE — 93010 ELECTROCARDIOGRAM REPORT: CPT | Performed by: NURSE PRACTITIONER

## 2021-09-27 PROCEDURE — 80053 COMPREHEN METABOLIC PANEL: CPT | Performed by: NURSE PRACTITIONER

## 2021-09-27 PROCEDURE — 99284 EMERGENCY DEPT VISIT MOD MDM: CPT | Performed by: NURSE PRACTITIONER

## 2021-09-27 RX ORDER — TRAZODONE HYDROCHLORIDE 50 MG/1
25-50 TABLET, FILM COATED ORAL AT BEDTIME
Qty: 15 TABLET | Refills: 0 | Status: SHIPPED | OUTPATIENT
Start: 2021-09-27 | End: 2022-04-11

## 2021-09-27 NOTE — ED PROVIDER NOTES
"  History     Chief Complaint   Patient presents with     Palpitations     X3 weeks     HPI  Nidia Acosta is a 61 year old female with past medical history of posttraumatic stress disorder, depression, hyperlipidemia, obesity, cough variant asthma, hypothyroidism who presents to the emergency room with concerns of a 3-week history of heart palpitations.  Patient reports her dad passed away age 61 ACS and she is worried that this may be correlated with her current symptoms.  Patient also reports that her Mom passed away following open heart surgery 2 years ago on September 17 and she still misses her.  Patient reports her onset of symptoms three weeks ago  Feels intermittent heart racing- no pain, \"little bit of pressure\"  Feels short of breath and not sure if related to having to wear a mask  Patient states she was sitting outside on Saturday and no issues and was not wearing a mask  Yesterday, at home, grocery shopping, refrigerator shopping, home chores and felt symptoms intermittently.  Pt reports a chronic cough, asthma, and allergies.  Pt states allergies have been worse this year.  PT receives david covid vaccine and also worried about possibility of blood clot.  Work has been stressful with being busy and demanding.  Reports in March had anxiety, depression medications adjusted and reports her Effexor was discontinued and now she is on Prozac..    Allergies:  Allergies   Allergen Reactions     Adhesive Tape Itching     Bees Anaphylaxis     Codeine Difficulty breathing     Latex Dermatitis     Penicillin [Penicillins] Difficulty breathing     Seasonal Allergies        Problem List:    Patient Active Problem List    Diagnosis Date Noted     Cough variant asthma 06/10/2020     Priority: Medium     Obesity (BMI 35.0-39.9) with comorbidity (H) 09/24/2019     Priority: Medium     BMI 38.0-38.9,adult 06/25/2018     Priority: Medium     PTSD (post-traumatic stress disorder) 04/02/2014     Priority: Medium    "  Traumatic experiences related to murder of her sister a number of years ago, contributing to her depression.         H/O: hysterectomy 03/18/2013     Priority: Medium     Benign reasons.  Ovaries in tact.         POD (perioral dermatitis) 03/18/2013     Priority: Medium     Hyperlipidemia LDL goal <130 11/21/2011     Priority: Medium     Insomnia 11/21/2011     Priority: Medium     Mild major depression (H) 04/04/2011     Priority: Medium     Subdural hemorrhage (H) 08/26/2009     Priority: Medium     Bilateral, drained. Summer 2009.  See notes in EPIC.  Subacute.   Diagnosis updated by automated process. Provider to review and confirm.       Decreased libido 11/10/2006     Priority: Medium     Obesity 08/28/2006     Priority: Medium     Problem list name updated by automated process. Provider to review       Allergic rhinitis 01/23/2006     Priority: Medium     Problem list name updated by automated process. Provider to review       Other genital herpes 07/22/2005     Priority: Medium     Disturbance in sleep behavior 07/22/2005     Priority: Medium     Problem list name updated by automated process. Provider to review       Hypothyroidism due to acquired atrophy of thyroid 07/22/2005     Priority: Medium     Problem list name updated by automated process. Provider to review          Past Medical History:    Past Medical History:   Diagnosis Date     Arthritis      Backache, unspecified      Congestive heart failure (H)      COPD (chronic obstructive pulmonary disease) (H)      Cough variant asthma 7/7/2020     Depressive disorder      Diabetes (H)      Heart disease      Hypertension      Leiomyoma of uterus, unspecified 2003     Other genital herpes      Thyroid disease      Unspecified sinusitis (chronic)        Past Surgical History:    Past Surgical History:   Procedure Laterality Date     BACK SURGERY       COLONOSCOPY  4/5/2013    Procedure: COLONOSCOPY;  Colonoscopy  ;  Surgeon: Laura Ruff,  "MD;  Location: [facility]     HEAD & NECK SURGERY  2019    Discectomy with fusion     HYSTERECTOMY, PAP NO LONGER INDICATED  2003    [facility] for fibroids     LAPAROSCOPIC CHOLECYSTECTOMY N/A 2019    Procedure: CHOLECYSTECTOMY, LAPAROSCOPIC;  Surgeon: Kwabena Stephenson MD;  Location: [facility]     LAPAROSCOPIC LYSIS ADHESIONS  1985     LASIK  10/2004     TONSILLECTOMY & ADENOIDECTOMY  age 18     TUBAL LIGATION  1994       Family History:    Family History   Problem Relation Age of Onset     Arthritis Mother      Allergies Mother         otc meds     Depression Mother      Alzheimer Disease Mother      Hyperlipidemia Mother      Osteoporosis Mother      Diabetes Mother      Heart Disease Mother 80        mitral valve issues     Obesity Mother      Hypertension Father         on med     C.A.D. Father         has had 2 MI\"S, possible surgery     Alcohol/Drug Father      Arthritis Father      Hyperlipidemia Father      Substance Abuse Father      Alcohol/Drug Brother      Substance Abuse Brother      Heart Disease Maternal Grandmother      Osteoporosis Maternal Grandmother      Heart Disease Maternal Grandfather      Heart Disease Paternal Grandmother      Diabetes Paternal Grandmother      Obesity Paternal Grandmother      Heart Disease Paternal Grandfather      Alcohol/Drug Son         son in recovery     Hypertension Brother      Hyperlipidemia Brother      Substance Abuse Other         Son     Hyperlipidemia Brother      Depression Brother        Social History:  Marital Status:   [4]  Social History     Tobacco Use     Smoking status: Former Smoker     Packs/day: 0.50     Years: 1.00     Pack years: 0.50     Types: Cigarettes     Quit date: 1986     Years since quittin.7     Smokeless tobacco: Never Used   Substance Use Topics     Alcohol use: Yes     Comment: 1-5 drinks weekly     Drug use: No        Medications:    traZODone (DESYREL) 50 MG tablet  albuterol (PROAIR HFA/PROVENTIL HFA/VENTOLIN HFA) " 108 (90 Base) MCG/ACT inhaler  FLUoxetine (PROZAC) 20 MG capsule  ipratropium-albuterol (COMBIVENT RESPIMAT)  MCG/ACT inhaler  levothyroxine (EUTHYROX) 125 MCG tablet  Loratadine (CLARITIN PO)  MISC NATURAL PRODUCTS PO  montelukast (SINGULAIR) 10 MG tablet  NONFORMULARY  NONFORMULARY  triamcinolone (ARISTOCORT HP) 0.5 % external cream  valACYclovir (VALTREX) 500 MG tablet          Review of Systems  As mentioned above in the history present illness. All other systems were reviewed and are negative.    Physical Exam   BP: (!) 147/99  Pulse: 93  Temp: 98.1  F (36.7  C)  Resp: 16  Weight: 96.2 kg (212 lb)  SpO2: 97 %      Physical Exam  Vitals and nursing note reviewed.   Constitutional:       General: She is not in acute distress.     Appearance: She is well-developed. She is obese. She is not ill-appearing, toxic-appearing or diaphoretic.   HENT:      Head: Normocephalic and atraumatic.      Right Ear: External ear normal.      Left Ear: External ear normal.   Eyes:      General:         Right eye: No discharge.         Left eye: No discharge.      Conjunctiva/sclera: Conjunctivae normal.   Cardiovascular:      Rate and Rhythm: Normal rate and regular rhythm.      Heart sounds: Normal heart sounds. No murmur heard.   No friction rub.   Pulmonary:      Effort: Pulmonary effort is normal. No respiratory distress.      Breath sounds: Normal breath sounds. No stridor. No wheezing or rales.   Chest:      Chest wall: No tenderness.   Skin:     General: Skin is warm and dry.      Coloration: Skin is not pale.      Findings: No erythema or rash.   Neurological:      Mental Status: She is alert.   Psychiatric:         Mood and Affect: Mood is anxious.         Speech: Speech normal.         Behavior: Behavior normal. Behavior is cooperative.         ED Course        Procedures         EKG Interpretation:      EKG Number: 1  Interpreted by ELOISE Zeng CNP  Symptoms at time of EKG: heart palpitations   Rhythm:  Normal sinus   Rate: Normal  Axis: Normal  Ectopy: None  Conduction: Normal  ST Segments/ T Waves: No ST-T wave changes and No acute ischemic changes  Q Waves: None  Comparison to prior: Unchanged from 08/19/2019    Clinical Impression: no acute changes and non-specific EKG      Results for orders placed or performed during the hospital encounter of 09/27/21 (from the past 24 hour(s))   CBC with platelets differential    Narrative    The following orders were created for panel order CBC with platelets differential.  Procedure                               Abnormality         Status                     ---------                               -----------         ------                     CBC with platelets and d...[[id number]]                      Final result                 Please view results for these tests on the individual orders.   Comprehensive metabolic panel   Result Value Ref Range    Sodium 138 133 - 144 mmol/L    Potassium 4.1 3.4 - 5.3 mmol/L    Chloride 108 94 - 109 mmol/L    Carbon Dioxide (CO2) 24 20 - 32 mmol/L    Anion Gap 6 3 - 14 mmol/L    Urea Nitrogen 11 7 - 30 mg/dL    Creatinine 0.78 0.52 - 1.04 mg/dL    Calcium 8.5 8.5 - 10.1 mg/dL    Glucose 90 70 - 99 mg/dL    Alkaline Phosphatase 90 40 - 150 U/L    AST 30 0 - 45 U/L    ALT 41 0 - 50 U/L    Protein Total 6.9 6.8 - 8.8 g/dL    Albumin 3.4 3.4 - 5.0 g/dL    Bilirubin Total 0.3 0.2 - 1.3 mg/dL    GFR Estimate 83 >60 mL/min/1.73m2   Troponin I   Result Value Ref Range    Troponin I <0.015 0.000 - 0.045 ug/L   TSH with free T4 reflex   Result Value Ref Range    TSH 0.62 0.40 - 4.00 mU/L   D dimer quantitative   Result Value Ref Range    D-Dimer Quantitative 0.34 0.00 - 0.50 ug/mL FEU    Narrative    This D-dimer assay is intended for use in conjunction with a clinical pretest probability assessment model to exclude pulmonary embolism (PE) and deep venous thrombosis (DVT) in outpatients suspected of PE or DVT. The cut-off value is 0.50 ug/mL FEU.    CBC with platelets and differential   Result Value Ref Range    WBC Count 8.0 4.0 - 11.0 10e3/uL    RBC Count 4.37 3.80 - 5.20 10e6/uL    Hemoglobin 13.1 11.7 - 15.7 g/dL    Hematocrit 39.1 35.0 - 47.0 %    MCV 90 78 - 100 fL    MCH 30.0 26.5 - 33.0 pg    MCHC 33.5 31.5 - 36.5 g/dL    RDW 12.2 10.0 - 15.0 %    Platelet Count 218 150 - 450 10e3/uL    % Neutrophils 55 %    % Lymphocytes 35 %    % Monocytes 8 %    % Eosinophils 1 %    % Basophils 1 %    % Immature Granulocytes 0 %    NRBCs per 100 WBC 0 <1 /100    Absolute Neutrophils 4.3 1.6 - 8.3 10e3/uL    Absolute Lymphocytes 2.8 0.8 - 5.3 10e3/uL    Absolute Monocytes 0.7 0.0 - 1.3 10e3/uL    Absolute Eosinophils 0.1 0.0 - 0.7 10e3/uL    Absolute Basophils 0.1 0.0 - 0.2 10e3/uL    Absolute Immature Granulocytes 0.0 <=0.0 10e3/uL    Absolute NRBCs 0.0 10e3/uL       Medications - No data to display    Assessments & Plan (with Medical Decision Making)  Nidia Acosta is a 61 year old female with past medical history of posttraumatic stress disorder, depression, hyperlipidemia, obesity, cough variant asthma, hypothyroidism who presents to the emergency room with concerns of a 3-week history of heart palpitations.  Patient denying any pain in her left arm, sensation of elephant sitting on her chest, diaphoresis.  On exam patient is vitally stable and appears anxious, no palpable chest tenderness.  No musculoskeletal reproducible chest tenderness.  Work-up includes EKG that reveals no acute ST changes no acute ischemic changes and no change from August 19 of 2019.  Troponin is negative, D-dimer is negative, CBC and comprehensive metabolic panel also unremarkable.  TSH is stable and no sign of thyroid storm or hyper or overmedication.  Reviewed these findings with patient.  Recommend Zio patch monitor and follow-up with primary care after completion of the testing.  Recommend following up with primary care regarding her PTSD/depression medications.  Offered a  short trial of trazodone taking 25 to 50 mg at bedtime to see if this is helpful.  With follow-up, to primary care to discuss this.  Patient verbalized understanding regarding all of the above and discharged in stable condition.     I have reviewed the nursing notes.    I have reviewed the findings, diagnosis, plan and need for follow up with the patient.    Discharge Medication List as of 9/27/2021  7:17 PM      START taking these medications    Details   traZODone (DESYREL) 50 MG tablet Take 0.5-1 tablets (25-50 mg) by mouth At Bedtime, Disp-15 tablet, R-0, E-Prescribe             Final diagnoses:   Heart palpitations   PTSD (post-traumatic stress disorder)       9/27/2021   [facility] EMERGENCY DEPT     Dimas, Evelyne Murphy, ELOISE CNP  09/27/21 [id number]

## 2021-09-27 NOTE — ED NOTES
"IV access attempted X 2 using accuvein. Unable to advance. Blood drawn and sent. During IV access attempt, pt reports chest pain, RUQ pain for a \" quick second\" Provider notified. EKG monitoring shows NSR. No ectopy. No ST depression. Pain gone now.   "

## 2021-09-28 NOTE — DISCHARGE INSTRUCTIONS
Someone will call you to schedule a Zio patch monitor.  Follow-up with primary care after you have completed doing this.  I recommend following up with your primary care provider and discussing your current PTSD medication and see if there is a need for an adjustment.  You can try trazodone 1/2 to 1 tablet at bedtime for sleep  Follow up with primary to discuss response

## 2021-09-29 ENCOUNTER — HOSPITAL ENCOUNTER (OUTPATIENT)
Dept: CARDIOLOGY | Facility: CLINIC | Age: 61
Discharge: HOME | End: 2021-09-29
Attending: NURSE PRACTITIONER | Admitting: NURSE PRACTITIONER
Payer: COMMERCIAL

## 2021-09-29 DIAGNOSIS — R00.2 HEART PALPITATIONS: ICD-10-CM

## 2021-09-29 PROCEDURE — 93244 EXT ECG>48HR<7D REV&INTERPJ: CPT | Performed by: INTERNAL MEDICINE

## 2021-09-29 PROCEDURE — 93242 EXT ECG>48HR<7D RECORDING: CPT

## 2021-10-15 ENCOUNTER — MYC MEDICAL ADVICE (OUTPATIENT)
Dept: FAMILY MEDICINE | Facility: CLINIC | Age: 61
End: 2021-10-15

## 2021-10-18 ENCOUNTER — MYC MEDICAL ADVICE (OUTPATIENT)
Dept: FAMILY MEDICINE | Facility: CLINIC | Age: 61
End: 2021-10-18

## 2021-10-19 ENCOUNTER — OFFICE VISIT (OUTPATIENT)
Dept: FAMILY MEDICINE | Facility: CLINIC | Age: 61
End: 2021-10-19
Payer: COMMERCIAL

## 2021-10-19 VITALS
HEIGHT: 61 IN | HEART RATE: 72 BPM | OXYGEN SATURATION: 98 % | SYSTOLIC BLOOD PRESSURE: 122 MMHG | BODY MASS INDEX: 40.4 KG/M2 | WEIGHT: 214 LBS | DIASTOLIC BLOOD PRESSURE: 70 MMHG

## 2021-10-19 DIAGNOSIS — F43.9 STRESS: ICD-10-CM

## 2021-10-19 DIAGNOSIS — I49.3 PVC'S (PREMATURE VENTRICULAR CONTRACTIONS): ICD-10-CM

## 2021-10-19 DIAGNOSIS — Z23 NEED FOR PROPHYLACTIC VACCINATION AND INOCULATION AGAINST INFLUENZA: Primary | ICD-10-CM

## 2021-10-19 PROCEDURE — 99214 OFFICE O/P EST MOD 30 MIN: CPT | Mod: 25 | Performed by: FAMILY MEDICINE

## 2021-10-19 PROCEDURE — 90471 IMMUNIZATION ADMIN: CPT | Performed by: FAMILY MEDICINE

## 2021-10-19 PROCEDURE — 90682 RIV4 VACC RECOMBINANT DNA IM: CPT | Performed by: FAMILY MEDICINE

## 2021-10-19 RX ORDER — FLUOXETINE 40 MG/1
40 CAPSULE ORAL DAILY
Qty: 90 CAPSULE | Refills: 3 | Status: SHIPPED | OUTPATIENT
Start: 2021-10-19 | End: 2022-04-11

## 2021-10-19 ASSESSMENT — MIFFLIN-ST. JEOR: SCORE: 1487.04

## 2021-10-19 NOTE — PROGRESS NOTES
"    Assessment & Plan     Need for prophylactic vaccination and inoculation against influenza  Given     PVC's (premature ventricular contractions)  Reassurance     Stress  Will increase the prozac. If not improving make a virtual visit appointment   - FLUoxetine (PROZAC) 40 MG capsule; Take 1 capsule (40 mg) by mouth daily         BMI:   Estimated body mass index is 40.11 kg/m  as calculated from the following:    Height as of this encounter: 1.556 m (5' 1.25\").    Weight as of this encounter: 97.1 kg (214 lb).   Weight management plan: Discussed healthy diet and exercise guidelines    Patient Instructions   Increase the prozac to 40 mg  Send me the form for the emergency room  Take a day off if needed       Return in about 6 months (around 2022) for follow up.    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      Objective    Ht 1.556 m (5' 1.25\")   Wt 97.1 kg (214 lb)   LMP 2002   BMI 40.11 kg/m    Body mass index is 40.11 kg/m .  Physical Exam   GENERAL: healthy, alert and no distress  MS: no gross musculoskeletal defects noted, no edema  PSYCH: mentation appears normal, tearful, anxious, judgement " Crisis here. Pt dc'd to care of crisis worker.    and insight intact and appearance well groomed    Admission on 09/27/2021, Discharged on 09/27/2021   Component Date Value Ref Range Status     Sodium 09/27/2021 138  133 - 144 mmol/L Final     Potassium 09/27/2021 4.1  3.4 - 5.3 mmol/L Final    Specimen slightly hemolyzed, potassium may be falsely elevated.     Chloride 09/27/2021 108  94 - 109 mmol/L Final     Carbon Dioxide (CO2) 09/27/2021 24  20 - 32 mmol/L Final     Anion Gap 09/27/2021 6  3 - 14 mmol/L Final     Urea Nitrogen 09/27/2021 11  7 - 30 mg/dL Final     Creatinine 09/27/2021 0.78  0.52 - 1.04 mg/dL Final     Calcium 09/27/2021 8.5  8.5 - 10.1 mg/dL Final     Glucose 09/27/2021 90  70 - 99 mg/dL Final     Alkaline Phosphatase 09/27/2021 90  40 - 150 U/L Final     AST 09/27/2021 30  0 - 45 U/L Final    Specimen is hemolyzed which can falsely elevate AST. Analysis of a non-hemolyzed specimen may result in a lower value.     ALT 09/27/2021 41  0 - 50 U/L Final     Protein Total 09/27/2021 6.9  6.8 - 8.8 g/dL Final     Albumin 09/27/2021 3.4  3.4 - 5.0 g/dL Final     Bilirubin Total 09/27/2021 0.3  0.2 - 1.3 mg/dL Final     GFR Estimate 09/27/2021 83  >60 mL/min/1.73m2 Final    As of July 11, 2021, eGFR is calculated by the CKD-EPI creatinine equation, without race adjustment. eGFR can be influenced by muscle mass, exercise, and diet. The reported eGFR is an estimation only and is only applicable if the renal function is stable.     Troponin I 09/27/2021 <0.015  0.000 - 0.045 ug/L Final    The 99th percentile for upper reference range is 0.045ug/L.  Troponin values in the range of 0.045 - 0.120 ug/L may be associated with risks of adverse clinical events.     TSH 09/27/2021 0.62  0.40 - 4.00 mU/L Final     D-Dimer Quantitative 09/27/2021 0.34  0.00 - 0.50 ug/mL FEU Final     WBC Count 09/27/2021 8.0  4.0 - 11.0 10e3/uL Final     RBC Count 09/27/2021 4.37  3.80 - 5.20 10e6/uL Final     Hemoglobin 09/27/2021 13.1  11.7 - 15.7 g/dL Final     Hematocrit  09/27/2021 39.1  35.0 - 47.0 % Final     MCV 09/27/2021 90  78 - 100 fL Final     MCH 09/27/2021 30.0  26.5 - 33.0 pg Final     MCHC 09/27/2021 33.5  31.5 - 36.5 g/dL Final     RDW 09/27/2021 12.2  10.0 - 15.0 % Final     Platelet Count 09/27/2021 218  150 - 450 10e3/uL Final     % Neutrophils 09/27/2021 55  % Final     % Lymphocytes 09/27/2021 35  % Final     % Monocytes 09/27/2021 8  % Final     % Eosinophils 09/27/2021 1  % Final     % Basophils 09/27/2021 1  % Final     % Immature Granulocytes 09/27/2021 0  % Final     NRBCs per 100 WBC 09/27/2021 0  <1 /100 Final     Absolute Neutrophils 09/27/2021 4.3  1.6 - 8.3 10e3/uL Final     Absolute Lymphocytes 09/27/2021 2.8  0.8 - 5.3 10e3/uL Final     Absolute Monocytes 09/27/2021 0.7  0.0 - 1.3 10e3/uL Final     Absolute Eosinophils 09/27/2021 0.1  0.0 - 0.7 10e3/uL Final     Absolute Basophils 09/27/2021 0.1  0.0 - 0.2 10e3/uL Final     Absolute Immature Granulocytes 09/27/2021 0.0  <=0.0 10e3/uL Final     Absolute NRBCs 09/27/2021 0.0  10e3/uL Final       Merlene Reyes M.D.

## 2021-10-20 ASSESSMENT — ASTHMA QUESTIONNAIRES: ACT_TOTALSCORE: 21

## 2022-02-27 ENCOUNTER — HEALTH MAINTENANCE LETTER (OUTPATIENT)
Age: 62
End: 2022-02-27

## 2022-04-11 ENCOUNTER — VIRTUAL VISIT (OUTPATIENT)
Dept: FAMILY MEDICINE | Facility: CLINIC | Age: 62
End: 2022-04-11
Payer: COMMERCIAL

## 2022-04-11 DIAGNOSIS — A60.00 HERPES SIMPLEX INFECTION OF GENITOURINARY SYSTEM: ICD-10-CM

## 2022-04-11 DIAGNOSIS — F33.42 RECURRENT MAJOR DEPRESSION IN COMPLETE REMISSION (H): ICD-10-CM

## 2022-04-11 DIAGNOSIS — B00.9 HERPES SIMPLEX VIRUS INFECTION: ICD-10-CM

## 2022-04-11 DIAGNOSIS — J30.2 SEASONAL ALLERGIC RHINITIS, UNSPECIFIED TRIGGER: ICD-10-CM

## 2022-04-11 DIAGNOSIS — E03.4 HYPOTHYROIDISM DUE TO ACQUIRED ATROPHY OF THYROID: ICD-10-CM

## 2022-04-11 DIAGNOSIS — F43.9 STRESS: ICD-10-CM

## 2022-04-11 DIAGNOSIS — J45.991 COUGH VARIANT ASTHMA: ICD-10-CM

## 2022-04-11 DIAGNOSIS — R03.0 ELEVATED BP WITHOUT DIAGNOSIS OF HYPERTENSION: ICD-10-CM

## 2022-04-11 DIAGNOSIS — E66.01 MORBID OBESITY (H): Primary | ICD-10-CM

## 2022-04-11 PROCEDURE — 99214 OFFICE O/P EST MOD 30 MIN: CPT | Mod: 95 | Performed by: FAMILY MEDICINE

## 2022-04-11 RX ORDER — LEVOTHYROXINE SODIUM 125 UG/1
125 TABLET ORAL DAILY
Qty: 90 TABLET | Refills: 4 | Status: SHIPPED | OUTPATIENT
Start: 2022-04-11 | End: 2023-07-11

## 2022-04-11 RX ORDER — VALACYCLOVIR HYDROCHLORIDE 500 MG/1
500 TABLET, FILM COATED ORAL DAILY
Qty: 90 TABLET | Refills: 3 | Status: SHIPPED | OUTPATIENT
Start: 2022-04-11 | End: 2023-07-11

## 2022-04-11 RX ORDER — MONTELUKAST SODIUM 10 MG/1
10 TABLET ORAL AT BEDTIME
Qty: 90 TABLET | Refills: 3 | Status: SHIPPED | OUTPATIENT
Start: 2022-04-11 | End: 2023-07-11

## 2022-04-11 RX ORDER — FLUTICASONE PROPIONATE 110 UG/1
1 AEROSOL, METERED RESPIRATORY (INHALATION) 2 TIMES DAILY
Qty: 12 G | Refills: 3 | Status: SHIPPED | OUTPATIENT
Start: 2022-04-11 | End: 2022-07-19

## 2022-04-11 RX ORDER — ALBUTEROL SULFATE 90 UG/1
2 AEROSOL, METERED RESPIRATORY (INHALATION) EVERY 6 HOURS
Qty: 18 G | Refills: 3 | Status: SHIPPED | OUTPATIENT
Start: 2022-04-11 | End: 2023-07-11

## 2022-04-11 RX ORDER — FLUOXETINE 40 MG/1
40 CAPSULE ORAL DAILY
Qty: 90 CAPSULE | Refills: 3 | Status: SHIPPED | OUTPATIENT
Start: 2022-04-11 | End: 2023-07-11

## 2022-04-11 ASSESSMENT — PATIENT HEALTH QUESTIONNAIRE - PHQ9
10. IF YOU CHECKED OFF ANY PROBLEMS, HOW DIFFICULT HAVE THESE PROBLEMS MADE IT FOR YOU TO DO YOUR WORK, TAKE CARE OF THINGS AT HOME, OR GET ALONG WITH OTHER PEOPLE: SOMEWHAT DIFFICULT
SUM OF ALL RESPONSES TO PHQ QUESTIONS 1-9: 8
SUM OF ALL RESPONSES TO PHQ QUESTIONS 1-9: 8

## 2022-04-11 ASSESSMENT — ANXIETY QUESTIONNAIRES
GAD7 TOTAL SCORE: 19
7. FEELING AFRAID AS IF SOMETHING AWFUL MIGHT HAPPEN: MORE THAN HALF THE DAYS
GAD7 TOTAL SCORE: 19
5. BEING SO RESTLESS THAT IT IS HARD TO SIT STILL: NEARLY EVERY DAY
6. BECOMING EASILY ANNOYED OR IRRITABLE: NEARLY EVERY DAY
GAD7 TOTAL SCORE: 19
4. TROUBLE RELAXING: NEARLY EVERY DAY
2. NOT BEING ABLE TO STOP OR CONTROL WORRYING: NEARLY EVERY DAY
7. FEELING AFRAID AS IF SOMETHING AWFUL MIGHT HAPPEN: MORE THAN HALF THE DAYS
3. WORRYING TOO MUCH ABOUT DIFFERENT THINGS: MORE THAN HALF THE DAYS
1. FEELING NERVOUS, ANXIOUS, OR ON EDGE: NEARLY EVERY DAY

## 2022-04-11 NOTE — PROGRESS NOTES
Nidia is a 61 year old who is being evaluated via a billable video visit.      How would you like to obtain your AVS? MyChart  If the video visit is dropped, the invitation should be resent by: Send to e-mail at: [email or web address]  Will anyone else be joining your video visit? No      Video Start Time: 8:45 AM    Assessment & Plan     1. Stress  She is doing well with the fluoxetine will continue with the 40 mg dosing  - FLUoxetine (PROZAC) 40 MG capsule; Take 1 capsule (40 mg) by mouth in the morning.  Dispense: 90 capsule; Refill: 3    2. Hypothyroidism due to acquired atrophy of thyroid  Euthyroid  - levothyroxine (EUTHYROX) 125 MCG tablet; Take 1 tablet (125 mcg) by mouth in the morning.  Dispense: 90 tablet; Refill: 4    3. Cough variant asthma  We will start Flovent 1 puff twice a day she is to use this during her allergy season and anytime she gets a cold.  - fluticasone (FLOVENT HFA) 110 MCG/ACT inhaler; Inhale 1 puff into the lungs in the morning and 1 puff in the evening.  Dispense: 12 g; Refill: 3  - albuterol (PROAIR HFA/PROVENTIL HFA/VENTOLIN HFA) 108 (90 Base) MCG/ACT inhaler; Inhale 2 puffs into the lungs in the morning and 2 puffs at noon and 2 puffs in the evening and 2 puffs before bedtime.  Dispense: 18 g; Refill: 3  - montelukast (SINGULAIR) 10 MG tablet; Take 1 tablet (10 mg) by mouth At Bedtime  Dispense: 90 tablet; Refill: 3    4. Herpes simplex infection of genitourinary system  4 repression  - valACYclovir (VALTREX) 500 MG tablet; Take 1 tablet (500 mg) by mouth in the morning.  Dispense: 90 tablet; Refill: 3    5. Obesity (BMI 35.0-39.9) with comorbidity (H)  Now that she has a different job she is going to try to pay more attention to her own health.    6. Herpes simplex virus infection  As above    7. Seasonal allergic rhinitis, unspecified trigger  Taking montelukast and a daily over-the-counter allergy pill      9. Elevated BP without diagnosis of hypertension  She is going to get a  "blood pressure cuff and start checking  - Home Blood Pressure Monitor Order for DME - ONLY FOR DME             BMI:   Estimated body mass index is 40.11 kg/m  as calculated from the following:    Height as of 10/19/21: 1.556 m (5' 1.25\").    Weight as of 10/19/21: 97.1 kg (214 lb).             Return in about 6 months (around 10/11/2022) for Physical Exam, Lab Work, med check.    Merlene Reyes MD  [facility] KATHLEEN Jacob is a 61 year old who presents for the following health issues     History of Present Illness     Asthma:  She presents for follow up of asthma.  She has some cough, some wheezing, and some shortness of breath. She is using a relief medication a few times a week. She does not miss any doses of her controller medication throughout the week.Patient is aware of the following triggers: animal dander, cold air, dust mites, exercise or sports, humidity, mold, pollens and strong odors and fumes. The patient has not had a visit to the Emergency Room, Urgent Care or Hospital due to asthma since the last clinic visit.     Mental Health Follow-up:  Patient presents to follow-up on Depression & Anxiety.Patient's depression since last visit has been:  Better  The patient is not having other symptoms associated with depression.  Patient's anxiety since last visit has been:  Better  The patient is not having other symptoms associated with anxiety.  Any significant life events: job concerns  Patient is not feeling anxious or having panic attacks.  Patient has no concerns about alcohol or drug use.       Today's PHQ-9         PHQ-9 Total Score: 8  PHQ-9 Q9 Thoughts of better off dead/self-harm past 2 weeks :   (P) Not at all    How difficult have these problems made it for you to do your work, take care of things at home, or get along with other people: Somewhat difficult    Today's CALI-7 Score: 19    Hypothyroidism:     Since last visit, patient describes the following symptoms::  " Anxiety, Constipation, Depression, Dry skin, Fatigue and Hair loss    Reason for visit:  Preventive yearly visit  Symptom progression:  Improving  Had these symptoms before:  Yes  What makes it worse:  Going to work  What makes it better:  Quit my job    She eats 0-1 servings of fruits and vegetables daily.She consumes 1 sweetened beverage(s) daily.She exercises with enough effort to increase her heart rate 10 to 19 minutes per day.  She exercises with enough effort to increase her heart rate 3 or less days per week. She is missing 1 dose(s) of medications per week.  She is not taking prescribed medications regularly due to remembering to take.     She is herefor follow up on medications she can look  BP Readings from Last 6 Encounters:   10/19/21 122/70   09/27/21 135/63   03/24/21 126/89   06/10/20 129/81   11/11/19 (!) 139/94   09/24/19 116/72     She has been doing much better since she left her job she starts a new one next week which is why she could not be seen in person.  The only issue she is having right now is that she is having a bit of an asthma flare she has montelukast as a controller and albuterol but she is having to use the albuterol too much.  I we discussed starting a daily controller during her allergy season and she was amenable to that.  I also we talked about blood pressure and that I will write her prescription for a blood pressure kit.  She can start checking in and sending the my my chart  She is euthyroid as of last check in her mood certainly is congruent with that    Review of Systems   Constitutional, HEENT, cardiovascular, pulmonary, gi and gu systems are negative, except as otherwise noted.      Objective           Vitals:  No vitals were obtained today due to virtual visit.    Physical Exam   GENERAL: Healthy, alert and no distress  EYES: Eyes grossly normal to inspection.  No discharge or erythema, or obvious scleral/conjunctival abnormalities.  RESP: No audible wheeze, cough, or  visible cyanosis.  No visible retractions or increased work of breathing.    SKIN: Visible skin clear. No significant rash, abnormal pigmentation or lesions.  NEURO: Cranial nerves grossly intact.  Mentation and speech appropriate for age.  PSYCH: Mentation appears normal, affect normal/bright, judgement and insight intact, normal speech and appearance well-groomed.    Admission on 09/27/2021, Discharged on 09/27/2021   Component Date Value Ref Range Status     Sodium 09/27/2021 138  133 - 144 mmol/L Final     Potassium 09/27/2021 4.1  3.4 - 5.3 mmol/L Final    Specimen slightly hemolyzed, potassium may be falsely elevated.     Chloride 09/27/2021 108  94 - 109 mmol/L Final     Carbon Dioxide (CO2) 09/27/2021 24  20 - 32 mmol/L Final     Anion Gap 09/27/2021 6  3 - 14 mmol/L Final     Urea Nitrogen 09/27/2021 11  7 - 30 mg/dL Final     Creatinine 09/27/2021 0.78  0.52 - 1.04 mg/dL Final     Calcium 09/27/2021 8.5  8.5 - 10.1 mg/dL Final     Glucose 09/27/2021 90  70 - 99 mg/dL Final     Alkaline Phosphatase 09/27/2021 90  40 - 150 U/L Final     AST 09/27/2021 30  0 - 45 U/L Final    Specimen is hemolyzed which can falsely elevate AST. Analysis of a non-hemolyzed specimen may result in a lower value.     ALT 09/27/2021 41  0 - 50 U/L Final     Protein Total 09/27/2021 6.9  6.8 - 8.8 g/dL Final     Albumin 09/27/2021 3.4  3.4 - 5.0 g/dL Final     Bilirubin Total 09/27/2021 0.3  0.2 - 1.3 mg/dL Final     GFR Estimate 09/27/2021 83  >60 mL/min/1.73m2 Final    As of July 11, 2021, eGFR is calculated by the CKD-EPI creatinine equation, without race adjustment. eGFR can be influenced by muscle mass, exercise, and diet. The reported eGFR is an estimation only and is only applicable if the renal function is stable.     Troponin I 09/27/2021 <0.015  0.000 - 0.045 ug/L Final    The 99th percentile for upper reference range is 0.045ug/L.  Troponin values in the range of 0.045 - 0.120 ug/L may be associated with risks of adverse  clinical events.     TSH 09/27/2021 0.62  0.40 - 4.00 mU/L Final     D-Dimer Quantitative 09/27/2021 0.34  0.00 - 0.50 ug/mL FEU Final     WBC Count 09/27/2021 8.0  4.0 - 11.0 10e3/uL Final     RBC Count 09/27/2021 4.37  3.80 - 5.20 10e6/uL Final     Hemoglobin 09/27/2021 13.1  11.7 - 15.7 g/dL Final     Hematocrit 09/27/2021 39.1  35.0 - 47.0 % Final     MCV 09/27/2021 90  78 - 100 fL Final     MCH 09/27/2021 30.0  26.5 - 33.0 pg Final     MCHC 09/27/2021 33.5  31.5 - 36.5 g/dL Final     RDW 09/27/2021 12.2  10.0 - 15.0 % Final     Platelet Count 09/27/2021 218  150 - 450 10e3/uL Final     % Neutrophils 09/27/2021 55  % Final     % Lymphocytes 09/27/2021 35  % Final     % Monocytes 09/27/2021 8  % Final     % Eosinophils 09/27/2021 1  % Final     % Basophils 09/27/2021 1  % Final     % Immature Granulocytes 09/27/2021 0  % Final     NRBCs per 100 WBC 09/27/2021 0  <1 /100 Final     Absolute Neutrophils 09/27/2021 4.3  1.6 - 8.3 10e3/uL Final     Absolute Lymphocytes 09/27/2021 2.8  0.8 - 5.3 10e3/uL Final     Absolute Monocytes 09/27/2021 0.7  0.0 - 1.3 10e3/uL Final     Absolute Eosinophils 09/27/2021 0.1  0.0 - 0.7 10e3/uL Final     Absolute Basophils 09/27/2021 0.1  0.0 - 0.2 10e3/uL Final     Absolute Immature Granulocytes 09/27/2021 0.0  <=0.0 10e3/uL Final     Absolute NRBCs 09/27/2021 0.0  10e3/uL Final               Video-Visit Details    Type of service:  Video Visit    Video End Time:907    Originating Location (pt. Location): Home    Distant Location (provider location):  [facility]     Platform used for Video Visit: Vivian Reyes M.D.

## 2022-04-12 ASSESSMENT — PATIENT HEALTH QUESTIONNAIRE - PHQ9: SUM OF ALL RESPONSES TO PHQ QUESTIONS 1-9: 8

## 2022-04-12 ASSESSMENT — ANXIETY QUESTIONNAIRES: GAD7 TOTAL SCORE: 19

## 2022-04-24 ENCOUNTER — HEALTH MAINTENANCE LETTER (OUTPATIENT)
Age: 62
End: 2022-04-24

## 2022-04-25 ENCOUNTER — HOSPITAL ENCOUNTER (OUTPATIENT)
Dept: MAMMOGRAPHY | Facility: CLINIC | Age: 62
Discharge: HOME | End: 2022-04-25
Attending: FAMILY MEDICINE | Admitting: FAMILY MEDICINE
Payer: COMMERCIAL

## 2022-04-25 DIAGNOSIS — Z12.31 VISIT FOR SCREENING MAMMOGRAM: ICD-10-CM

## 2022-04-25 PROCEDURE — 77067 SCR MAMMO BI INCL CAD: CPT

## 2022-06-13 ENCOUNTER — OFFICE VISIT (OUTPATIENT)
Dept: DERMATOLOGY | Facility: CLINIC | Age: 62
End: 2022-06-13
Payer: COMMERCIAL

## 2022-06-13 DIAGNOSIS — L82.1 SEBORRHEIC KERATOSIS: ICD-10-CM

## 2022-06-13 DIAGNOSIS — D22.9 NEVUS: ICD-10-CM

## 2022-06-13 DIAGNOSIS — D17.30 NEVUS LIPOMATOSUS CUTANEOUS SUPERFICIALIS: ICD-10-CM

## 2022-06-13 DIAGNOSIS — L71.9 ROSACEA: Primary | ICD-10-CM

## 2022-06-13 DIAGNOSIS — D18.01 ANGIOMA OF SKIN: ICD-10-CM

## 2022-06-13 DIAGNOSIS — L81.4 LENTIGO: ICD-10-CM

## 2022-06-13 PROCEDURE — 99214 OFFICE O/P EST MOD 30 MIN: CPT | Performed by: PHYSICIAN ASSISTANT

## 2022-06-13 RX ORDER — METRONIDAZOLE 7.5 MG/G
GEL TOPICAL
Qty: 45 G | Refills: 11 | Status: SHIPPED | OUTPATIENT
Start: 2022-06-13 | End: 2022-07-19

## 2022-06-13 ASSESSMENT — PAIN SCALES - GENERAL: PAINLEVEL: NO PAIN (0)

## 2022-06-13 NOTE — LETTER
6/13/2022         RE: Nidia Acosta  [address] [facility] [place] [address]        Dear Colleague,    Thank you for referring your patient, Nidia Acosta, to the [facility]. Please see a copy of my visit note below.    HPI:   Chief complaints: Nidia Acosta is a pleasant 62 year old female who presents for Full skin cancer screening to rule out skin cancer   Last Skin Exam: n/a      1st Baseline: yes  Personal HX of Skin Cancer: no   Personal HX of Malignant Melanoma: no   Family HX of Skin Cancer / Malignant Melanoma: no  Personal HX of Atypical Moles:   no  Risk factors: history of sun exposure and burns  New / Changing lesions:yes skin tag that was removed is back. She is also getting red bumps on the face  Social History: going glamping this summer  On review of systems, there are no further skin complaints, patient is feeling otherwise well.   ROS of the following were done and are negative: Constitutional, Eyes, Ears, Nose,   Mouth, Throat, Cardiovascular, Respiratory, GI, Genitourinary, Musculoskeletal,   Psychiatric, Endocrine, Allergic/Immunologic.    PHYSICAL EXAM:   LMP 07/29/2002   Skin exam performed as follows: Type 2 skin. Mood appropriate  Alert and Oriented X 3. Well developed, well nourished in no distress.  General appearance: Normal  Head including face: Normal  Eyes: conjunctiva and lids: Normal  Mouth: Lips, teeth, gums: Normal  Neck: Normal  Chest-breast/axillae: Normal  Back: Normal  Spleen and liver: Normal  Cardiovascular: Exam of peripheral vascular system by observation for swelling, varicosities, edema: Normal  Genitalia: groin, buttocks: Normal  Extremities: digits/nails (clubbing): Normal  Eccrine and Apocrine glands: Normal  Right upper extremity: Normal  Left upper extremity: Normal  Right lower extremity: Normal  Left lower extremity: Normal  Skin: Scalp and body hair: See below    Pt deferred exam of breasts, groin, buttocks: No    Other  physical findings:  1. Multiple pigmented macules on extremities and trunk  2. Multiple pigmented macules on face, trunk and extremities  3. Multiple vascular papules on trunk, arms and legs  4. Multiple scattered keratotic plaques  5. 15 mm squishy plaque on the left upper inner thigh  6. Background erythema with red papules on the face     Except as noted above, no other signs of skin cancer or melanoma.     ASSESSMENT/PLAN:   Benign Full skin cancer screening today. . Patient with history of none  Advised on monthly self exams and 1 year  Patient Education: Appropriate brochures given.    1. Multiple benign appearing melanocytic nevi on arms, legs and trunk. Discussed ABCDEs of melanoma and sunscreen.   2. Multiple lentigos on arms, legs and trunk. Advised benign, no treatment needed.  3. Multiple scattered angiomas. Advised benign, no treatment needed.   4. Seborrheic keratosis on arms, legs and trunk. Advised benign, no treatment needed.  5. Nevus lipomatous superficialis - recommend excision with Dr Cormier  6. Rosacea  --Start metrogel BID            Follow-up: yearly/PRN sooner    1.) Patient was asked about new and changing moles. YES  2.) Patient received a complete physical skin examination: YES  3.) Patient was counseled to perform a monthly self skin examination: YES  Scribed By: Ariadne Odell, MS, PAKVNG          Again, thank you for allowing me to participate in the care of your patient.        Sincerely,        Ariadne Odell PA-C

## 2022-06-13 NOTE — PROGRESS NOTES
HPI:   Chief complaints: Nidia Acosta is a pleasant 62 year old female who presents for Full skin cancer screening to rule out skin cancer   Last Skin Exam: n/a      1st Baseline: yes  Personal HX of Skin Cancer: no   Personal HX of Malignant Melanoma: no   Family HX of Skin Cancer / Malignant Melanoma: no  Personal HX of Atypical Moles:   no  Risk factors: history of sun exposure and burns  New / Changing lesions:yes skin tag that was removed is back. She is also getting red bumps on the face  Social History: going glamping this summer  On review of systems, there are no further skin complaints, patient is feeling otherwise well.   ROS of the following were done and are negative: Constitutional, Eyes, Ears, Nose,   Mouth, Throat, Cardiovascular, Respiratory, GI, Genitourinary, Musculoskeletal,   Psychiatric, Endocrine, Allergic/Immunologic.    PHYSICAL EXAM:   [facility] 07/29/2002   Skin exam performed as follows: Type 2 skin. Mood appropriate  Alert and Oriented X 3. Well developed, well nourished in no distress.  General appearance: Normal  Head including face: Normal  Eyes: conjunctiva and lids: Normal  Mouth: Lips, teeth, gums: Normal  Neck: Normal  Chest-breast/axillae: Normal  Back: Normal  Spleen and liver: Normal  Cardiovascular: Exam of peripheral vascular system by observation for swelling, varicosities, edema: Normal  Genitalia: groin, buttocks: Normal  Extremities: digits/nails (clubbing): Normal  Eccrine and Apocrine glands: Normal  Right upper extremity: Normal  Left upper extremity: Normal  Right lower extremity: Normal  Left lower extremity: Normal  Skin: Scalp and body hair: See below    Pt deferred exam of breasts, groin, buttocks: No    Other physical findings:  1. Multiple pigmented macules on extremities and trunk  2. Multiple pigmented macules on face, trunk and extremities  3. Multiple vascular papules on trunk, arms and legs  4. Multiple scattered keratotic plaques  5. 15 mm squishy plaque  on the left upper inner thigh  6. Background erythema with red papules on the face     Except as noted above, no other signs of skin cancer or melanoma.     ASSESSMENT/PLAN:   Benign Full skin cancer screening today. . Patient with history of none  Advised on monthly self exams and 1 year  Patient Education: Appropriate brochures given.    1. Multiple benign appearing melanocytic nevi on arms, legs and trunk. Discussed ABCDEs of melanoma and sunscreen.   2. Multiple lentigos on arms, legs and trunk. Advised benign, no treatment needed.  3. Multiple scattered angiomas. Advised benign, no treatment needed.   4. Seborrheic keratosis on arms, legs and trunk. Advised benign, no treatment needed.  5. Nevus lipomatous superficialis - recommend excision with Dr Cormier  6. Rosacea  --Start metrogel BID            Follow-up: yearly/PRN sooner    1.) Patient was asked about new and changing moles. YES  2.) Patient received a complete physical skin examination: YES  3.) Patient was counseled to perform a monthly self skin examination: YES  Scribed By: Ariadne Odell, MS, PA-C

## 2022-07-19 ENCOUNTER — VIRTUAL VISIT (OUTPATIENT)
Dept: FAMILY MEDICINE | Facility: CLINIC | Age: 62
End: 2022-07-19
Payer: COMMERCIAL

## 2022-07-19 DIAGNOSIS — U07.1 INFECTION DUE TO 2019 NOVEL CORONAVIRUS: Primary | ICD-10-CM

## 2022-07-19 PROCEDURE — 99213 OFFICE O/P EST LOW 20 MIN: CPT | Mod: 95 | Performed by: STUDENT IN AN ORGANIZED HEALTH CARE EDUCATION/TRAINING PROGRAM

## 2022-07-19 ASSESSMENT — PATIENT HEALTH QUESTIONNAIRE - PHQ9
10. IF YOU CHECKED OFF ANY PROBLEMS, HOW DIFFICULT HAVE THESE PROBLEMS MADE IT FOR YOU TO DO YOUR WORK, TAKE CARE OF THINGS AT HOME, OR GET ALONG WITH OTHER PEOPLE: SOMEWHAT DIFFICULT
SUM OF ALL RESPONSES TO PHQ QUESTIONS 1-9: 3
SUM OF ALL RESPONSES TO PHQ QUESTIONS 1-9: 3

## 2022-07-19 NOTE — PROGRESS NOTES
"Nidia is a 62 year old who is being evaluated via a billable video visit.      How would you like to obtain your AVS? MyChart  If the video visit is dropped, the invitation should be resent by: Text to cell phone: [phone]  Will anyone else be joining your video visit? No    Assessment & Plan     Infection due to 2019 novel coronavirus  Does not qualify for Paxlovid as patient does not want to stop her several OTC herbal supplements with potential significant drug-drug interaction risk thus referring for possible monoclonal antibody infusion instead. Discussed EUA status and common/rare side effects of infusion. Also discussed that if she develops worsened SOB or chest tightness/pain, she should have a low threshold to be seen in the ED.   - Covid Monoclonal Antibody Referral    BMI:   Estimated body mass index is 40.11 kg/m  as calculated from the following:    Height as of 10/19/21: 1.556 m (5' 1.25\").    Weight as of 10/19/21: 97.1 kg (214 lb).     COVID-19 positive patient.  Encounter for consideration of medication intervention. Patient does not qualify for a prescription. Full discussion with patient including medication options, risks and benefits. Potential drug interactions reviewed with patient.     Treatment Planned Referral for possible monoclonal antibodies  Temporary change to home medications:  None     Estimated body mass index is 40.11 kg/m  as calculated from the following:    Height as of 10/19/21: 1.556 m (5' 1.25\").    Weight as of 10/19/21: 97.1 kg (214 lb).  GFR Estimate   Date Value Ref Range Status   09/27/2021 83 >60 mL/min/1.73m2 Final     Comment:     As of July 11, 2021, eGFR is calculated by the CKD-EPI creatinine equation, without race adjustment. eGFR can be influenced by muscle mass, exercise, and diet. The reported eGFR is an estimation only and is only applicable if the renal function is stable.   08/05/2019 74 >60 mL/min/[1.73_m2] Final     Comment:     Non  " GFR Calc  Starting 12/18/2018, serum creatinine based estimated GFR (eGFR) will be   calculated using the Chronic Kidney Disease Epidemiology Collaboration   (CKD-EPI) equation.       No results found for: [id number]    No follow-ups on file.    Chris Lizarraga MD  [facility] LUL Jacob is a 62 year old presenting for the following health issues:    Covid Concern    HPI     COVID-19 Symptom Review  How many days ago did these symptoms start? 3 days     Are any of the following symptoms significant for you?  New or worsening difficulty breathing? Yes    Please describe what kind of difficulty you are having breathing:Mild dyspnea (able to do ADLs without difficulty, mild shortness of breath with activities such as climbing one or two flights of stairs or walking briskly)    Worsening cough? Yes, it's a dry cough.     Fever or chills? Yes, I felt feverish or had chills.    Headache: YES    Sore throat: YES    Chest pain: YES- tightness, has asthma     Diarrhea: No    Body aches? No    What treatments has patient tried? Guaifenesin (mucinex), Acetaminophen and Decongestant - oral   Does patient live in a nursing home, group home, or shelter? No  Does patient have a way to get food/medications during quarantined? Yes, I have a friend or family member who can help me.    COVID   Boyfriend tested positive on Saturday.  Emerado crappy on Saturday night.  At home antigen test positive on Sunday.  Today is COVID day 4 of symptoms  Did have JJ vaccine and one booster.    Was a little short of breath on Sunday, did inhaler and has been helpful.  Does have some chest tightness - feels similar to asthma. Improving.  She feels the best she has felt in the last couple days.   Likes to treat homeopathically if she can - has been using to boost immunity - is on several OTC herbals        Objective         Vitals:  No vitals were obtained today due to telephone visit.    Physical Exam:  N/A due to  telephone visit    Telephone-Visit Details    Start Time: 9:04 AM    Type of service:  Telephone visit    End Time: 9:26 AM    Originating Location (pt. Location): Home     Distant Location (provider location):  [facility]     Platform used for Video Visit: Bibulu    .  ..  Answers for HPI/ROS submitted by the patient on 7/19/2022  If you checked off any problems, how difficult have these problems made it for you to do your work, take care of things at home, or get along with other people?: Somewhat difficult  PHQ9 TOTAL SCORE: 3

## 2022-07-19 NOTE — LETTER
July 19, 2022      Nidia Acosta  [address] [facility] [address]        To Whom It May Concern:    Nidia Acosta was seen in our clinic for positive COVID test. She can return to work (if working outside the home) on July 22nd with a well-fitted mask IF symptoms are improving. Mask should be worn until 7/22/22.      Sincerely,        Chris Lizarraga MD

## 2022-08-22 ENCOUNTER — ANCILLARY PROCEDURE (OUTPATIENT)
Dept: GENERAL RADIOLOGY | Facility: CLINIC | Age: 62
End: 2022-08-22
Attending: PEDIATRICS
Payer: COMMERCIAL

## 2022-08-22 ENCOUNTER — OFFICE VISIT (OUTPATIENT)
Dept: ORTHOPEDICS | Facility: CLINIC | Age: 62
End: 2022-08-22

## 2022-08-22 VITALS
SYSTOLIC BLOOD PRESSURE: 122 MMHG | HEART RATE: 75 BPM | HEIGHT: 61 IN | DIASTOLIC BLOOD PRESSURE: 75 MMHG | BODY MASS INDEX: 40.43 KG/M2

## 2022-08-22 DIAGNOSIS — M79.672 LEFT FOOT PAIN: Primary | ICD-10-CM

## 2022-08-22 DIAGNOSIS — S99.912A INJURY OF LEFT ANKLE, INITIAL ENCOUNTER: ICD-10-CM

## 2022-08-22 PROCEDURE — 99203 OFFICE O/P NEW LOW 30 MIN: CPT | Performed by: PEDIATRICS

## 2022-08-22 PROCEDURE — 73630 X-RAY EXAM OF FOOT: CPT | Mod: TC | Performed by: RADIOLOGY

## 2022-08-22 NOTE — LETTER
8/22/2022         RE: Nidia Acosta  [address] [facility] [address]        Dear Colleague,    Thank you for referring your patient, Nidia Acosta, to the [facility]. Please see a copy of my visit note below.    ASSESSMENT & PLAN    Nidia was seen today for pain and pain.    Diagnoses and all orders for this visit:    Left foot pain  -     XR Foot LT G/E 3 vw    Injury of left ankle, initial encounter  -     XR Ankle Left G/E 3 Views; Future      This issue is acute and Improving.    We discussed these other possible diagnosis:  Likely lateral ankle sprain given negative x-rays.  Foot pain possible flare of degenerative changes.    Recommend supportive care including cam walking boot, crutches, rest, ice, elevation. Follow up in 1-2 weeks for repeat exam. Would consider further imaging at that point pending clinical course. Patient will likely wean to ankle brace and need physical therapy at some point.     Plan:  - Today's Plan of Care:  Continue walking boot for support, crutches if needed  Continue with relative rest and activity modification, Ice, Compression, and Elevation.  Can apply ice 10-15 minutes 3-4 times per day as needed. OTC medications as needed.  Home Exercise Program    -We also discussed other future treatment options:  Referral to Physical Therapy  MRI if more severe    Follow Up: ~ 2 weeks    Concerning signs and symptoms were reviewed.  The patient expressed understanding of this management plan and all questions were answered at this time.    Iram Lancaster MD [facility]  Sports Medicine Physician  [facility]      -----  Chief Complaint   Patient presents with     Left Ankle - Pain     Left Foot - Pain       SUBJECTIVE  Nidia Acosta is a/an 62 year old female who is seen as a self referral for evaluation of left ankle injury.    The patient is seen by themselves.    Onset: 4 days(s) ago. Patient describes injury as  "walking through some [place] at [facility] and slipped and fell  Location of Pain: left ankle; medial and lateral ankle pain, plantar- mid foot pain  Worsened by: walking, pressure  Better with: walking boot, ace bandage, icing, elevation, ibuprofen  Treatments tried: rest/activity avoidance, ice, ibuprofen, previous imaging (xray 8/18/22) and casting/splinting/bracing  - Is feeling slightly better since her UC visit in [place]    Associated symptoms: swelling, weakness of left ankle and feeling of instability, discoloration dorsum of the foot     Orthopedic/Surgical history: NO  Social History/Occupation: works at a desk    No family history pertinent to patient's problem today.    REVIEW OF SYSTEMS:  Review of Systems  Skin: yes bruising, yes swelling  Musculoskeletal: as above  Neurologic: no numbness, paresthesias  Remainder of review of systems is negative including constitutional, CV, pulmonary, GI, except as noted in HPI or medical history.    OBJECTIVE:  /75   Pulse 75   Ht 1.549 m (5' 1\")   LMP 07/29/2002   BMI 40.43 kg/m     General: healthy, alert and in no distress  HEENT: no scleral icterus or conjunctival erythema  Skin: no suspicious lesions or rash. No jaundice.  CV: distal perfusion intact  Resp: normal respiratory effort without conversational dyspnea   Psych: normal mood and affect  Gait: normal steady gait with appropriate coordination and balance  Neuro: Normal light sensory exam of lower extremity    Bilateral Foot and Ankle Exam:  Inspection:       Mild lateral bruising, mild ankle swelling    Foot inspection:       no deformity noted    Tender:       lateral ankle ligaments  Left, mild anterior ankle    Non-Tender:       remainder of ankle and foot left, including medial and lateral malleolus, base of the fifth metatarsal and Lisfranc area    ROM:        limited ankle dorsiflexion, plantarflexion, inversion and eversion on the left compared to the right    Strength:       " ankle dorsiflexion 4/5 left       plantarflexion 4/5 left       inversion 4/5 left       eversion 4/5 left    Gait:       Normal    Neurovascular:       2+ peripheral pulses bilaterally and brisk capillary refill       sensation grossly intact      RADIOLOGY:  I independently ordered, visualized and reviewed these images with the patient  3 XR views of left ankle reviewed: no acute bony abnormality, mild degenerative change, medial chronic ossicle  - will follow official read    3 XR views of left foot reviewed: no acute bony abnormality, mild mid foot degenerative change  - will follow official read      Review of the result(s) of each unique test - XR             Again, thank you for allowing me to participate in the care of your patient.        Sincerely,        Iram Lancaster MD

## 2022-08-22 NOTE — PATIENT INSTRUCTIONS
We discussed these other possible diagnosis:  Likely lateral ankle sprain given negative x-rays.  Foot pain possible flare of degenerative changes.    Recommend supportive care including cam walking boot, crutches, rest, ice, elevation. Follow up in 1-2 weeks for repeat exam. Would consider further imaging at that point pending clinical course. Patient will likely wean to ankle brace and need physical therapy at some point.     Plan:  - Today's Plan of Care:  Continue walking boot for support, crutches if needed  Continue with relative rest and activity modification, Ice, Compression, and Elevation.  Can apply ice 10-15 minutes 3-4 times per day as needed. OTC medications as needed.  Home Exercise Program    -We also discussed other future treatment options:  Referral to Physical Therapy  MRI if more severe    Follow Up: ~ 2 weeks    If you have any further questions for your physician or physician s care team you can call [phone] and use option 3 to leave a voice message.

## 2022-08-22 NOTE — PROGRESS NOTES
ASSESSMENT & PLAN    Nidia was seen today for pain and pain.    Diagnoses and all orders for this visit:    Left foot pain  -     XR Foot LT G/E 3 vw    Injury of left ankle, initial encounter  -     XR Ankle Left G/E 3 Views; Future      This issue is acute and Improving.    We discussed these other possible diagnosis:  Likely lateral ankle sprain given negative x-rays.  Foot pain possible flare of degenerative changes.    Recommend supportive care including cam walking boot, crutches, rest, ice, elevation. Follow up in 1-2 weeks for repeat exam. Would consider further imaging at that point pending clinical course. Patient will likely wean to ankle brace and need physical therapy at some point.     Plan:  - Today's Plan of Care:  Continue walking boot for support, crutches if needed  Continue with relative rest and activity modification, Ice, Compression, and Elevation.  Can apply ice 10-15 minutes 3-4 times per day as needed. OTC medications as needed.  Home Exercise Program    -We also discussed other future treatment options:  Referral to Physical Therapy  MRI if more severe    Follow Up: ~ 2 weeks    Concerning signs and symptoms were reviewed.  The patient expressed understanding of this management plan and all questions were answered at this time.    Iram Lancaster MD [facility]  Sports Medicine Physician  [facility]      -----  Chief Complaint   Patient presents with     Left Ankle - Pain     Left Foot - Pain       SUBJECTIVE  Nidia Acosta is a/an 62 year old female who is seen as a self referral for evaluation of left ankle injury.    The patient is seen by themselves.    Onset: 4 days(s) ago. Patient describes injury as walking through some caves at [facility] and slipped and fell  Location of Pain: left ankle; medial and lateral ankle pain, plantar- mid foot pain  Worsened by: walking, pressure  Better with: walking boot, ace bandage, icing, elevation, ibuprofen  Treatments tried:  "rest/activity avoidance, ice, ibuprofen, previous imaging (xray 8/18/22) and casting/splinting/bracing  - Is feeling slightly better since her UC visit in [place]    Associated symptoms: swelling, weakness of left ankle and feeling of instability, discoloration dorsum of the foot     Orthopedic/Surgical history: NO  Social History/Occupation: works at a desk    No family history pertinent to patient's problem today.    REVIEW OF SYSTEMS:  Review of Systems  Skin: yes bruising, yes swelling  Musculoskeletal: as above  Neurologic: no numbness, paresthesias  Remainder of review of systems is negative including constitutional, CV, pulmonary, GI, except as noted in HPI or medical history.    OBJECTIVE:  /75   Pulse 75   Ht 1.549 m (5' 1\")   LMP 07/29/2002   BMI 40.43 kg/m     General: healthy, alert and in no distress  HEENT: no scleral icterus or conjunctival erythema  Skin: no suspicious lesions or rash. No jaundice.  CV: distal perfusion intact  Resp: normal respiratory effort without conversational dyspnea   Psych: normal mood and affect  Gait: normal steady gait with appropriate coordination and balance  Neuro: Normal light sensory exam of lower extremity    Bilateral Foot and Ankle Exam:  Inspection:       Mild lateral bruising, mild ankle swelling    Foot inspection:       no deformity noted    Tender:       lateral ankle ligaments  Left, mild anterior ankle    Non-Tender:       remainder of ankle and foot left, including medial and lateral malleolus, base of the fifth metatarsal and Lisfranc area    ROM:        limited ankle dorsiflexion, plantarflexion, inversion and eversion on the left compared to the right    Strength:       ankle dorsiflexion 4/5 left       plantarflexion 4/5 left       inversion 4/5 left       eversion 4/5 left    Gait:       Normal    Neurovascular:       2+ peripheral pulses bilaterally and brisk capillary refill       sensation grossly intact      RADIOLOGY:  I " independently ordered, visualized and reviewed these images with the patient  3 XR views of left ankle reviewed: no acute bony abnormality, mild degenerative change, medial chronic ossicle  - will follow official read    3 XR views of left foot reviewed: no acute bony abnormality, mild mid foot degenerative change  - will follow official read      Review of the result(s) of each unique test - XR

## 2022-08-25 NOTE — NURSING NOTE
"Chief Complaint   Patient presents with     Sinus Problem       Initial /75 mmHg  Pulse 83  Temp(Src) 99.2  F (37.3  C) (Tympanic)  Ht 5' 1\" (1.549 m)  Wt   SpO2 97%  LMP 07/29/2002 Estimated body mass index is 38.00 kg/(m^2) as calculated from the following:    Height as of this encounter: 5' 1\" (1.549 m).    Weight as of 5/4/16: 201 lb (91.173 kg).  BP completed using cuff size: dinah Whitehead CMA    " Patient: Chadnan Mora Date: 2022   : 1991    31 year old female      OUTPATIENT WOUND CARE PROGRESS NOTE    Supervising Wound Care / Hyperbaric Medicine Physician: Dr. Min Abad  Consulting Provider:  Min Abad MD  Date of Consultation/Last Comprehensive Exam:  2022  Referring  Provider:  Dr. Crow    SUBJECTIVE:    Chief Complaint:  HBO eval      Wound/Ulcer Present:    Diabetic lower extremity ulcer:  Cloon grade 3 (deep ulcer with abscess or osteomyelitis)  Does the patient have a plantar wound?   No.    Diabetic foot exam performed?  No.     Current Vascular Assessment:  Physical exam.     Current Antibiotic Regimen:  See orders.   Current Offloading Modality:  not applicable at this time.    Additional Wound Category:  None     Maximum Baseline Ambulatory Status:  Unable to assess    History of Present Illness:  This is a pleasant and approachable 31 year old type 1 diabetic female admitted to [facility] 2022 with infected right toe wound with MRI indicating acute osteomyelitis distal tuft of 1st distal phalanx, now status post right 2nd resection and partial 1st toe amputation, I&D with Dr. Servin on 6/15/2022. Dr. Luis was consulted for antibiotic management- polymicrobial infection.     On cefepime and metronidazole.  Following 6/15 culture and surgical pathology.    2022:  Patient seen at bedside today. She is under the blanket. Endorses poor appetite. Is offloading in bed. Is frustrated at recent medical events. She admits diabetes is not well controlled but she is unsure why.    2022:  Patient seen at bedside today. She is over the blanket. Endorses improved appetite. Is working with PT/OT for walking. Is thankful she is alive. She reports her DM is still vascillating. She reports podiatry saw her this AM and endorsed \"normal healing.\"     Podiatry note reviewed.    Interval history: Patient seen on 2022 for wound care encounter.    Endocrinology progress  note reviewed.    Was unable to make podiatry appointment- rescheduled.    Drainage has decreased. No odor.    Pain is same. Last pain management note reviewed. Patient reports no improvement in pain and would like 2nd opinion.    Wearing compression, tolerated well.    Wearing DH shoe, tolerated well.    Review of Systems:  Pertinent items are noted in HPI (history of present illness).    OBJECTIVE:  Vital Signs:    Visit Vitals  /90 (BP Location: RUE - Right upper extremity, Patient Position: Sitting)   Pulse 93   Temp 96.3 °F (35.7 °C) (Temporal)   Resp 16   SpO2 100%       Physical Exam:  General appearance: Appears stated age, Alert, in no distress and cooperative  Head:   normocephalic without obvious abnormality  Pulmonary: normal respiratory effort    Wound exam- includes bed, avi-wound, additional descriptors:    Right foot with minimal edema.     D2 surgical site appears less macerated than prior, incision appeal was visually shorter from 12-6 o'clock.  Small depth to soft tissue. No purulence/malodor or periwound erythema/maceration.    On the lateral 5th metatarsal head and the lateral phalangeal joint, bili signs of excess pressure.  On the lateral there was actually an open wound which is shallow with viable tissue. No purulence/malodor or periwound erythema/maceration.    8/25 8/1 7/18      PROCEDURE:  None performed  Performed by physician, as applicable.    Wound Measurements Per Flowsheet:     Wound Foot Right Incision (Active)   Wound Length (cm) 1 cm 08/25/22 1200   Wound Width (cm) 0.2 cm 08/25/22 1200   Wound Depth (cm) 0.2 cm 08/25/22 1200   Wound Surface Area (cm^2) 0.2 cm^2 08/25/22 1200   Wound Volume (cm^3) 0.04 cm^3 08/25/22 1200   Number of days: 71       Wound Toe, 5th Right Lateral (Active)   Number of days: 0       Labs:  Hemoglobin A1C (%)   Date Value   06/14/2022 11.2 (H)   08/11/2018 12.9 (H)     TSH (mcUnits/mL)   Date Value   08/11/2018 0.418       No results  found for: PAB   Albumin (g/dL)   Date Value   06/20/2022 2.2 (L)   06/18/2022 2.3 (L)       No results found for: EF    Wt Readings from Last 4 Encounters:   08/22/22 68 kg (150 lb)   06/20/22 58.2 kg (128 lb 4.9 oz)   06/14/22 63.5 kg (140 lb)   08/11/18 63.7 kg (140 lb 6.9 oz)     There were no vitals filed for this visit.  Estimated body mass index is 24.21 kg/m² as calculated from the following:    Height as of 6/15/22: 5' 6\" (1.676 m).    Weight as of 8/22/22: 68 kg (150 lb).    WBC (K/mcL)   Date Value   07/27/2022 7.1   06/20/2022 9.3   06/18/2022 10.6        Medical histories:  Laboratory & diagnostic assessments reviewed present in HPI or MDM.  Past Medical History:   Diagnosis Date   • Type I diabetes mellitus, uncontrolled      Past Surgical History:   Procedure Laterality Date   • Cataract extraction, bilateral       Social History     Socioeconomic History   • Marital status: Single     Spouse name: Not on file   • Number of children: Not on file   • Years of education: Not on file   • Highest education level: Not on file   Occupational History   • Not on file   Tobacco Use   • Smoking status: Never Smoker   • Smokeless tobacco: Never Used   Substance and Sexual Activity   • Alcohol use: No   • Drug use: No     Types: Marijuana     Comment: pt quit over a year ago   • Sexual activity: Not on file   Other Topics Concern   • Not on file   Social History Narrative   • Not on file     Social Determinants of Health     Financial Resource Strain: Not on file   Food Insecurity: Not on file   Transportation Needs: Not on file   Physical Activity: Not on file   Stress: Not on file   Social Connections: Not on file   Intimate Partner Violence: Not At Risk   • Social Determinants: Intimate Partner Violence Past Fear: No   • Social Determinants: Intimate Partner Violence Current Fear: No     No family history on file.  Current Outpatient Medications   Medication Sig   • Lancets Misc One touch ultra. Four times a  day   • gabapentin (NEURONTIN) 300 MG capsule Wk 1 take 300 mg AM and PM, 600 mg bedtime. Wk 2 take 600 mg AM, 300 mg PM, 600 mg bedtime. Wk 3 and on take 600 mg AM, PM, and bedtime.   • lidocaine (XYLOCAINE) 5 % ointment Apply 2-4 gm topically to the affected area daily for 12 hours.   • amoxicillin-clavulanate (AUGMENTIN) 875-125 MG per tablet Take 1 tablet by mouth in the morning and 1 tablet before bedtime.   • HYDROcodone-acetaminophen (NORCO) 5-325 MG per tablet Take 1 tablet by mouth every 6 hours as needed for Pain.   • insulin glargine (Lantus SoloStar) 100 UNIT/ML pen-injector Inject 28 Units into the skin nightly. Prime 2 units before each dose.   • pantoprazole (PROTONIX) 40 MG tablet Take 1 tablet by mouth at bedtime.   • lisinopril (ZESTRIL) 20 MG tablet Take 1 tablet by mouth daily.   • metoPROLOL succinate (TOPROL-XL) 100 MG 24 hr tablet Take 1 tablet by mouth daily. Do not start before June 21, 2022.   • Insulin Lispro, 1 Unit Dial, (HumaLOG KwikPen) 100 UNIT/ML pen-injector Inject 5 Units into the skin in the morning and 5 Units at noon and 5 Units in the evening. Inject before meals. Prime 2 units before each dose.     Current Facility-Administered Medications   Medication   • lidocaine (XYLOCAINE) 5 % ointment   • LIDOCAINE 5 % EX OINT Pyxis Override      ALLERGIES:  Acetaminophen, Ibuprofen, and Lactose intolerance   (food or med)    DIAGNOSES:    Colon 3 diabetic foot ulcer, right foot  Uncontrolled T1DM  Acquired foot deformity    Medical Decision Making     31 year old uncontrolled diabetic female status post right hallux partial amputation and D2 amputation with I&D on 6/15/2022 with Dr. Servin for MRI-confirmed osteomyelitis of  distal tuft of 1st distal phalanx with Dr. Luis managing antibiotics. The D2 site did not heal well and patient is followed by both wound care and podiatry, and was fraught with excessive drainage however on 8/25/2022 has decreased drainage.     Today noted that  while the D2 is slowly improving and has significantly less drainage, though at lateral 5th metatarsal head and corresponding phalange have signs of excess pressure, which is new, and does a small wound on dorsal lateral D5.  This is of critical importance and discussed meaning and risks with patient today.  Continue the DH shoe but stop compression as a trial period. Start packing D2 with AC flex as she is amenable, iodoflex on lateral wound, and obtain insole and diabetic shoe with [facility] at this point, a prescription was given to patient and she was recommended to call them, and 1 was faxed to [facility].    Has upcoming appointment with Dr. Servin, encouraged to complete.  New pain referral placed per patient request.    Consider HBO discussion at next visit if stalling.    Topical care/offloading:  Right D2- Gentle acticoat flex packing changed 3x/week and PRN by patient.    Right lateral D5 wound- iodoflex then copious soft padding changed 3x/week and PRN by patient.    Infectious disease:  Levofloxacin prescribed on discharge for 9 days, 6/21 - 6/30.  7/15/2022- Culture with mixed radha, few PMNS. Started augmentin by podiatry.  7/18/2022- Bacterial culture obtained; eventually showed mixed radha and no PMNs.  7/27/2022- Normal CRP, mildly elevated ESR but less than prior, no leukocytosis on CBC.  8/1/2022- No david infection noted.    Vascular status:  Palpable DP and PT pulses noted 6/17/2022. Resting NELLY 6/17/2022:  INTERPRETATION/FINDINGS:  The calculated right ankle-brachial index is  1.23, with a great toe pressure of surgically absent mmHg. The calculated  left ankle-brachial index is 1.23, with a great toe pressure of 156 mmHg.  The toe pressures obtained would be adequate for wound healing at the pedal  level in the left extremity. Right extremity is indeterminate. The arterial  waveforms were triphasic at all anatomic levels in both lower extremities.  Pulse volume recording amplitude is  preserved at both ankles. IMPRESSION:  Normal ankle-brachial indices and toe pressures of the lower  Extremities.    Diabetes  Patient is uncontrolled diabetic (last a1c 11.2% on 6/14/2022, prior was 12/9% on 8/11/2018) and discussed want glucose <150 average for best wound healing. Started followed with DONNY Holt of endocrinology on 7/22/222.     Offloading  Per podiatry 6/17 note \"Patient should remain heel WB to operative side and FWB to contralateral side.\"  DH shoe dispensed from wound clinic on 6/23/2022, size small, but patient currently ambulating in post-op shoe.    [facility] script given 8/25/2022 and faxed.    Nutrition  Hypoalbuminemia present with 6/20/2022 albumin 2.2. Encouraged patient to consume adequate protein for wound healing. RD consult completed inpatient on 6/15/2022.    Albumin (g/dL)   Date Value   06/20/2022 2.2 (L)   06/18/2022 2.3 (L)   06/17/2022 2.3 (L)     HBO  Colon 3 diabetic foot ulcer with official start of care 6/15/2022. Would meet criteria for HBO 30 days after if still present. HBO risk stratification performed on 6/17/2022 and at the time only risk factor was type 1 diabetes mellitus on glucose-lowering medications.    Counseling provided (ex. Increasing nutrition/protein consumption [ideal goal is 1-1.5mg/kg/day of protein], offloading, tobacco abstinence, controlling diabetes/thyroid disorders as applicable, monitoring for signs of infection).    Significant note date copied forward from my prior note and updated for interval changes.    Min Abad MD  Undersea/Hyperbaric Medicine & Wound Care    [facility]  Phone: [phone]  Fax: [phone]    [facility]  [facility] W[facility]  Wound Care Dept: [phone]  Hyperbaric Dept: [phone]

## 2022-09-08 ENCOUNTER — OFFICE VISIT (OUTPATIENT)
Dept: ORTHOPEDICS | Facility: CLINIC | Age: 62
End: 2022-09-08
Payer: COMMERCIAL

## 2022-09-08 VITALS
BODY MASS INDEX: 40.43 KG/M2 | WEIGHT: 214 LBS | DIASTOLIC BLOOD PRESSURE: 75 MMHG | HEART RATE: 75 BPM | SYSTOLIC BLOOD PRESSURE: 140 MMHG

## 2022-09-08 DIAGNOSIS — S99.912D INJURY OF LEFT ANKLE, SUBSEQUENT ENCOUNTER: ICD-10-CM

## 2022-09-08 DIAGNOSIS — S93.492D SPRAIN OF OTHER LIGAMENT OF LEFT ANKLE, SUBSEQUENT ENCOUNTER: Primary | ICD-10-CM

## 2022-09-08 PROBLEM — M79.672 LEFT FOOT PAIN: Status: ACTIVE | Noted: 2022-09-08

## 2022-09-08 PROCEDURE — 99213 OFFICE O/P EST LOW 20 MIN: CPT | Performed by: PEDIATRICS

## 2022-09-08 NOTE — LETTER
9/8/2022         RE: Nidia Acosta  [address] [facility] [address]        Dear Colleague,    Thank you for referring your patient, Nidia Acosta, to the [facility]. Please see a copy of my visit note below.    ASSESSMENT & PLAN    Nidia was seen today for pain and follow up.    Diagnoses and all orders for this visit:    Sprain of other ligament of left ankle, subsequent encounter    Injury of left ankle, subsequent encounter      This issue is acute and Improving.    Discussed the likely injuries associated with a lateral ankle sprain and typical treatment including rest from irritating activities and pain free weight bearing - transition to ankle brace.  Patient will need a gradual rehabilitation program focusing on range of motion, strengthening and proprioception - Home Exercise Program.  Discussed a gradual return to sports and activities once pain free, no swelling, full range of motion and full strength.  Follow up at this time is only as needed - if symptoms do not improve.  Recommended ankle brace use with activities for at least 6 months post injury.    Plan:  - Today's Plan of Care:  Transition to ankle brace  Discussed activity considerations and other supportive care including Ice/Heat, OTC and other topical medications as needed.  Home Exercise Program    -We also discussed other future treatment options:  Referral to Physical Therapy    Follow Up: as needed    Concerning signs and symptoms were reviewed.  The patient expressed understanding of this management plan and all questions were answered at this time.    Iram Lancaster MD [facility]  Sports Medicine Physician  [facility]      SUBJECTIVE- Interim History September 8, 2022    Chief Complaint   Patient presents with     Left Ankle - Pain, Follow Up       Nidia Acosta is a 62 year old female who is seen in f/u up for    Sprain of other ligament of left ankle, subsequent  encounter  Injury of left ankle, subsequent encounter.  Since last visit on 8/22/22 patient has been feeling really good. She has had no problems, she is not completely pain free, but is doing very well.  - Now ~ 3 weeks from initial injury    Worsened by: inversion of the ankle  Better with: walking boot, ace bandage, icing, elevation, ibuprofen  Treatments tried: rest/activity avoidance, ice, ibuprofen, previous imaging (xray 8/18/22) and casting/splinting/bracing  Associated symptoms: minimal swelling     Orthopedic/Surgical history: NO  Social History/Occupation: works at a desk    No family history pertinent to patient's problem today.    REVIEW OF SYSTEMS:  Review of Systems  Skin: initial bruising, initial swelling  Musculoskeletal: as above  Neurologic: no numbness, paresthesias  Remainder of review of systems is negative including constitutional, CV, pulmonary, GI, except as noted in HPI or medical history.    OBJECTIVE:  BP (!) 140/75   Pulse 75   Wt 97.1 kg (214 lb)   LMP 07/29/2002   BMI 40.43 kg/m       General: healthy, alert and in no distress  HEENT: no scleral icterus or conjunctival erythema  Skin: no suspicious lesions or rash. No jaundice.  CV: distal perfusion intact  Resp: normal respiratory effort without conversational dyspnea   Psych: normal mood and affect  Gait: normal steady gait with appropriate coordination and balance  Neuro: Normal light sensory exam of lower extremity     Bilateral Foot and Ankle Exam:  Inspection:       Improved bruising and ankle swelling     Foot inspection:       no deformity noted     Tender:       lateral ankle ligaments (mild)     Non-Tender:       remainder of ankle and foot left, including medial and lateral malleolus, base of the fifth metatarsal and Lisfranc area     ROM:        near full ROM ankle dorsiflexion, plantarflexion, inversion and eversion on the left     Strength:       ankle dorsiflexion 5/5 left       plantarflexion 5/5 left        inversion 5/5 left       eversion 5/5 left     Gait:       Normal     Neurovascular:       2+ peripheral pulses bilaterally and brisk capillary refill       sensation grossly intact    Special Tests:       positive (+) anterior drawer left       neg (-) talar tilt left       neg (-) external rotation testing left      RADIOLOGY:  Final results and radiologist's interpretation, available in the [facility] health record.  Images were reviewed with the patient in the office today.  My personal interpretation of the performed imaging:   Reviewed Left Ankle XR 8/22/2022 - no acute bony abnormality, mild degenerative change, medial chronic ossicle    Review of the result(s) of each unique test - XR             Again, thank you for allowing me to participate in the care of your patient.        Sincerely,        Iram Lancaster MD

## 2022-09-08 NOTE — PROGRESS NOTES
ASSESSMENT & PLAN    Nidia was seen today for pain and follow up.    Diagnoses and all orders for this visit:    Sprain of other ligament of left ankle, subsequent encounter    Injury of left ankle, subsequent encounter      This issue is acute and Improving.    Discussed the likely injuries associated with a lateral ankle sprain and typical treatment including rest from irritating activities and pain free weight bearing - transition to ankle brace.  Patient will need a gradual rehabilitation program focusing on range of motion, strengthening and proprioception - Home Exercise Program.  Discussed a gradual return to sports and activities once pain free, no swelling, full range of motion and full strength.  Follow up at this time is only as needed - if symptoms do not improve.  Recommended ankle brace use with activities for at least 6 months post injury.    Plan:  - Today's Plan of Care:  Transition to ankle brace  Discussed activity considerations and other supportive care including Ice/Heat, OTC and other topical medications as needed.  Home Exercise Program    -We also discussed other future treatment options:  Referral to Physical Therapy    Follow Up: as needed    Concerning signs and symptoms were reviewed.  The patient expressed understanding of this management plan and all questions were answered at this time.    Iram Lancaster MD [facility]  Sports Medicine Physician  [facility]      SUBJECTIVE- Interim History September 8, 2022    Chief Complaint   Patient presents with     Left Ankle - Pain, Follow Up       Nidia Acosta is a 62 year old female who is seen in f/u up for    Sprain of other ligament of left ankle, subsequent encounter  Injury of left ankle, subsequent encounter.  Since last visit on 8/22/22 patient has been feeling really good. She has had no problems, she is not completely pain free, but is doing very well.  - Now ~ 3 weeks from initial injury    Worsened by: inversion of  the ankle  Better with: walking boot, ace bandage, icing, elevation, ibuprofen  Treatments tried: rest/activity avoidance, ice, ibuprofen, previous imaging (xray 8/18/22) and casting/splinting/bracing  Associated symptoms: minimal swelling     Orthopedic/Surgical history: NO  Social History/Occupation: works at a desk    No family history pertinent to patient's problem today.    REVIEW OF SYSTEMS:  Review of Systems  Skin: initial bruising, initial swelling  Musculoskeletal: as above  Neurologic: no numbness, paresthesias  Remainder of review of systems is negative including constitutional, CV, pulmonary, GI, except as noted in HPI or medical history.    OBJECTIVE:  BP (!) 140/75   Pulse 75   Wt 97.1 kg (214 lb)   LMP 07/29/2002   BMI 40.43 kg/m       General: healthy, alert and in no distress  HEENT: no scleral icterus or conjunctival erythema  Skin: no suspicious lesions or rash. No jaundice.  CV: distal perfusion intact  Resp: normal respiratory effort without conversational dyspnea   Psych: normal mood and affect  Gait: normal steady gait with appropriate coordination and balance  Neuro: Normal light sensory exam of lower extremity     Bilateral Foot and Ankle Exam:  Inspection:       Improved bruising and ankle swelling     Foot inspection:       no deformity noted     Tender:       lateral ankle ligaments (mild)     Non-Tender:       remainder of ankle and foot left, including medial and lateral malleolus, base of the fifth metatarsal and Lisfranc area     ROM:        near full ROM ankle dorsiflexion, plantarflexion, inversion and eversion on the left     Strength:       ankle dorsiflexion 5/5 left       plantarflexion 5/5 left       inversion 5/5 left       eversion 5/5 left     Gait:       Normal     Neurovascular:       2+ peripheral pulses bilaterally and brisk capillary refill       sensation grossly intact    Special Tests:       positive (+) anterior drawer left       neg (-) talar tilt left        neg (-) external rotation testing left      RADIOLOGY:  Final results and radiologist's interpretation, available in the [facility] health record.  Images were reviewed with the patient in the office today.  My personal interpretation of the performed imaging:   Reviewed Left Ankle XR 8/22/2022 - no acute bony abnormality, mild degenerative change, medial chronic ossicle    Review of the result(s) of each unique test - XR

## 2022-09-08 NOTE — PATIENT INSTRUCTIONS
Discussed the likely injuries associated with a lateral ankle sprain and typical treatment including rest from irritating activities and pain free weight bearing - transition to ankle brace.  Patient will need a gradual rehabilitation program focusing on range of motion, strengthening and proprioception - Home Exercise Program.  Discussed a gradual return to sports and activities once pain free, no swelling, full range of motion and full strength.  Follow up at this time is only as needed - if symptoms do not improve.  Recommended ankle brace use with activities for at least 6 months post injury.    Plan:  - Today's Plan of Care:  Transition to ankle brace  Discussed activity considerations and other supportive care including Ice/Heat, OTC and other topical medications as needed.  Home Exercise Program    -We also discussed other future treatment options:  Referral to Physical Therapy    Follow Up: as needed    If you have any further questions for your physician or physician s care team you can call [phone] and use option 3 to leave a voice message.

## 2022-11-19 ENCOUNTER — HEALTH MAINTENANCE LETTER (OUTPATIENT)
Age: 62
End: 2022-11-19

## 2023-06-01 ENCOUNTER — HEALTH MAINTENANCE LETTER (OUTPATIENT)
Age: 63
End: 2023-06-01

## 2023-06-29 ENCOUNTER — HOSPITAL ENCOUNTER (OUTPATIENT)
Dept: MAMMOGRAPHY | Facility: CLINIC | Age: 63
Discharge: HOME | End: 2023-06-29
Attending: FAMILY MEDICINE | Admitting: FAMILY MEDICINE
Payer: COMMERCIAL

## 2023-06-29 DIAGNOSIS — Z12.31 VISIT FOR SCREENING MAMMOGRAM: ICD-10-CM

## 2023-06-29 PROCEDURE — 77067 SCR MAMMO BI INCL CAD: CPT

## 2023-07-11 ENCOUNTER — OFFICE VISIT (OUTPATIENT)
Dept: FAMILY MEDICINE | Facility: CLINIC | Age: 63
End: 2023-07-11
Payer: COMMERCIAL

## 2023-07-11 VITALS
WEIGHT: 192 LBS | SYSTOLIC BLOOD PRESSURE: 136 MMHG | BODY MASS INDEX: 36.25 KG/M2 | TEMPERATURE: 98.5 F | DIASTOLIC BLOOD PRESSURE: 70 MMHG | OXYGEN SATURATION: 99 % | HEART RATE: 82 BPM | HEIGHT: 61 IN

## 2023-07-11 DIAGNOSIS — L30.1 DYSHIDROTIC FOOT DERMATITIS: ICD-10-CM

## 2023-07-11 DIAGNOSIS — E03.4 HYPOTHYROIDISM DUE TO ACQUIRED ATROPHY OF THYROID: ICD-10-CM

## 2023-07-11 DIAGNOSIS — Z12.11 SCREEN FOR COLON CANCER: ICD-10-CM

## 2023-07-11 DIAGNOSIS — F43.9 STRESS: ICD-10-CM

## 2023-07-11 DIAGNOSIS — A60.00 HERPES SIMPLEX INFECTION OF GENITOURINARY SYSTEM: ICD-10-CM

## 2023-07-11 DIAGNOSIS — Z00.00 ROUTINE GENERAL MEDICAL EXAMINATION AT A HEALTH CARE FACILITY: Primary | ICD-10-CM

## 2023-07-11 DIAGNOSIS — G47.9 DISTURBANCE IN SLEEP BEHAVIOR: ICD-10-CM

## 2023-07-11 DIAGNOSIS — J45.991 COUGH VARIANT ASTHMA: ICD-10-CM

## 2023-07-11 LAB
T4 FREE SERPL-MCNC: 1.81 NG/DL (ref 0.9–1.7)
TSH SERPL DL<=0.005 MIU/L-ACNC: 0.01 UIU/ML (ref 0.3–4.2)

## 2023-07-11 PROCEDURE — 36415 COLL VENOUS BLD VENIPUNCTURE: CPT | Performed by: FAMILY MEDICINE

## 2023-07-11 PROCEDURE — 99213 OFFICE O/P EST LOW 20 MIN: CPT | Mod: 25 | Performed by: FAMILY MEDICINE

## 2023-07-11 PROCEDURE — 99396 PREV VISIT EST AGE 40-64: CPT | Performed by: FAMILY MEDICINE

## 2023-07-11 PROCEDURE — 84439 ASSAY OF FREE THYROXINE: CPT | Performed by: FAMILY MEDICINE

## 2023-07-11 PROCEDURE — 84443 ASSAY THYROID STIM HORMONE: CPT | Performed by: FAMILY MEDICINE

## 2023-07-11 RX ORDER — MONTELUKAST SODIUM 10 MG/1
10 TABLET ORAL AT BEDTIME
Qty: 90 TABLET | Refills: 3 | Status: SHIPPED | OUTPATIENT
Start: 2023-07-11 | End: 2023-10-30

## 2023-07-11 RX ORDER — VALACYCLOVIR HYDROCHLORIDE 500 MG/1
500 TABLET, FILM COATED ORAL DAILY
Qty: 30 TABLET | Refills: 0 | Status: SHIPPED | OUTPATIENT
Start: 2023-07-11 | End: 2023-07-11

## 2023-07-11 RX ORDER — TRIAMCINOLONE ACETONIDE 5 MG/G
CREAM TOPICAL 2 TIMES DAILY
Qty: 454 G | Refills: 0 | Status: SHIPPED | OUTPATIENT
Start: 2023-07-11

## 2023-07-11 RX ORDER — FLUOXETINE 40 MG/1
40 CAPSULE ORAL DAILY
Qty: 90 CAPSULE | Refills: 3 | Status: SHIPPED | OUTPATIENT
Start: 2023-07-11 | End: 2023-10-30

## 2023-07-11 RX ORDER — LEVOTHYROXINE SODIUM 125 UG/1
125 TABLET ORAL DAILY
Qty: 30 TABLET | Refills: 0 | Status: SHIPPED | OUTPATIENT
Start: 2023-07-11 | End: 2023-08-08

## 2023-07-11 RX ORDER — FLUOXETINE 40 MG/1
40 CAPSULE ORAL DAILY
Qty: 30 CAPSULE | Refills: 0 | Status: SHIPPED | OUTPATIENT
Start: 2023-07-11 | End: 2023-07-11

## 2023-07-11 RX ORDER — VALACYCLOVIR HYDROCHLORIDE 500 MG/1
500 TABLET, FILM COATED ORAL DAILY
Qty: 90 TABLET | Refills: 3 | Status: SHIPPED | OUTPATIENT
Start: 2023-07-11 | End: 2023-10-30

## 2023-07-11 RX ORDER — ALBUTEROL SULFATE 90 UG/1
2 AEROSOL, METERED RESPIRATORY (INHALATION) EVERY 6 HOURS
Qty: 18 G | Refills: 0 | Status: SHIPPED | OUTPATIENT
Start: 2023-07-11 | End: 2024-07-24

## 2023-07-11 RX ORDER — MONTELUKAST SODIUM 10 MG/1
10 TABLET ORAL AT BEDTIME
Qty: 30 TABLET | Refills: 0 | Status: SHIPPED | OUTPATIENT
Start: 2023-07-11 | End: 2023-07-11

## 2023-07-11 ASSESSMENT — ENCOUNTER SYMPTOMS
SHORTNESS OF BREATH: 0
WEAKNESS: 0
EYE PAIN: 0
CONSTIPATION: 0
NAUSEA: 0
ABDOMINAL PAIN: 0
BREAST MASS: 0
DYSURIA: 0
HEMATOCHEZIA: 0
CHILLS: 0
HEADACHES: 0
COUGH: 0
FEVER: 0
SORE THROAT: 0
MYALGIAS: 0
HEARTBURN: 0
DIARRHEA: 0
PARESTHESIAS: 0
DIZZINESS: 0
FREQUENCY: 0
ARTHRALGIAS: 0
HEMATURIA: 0
NERVOUS/ANXIOUS: 0
JOINT SWELLING: 0

## 2023-07-11 ASSESSMENT — PATIENT HEALTH QUESTIONNAIRE - PHQ9
SUM OF ALL RESPONSES TO PHQ QUESTIONS 1-9: 2
SUM OF ALL RESPONSES TO PHQ QUESTIONS 1-9: 2
10. IF YOU CHECKED OFF ANY PROBLEMS, HOW DIFFICULT HAVE THESE PROBLEMS MADE IT FOR YOU TO DO YOUR WORK, TAKE CARE OF THINGS AT HOME, OR GET ALONG WITH OTHER PEOPLE: SOMEWHAT DIFFICULT

## 2023-07-11 ASSESSMENT — ASTHMA QUESTIONNAIRES: ACT_TOTALSCORE: 21
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
## 2023-07-13 ENCOUNTER — ANCILLARY PROCEDURE (OUTPATIENT)
Dept: MAMMOGRAPHY | Facility: CLINIC | Age: 63
End: 2023-07-13
Attending: FAMILY MEDICINE
Payer: COMMERCIAL

## 2023-07-13 DIAGNOSIS — R92.8 ABNORMAL MAMMOGRAM: ICD-10-CM

## 2023-07-13 PROCEDURE — 76642 ULTRASOUND BREAST LIMITED: CPT | Mod: LT

## 2023-07-13 PROCEDURE — 250N000009 HC RX 250: Performed by: FAMILY MEDICINE

## 2023-07-13 PROCEDURE — 19000 PUNCTURE ASPIR CYST BREAST: CPT | Mod: LT

## 2023-07-13 PROCEDURE — 77061 BREAST TOMOSYNTHESIS UNI: CPT | Mod: LT

## 2023-07-13 PROCEDURE — 272N000431 US BREAST ASPIRATION CYST INITIAL LEFT

## 2023-07-13 RX ADMIN — LIDOCAINE HYDROCHLORIDE 10 ML: 10 INJECTION, SOLUTION INFILTRATION; PERINEURAL at 14:32

## 2023-07-22 ENCOUNTER — APPOINTMENT (OUTPATIENT)
Dept: CT IMAGING | Facility: HOSPITAL | Age: 63
End: 2023-07-22
Attending: EMERGENCY MEDICINE
Payer: COMMERCIAL

## 2023-07-22 ENCOUNTER — OFFICE VISIT (OUTPATIENT)
Dept: FAMILY MEDICINE | Facility: CLINIC | Age: 63
End: 2023-07-22
Payer: COMMERCIAL

## 2023-07-22 ENCOUNTER — HOSPITAL ENCOUNTER (EMERGENCY)
Facility: HOSPITAL | Age: 63
Discharge: HOME | End: 2023-07-22
Attending: EMERGENCY MEDICINE | Admitting: EMERGENCY MEDICINE
Payer: COMMERCIAL

## 2023-07-22 VITALS
OXYGEN SATURATION: 98 % | BODY MASS INDEX: 36.28 KG/M2 | SYSTOLIC BLOOD PRESSURE: 121 MMHG | WEIGHT: 192 LBS | RESPIRATION RATE: 16 BRPM | TEMPERATURE: 98.2 F | HEART RATE: 98 BPM | DIASTOLIC BLOOD PRESSURE: 82 MMHG

## 2023-07-22 VITALS
HEART RATE: 88 BPM | HEIGHT: 61 IN | OXYGEN SATURATION: 96 % | TEMPERATURE: 98.8 F | SYSTOLIC BLOOD PRESSURE: 123 MMHG | DIASTOLIC BLOOD PRESSURE: 64 MMHG | RESPIRATION RATE: 16 BRPM | BODY MASS INDEX: 36.28 KG/M2

## 2023-07-22 DIAGNOSIS — R19.7 VOMITING AND DIARRHEA: ICD-10-CM

## 2023-07-22 DIAGNOSIS — R11.10 VOMITING AND DIARRHEA: ICD-10-CM

## 2023-07-22 DIAGNOSIS — R10.31 RLQ ABDOMINAL PAIN: Primary | ICD-10-CM

## 2023-07-22 DIAGNOSIS — R10.84 DIFFUSE ABDOMINAL PAIN: ICD-10-CM

## 2023-07-22 LAB
ALBUMIN SERPL BCG-MCNC: 4.2 G/DL (ref 3.5–5.2)
ALBUMIN UR-MCNC: 10 MG/DL
ALP SERPL-CCNC: 106 U/L (ref 35–104)
ALT SERPL W P-5'-P-CCNC: 25 U/L (ref 0–50)
ANION GAP SERPL CALCULATED.3IONS-SCNC: 13 MMOL/L (ref 7–15)
APPEARANCE UR: CLEAR
AST SERPL W P-5'-P-CCNC: 31 U/L (ref 0–45)
BACTERIA #/AREA URNS HPF: ABNORMAL /HPF
BASOPHILS # BLD AUTO: 0 10E3/UL (ref 0–0.2)
BASOPHILS NFR BLD AUTO: 0 %
BILIRUB DIRECT SERPL-MCNC: <0.2 MG/DL (ref 0–0.3)
BILIRUB SERPL-MCNC: 0.3 MG/DL
BILIRUB UR QL STRIP: NEGATIVE
BUN SERPL-MCNC: 11.8 MG/DL (ref 8–23)
CALCIUM SERPL-MCNC: 9.6 MG/DL (ref 8.8–10.2)
CHLORIDE SERPL-SCNC: 104 MMOL/L (ref 98–107)
COLOR UR AUTO: ABNORMAL
CREAT SERPL-MCNC: 0.55 MG/DL (ref 0.51–0.95)
DEPRECATED HCO3 PLAS-SCNC: 20 MMOL/L (ref 22–29)
EOSINOPHIL # BLD AUTO: 0 10E3/UL (ref 0–0.7)
EOSINOPHIL NFR BLD AUTO: 0 %
ERYTHROCYTE [DISTWIDTH] IN BLOOD BY AUTOMATED COUNT: 11.9 % (ref 10–15)
GFR SERPL CREATININE-BSD FRML MDRD: >90 ML/MIN/1.73M2
GLUCOSE SERPL-MCNC: 120 MG/DL (ref 70–99)
GLUCOSE UR STRIP-MCNC: NEGATIVE MG/DL
HCT VFR BLD AUTO: 39.3 % (ref 35–47)
HGB BLD-MCNC: 13.3 G/DL (ref 11.7–15.7)
HGB UR QL STRIP: NEGATIVE
HOLD SPECIMEN: NORMAL
HOLD SPECIMEN: NORMAL
IMM GRANULOCYTES # BLD: 0 10E3/UL
IMM GRANULOCYTES NFR BLD: 1 %
KETONES UR STRIP-MCNC: NEGATIVE MG/DL
LEUKOCYTE ESTERASE UR QL STRIP: NEGATIVE
LIPASE SERPL-CCNC: 19 U/L (ref 13–60)
LYMPHOCYTES # BLD AUTO: 0.9 10E3/UL (ref 0.8–5.3)
LYMPHOCYTES NFR BLD AUTO: 11 %
MCH RBC QN AUTO: 29.1 PG (ref 26.5–33)
MCHC RBC AUTO-ENTMCNC: 33.8 G/DL (ref 31.5–36.5)
MCV RBC AUTO: 86 FL (ref 78–100)
MONOCYTES # BLD AUTO: 0.3 10E3/UL (ref 0–1.3)
MONOCYTES NFR BLD AUTO: 3 %
MUCOUS THREADS #/AREA URNS LPF: PRESENT /LPF
NEUTROPHILS # BLD AUTO: 7.4 10E3/UL (ref 1.6–8.3)
NEUTROPHILS NFR BLD AUTO: 85 %
NITRATE UR QL: NEGATIVE
NRBC # BLD AUTO: 0 10E3/UL
NRBC BLD AUTO-RTO: 0 /100
PH UR STRIP: 5.5 [PH] (ref 5–7)
PLATELET # BLD AUTO: 282 10E3/UL (ref 150–450)
POTASSIUM SERPL-SCNC: 3.9 MMOL/L (ref 3.4–5.3)
PROT SERPL-MCNC: 6.8 G/DL (ref 6.4–8.3)
RBC # BLD AUTO: 4.57 10E6/UL (ref 3.8–5.2)
RBC URINE: <1 /HPF
SODIUM SERPL-SCNC: 137 MMOL/L (ref 136–145)
SP GR UR STRIP: 1.02 (ref 1–1.03)
SQUAMOUS EPITHELIAL: 1 /HPF
UROBILINOGEN UR STRIP-MCNC: <2 MG/DL
WBC # BLD AUTO: 8.7 10E3/UL (ref 4–11)
WBC URINE: 3 /HPF

## 2023-07-22 PROCEDURE — 250N000011 HC RX IP 250 OP 636: Mod: JZ | Performed by: EMERGENCY MEDICINE

## 2023-07-22 PROCEDURE — 96374 THER/PROPH/DIAG INJ IV PUSH: CPT | Mod: 59

## 2023-07-22 PROCEDURE — 99213 OFFICE O/P EST LOW 20 MIN: CPT | Performed by: FAMILY MEDICINE

## 2023-07-22 PROCEDURE — 99285 EMERGENCY DEPT VISIT HI MDM: CPT | Mod: 25

## 2023-07-22 PROCEDURE — 36415 COLL VENOUS BLD VENIPUNCTURE: CPT | Performed by: STUDENT IN AN ORGANIZED HEALTH CARE EDUCATION/TRAINING PROGRAM

## 2023-07-22 PROCEDURE — 87507 IADNA-DNA/RNA PROBE TQ 12-25: CPT | Performed by: EMERGENCY MEDICINE

## 2023-07-22 PROCEDURE — 258N000003 HC RX IP 258 OP 636: Performed by: EMERGENCY MEDICINE

## 2023-07-22 PROCEDURE — 96375 TX/PRO/DX INJ NEW DRUG ADDON: CPT

## 2023-07-22 PROCEDURE — 96361 HYDRATE IV INFUSION ADD-ON: CPT

## 2023-07-22 PROCEDURE — 81001 URINALYSIS AUTO W/SCOPE: CPT | Performed by: EMERGENCY MEDICINE

## 2023-07-22 PROCEDURE — 85025 COMPLETE CBC W/AUTO DIFF WBC: CPT | Performed by: STUDENT IN AN ORGANIZED HEALTH CARE EDUCATION/TRAINING PROGRAM

## 2023-07-22 PROCEDURE — 74177 CT ABD & PELVIS W/CONTRAST: CPT

## 2023-07-22 PROCEDURE — 83690 ASSAY OF LIPASE: CPT | Performed by: EMERGENCY MEDICINE

## 2023-07-22 PROCEDURE — 80053 COMPREHEN METABOLIC PANEL: CPT | Performed by: EMERGENCY MEDICINE

## 2023-07-22 RX ORDER — ONDANSETRON 2 MG/ML
4 INJECTION INTRAMUSCULAR; INTRAVENOUS ONCE
Status: COMPLETED | OUTPATIENT
Start: 2023-07-22 | End: 2023-07-22

## 2023-07-22 RX ORDER — ONDANSETRON 4 MG/1
4 TABLET, ORALLY DISINTEGRATING ORAL EVERY 8 HOURS PRN
Qty: 10 TABLET | Refills: 0 | Status: SHIPPED | OUTPATIENT
Start: 2023-07-22 | End: 2024-04-18

## 2023-07-22 RX ORDER — HYDROMORPHONE HCL IN WATER/PF 6 MG/30 ML
0.2 PATIENT CONTROLLED ANALGESIA SYRINGE INTRAVENOUS ONCE
Status: COMPLETED | OUTPATIENT
Start: 2023-07-22 | End: 2023-07-22

## 2023-07-22 RX ORDER — IOPAMIDOL 755 MG/ML
90 INJECTION, SOLUTION INTRAVASCULAR ONCE
Status: COMPLETED | OUTPATIENT
Start: 2023-07-22 | End: 2023-07-22

## 2023-07-22 RX ADMIN — ONDANSETRON 4 MG: 2 INJECTION INTRAMUSCULAR; INTRAVENOUS at 20:23

## 2023-07-22 RX ADMIN — HYDROMORPHONE HYDROCHLORIDE 0.2 MG: 0.2 INJECTION, SOLUTION INTRAMUSCULAR; INTRAVENOUS; SUBCUTANEOUS at 21:57

## 2023-07-22 RX ADMIN — IOPAMIDOL 90 ML: 755 INJECTION, SOLUTION INTRAVENOUS at 21:25

## 2023-07-22 RX ADMIN — SODIUM CHLORIDE 500 ML: 9 INJECTION, SOLUTION INTRAVENOUS at 20:19

## 2023-07-22 ASSESSMENT — ENCOUNTER SYMPTOMS
DYSURIA: 0
VOMITING: 1
ABDOMINAL PAIN: 1
SHORTNESS OF BREATH: 0
COUGH: 0
DIARRHEA: 1
FEVER: 0
NAUSEA: 1

## 2023-07-22 ASSESSMENT — ACTIVITIES OF DAILY LIVING (ADL): ADLS_ACUITY_SCORE: 35

## 2023-07-22 NOTE — PROGRESS NOTES
Assessment/Plan:   RLQ abdominal pain  Increasing right side abdominal pain since 0430 today.   Associated with bloating, diarrhea, dry heaves. Increased pain with movement and eating. More pain with bouncing/car bumps and deep palpation in the right lower quadrant.   No fever or vomiting. Minimal nausea.   S/P cholecystectomy and hysterectomy.   Consider appy, diverticulitis, duct stone, gastroenteritis, pancreatitis, SBO  To [facility] ED for further evaluation. Patient is agreeable and is stable for transport by private vehicle.   Report called to ER MD.    Subjective:     Nidia Acosta is a 63 year old female who presents for evaluation of worsening abdominal pain, mainly right side. Onset 0430 intense diffuse abdominal pain and diarrhea. Stools were greasy and she thought it was due to tacos she had the night before. Pain has persisted and localizing more to the right side. Last loose stool at 11, had peptobismol, not much help with pain. Also with bloating, diarrhea, dry heaves. Increased pain with movement, worse after eating. Tried dry toast and crackers. No emesis. Not much nausea generally.   Has been burping and passing gas, no relief of pain.   No fever. No urinary symptoms. No back or flank pain.   Increased pain in every position, better in fetal position. Bumps in the car on the way here increased pain.  S/P mat, S/P hyst.     Allergies   Allergen Reactions     Adhesive Tape Itching     Bees Anaphylaxis     Latex Dermatitis     Morphine And Related Difficulty breathing     Penicillin [Penicillins] Difficulty breathing     Seasonal Allergies      Current Outpatient Medications   Medication     albuterol (PROAIR HFA/PROVENTIL HFA/VENTOLIN HFA) 108 (90 Base) MCG/ACT inhaler     Fexofenadine HCl (ALLEGRA PO)     FLUoxetine (PROZAC) 40 MG capsule     levothyroxine (EUTHYROX) 125 MCG tablet     montelukast (SINGULAIR) 10 MG tablet     NONFORMULARY     triamcinolone (ARISTOCORT HP) 0.5 % external  cream     valACYclovir (VALTREX) 500 MG tablet     No current facility-administered medications for this visit.     Patient Active Problem List   Diagnosis     Herpes simplex infection of genitourinary system     Disturbance in sleep behavior     Hypothyroidism due to acquired atrophy of thyroid     Seasonal allergic rhinitis, unspecified trigger     Obesity     Subdural hemorrhage (H)     Mild major depression (H)     Hyperlipidemia LDL goal <130     Insomnia     H/O: hysterectomy     POD (perioral dermatitis)     PTSD (post-traumatic stress disorder)     BMI 38.0-38.9,adult     Obesity (BMI 35.0-39.9) with comorbidity (H)     Cough variant asthma     Recurrent major depression in complete remission (H)     Left foot pain     Injury of left ankle, subsequent encounter       Objective:     /82 (BP Location: Right arm, Patient Position: Sitting, Cuff Size: Adult Regular)   Pulse 98   Temp 98.2  F (36.8  C) (Oral)   Resp 16   Wt 87.1 kg (192 lb)   LMP 07/29/2002   SpO2 98%   BMI 36.28 kg/m      Physical    General Appearance: Alert, pleasant, no distress but very uncomfortable and lying on right side on exam table. AVSS   Head: Normocephalic, without obvious abnormality, atraumatic  Eyes: Conjunctivae are normal.   Lungs:  respirations unlabored  Abdomen: Soft, slightly distended, not tympanitic, pain right side with stethoscope, bowel sounds normal. Tender right side, lower at mcburneys point with guarding and mild rebound on deep palpation, mild tenderness right upper. Left side nontender, no masses, no definite organomegaly  Back: no CVA tenderness.   Skin: Skin color, texture, turgor normal, no rashes or lesions  Psychiatric: Patient has a normal mood and affect.       This note has been dictated in part using voice recognition software.  Any grammatical or context distortions are unintentional and inherent to the software.  Please feel free to contact me directly for clarification if needed.

## 2023-07-22 NOTE — ED NOTES
Expected Patient Referral to ED  6:58 PM    Referring Clinic/Provider:  [facility]. [facility]    Reason for referral/Clinical facts:  Woken at 4:30am with abd pain, bloating and greasy stools. Did eat tacos last night.     +diarrhea and took pepto. No vomiting or fever.    Recommendations provided:  will see in the ER    Caller was informed that this institution does  possess the capabilities and/or resources to provide for patient and should be transferred to our institution.    Based on the information provided, discussed that this patient likely nolberto good candidate for direct admission to this institution and that provider could proceed as such.  If however direct admit is sought and any hurdles encountered, this ED would be happy to see the patient and evaluate.    Informed caller that recommendations provided are recommendations based only on the facts provided and that they responsible to accept or reject the advice, or to seek a formal in person consultation as needed and that this ED will see/treat patient should they arrive.      Nicole Velarde MD  Emergency Medicine  [facility] EMERGENCY DEPARTMENT  [address] [place] [id number]  [phone]          Nicole Velarde MD  07/22/23 1947

## 2023-07-23 LAB

## 2023-07-23 NOTE — ED TRIAGE NOTES
Patient presents to ED for mid abdominal pain that began at 4:30AM today.  Nausea.  Loose stools.  Denies fevers.  Was seen at clinic prior to arrival and sent here.      Triage Assessment     Row Name 07/22/23 1929       Triage Assessment (Adult)    Airway WDL WDL       Respiratory WDL    Respiratory WDL WDL       Skin Circulation/Temperature WDL    Skin Circulation/Temperature WDL WDL       Peripheral/Neurovascular WDL    Peripheral Neurovascular WDL WDL       Cognitive/Neuro/Behavioral WDL    Cognitive/Neuro/Behavioral WDL WDL

## 2023-07-23 NOTE — DISCHARGE INSTRUCTIONS
Blood test and the CT scan all look good.  The CT scan mentions an enteritis which is consistent with a stomach bug type picture.    Usually these resolve in 24-48 hours.  I have written a prescription for Zofran to use for any nausea and vomiting.    Return the emergency department with worsening abdominal pain, worsening vomiting, concerns for dehydration, or any other concerns.    Thank you for choosing [facility] Emergency Department.  It has been my pleasure caring for you today.     ~Dr. Syd MD

## 2023-07-23 NOTE — ED PROVIDER NOTES
EMERGENCY DEPARTMENT ENCOUNTER      NAME: Nidia Acosta  AGE: 63 year old female  YOB: 1960  MRN: [id number]  EVALUATION DATE & TIME: No admission date for patient encounter.    PCP: Merlene Reyes    ED PROVIDER: Nicole Velarde M.D.        Chief Complaint   Patient presents with     Abdominal Pain         FINAL IMPRESSION:    1. Vomiting and diarrhea    2. Diffuse abdominal pain            MEDICAL DECISION MAKIN year old female presents emergency department with retching and diarrhea as well as abdominal pain.  CT scan reassuring and shows signs for an enteritis.  Likely represents a gastroenteritis type picture.  Laboratory stable.  Plan at this time is discharge home with prescription for Zofran and conservative management.  She understands what to watch for and when to return to the ER and all of her questions have been answered.      ED COURSE:  7:58 PM  I met with the patient to gather history and perform my exam. ED course and treatment discussed.    10:58 PM  Patient was updated on all results.  At this time seems more consistent with a gastroenteritis type picture.  Laboratory stable.  Patient feeling better.  Plan is discharge home with Zofran and conservative management at home.  She understands what to watch for and when to return to the ER and all of her questions have been answered.    I do not think that this represents ACS, PE, ruptured AAA, aortic dissection, bowel obstruction, bowel ischemia, cholecystitis, pancreatitis, appendicitis, diverticulitis, kidney stone, pyelonephritis, incarcerated or strangulated hernia, ovarian torsion, viscus perforation, perforated GI ulcer, or other such etiologies at this time.    At the conclusion of the encounter I discussed the results of all of the tests and the disposition. Their questions were answered. The patient (and any family present) acknowledged understanding and were agreeable with the care  "plan.      CONSULTANTS:  none        MEDICATIONS GIVEN IN THE EMERGENCY:  Medications   ondansetron (ZOFRAN) injection 4 mg (4 mg Intravenous $Given 7/22/23 2023)   HYDROmorphone (DILAUDID) injection 0.2 mg (0.2 mg Intravenous $Given 7/22/23 2157)   0.9% sodium chloride BOLUS (0 mLs Intravenous Stopped 7/22/23 2120)   iopamidol (ISOVUE-370) solution 90 mL (90 mLs Intravenous $Given 7/22/23 2125)           NEW PRESCRIPTIONS STARTED AT TODAY'S ER VISIT     Medication List      Started    ondansetron 4 MG ODT tab  Commonly known as: ZOFRAN ODT  4 mg, Oral, EVERY 8 HOURS PRN                CONDITION:  Stable, improving        DISPOSITION:  discharge home with          =================================================================  =================================================================  TRIAGE ASSESSMENT:  Patient presents to ED for mid abdominal pain that began at 4:30AM today.  Nausea.  Loose stools.  Denies fevers.  Was seen at clinic prior to arrival and sent here.      Triage Assessment     Row Name 07/22/23 1929       Triage Assessment (Adult)    Airway WDL WDL       Respiratory WDL    Respiratory WDL WDL       Skin Circulation/Temperature WDL    Skin Circulation/Temperature WDL WDL       Peripheral/Neurovascular WDL    Peripheral Neurovascular WDL WDL       Cognitive/Neuro/Behavioral WDL    Cognitive/Neuro/Behavioral WDL WDL                   ED Triage Vitals   Enc Vitals Group      BP 07/22/23 1929 136/58      Pulse 07/22/23 1927 99      Resp 07/22/23 1927 16      Temp 07/22/23 1927 98.8  F (37.1  C)      Temp src 07/22/23 1927 Oral      SpO2 07/22/23 1927 96 %      Weight --       Height 07/22/23 1927 1.549 m (5' 1\")          ================================================================  ================================================================    HPI    Patient information was obtained from: patient    Use of Intrepreter: N/A      Nidia Acosta is a 63 year old female with " history of subdural hemorrhage, asthma hypothyroidism, asthma,  who presents to the ER with complaints of abdominal pain, vomiting and diarrhea.    She had eaten tacos last night and around 4 AM started developing abdominal pain and greasy stools.  She just attributed to the tacos and thought they were too greasy.  Pain continues.  She continues to have dry heaves and multiple episodes of diarrhea.  Pain is most significant in the epigastric region and right upper quadrant area.  She states she has had her gallbladder removed.    Otherwise denies any fevers, cough, chest pain, dysuria, hematuria.    Initially presented to urgent care who referred her to the ER for further evaluation.      REVIEW OF SYSTEMS  Review of Systems   Constitutional: Negative for fever.   Respiratory: Negative for cough and shortness of breath.    Cardiovascular: Negative for chest pain.   Gastrointestinal: Positive for abdominal pain, diarrhea, nausea and vomiting.   Genitourinary: Negative for dysuria.   All other systems reviewed and are negative.        PAST MEDICAL HISTORY:  Past Medical History:   Diagnosis Date     Backache, unspecified     lumbar back pain, degen disk disease     COPD (chronic obstructive pulmonary disease) (H)     I get bronchitis frequently     Cough variant asthma 07/07/2020     Depressive disorder     numerous yrs ago diagnosed with PTSD     Leiomyoma of uterus, unspecified 2003    s/p LAVH     Other genital herpes     HSV-2     Thyroid disease     Me     Unspecified sinusitis (chronic)          PAST SURGICAL HISTORY:  Past Surgical History:   Procedure Laterality Date     BACK SURGERY       COLONOSCOPY  04/05/2013    Procedure: COLONOSCOPY;  Colonoscopy  ;  Surgeon: Laura Ruff MD;  Location: [facility]     HEAD & NECK SURGERY  06/2019    Discectomy with fusion     HYSTERECTOMY, PAP NO LONGER INDICATED  07/2003    LAV for fibroids     LAPAROSCOPIC CHOLECYSTECTOMY N/A 08/21/2019    Procedure:  CHOLECYSTECTOMY, LAPAROSCOPIC;  Surgeon: Kwabena Stephenson MD;  Location: [facility]     LAPAROSCOPIC LYSIS ADHESIONS  1985     LASIK  10/2004     TONSILLECTOMY & ADENOIDECTOMY  age 18     TUBAL LIGATION  04/1994         CURRENT MEDICATIONS:    Prior to Admission medications    Medication Sig Start Date End Date Taking? Authorizing Provider   albuterol (PROAIR HFA/PROVENTIL HFA/VENTOLIN HFA) 108 (90 Base) MCG/ACT inhaler Inhale 2 puffs into the lungs every 6 hours 7/11/23   Merlene Reyes MD   Fexofenadine HCl (ALLEGRA PO) Take by mouth daily    Reported, Patient   FLUoxetine (PROZAC) 40 MG capsule Take 1 capsule (40 mg) by mouth daily 7/11/23   Merlene Reyes MD   levothyroxine (EUTHYROX) 125 MCG tablet Take 1 tablet (125 mcg) by mouth daily 7/11/23   Merlene Reyes MD   montelukast (SINGULAIR) 10 MG tablet Take 1 tablet (10 mg) by mouth At Bedtime 7/11/23   Merlene Reyes MD   NONFORMULARY Take 4 capsules by mouth daily Sulfurzyme -  combines wolfberry with MSM, a naturally occurring organic form of dietary sulfur needed by our bodies every day to maintain the structure of proteins, protect cells and cell membranes, replenish the connections between cells, and preserve the molecular framework of connective tissue.    Reported, Patient   triamcinolone (ARISTOCORT HP) 0.5 % external cream Apply topically 2 times daily 7/11/23   Merlene Reyes MD   valACYclovir (VALTREX) 500 MG tablet Take 1 tablet (500 mg) by mouth daily 7/11/23   Merlene Reyes MD         ALLERGIES:  Allergies   Allergen Reactions     Adhesive Tape Itching     Bees Anaphylaxis     Latex Dermatitis     Morphine And Related Difficulty breathing     Penicillin [Penicillins] Difficulty breathing     Seasonal Allergies          FAMILY HISTORY:  Family History   Problem Relation Age of Onset     Arthritis Mother      Allergies Mother         otc meds     Depression Mother      Alzheimer Disease Mother      Hyperlipidemia Mother       "Osteoporosis Mother      Diabetes Mother      Heart Disease Mother 80        mitral valve issues     Obesity Mother      Hypertension Father         on med     C.A.D. Father         has had 2 MI\"S, possible surgery     Alcohol/Drug Father      Arthritis Father      Hyperlipidemia Father      Substance Abuse Father      Alcohol/Drug Brother      Substance Abuse Brother      Heart Disease Maternal Grandmother      Osteoporosis Maternal Grandmother      Heart Disease Maternal Grandfather      Heart Disease Paternal Grandmother      Diabetes Paternal Grandmother      Obesity Paternal Grandmother      Heart Disease Paternal Grandfather      Alcohol/Drug Son         son in recovery     Hypertension Brother      Hyperlipidemia Brother      Substance Abuse Other         Son     Hyperlipidemia Brother      Depression Brother          SOCIAL HISTORY:  Social History     Socioeconomic History     Marital status:      Number of children: 6   Occupational History     Occupation: Credit Manager     Employer: [employer]   Tobacco Use     Smoking status: Former     Packs/day: 0.50     Years: 1.00     Pack years: 0.50     Types: Cigarettes     Quit date: 1986     Years since quittin.5     Smokeless tobacco: Never   Substance and Sexual Activity     Alcohol use: Yes     Comment: 1-5 drinks weekly     Drug use: No     Sexual activity: Yes     Partners: Male     Birth control/protection: Female Surgical     Comment: Historectomy   Other Topics Concern     Parent/sibling w/ CABG, MI or angioplasty before 65F 55M? No     Comment: father, brother         VITALS:  Patient Vitals for the past 24 hrs:   BP Temp Temp src Pulse Resp SpO2 Height   23 2300 123/64 -- -- 88 -- 96 % --   23 2246 122/56 -- -- 92 -- 95 % --   23 2229 98/49 -- -- 87 -- 93 % --   23 2214 99/51 -- -- 85 -- 93 % --   23 2157 117/58 -- -- -- -- -- --   23 1929 136/58 98.8  F (37.1  C) Oral 100 16 98 % " "--   07/22/23 1927 -- 98.8  F (37.1  C) Oral 99 16 96 % 1.549 m (5' 1\")       Wt Readings from Last 3 Encounters:   07/22/23 87.1 kg (192 lb)   07/11/23 87.1 kg (192 lb)   09/08/22 97.1 kg (214 lb)       Estimated Creatinine Clearance: 107.7 mL/min (based on SCr of 0.55 mg/dL).    PHYSICAL EXAM    Constitutional:  Well developed, Well nourished, NAD, GCS 15  HENT:  Normocephalic, Atraumatic, Bilateral external ears normal,  Nose normal. Neck- Supple, No stridor.   Eyes:  PERRL, EOMI, Conjunctiva normal, No discharge.  Respiratory:  Normal breath sounds, No respiratory distress, No wheezing, Speaks full sentences easily. No cough.   Cardiovascular:  Normal heart rate, Regular rhythm, No rubs, No gallops. Chest wall nontender.   GI:  No excessive obesity.  Bowel sounds normal, Soft, moderate RUQ tenderness, No masses, No flank tenderness. No rebound or guarding.  : deferred  Musculoskeletal:  No cyanosis, No clubbing. Good range of motion in all major joints. No major deformities noted.   Integument:  Warm, Dry, No erythema, No rash.  No petechiae.   Neurologic:  Alert & oriented x 3  Psychiatric:  Affect normal, Cooperative         LAB:  All pertinent labs reviewed and interpreted.  Recent Results (from the past 24 hour(s))   Hepatic function panel    Collection Time: 07/22/23  8:13 PM   Result Value Ref Range    Protein Total 6.8 6.4 - 8.3 g/dL    Albumin 4.2 3.5 - 5.2 g/dL    Bilirubin Total 0.3 <=1.2 mg/dL    Alkaline Phosphatase 106 (H) 35 - 104 U/L    AST 31 0 - 45 U/L    ALT 25 0 - 50 U/L    Bilirubin Direct <0.20 0.00 - 0.30 mg/dL   Lipase    Collection Time: 07/22/23  8:13 PM   Result Value Ref Range    Lipase 19 13 - 60 U/L   CBC with platelets and differential    Collection Time: 07/22/23  8:13 PM   Result Value Ref Range    WBC Count 8.7 4.0 - 11.0 10e3/uL    RBC Count 4.57 3.80 - 5.20 10e6/uL    Hemoglobin 13.3 11.7 - 15.7 g/dL    Hematocrit 39.3 35.0 - 47.0 %    MCV 86 78 - 100 fL    MCH 29.1 26.5 - " 33.0 pg    MCHC 33.8 31.5 - 36.5 g/dL    RDW 11.9 10.0 - 15.0 %    Platelet Count 282 150 - 450 10e3/uL    % Neutrophils 85 %    % Lymphocytes 11 %    % Monocytes 3 %    % Eosinophils 0 %    % Basophils 0 %    % Immature Granulocytes 1 %    NRBCs per 100 WBC 0 <1 /100    Absolute Neutrophils 7.4 1.6 - 8.3 10e3/uL    Absolute Lymphocytes 0.9 0.8 - 5.3 10e3/uL    Absolute Monocytes 0.3 0.0 - 1.3 10e3/uL    Absolute Eosinophils 0.0 0.0 - 0.7 10e3/uL    Absolute Basophils 0.0 0.0 - 0.2 10e3/uL    Absolute Immature Granulocytes 0.0 <=0.4 10e3/uL    Absolute NRBCs 0.0 10e3/uL   Extra Blue Top Tube    Collection Time: 07/22/23  8:13 PM   Result Value Ref Range    Hold Specimen [facility]    Extra Red Top Tube    Collection Time: 07/22/23  8:13 PM   Result Value Ref Range    Hold Specimen [facility]    Basic metabolic panel    Collection Time: 07/22/23  8:13 PM   Result Value Ref Range    Sodium 137 136 - 145 mmol/L    Potassium 3.9 3.4 - 5.3 mmol/L    Chloride 104 98 - 107 mmol/L    Carbon Dioxide (CO2) 20 (L) 22 - 29 mmol/L    Anion Gap 13 7 - 15 mmol/L    Urea Nitrogen 11.8 8.0 - 23.0 mg/dL    Creatinine 0.55 0.51 - 0.95 mg/dL    Calcium 9.6 8.8 - 10.2 mg/dL    Glucose 120 (H) 70 - 99 mg/dL    GFR Estimate >90 >60 mL/min/1.73m2   UA with Microscopic reflex to Culture    Collection Time: 07/22/23  9:51 PM    Specimen: Urine, Clean Catch   Result Value Ref Range    Color Urine Light Yellow Colorless, Straw, Light Yellow, Yellow    Appearance Urine Clear Clear    Glucose Urine Negative Negative mg/dL    Bilirubin Urine Negative Negative    Ketones Urine Negative Negative mg/dL    Specific Gravity Urine 1.025 1.001 - 1.030    Blood Urine Negative Negative    pH Urine 5.5 5.0 - 7.0    Protein Albumin Urine 10 (A) Negative mg/dL    Urobilinogen Urine <2.0 <2.0 mg/dL    Nitrite Urine Negative Negative    Leukocyte Esterase Urine Negative Negative    Bacteria Urine Few (A) None Seen /HPF    Mucus Urine Present (A) None Seen /LPF    RBC Urine  <1 <=2 /HPF    WBC Urine 3 <=5 /HPF    Squamous Epithelials Urine 1 <=1 /HPF       No results found for: ABORH        RADIOLOGY:  Reviewed all pertinent imaging. Please see official radiology report.    CT Abdomen Pelvis w Contrast   Final Result   IMPRESSION:    1.  Findings of an enteritis with liquid fecal contents in the colon.   2.  No evidence of a colitis.   3.  Prior cholecystectomy and hysterectomy.            EKG:    none    PROCEDURES:  none    Medical Decision Making    History:    Supplemental history from: Documented in chart, if applicable and Family Member/Significant Other    External Record(s) reviewed: Documented in chart, if applicable.    Work Up:    Chart documentation includes differential considered and any EKGs or imaging independently interpreted by provider, where specified.    In additional to work up documented, I considered the following work up: Documented in chart, if applicable.    External consultation:    Discussion of management with another provider: Documented in chart, if applicable    Complicating factors:    Care impacted by chronic illness: N/A    Care affected by social determinants of health: N/A    Disposition considerations: Discharge. I prescribed additional prescription strength medication(s) as charted. I considered admission, but ultimately discharged patient As she has improved and CT and lab results are reassuring..        I, Keyonna Buckner, am serving as a scribe to document services personally performed by Dr. Nicole Velarde based on my observation and the provider's statements to me. I, Dr. Nicole Velarde MD attest that Keyonna Buckner is acting in a scribe capacity, has observed my performance of the services and has documented them in accordance with my direction.        Nicole Velarde M.D. [facility]  Emergency Medicine and Medical Toxicology  Formerly [facility] EMERGENCY DEPARTMENT  [id number]  [facility] [address]  [phone]  Dept: [phone]           Nicole Velarde MD  07/23/23 0119

## 2023-08-08 ENCOUNTER — MYC REFILL (OUTPATIENT)
Dept: FAMILY MEDICINE | Facility: CLINIC | Age: 63
End: 2023-08-08

## 2023-08-08 DIAGNOSIS — E03.4 HYPOTHYROIDISM DUE TO ACQUIRED ATROPHY OF THYROID: ICD-10-CM

## 2023-08-09 NOTE — TELEPHONE ENCOUNTER
"Routing refill request to provider for review/approval because:  Labs out of range:  TSH      Requested Prescriptions   Pending Prescriptions Disp Refills    levothyroxine (EUTHYROX) 125 MCG tablet 30 tablet 0     Sig: Take 1 tablet (125 mcg) by mouth daily       Thyroid Protocol Failed - 8/8/2023  7:57 PM        Failed - Normal TSH on file in past 12 months     Recent Labs   Lab Test 07/11/23  1751   TSH 0.01*              Passed - Patient is 12 years or older        Passed - Recent (12 mo) or future (30 days) visit within the authorizing provider's specialty     Patient has had an office visit with the authorizing provider or a provider within the authorizing providers department within the previous 12 mos or has a future within next 30 days. See \"Patient Info\" tab in inbasket, or \"Choose Columns\" in Meds & Orders section of the refill encounter.              Passed - Medication is active on med list        Passed - No active pregnancy on record     If patient is pregnant or has had a positive pregnancy test, please check TSH.          Passed - No positive pregnancy test in past 12 months     If patient is pregnant or has had a positive pregnancy test, please check TSH.                   Alirio Sommer RN 08/09/23 8:39 AM   "

## 2023-08-11 RX ORDER — LEVOTHYROXINE SODIUM 125 UG/1
125 TABLET ORAL DAILY
Qty: 30 TABLET | Refills: 0 | Status: SHIPPED | OUTPATIENT
Start: 2023-08-11 | End: 2023-10-30

## 2023-10-30 ENCOUNTER — MYC REFILL (OUTPATIENT)
Dept: FAMILY MEDICINE | Facility: CLINIC | Age: 63
End: 2023-10-30
Payer: COMMERCIAL

## 2023-10-30 DIAGNOSIS — F43.9 STRESS: ICD-10-CM

## 2023-10-30 DIAGNOSIS — E03.4 HYPOTHYROIDISM DUE TO ACQUIRED ATROPHY OF THYROID: ICD-10-CM

## 2023-10-30 DIAGNOSIS — A60.00 HERPES SIMPLEX INFECTION OF GENITOURINARY SYSTEM: ICD-10-CM

## 2023-10-30 DIAGNOSIS — J45.991 COUGH VARIANT ASTHMA: ICD-10-CM

## 2023-10-30 RX ORDER — LEVOTHYROXINE SODIUM 125 UG/1
125 TABLET ORAL DAILY
Qty: 90 TABLET | Refills: 2 | Status: SHIPPED | OUTPATIENT
Start: 2023-10-30 | End: 2024-04-18

## 2023-10-30 RX ORDER — MONTELUKAST SODIUM 10 MG/1
10 TABLET ORAL AT BEDTIME
Qty: 90 TABLET | Refills: 2 | Status: SHIPPED | OUTPATIENT
Start: 2023-10-30 | End: 2024-04-18

## 2023-10-30 RX ORDER — VALACYCLOVIR HYDROCHLORIDE 500 MG/1
500 TABLET, FILM COATED ORAL DAILY
Qty: 90 TABLET | Refills: 2 | Status: SHIPPED | OUTPATIENT
Start: 2023-10-30 | End: 2024-04-18

## 2023-10-30 RX ORDER — FLUOXETINE 40 MG/1
40 CAPSULE ORAL DAILY
Qty: 90 CAPSULE | Refills: 2 | Status: SHIPPED | OUTPATIENT
Start: 2023-10-30 | End: 2024-04-18

## 2023-11-21 ENCOUNTER — ANESTHESIA EVENT (OUTPATIENT)
Dept: GASTROENTEROLOGY | Facility: CLINIC | Age: 63
End: 2023-11-21
Payer: COMMERCIAL

## 2023-11-21 ASSESSMENT — LIFESTYLE VARIABLES: TOBACCO_USE: 1

## 2023-11-21 ASSESSMENT — COPD QUESTIONNAIRES: COPD: 1

## 2023-11-21 NOTE — ANESTHESIA PREPROCEDURE EVALUATION
Anesthesia Pre-Procedure Evaluation    Patient: Nidia Acosta   MRN: [id number] : 1960        Procedure : Procedure(s):  Colonoscopy          Past Medical History:   Diagnosis Date     Backache, unspecified     lumbar back pain, degen disk disease     COPD (chronic obstructive pulmonary disease) (H)     I get bronchitis frequently     Cough variant asthma 2020     Depressive disorder     numerous yrs ago diagnosed with PTSD     Leiomyoma of uterus, unspecified     s/p LAVH     Other genital herpes     HSV-2     Thyroid disease     Me     Unspecified sinusitis (chronic)       Past Surgical History:   Procedure Laterality Date     BACK SURGERY       COLONOSCOPY  2013    Procedure: COLONOSCOPY;  Colonoscopy  ;  Surgeon: Laura Ruff MD;  Location: [facility]     HEAD & NECK SURGERY  2019    Discectomy with fusion     HYSTERECTOMY, PAP NO LONGER INDICATED  2003    LAVH for fibroids     LAPAROSCOPIC CHOLECYSTECTOMY N/A 2019    Procedure: CHOLECYSTECTOMY, LAPAROSCOPIC;  Surgeon: Kwabena Stephenson MD;  Location: [facility]     LAPAROSCOPIC LYSIS ADHESIONS  1985     LASIK  10/2004     TONSILLECTOMY & ADENOIDECTOMY  age 18     TUBAL LIGATION  1994      Allergies   Allergen Reactions     Adhesive Tape Itching     Bees Anaphylaxis     Codeine Difficulty breathing     Latex Dermatitis     Penicillin [Penicillins] Difficulty breathing     Seasonal Allergies       Social History     Tobacco Use     Smoking status: Former     Packs/day: 0.50     Years: 1.00     Additional pack years: 0.00     Total pack years: 0.50     Types: Cigarettes     Quit date: 1986     Years since quittin.9     Smokeless tobacco: Never   Substance Use Topics     Alcohol use: Yes     Comment: 1-5 drinks weekly      Wt Readings from Last 1 Encounters:   23 87.1 kg (192 lb)        Anesthesia Evaluation   Pt has had prior anesthetic. Type: General and MAC.        ROS/MED HX  ENT/Pulmonary:     (+)      KENNY risk factors,   obese,   allergic rhinitis,     tobacco use, Past use,    asthma    COPD,              Neurologic: Comment: Hx SDH   - neg neurologic ROS     Cardiovascular:     (+) Dyslipidemia - -   -  - -                                 Previous cardiac testing   Echo: Date: Results:    Stress Test:  Date: Results:    ECG Reviewed:  Date: 9/27/21 Results:  Sinus  Rhythm   WITHIN NORMAL LIMITS  Cath:  Date: Results:      METS/Exercise Tolerance:     Hematologic:  - neg hematologic  ROS     Musculoskeletal: Comment: Hx cervical discectomy and fusion 2019  Lumbago    (+)  arthritis,             GI/Hepatic:  - neg GI/hepatic ROS     Renal/Genitourinary:  - neg Renal ROS     Endo:     (+)          thyroid problem, hypothyroidism,    Obesity,       Psychiatric/Substance Use:     (+) psychiatric history depression and other (comment) (PTSD) alcohol abuse (1-5 drinks weekly)      Infectious Disease:  - neg infectious disease ROS     Malignancy:       Other:            Physical Exam    Airway  airway exam normal      Mallampati: II   TM distance: > 3 FB   Neck ROM: full   Mouth opening: > 3 cm    Respiratory Devices and Support         Dental       (+) Minor Abnormalities - some fillings, tiny chips      Cardiovascular   cardiovascular exam normal          Pulmonary   pulmonary exam normal        breath sounds clear to auscultation       OUTSIDE LABS:  CBC:   Lab Results   Component Value Date    WBC 8.7 07/22/2023    WBC 8.0 09/27/2021    HGB 13.3 07/22/2023    HGB 13.1 09/27/2021    HCT 39.3 07/22/2023    HCT 39.1 09/27/2021     07/22/2023     09/27/2021     BMP:   Lab Results   Component Value Date     07/22/2023     09/27/2021    POTASSIUM 3.9 07/22/2023    POTASSIUM 4.1 09/27/2021    CHLORIDE 104 07/22/2023    CHLORIDE 108 09/27/2021    CO2 20 (L) 07/22/2023    CO2 24 09/27/2021    BUN 11.8 07/22/2023    BUN 11 09/27/2021    CR 0.55 07/22/2023    CR 0.78 09/27/2021     (H)  "07/22/2023    GLC 90 09/27/2021     COAGS:   Lab Results   Component Value Date    PTT 30 07/13/2009    INR 0.94 07/29/2009     POC: No results found for: \"BGM\", \"HCG\", \"HCGS\"  HEPATIC:   Lab Results   Component Value Date    ALBUMIN 4.2 07/22/2023    PROTTOTAL 6.8 07/22/2023    ALT 25 07/22/2023    AST 31 07/22/2023    ALKPHOS 106 (H) 07/22/2023    BILITOTAL 0.3 07/22/2023     OTHER:   Lab Results   Component Value Date    ROHAN 9.6 07/22/2023    PHOS 3.9 08/01/2009    MAG 2.3 08/01/2009    LIPASE 19 07/22/2023    TSH 0.01 (L) 07/11/2023    T4 1.81 (H) 07/11/2023    SED 9 07/13/2009       Anesthesia Plan    ASA Status:  3    NPO Status:  NPO Appropriate    Anesthesia Type: General.     - Airway: Native airway   Induction: Intravenous.   Maintenance: TIVA.        Consents    Anesthesia Plan(s) and associated risks, benefits, and realistic alternatives discussed. Questions answered and patient/representative(s) expressed understanding.     - Discussed: Risks, Benefits and Alternatives for BOTH SEDATION and the PROCEDURE were discussed     - Discussed with:  Patient            Postoperative Care            Comments:              ELOISE Vivar CRNA  "

## 2023-11-24 ENCOUNTER — ANESTHESIA (OUTPATIENT)
Dept: GASTROENTEROLOGY | Facility: CLINIC | Age: 63
End: 2023-11-24
Payer: COMMERCIAL

## 2023-11-24 ENCOUNTER — HOSPITAL ENCOUNTER (OUTPATIENT)
Facility: CLINIC | Age: 63
Discharge: HOME | End: 2023-11-24
Attending: STUDENT IN AN ORGANIZED HEALTH CARE EDUCATION/TRAINING PROGRAM | Admitting: STUDENT IN AN ORGANIZED HEALTH CARE EDUCATION/TRAINING PROGRAM
Payer: COMMERCIAL

## 2023-11-24 ENCOUNTER — NURSE TRIAGE (OUTPATIENT)
Dept: NURSING | Facility: CLINIC | Age: 63
End: 2023-11-24
Payer: COMMERCIAL

## 2023-11-24 VITALS
HEIGHT: 61 IN | RESPIRATION RATE: 16 BRPM | OXYGEN SATURATION: 99 % | SYSTOLIC BLOOD PRESSURE: 120 MMHG | DIASTOLIC BLOOD PRESSURE: 70 MMHG | TEMPERATURE: 98 F | BODY MASS INDEX: 36.63 KG/M2 | HEART RATE: 82 BPM | WEIGHT: 194 LBS

## 2023-11-24 LAB — COLONOSCOPY: NORMAL

## 2023-11-24 PROCEDURE — 370N000017 HC ANESTHESIA TECHNICAL FEE, PER MIN: Performed by: STUDENT IN AN ORGANIZED HEALTH CARE EDUCATION/TRAINING PROGRAM

## 2023-11-24 PROCEDURE — 250N000009 HC RX 250: Performed by: NURSE ANESTHETIST, CERTIFIED REGISTERED

## 2023-11-24 PROCEDURE — 45385 COLONOSCOPY W/LESION REMOVAL: CPT | Mod: PT | Performed by: STUDENT IN AN ORGANIZED HEALTH CARE EDUCATION/TRAINING PROGRAM

## 2023-11-24 PROCEDURE — 250N000011 HC RX IP 250 OP 636: Performed by: NURSE ANESTHETIST, CERTIFIED REGISTERED

## 2023-11-24 PROCEDURE — 258N000003 HC RX IP 258 OP 636: Performed by: NURSE ANESTHETIST, CERTIFIED REGISTERED

## 2023-11-24 PROCEDURE — 258N000003 HC RX IP 258 OP 636: Performed by: STUDENT IN AN ORGANIZED HEALTH CARE EDUCATION/TRAINING PROGRAM

## 2023-11-24 PROCEDURE — 250N000009 HC RX 250: Performed by: STUDENT IN AN ORGANIZED HEALTH CARE EDUCATION/TRAINING PROGRAM

## 2023-11-24 PROCEDURE — 88305 TISSUE EXAM BY PATHOLOGIST: CPT | Mod: 26 | Performed by: PATHOLOGY

## 2023-11-24 PROCEDURE — 88305 TISSUE EXAM BY PATHOLOGIST: CPT | Mod: TC | Performed by: STUDENT IN AN ORGANIZED HEALTH CARE EDUCATION/TRAINING PROGRAM

## 2023-11-24 RX ORDER — GLYCOPYRROLATE 0.2 MG/ML
INJECTION, SOLUTION INTRAMUSCULAR; INTRAVENOUS PRN
Status: DISCONTINUED | OUTPATIENT
Start: 2023-11-24 | End: 2023-11-24

## 2023-11-24 RX ORDER — NALOXONE HYDROCHLORIDE 0.4 MG/ML
0.2 INJECTION, SOLUTION INTRAMUSCULAR; INTRAVENOUS; SUBCUTANEOUS
Status: DISCONTINUED | OUTPATIENT
Start: 2023-11-24 | End: 2023-11-24 | Stop reason: HOSPADM

## 2023-11-24 RX ORDER — FLUMAZENIL 0.1 MG/ML
0.2 INJECTION, SOLUTION INTRAVENOUS
Status: DISCONTINUED | OUTPATIENT
Start: 2023-11-24 | End: 2023-11-24 | Stop reason: HOSPADM

## 2023-11-24 RX ORDER — NALOXONE HYDROCHLORIDE 0.4 MG/ML
0.4 INJECTION, SOLUTION INTRAMUSCULAR; INTRAVENOUS; SUBCUTANEOUS
Status: DISCONTINUED | OUTPATIENT
Start: 2023-11-24 | End: 2023-11-24 | Stop reason: HOSPADM

## 2023-11-24 RX ORDER — SODIUM CHLORIDE, SODIUM LACTATE, POTASSIUM CHLORIDE, CALCIUM CHLORIDE 600; 310; 30; 20 MG/100ML; MG/100ML; MG/100ML; MG/100ML
INJECTION, SOLUTION INTRAVENOUS CONTINUOUS PRN
Status: DISCONTINUED | OUTPATIENT
Start: 2023-11-24 | End: 2023-11-24

## 2023-11-24 RX ORDER — SODIUM CHLORIDE, SODIUM LACTATE, POTASSIUM CHLORIDE, CALCIUM CHLORIDE 600; 310; 30; 20 MG/100ML; MG/100ML; MG/100ML; MG/100ML
INJECTION, SOLUTION INTRAVENOUS CONTINUOUS
Status: DISCONTINUED | OUTPATIENT
Start: 2023-11-24 | End: 2023-11-24 | Stop reason: HOSPADM

## 2023-11-24 RX ORDER — LIDOCAINE HYDROCHLORIDE 20 MG/ML
INJECTION, SOLUTION INFILTRATION; PERINEURAL PRN
Status: DISCONTINUED | OUTPATIENT
Start: 2023-11-24 | End: 2023-11-24

## 2023-11-24 RX ORDER — LIDOCAINE 40 MG/G
CREAM TOPICAL
Status: DISCONTINUED | OUTPATIENT
Start: 2023-11-24 | End: 2023-11-24 | Stop reason: HOSPADM

## 2023-11-24 RX ORDER — ONDANSETRON 4 MG/1
4 TABLET, ORALLY DISINTEGRATING ORAL EVERY 30 MIN PRN
Status: DISCONTINUED | OUTPATIENT
Start: 2023-11-24 | End: 2023-11-24 | Stop reason: HOSPADM

## 2023-11-24 RX ORDER — ONDANSETRON 2 MG/ML
4 INJECTION INTRAMUSCULAR; INTRAVENOUS EVERY 30 MIN PRN
Status: DISCONTINUED | OUTPATIENT
Start: 2023-11-24 | End: 2023-11-24 | Stop reason: HOSPADM

## 2023-11-24 RX ORDER — PROPOFOL 10 MG/ML
INJECTION, EMULSION INTRAVENOUS CONTINUOUS PRN
Status: DISCONTINUED | OUTPATIENT
Start: 2023-11-24 | End: 2023-11-24

## 2023-11-24 RX ADMIN — LIDOCAINE HYDROCHLORIDE 0.1 ML: 10 INJECTION, SOLUTION EPIDURAL; INFILTRATION; INTRACAUDAL; PERINEURAL at 06:54

## 2023-11-24 RX ADMIN — GLYCOPYRROLATE 0.2 MG: 0.2 INJECTION, SOLUTION INTRAMUSCULAR; INTRAVENOUS at 08:00

## 2023-11-24 RX ADMIN — LIDOCAINE HYDROCHLORIDE 60 MG: 20 INJECTION, SOLUTION INFILTRATION; PERINEURAL at 08:00

## 2023-11-24 RX ADMIN — SODIUM CHLORIDE, POTASSIUM CHLORIDE, SODIUM LACTATE AND CALCIUM CHLORIDE: 600; 310; 30; 20 INJECTION, SOLUTION INTRAVENOUS at 08:00

## 2023-11-24 RX ADMIN — SODIUM CHLORIDE, POTASSIUM CHLORIDE, SODIUM LACTATE AND CALCIUM CHLORIDE: 600; 310; 30; 20 INJECTION, SOLUTION INTRAVENOUS at 06:54

## 2023-11-24 RX ADMIN — PROPOFOL 200 MCG/KG/MIN: 10 INJECTION, EMULSION INTRAVENOUS at 08:00

## 2023-11-24 ASSESSMENT — ACTIVITIES OF DAILY LIVING (ADL)
ADLS_ACUITY_SCORE: 35
ADLS_ACUITY_SCORE: 35

## 2023-11-24 NOTE — ANESTHESIA POSTPROCEDURE EVALUATION
Patient: Nidia Acosta    Procedure: Procedure(s):  COLONOSCOPY, FLEXIBLE, WITH LESION REMOVAL USING SNARE       Anesthesia Type:  General    Note:  Disposition: Outpatient   Postop Pain Control:             Sign Out: Well controlled pain   PONV:    Neuro/Psych:             Sign Out: Acceptable/Baseline neuro status   Airway/Respiratory:             Sign Out: Acceptable/Baseline resp. status   CV/Hemodynamics:             Sign Out: Acceptable CV status   Other NRE: NONE   DID A NON-ROUTINE EVENT OCCUR? No       Last vitals:  Vitals:    11/24/23 0630   BP: 135/84   Pulse: 81   Resp: 18   Temp: 36.7  C (98  F)   SpO2: 98%       Electronically Signed By: ELOISE Elias CRNA  November 24, 2023  8:33 AM

## 2023-11-24 NOTE — H&P
[facility]    Pre-Endoscopy History and Physical     Nidia Acosat MRN# [id number]   YOB: 1960 Age: 63 year old     Date of Procedure: 11/24/2023  Primary care provider: Merlene Reyes  Type of Endoscopy: Colonoscopy with possible biopsy, possible polypectomy  Reason for Procedure: Screening  Type of Anesthesia Anticipated: Conscious Sedation    HPI:    Nidia is a 63 year old female who will be undergoing the above procedure.      A history and physical has been performed. The patient's medications and allergies have been reviewed. The risks and benefits of the procedure and the sedation options and risks were discussed with the patient.  All questions were answered and informed consent was obtained.      She denies a personal or family history of anesthesia complications or bleeding disorders.     Last colonoscopy 2013, screening. ASA II for asthma, COPD. Surg hx of lap mat, hysterectomy. No family hx of colon cancer. No AC    Patient Active Problem List   Diagnosis    Herpes simplex infection of genitourinary system    Disturbance in sleep behavior    Hypothyroidism due to acquired atrophy of thyroid    Seasonal allergic rhinitis, unspecified trigger    Obesity    Subdural hemorrhage (H)    Mild major depression (H)    Hyperlipidemia LDL goal <130    Insomnia    H/O: hysterectomy    POD (perioral dermatitis)    PTSD (post-traumatic stress disorder)    BMI 38.0-38.9,adult    Obesity (BMI 35.0-39.9) with comorbidity (H)    Cough variant asthma    Recurrent major depression in complete remission (H24)    Left foot pain    Injury of left ankle, subsequent encounter        Past Medical History:   Diagnosis Date    Backache, unspecified     lumbar back pain, degen disk disease    COPD (chronic obstructive pulmonary disease) (H)     I get bronchitis frequently    Cough variant asthma 07/07/2020    Depressive disorder     numerous yrs ago diagnosed with PTSD    Leiomyoma of  "uterus, unspecified     s/p LAVH    Other genital herpes     HSV-2    Thyroid disease     Me    Unspecified sinusitis (chronic)         Past Surgical History:   Procedure Laterality Date    BACK SURGERY      COLONOSCOPY  2013    Procedure: COLONOSCOPY;  Colonoscopy  ;  Surgeon: Laura Ruff MD;  Location: [facility]    HEAD & NECK SURGERY  2019    Discectomy with fusion    HYSTERECTOMY, PAP NO LONGER INDICATED  2003    [facility] for fibroids    LAPAROSCOPIC CHOLECYSTECTOMY N/A 2019    Procedure: CHOLECYSTECTOMY, LAPAROSCOPIC;  Surgeon: Kwabena Stephenson MD;  Location: [facility]    LAPAROSCOPIC LYSIS ADHESIONS  1985    LASIK  10/2004    TONSILLECTOMY & ADENOIDECTOMY  age 18    TUBAL LIGATION  1994       Social History     Tobacco Use    Smoking status: Former     Packs/day: 0.50     Years: 1.00     Additional pack years: 0.00     Total pack years: 0.50     Types: Cigarettes     Quit date: 1986     Years since quittin.9    Smokeless tobacco: Never   Substance Use Topics    Alcohol use: Yes     Comment: 1-5 drinks weekly       Family History   Problem Relation Age of Onset    Arthritis Mother     Allergies Mother         otc meds    Depression Mother     Alzheimer Disease Mother     Hyperlipidemia Mother     Osteoporosis Mother     Diabetes Mother     Heart Disease Mother 80        mitral valve issues    Obesity Mother     Hypertension Father         on med    C.A.D. Father         has had 2 MI\"S, possible surgery    Alcohol/Drug Father     Arthritis Father     Hyperlipidemia Father     Substance Abuse Father     Alcohol/Drug Brother     Substance Abuse Brother     Heart Disease Maternal Grandmother     Osteoporosis Maternal Grandmother     Heart Disease Maternal Grandfather     Heart Disease Paternal Grandmother     Diabetes Paternal Grandmother     Obesity Paternal Grandmother     Heart Disease Paternal Grandfather     Alcohol/Drug Son         son in recovery    Hypertension Brother  "    Hyperlipidemia Brother     Substance Abuse Other         Son    Hyperlipidemia Brother     Depression Brother        Prior to Admission medications    Medication Sig Start Date End Date Taking? Authorizing Provider   albuterol (PROAIR HFA/PROVENTIL HFA/VENTOLIN HFA) 108 (90 Base) MCG/ACT inhaler Inhale 2 puffs into the lungs every 6 hours 7/11/23  Yes Merlene Reyes MD   Fexofenadine HCl (ALLEGRA PO) Take by mouth daily   Yes Reported, Patient   FLUoxetine (PROZAC) 40 MG capsule Take 1 capsule (40 mg) by mouth daily 10/30/23  Yes Merlene Reyes MD   levothyroxine (EUTHYROX) 125 MCG tablet Take 1 tablet (125 mcg) by mouth daily 10/30/23  Yes Merlene Reyes MD   montelukast (SINGULAIR) 10 MG tablet Take 1 tablet (10 mg) by mouth at bedtime 10/30/23  Yes Merlene Reyes MD   NONFORMULARY Take 4 capsules by mouth daily Sulfurzyme -  combines wolfberry with MSM, a naturally occurring organic form of dietary sulfur needed by our bodies every day to maintain the structure of proteins, protect cells and cell membranes, replenish the connections between cells, and preserve the molecular framework of connective tissue.   Yes Reported, Patient   ondansetron (ZOFRAN ODT) 4 MG ODT tab Take 1 tablet (4 mg) by mouth every 8 hours as needed for nausea or vomiting 7/22/23  Yes Nicole Velarde MD   valACYclovir (VALTREX) 500 MG tablet Take 1 tablet (500 mg) by mouth daily 10/30/23  Yes Merlene Reyes MD   triamcinolone (ARISTOCORT HP) 0.5 % external cream Apply topically 2 times daily 7/11/23   Merlene Reyes MD       Allergies   Allergen Reactions    Adhesive Tape Itching    Bees Anaphylaxis    Codeine Difficulty breathing    Latex Dermatitis    Penicillin [Penicillins] Difficulty breathing    Seasonal Allergies         REVIEW OF SYSTEMS:   5 point ROS negative except as noted above in HPI, including Gen., Resp., CV, GI &  system review.    PHYSICAL EXAM:   /84   Pulse 81   Temp 98  F (36.7  C)  "(Oral)   Resp 18   Ht 1.549 m (5' 1\")   Wt 88 kg (194 lb)   LMP 07/29/2002   SpO2 98%   BMI 36.66 kg/m   Estimated body mass index is 36.66 kg/m  as calculated from the following:    Height as of this encounter: 1.549 m (5' 1\").    Weight as of this encounter: 88 kg (194 lb).   Constitutional: Awake, alert, no acute distress.  Eyes: No scleral icterus.  Conjunctiva are without injection.  ENMT: Mucous membranes moist, dentition and gums are intact.   Neck: Soft, supple, trachea midline.    Endocrine: n/a   Lymphatic: There is no cervical, submandibularadenopathy.  Respiratory: normal efforts on room air  Cardiovascular: extremities warm and well perfused  Abdomen: Non-distended, non-tender,  No masses,  Musculoskeletal: Full range of motion in the upper and lower extremities.    Skin: No skin rashes or lesions to inspection.  No petechia.    Neurologic: alerted and oriented 3x  Psychiatric: The patient's affect is not blunted and mood is appropriate.  DIAGNOSTICS:    Not indicated    IMPRESSION   ASA Class 2 - Mild systemic disease    PLAN:   Plan for Colonoscopy with possible biopsy, possible polypectomy. We discussed the risks, benefits and alternatives and the patient wished to proceed.  Patient is cleared for the above procedure.    The above has been forwarded to the consulting provider.    Luciano Villalobos MD on 11/24/2023 at 7:49 AM  [facility]        "

## 2023-11-24 NOTE — LETTER
Nidia Acosta  [address] [facility] [place] [address]      November 27, 2023    Dear Nidia,  This letter is written to inform you of the results of your recent colonoscopy.  Your examination showed polyp(s) in your sigmoid colon. All polyps were removed in their entirety and sent for review by a pathologist. As you will see on the pathology report below, the tissue(s) were tubular adenomatous polyps. Your examination was otherwise without abnormality.    Adenomatous polyps are entirely benign (non-cancerous); however, patients who have developed these polyps are at an increased risk for developing additional polyps in the future. If these are not eventually removed, there is a risk of developing colon cancer. We will advise more frequent examinations with you because of the risk associated with this type of polyp.    Given these findings, I recommend that you undergo a repeat colonoscopy in 7 year(s) for surveillance. We will enter you into a recall system so you receive a reminder closer to the time that you are due for repeat examination. Your physician also recommends that you adhere to a high fiber diet indefinitely to promote colon health.     Please remember that this recommendation is made with the understanding that you are not experiencing persistent changes in bowel function, bleeding per rectum, and/or significant abdominal pain. If you experience these symptoms, please contact your primary care provider for a further evaluation.     If you have any questions or concerns about the results of your colonoscopy or the appropriate follow-up, please contact my assistant at [phone]    Sincerely,      Luciano Villalobos MD   [facility]  ___                    Resulted Orders   Surgical Pathology Exam   Result Value Ref Range    Case Report       Surgical Pathology Report                         Case: [id number]                                  Authorizing Provider:  Luciano Villalobos MD        Collected:           11/24/2023 08:18 AM          Ordering Location:     [facility]   Received:            11/24/2023 08:31 AM                                 [place]                                                                      Pathologist:           Joel King MD                                                            Specimen:    Large Intestine, Colon, Sigmoid                                                            Final Diagnosis       Colon, sigmoid, polypectomy:  --Tubular adenoma.          Clinical Information       Procedure:  COLONOSCOPY, FLEXIBLE, WITH LESION REMOVAL USING SNARE  Pre-op Diagnosis: Screen for colon cancer [Z12.11]  Post-op Diagnosis: Z12.11 - Screen for colon cancer [ICD-10-CM]      Gross Description       A(1). Large Intestine, Colon, Sigmoid, :  The specimen is received in formalin, labeled with the patient's name, medical record number and other identifying information and designated  sigmoid . It consists of multiple tan soft tissue fragments ranging from 0.1-0.4 cm. Entirely submitted in one cassette.   [JAYDE Keenan(ASCP)]      Microscopic Description       Microscopic examination was performed.        Performing Labs       The technical component of this testing was completed at [facility]      Case Images

## 2023-11-24 NOTE — ANESTHESIA CARE TRANSFER NOTE
Patient: Nidia Acosta    Procedure: Procedure(s):  COLONOSCOPY, FLEXIBLE, WITH LESION REMOVAL USING SNARE       Diagnosis: Screen for colon cancer [Z12.11]  Diagnosis Additional Information: No value filed.    Anesthesia Type:   General     Note:    Oropharynx: spontaneously breathing  Level of Consciousness: drowsy  Oxygen Supplementation: room air    Independent Airway: airway patency satisfactory and stable  Dentition: dentition unchanged  Vital Signs Stable: post-procedure vital signs reviewed and stable  Report to RN Given: handoff report given  Patient transferred to: Phase II    Handoff Report: Identifed the Patient, Identified the Reponsible Provider, Reviewed the pertinent medical history, Discussed the surgical course, Reviewed Intra-OP anesthesia mangement and issues during anesthesia, Set expectations for post-procedure period and Allowed opportunity for questions and acknowledgement of understanding  Vitals:  Vitals Value Taken Time   BP     Temp     Pulse     Resp     SpO2 98 % 11/24/23 0832   Vitals shown include unfiled device data.    Electronically Signed By: ELOISE Elias CRNA  November 24, 2023  8:33 AM

## 2023-11-24 NOTE — TELEPHONE ENCOUNTER
Patient scheduled to be at hospital at 0630 for colonoscopy; reports she fell asleep and didn't take the magnesium citrate. Wondering if she still needs to take it? Reports bowel movements are clear water. Wondering if she should still arrive for colonoscopy. Advised as long as bowels are running completely clear liquid she should be OK to arrive for colonoscopy as scheduled but reminded, if she is not clear, the physician may have trouble visualizing leading to inaccurate results or repeat scope. Patient states she will arrive as planned.       Reason for Disposition   Health Information question, no triage required and triager able to answer question    Protocols used: Information Only Call - No Triage-A-

## 2023-11-27 LAB
PATH REPORT.COMMENTS IMP SPEC: NORMAL
PATH REPORT.COMMENTS IMP SPEC: NORMAL
PATH REPORT.FINAL DX SPEC: NORMAL
PATH REPORT.GROSS SPEC: NORMAL
PATH REPORT.MICROSCOPIC SPEC OTHER STN: NORMAL
PATH REPORT.RELEVANT HX SPEC: NORMAL
PHOTO IMAGE: NORMAL

## 2023-11-30 ENCOUNTER — HOSPITAL ENCOUNTER (EMERGENCY)
Facility: CLINIC | Age: 63
Discharge: HOME | End: 2023-11-30
Attending: EMERGENCY MEDICINE | Admitting: EMERGENCY MEDICINE
Payer: COMMERCIAL

## 2023-11-30 ENCOUNTER — APPOINTMENT (OUTPATIENT)
Dept: GENERAL RADIOLOGY | Facility: CLINIC | Age: 63
End: 2023-11-30
Attending: EMERGENCY MEDICINE
Payer: COMMERCIAL

## 2023-11-30 ENCOUNTER — NURSE TRIAGE (OUTPATIENT)
Dept: FAMILY MEDICINE | Facility: CLINIC | Age: 63
End: 2023-11-30

## 2023-11-30 VITALS
BODY MASS INDEX: 38.14 KG/M2 | TEMPERATURE: 97.4 F | HEIGHT: 61 IN | WEIGHT: 202 LBS | RESPIRATION RATE: 21 BRPM | OXYGEN SATURATION: 98 % | HEART RATE: 81 BPM

## 2023-11-30 DIAGNOSIS — M54.41 ACUTE RIGHT-SIDED LOW BACK PAIN WITH RIGHT-SIDED SCIATICA: ICD-10-CM

## 2023-11-30 DIAGNOSIS — M25.551 ACUTE PAIN OF RIGHT HIP: ICD-10-CM

## 2023-11-30 LAB
ALBUMIN UR-MCNC: NEGATIVE MG/DL
APPEARANCE UR: CLEAR
BACTERIA #/AREA URNS HPF: ABNORMAL /HPF
BILIRUB UR QL STRIP: NEGATIVE
COLOR UR AUTO: ABNORMAL
GLUCOSE UR STRIP-MCNC: NEGATIVE MG/DL
HGB UR QL STRIP: NEGATIVE
KETONES UR STRIP-MCNC: NEGATIVE MG/DL
LEUKOCYTE ESTERASE UR QL STRIP: ABNORMAL
MUCOUS THREADS #/AREA URNS LPF: PRESENT /LPF
NITRATE UR QL: NEGATIVE
PH UR STRIP: 6 [PH] (ref 5–7)
RBC URINE: <1 /HPF
SP GR UR STRIP: 1 (ref 1–1.03)
SQUAMOUS EPITHELIAL: <1 /HPF
UROBILINOGEN UR STRIP-MCNC: NORMAL MG/DL
WBC URINE: 3 /HPF

## 2023-11-30 PROCEDURE — 81001 URINALYSIS AUTO W/SCOPE: CPT | Performed by: EMERGENCY MEDICINE

## 2023-11-30 PROCEDURE — 99284 EMERGENCY DEPT VISIT MOD MDM: CPT | Mod: 25

## 2023-11-30 PROCEDURE — 99284 EMERGENCY DEPT VISIT MOD MDM: CPT | Performed by: EMERGENCY MEDICINE

## 2023-11-30 PROCEDURE — 250N000011 HC RX IP 250 OP 636: Mod: JZ | Performed by: EMERGENCY MEDICINE

## 2023-11-30 PROCEDURE — 73502 X-RAY EXAM HIP UNI 2-3 VIEWS: CPT

## 2023-11-30 PROCEDURE — 96372 THER/PROPH/DIAG INJ SC/IM: CPT | Performed by: EMERGENCY MEDICINE

## 2023-11-30 PROCEDURE — 72100 X-RAY EXAM L-S SPINE 2/3 VWS: CPT

## 2023-11-30 PROCEDURE — 99284 EMERGENCY DEPT VISIT MOD MDM: CPT | Mod: 25 | Performed by: EMERGENCY MEDICINE

## 2023-11-30 RX ORDER — KETOROLAC TROMETHAMINE 30 MG/ML
60 INJECTION, SOLUTION INTRAMUSCULAR; INTRAVENOUS ONCE
Status: COMPLETED | OUTPATIENT
Start: 2023-11-30 | End: 2023-11-30

## 2023-11-30 RX ORDER — OXYCODONE HYDROCHLORIDE 5 MG/1
5 TABLET ORAL EVERY 6 HOURS PRN
Qty: 12 TABLET | Refills: 0 | Status: SHIPPED | OUTPATIENT
Start: 2023-11-30 | End: 2023-12-04

## 2023-11-30 RX ADMIN — KETOROLAC TROMETHAMINE 60 MG: 30 INJECTION, SOLUTION INTRAMUSCULAR at 13:42

## 2023-11-30 ASSESSMENT — ACTIVITIES OF DAILY LIVING (ADL)
ADLS_ACUITY_SCORE: 35
ADLS_ACUITY_SCORE: 33
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
## 2023-11-30 NOTE — ED TRIAGE NOTES
Pt here with right back and right hip pain that started on Saturday. Pt had a colonoscopy on Friday. Voiding and stooling without problem. Taking ibuprofen for pain.      Triage Assessment (Adult)       Row Name 11/30/23 1234          Triage Assessment    Airway WDL WDL        Respiratory WDL    Respiratory WDL WDL        Cardiac WDL    Cardiac WDL WDL        Cognitive/Neuro/Behavioral WDL    Cognitive/Neuro/Behavioral WDL WDL

## 2023-11-30 NOTE — TELEPHONE ENCOUNTER
Pain in right hip it hurts to sit. pt left work and is driving home. Pain started in her back and has now settled in  right hip, no relief from tylenol. Pt had a colonoscopy on Friday does not have any abd pain or fever   she also had RSV vaccine on Friday and concerned about a reaction. pt. Has been voiding and normal  BM since procedure, no fever reported. States pain is 10/10. Pt would like to be seen today and no clinic appts available. Pt plans to go to urgent care in [place].  Svetlana Lopez RN on 11/30/2023 at 10:11 AM   Reason for Disposition   Back pain radiating (shooting) into hip   SEVERE back pain (e.g., excruciating, unable to do any normal activities) and not improved after pain medicine and CARE ADVICE    Additional Information   Negative: Looks like a broken bone or dislocated joint (e.g., crooked or deformed)   Negative: Sounds like a life-threatening emergency to the triager   Negative: Passed out (i.e., fainted, collapsed and was not responding)   Negative: Shock suspected (e.g., cold/pale/clammy skin, too weak to stand, low BP, rapid pulse)   Negative: Sounds like a life-threatening emergency to the triager   Negative: Major injury to the back (e.g., MVA, fall > 10 feet or 3 meters, penetrating injury, etc.)   Negative: Pain in the upper back over the ribs (rib cage) that radiates (travels) into the chest   Negative: Pain in the upper back over the ribs (rib cage) and worsened by coughing (or clearly increases with breathing)   Negative: Back pain during pregnancy   Negative: SEVERE back pain of sudden onset and age > 60 years   Negative: SEVERE abdominal pain (e.g., excruciating)   Negative: Abdominal pain and age > 60 years   Negative: Unable to urinate (or only a few drops) and bladder feels very full   Negative: Loss of bladder or bowel control (urine or bowel incontinence; wetting self, leaking stool) of new-onset   Negative: Numbness (loss of sensation) in groin or rectal area   Negative:  Pain radiates into groin, scrotum   Negative: Blood in urine (red, pink, or tea-colored)   Negative: Vomiting and pain over lower ribs of back (i.e., flank - kidney area)   Negative: Weakness of a leg or foot (e.g., unable to bear weight, dragging foot)   Negative: Patient sounds very sick or weak to the triager   Negative: Fever > 100.4 F (38.0 C) and flank pain   Negative: Pain or burning with passing urine (urination)    Protocols used: Hip Pain-A-OH, Back Pain-A-OH

## 2023-11-30 NOTE — TELEPHONE ENCOUNTER
Symptoms    Describe your symptoms: patient called stating that she had an rsv vaccine and then had a colonoscopy, reports new pain, in right hip pain--reports pain is worse than birth.     Any pain: yes see above.       Could we send this information to you in [employer] or would you prefer to receive a phone call?:   Patient would prefer a phone call   Okay to leave a detailed message?: Yes at Cell number on file:    Telephone Information:   Mobile [phone]

## 2023-11-30 NOTE — ED NOTES
Pt was not in room when went to discharge pt.   Provider went over discharge information with pt prior to discharge.   Pt left without discharge paperwork or last set of vitals.

## 2023-12-02 ENCOUNTER — APPOINTMENT (OUTPATIENT)
Dept: ULTRASOUND IMAGING | Facility: HOSPITAL | Age: 63
End: 2023-12-02
Attending: STUDENT IN AN ORGANIZED HEALTH CARE EDUCATION/TRAINING PROGRAM
Payer: COMMERCIAL

## 2023-12-02 ENCOUNTER — APPOINTMENT (OUTPATIENT)
Dept: CT IMAGING | Facility: HOSPITAL | Age: 63
End: 2023-12-02
Attending: EMERGENCY MEDICINE
Payer: COMMERCIAL

## 2023-12-02 ENCOUNTER — APPOINTMENT (OUTPATIENT)
Dept: MRI IMAGING | Facility: HOSPITAL | Age: 63
End: 2023-12-02
Attending: EMERGENCY MEDICINE
Payer: COMMERCIAL

## 2023-12-02 ENCOUNTER — HOSPITAL ENCOUNTER (OUTPATIENT)
Facility: HOSPITAL | Age: 63
Setting detail: OBSERVATION
Discharge: HOME | End: 2023-12-04
Attending: EMERGENCY MEDICINE | Admitting: RADIOLOGY
Payer: COMMERCIAL

## 2023-12-02 DIAGNOSIS — M47.816 FACET ARTHROPATHY, LUMBAR: ICD-10-CM

## 2023-12-02 DIAGNOSIS — M54.59 INTRACTABLE LOW BACK PAIN: ICD-10-CM

## 2023-12-02 LAB
ALBUMIN UR-MCNC: NEGATIVE MG/DL
ANION GAP SERPL CALCULATED.3IONS-SCNC: 15 MMOL/L (ref 7–15)
APPEARANCE UR: ABNORMAL
BACTERIA #/AREA URNS HPF: ABNORMAL /HPF
BASOPHILS # BLD AUTO: 0.1 10E3/UL (ref 0–0.2)
BASOPHILS NFR BLD AUTO: 1 %
BILIRUB UR QL STRIP: NEGATIVE
BUN SERPL-MCNC: 12 MG/DL (ref 8–23)
CALCIUM SERPL-MCNC: 9.9 MG/DL (ref 8.8–10.2)
CHLORIDE SERPL-SCNC: 102 MMOL/L (ref 98–107)
COLOR UR AUTO: COLORLESS
CREAT SERPL-MCNC: 0.68 MG/DL (ref 0.51–0.95)
CRP SERPL-MCNC: 3.8 MG/L
DEPRECATED HCO3 PLAS-SCNC: 21 MMOL/L (ref 22–29)
EGFRCR SERPLBLD CKD-EPI 2021: >90 ML/MIN/1.73M2
EOSINOPHIL # BLD AUTO: 0.7 10E3/UL (ref 0–0.7)
EOSINOPHIL NFR BLD AUTO: 7 %
ERYTHROCYTE [DISTWIDTH] IN BLOOD BY AUTOMATED COUNT: 11.9 % (ref 10–15)
GLUCOSE SERPL-MCNC: 101 MG/DL (ref 70–99)
GLUCOSE UR STRIP-MCNC: NEGATIVE MG/DL
HCT VFR BLD AUTO: 41 % (ref 35–47)
HGB BLD-MCNC: 13.6 G/DL (ref 11.7–15.7)
HGB UR QL STRIP: NEGATIVE
HOLD SPECIMEN: NORMAL
IMM GRANULOCYTES # BLD: 0 10E3/UL
IMM GRANULOCYTES NFR BLD: 0 %
KETONES UR STRIP-MCNC: NEGATIVE MG/DL
LACTATE SERPL-SCNC: 1.3 MMOL/L (ref 0.7–2)
LACTATE SERPL-SCNC: 3.2 MMOL/L (ref 0.7–2)
LEUKOCYTE ESTERASE UR QL STRIP: ABNORMAL
LYMPHOCYTES # BLD AUTO: 5 10E3/UL (ref 0.8–5.3)
LYMPHOCYTES NFR BLD AUTO: 52 %
MCH RBC QN AUTO: 29 PG (ref 26.5–33)
MCHC RBC AUTO-ENTMCNC: 33.2 G/DL (ref 31.5–36.5)
MCV RBC AUTO: 87 FL (ref 78–100)
MONOCYTES # BLD AUTO: 0.7 10E3/UL (ref 0–1.3)
MONOCYTES NFR BLD AUTO: 7 %
NEUTROPHILS # BLD AUTO: 3.3 10E3/UL (ref 1.6–8.3)
NEUTROPHILS NFR BLD AUTO: 33 %
NITRATE UR QL: NEGATIVE
NRBC # BLD AUTO: 0 10E3/UL
NRBC BLD AUTO-RTO: 0 /100
PH UR STRIP: 8 [PH] (ref 5–7)
PLATELET # BLD AUTO: 288 10E3/UL (ref 150–450)
POTASSIUM SERPL-SCNC: 4.1 MMOL/L (ref 3.4–5.3)
RBC # BLD AUTO: 4.69 10E6/UL (ref 3.8–5.2)
RBC URINE: 0 /HPF
SODIUM SERPL-SCNC: 138 MMOL/L (ref 135–145)
SP GR UR STRIP: 1.01 (ref 1–1.03)
SQUAMOUS EPITHELIAL: 0 /HPF
UROBILINOGEN UR STRIP-MCNC: NORMAL MG/DL
WBC # BLD AUTO: 9.8 10E3/UL (ref 4–11)
WBC CLUMPS #/AREA URNS HPF: ABNORMAL /HPF
WBC URINE: 3 /HPF

## 2023-12-02 PROCEDURE — 99285 EMERGENCY DEPT VISIT HI MDM: CPT | Mod: 25

## 2023-12-02 PROCEDURE — 250N000009 HC RX 250: Performed by: STUDENT IN AN ORGANIZED HEALTH CARE EDUCATION/TRAINING PROGRAM

## 2023-12-02 PROCEDURE — 250N000011 HC RX IP 250 OP 636: Mod: JZ | Performed by: EMERGENCY MEDICINE

## 2023-12-02 PROCEDURE — 87040 BLOOD CULTURE FOR BACTERIA: CPT | Performed by: PHYSICIAN ASSISTANT

## 2023-12-02 PROCEDURE — 76770 US EXAM ABDO BACK WALL COMP: CPT

## 2023-12-02 PROCEDURE — 83605 ASSAY OF LACTIC ACID: CPT | Performed by: EMERGENCY MEDICINE

## 2023-12-02 PROCEDURE — 80048 BASIC METABOLIC PNL TOTAL CA: CPT | Performed by: EMERGENCY MEDICINE

## 2023-12-02 PROCEDURE — 81001 URINALYSIS AUTO W/SCOPE: CPT | Performed by: EMERGENCY MEDICINE

## 2023-12-02 PROCEDURE — 250N000011 HC RX IP 250 OP 636: Mod: JZ | Performed by: STUDENT IN AN ORGANIZED HEALTH CARE EDUCATION/TRAINING PROGRAM

## 2023-12-02 PROCEDURE — 86140 C-REACTIVE PROTEIN: CPT | Performed by: EMERGENCY MEDICINE

## 2023-12-02 PROCEDURE — 96376 TX/PRO/DX INJ SAME DRUG ADON: CPT | Mod: 59

## 2023-12-02 PROCEDURE — 87086 URINE CULTURE/COLONY COUNT: CPT | Performed by: EMERGENCY MEDICINE

## 2023-12-02 PROCEDURE — G0378 HOSPITAL OBSERVATION PER HR: HCPCS

## 2023-12-02 PROCEDURE — 36415 COLL VENOUS BLD VENIPUNCTURE: CPT | Performed by: EMERGENCY MEDICINE

## 2023-12-02 PROCEDURE — 74177 CT ABD & PELVIS W/CONTRAST: CPT

## 2023-12-02 PROCEDURE — 36415 COLL VENOUS BLD VENIPUNCTURE: CPT | Performed by: PHYSICIAN ASSISTANT

## 2023-12-02 PROCEDURE — 99222 1ST HOSP IP/OBS MODERATE 55: CPT | Performed by: STUDENT IN AN ORGANIZED HEALTH CARE EDUCATION/TRAINING PROGRAM

## 2023-12-02 PROCEDURE — 96365 THER/PROPH/DIAG IV INF INIT: CPT

## 2023-12-02 PROCEDURE — 72158 MRI LUMBAR SPINE W/O & W/DYE: CPT

## 2023-12-02 PROCEDURE — A9585 GADOBUTROL INJECTION: HCPCS | Mod: JZ | Performed by: EMERGENCY MEDICINE

## 2023-12-02 PROCEDURE — 250N000013 HC RX MED GY IP 250 OP 250 PS 637: Performed by: EMERGENCY MEDICINE

## 2023-12-02 PROCEDURE — 250N000013 HC RX MED GY IP 250 OP 250 PS 637: Performed by: STUDENT IN AN ORGANIZED HEALTH CARE EDUCATION/TRAINING PROGRAM

## 2023-12-02 PROCEDURE — 96375 TX/PRO/DX INJ NEW DRUG ADDON: CPT

## 2023-12-02 PROCEDURE — 99204 OFFICE O/P NEW MOD 45 MIN: CPT | Performed by: PHYSICIAN ASSISTANT

## 2023-12-02 PROCEDURE — 255N000002 HC RX 255 OP 636: Mod: JZ | Performed by: EMERGENCY MEDICINE

## 2023-12-02 PROCEDURE — 85025 COMPLETE CBC W/AUTO DIFF WBC: CPT | Performed by: EMERGENCY MEDICINE

## 2023-12-02 RX ORDER — CETIRIZINE HYDROCHLORIDE 10 MG/1
10 TABLET ORAL DAILY
COMMUNITY

## 2023-12-02 RX ORDER — HYDROMORPHONE HCL IN WATER/PF 6 MG/30 ML
0.2 PATIENT CONTROLLED ANALGESIA SYRINGE INTRAVENOUS
Status: DISCONTINUED | OUTPATIENT
Start: 2023-12-02 | End: 2023-12-03

## 2023-12-02 RX ORDER — CETIRIZINE HYDROCHLORIDE 10 MG/1
10 TABLET ORAL DAILY
Status: DISCONTINUED | OUTPATIENT
Start: 2023-12-02 | End: 2023-12-04 | Stop reason: HOSPADM

## 2023-12-02 RX ORDER — ONDANSETRON 2 MG/ML
4 INJECTION INTRAMUSCULAR; INTRAVENOUS ONCE
Status: COMPLETED | OUTPATIENT
Start: 2023-12-02 | End: 2023-12-02

## 2023-12-02 RX ORDER — ONDANSETRON 4 MG/1
4 TABLET, ORALLY DISINTEGRATING ORAL EVERY 6 HOURS PRN
Status: DISCONTINUED | OUTPATIENT
Start: 2023-12-02 | End: 2023-12-04 | Stop reason: HOSPADM

## 2023-12-02 RX ORDER — IOPAMIDOL 755 MG/ML
90 INJECTION, SOLUTION INTRAVASCULAR ONCE
Status: COMPLETED | OUTPATIENT
Start: 2023-12-02 | End: 2023-12-02

## 2023-12-02 RX ORDER — VALACYCLOVIR HYDROCHLORIDE 500 MG/1
500 TABLET, FILM COATED ORAL DAILY
Status: DISCONTINUED | OUTPATIENT
Start: 2023-12-02 | End: 2023-12-04 | Stop reason: HOSPADM

## 2023-12-02 RX ORDER — HYDROMORPHONE HYDROCHLORIDE 1 MG/ML
0.5 INJECTION, SOLUTION INTRAMUSCULAR; INTRAVENOUS; SUBCUTANEOUS ONCE
Status: COMPLETED | OUTPATIENT
Start: 2023-12-02 | End: 2023-12-02

## 2023-12-02 RX ORDER — LEVOTHYROXINE SODIUM 125 UG/1
125 TABLET ORAL DAILY
Status: DISCONTINUED | OUTPATIENT
Start: 2023-12-02 | End: 2023-12-04 | Stop reason: HOSPADM

## 2023-12-02 RX ORDER — GADOBUTROL 604.72 MG/ML
9 INJECTION INTRAVENOUS ONCE
Status: COMPLETED | OUTPATIENT
Start: 2023-12-02 | End: 2023-12-02

## 2023-12-02 RX ORDER — ACETAMINOPHEN 325 MG/1
975 TABLET ORAL 3 TIMES DAILY
Status: DISCONTINUED | OUTPATIENT
Start: 2023-12-02 | End: 2023-12-03

## 2023-12-02 RX ORDER — LORAZEPAM 2 MG/ML
0.5 INJECTION INTRAMUSCULAR ONCE
Status: COMPLETED | OUTPATIENT
Start: 2023-12-02 | End: 2023-12-02

## 2023-12-02 RX ORDER — KETOROLAC TROMETHAMINE 15 MG/ML
15 INJECTION, SOLUTION INTRAMUSCULAR; INTRAVENOUS ONCE
Status: COMPLETED | OUTPATIENT
Start: 2023-12-02 | End: 2023-12-02

## 2023-12-02 RX ORDER — MONTELUKAST SODIUM 10 MG/1
10 TABLET ORAL AT BEDTIME
Status: DISCONTINUED | OUTPATIENT
Start: 2023-12-02 | End: 2023-12-04 | Stop reason: HOSPADM

## 2023-12-02 RX ORDER — NALOXONE HYDROCHLORIDE 0.4 MG/ML
0.2 INJECTION, SOLUTION INTRAMUSCULAR; INTRAVENOUS; SUBCUTANEOUS
Status: DISCONTINUED | OUTPATIENT
Start: 2023-12-02 | End: 2023-12-04 | Stop reason: HOSPADM

## 2023-12-02 RX ORDER — GABAPENTIN 100 MG/1
100 CAPSULE ORAL 3 TIMES DAILY
Status: DISCONTINUED | OUTPATIENT
Start: 2023-12-02 | End: 2023-12-03

## 2023-12-02 RX ORDER — NALOXONE HYDROCHLORIDE 0.4 MG/ML
0.4 INJECTION, SOLUTION INTRAMUSCULAR; INTRAVENOUS; SUBCUTANEOUS
Status: DISCONTINUED | OUTPATIENT
Start: 2023-12-02 | End: 2023-12-04 | Stop reason: HOSPADM

## 2023-12-02 RX ORDER — METHOCARBAMOL 500 MG/1
500 TABLET, FILM COATED ORAL 4 TIMES DAILY
Status: DISCONTINUED | OUTPATIENT
Start: 2023-12-02 | End: 2023-12-04 | Stop reason: HOSPADM

## 2023-12-02 RX ORDER — HYDROMORPHONE HCL IN WATER/PF 6 MG/30 ML
0.4 PATIENT CONTROLLED ANALGESIA SYRINGE INTRAVENOUS
Status: DISCONTINUED | OUTPATIENT
Start: 2023-12-02 | End: 2023-12-03

## 2023-12-02 RX ORDER — METRONIDAZOLE 500 MG/100ML
500 INJECTION, SOLUTION INTRAVENOUS ONCE
Status: DISCONTINUED | OUTPATIENT
Start: 2023-12-02 | End: 2023-12-02

## 2023-12-02 RX ORDER — DOXYCYCLINE 100 MG/1
100 CAPSULE ORAL 2 TIMES DAILY
COMMUNITY
Start: 2023-11-21 | End: 2023-12-06

## 2023-12-02 RX ORDER — MORPHINE SULFATE 4 MG/ML
4 INJECTION, SOLUTION INTRAMUSCULAR; INTRAVENOUS ONCE
Status: COMPLETED | OUTPATIENT
Start: 2023-12-02 | End: 2023-12-02

## 2023-12-02 RX ORDER — AMOXICILLIN 250 MG
1 CAPSULE ORAL 2 TIMES DAILY PRN
Status: DISCONTINUED | OUTPATIENT
Start: 2023-12-02 | End: 2023-12-03

## 2023-12-02 RX ORDER — AMOXICILLIN 250 MG
2 CAPSULE ORAL 2 TIMES DAILY PRN
Status: DISCONTINUED | OUTPATIENT
Start: 2023-12-02 | End: 2023-12-03

## 2023-12-02 RX ORDER — OXYCODONE HYDROCHLORIDE 5 MG/1
5 TABLET ORAL EVERY 4 HOURS PRN
Status: DISCONTINUED | OUTPATIENT
Start: 2023-12-02 | End: 2023-12-03

## 2023-12-02 RX ORDER — ACETAMINOPHEN 500 MG
1000 TABLET ORAL ONCE
Status: COMPLETED | OUTPATIENT
Start: 2023-12-02 | End: 2023-12-02

## 2023-12-02 RX ORDER — ONDANSETRON 2 MG/ML
4 INJECTION INTRAMUSCULAR; INTRAVENOUS EVERY 6 HOURS PRN
Status: DISCONTINUED | OUTPATIENT
Start: 2023-12-02 | End: 2023-12-04 | Stop reason: HOSPADM

## 2023-12-02 RX ORDER — ALBUTEROL SULFATE 90 UG/1
2 AEROSOL, METERED RESPIRATORY (INHALATION) EVERY 6 HOURS
Status: DISCONTINUED | OUTPATIENT
Start: 2023-12-02 | End: 2023-12-04 | Stop reason: HOSPADM

## 2023-12-02 RX ORDER — LIDOCAINE 40 MG/G
CREAM TOPICAL
Status: COMPLETED | OUTPATIENT
Start: 2023-12-02 | End: 2023-12-02

## 2023-12-02 RX ORDER — FENTANYL CITRATE 50 UG/ML
50 INJECTION, SOLUTION INTRAMUSCULAR; INTRAVENOUS ONCE
Status: COMPLETED | OUTPATIENT
Start: 2023-12-02 | End: 2023-12-02

## 2023-12-02 RX ORDER — CIPROFLOXACIN 2 MG/ML
400 INJECTION, SOLUTION INTRAVENOUS ONCE
Status: COMPLETED | OUTPATIENT
Start: 2023-12-02 | End: 2023-12-02

## 2023-12-02 RX ORDER — DOXYCYCLINE 100 MG/1
100 CAPSULE ORAL 2 TIMES DAILY
Status: DISCONTINUED | OUTPATIENT
Start: 2023-12-02 | End: 2023-12-04 | Stop reason: HOSPADM

## 2023-12-02 RX ADMIN — CIPROFLOXACIN 400 MG: 400 INJECTION, SOLUTION INTRAVENOUS at 10:30

## 2023-12-02 RX ADMIN — MORPHINE SULFATE 4 MG: 4 INJECTION, SOLUTION INTRAMUSCULAR; INTRAVENOUS at 13:01

## 2023-12-02 RX ADMIN — LEVOTHYROXINE SODIUM 125 MCG: 125 TABLET ORAL at 16:59

## 2023-12-02 RX ADMIN — ONDANSETRON 4 MG: 2 INJECTION INTRAMUSCULAR; INTRAVENOUS at 12:20

## 2023-12-02 RX ADMIN — FENTANYL CITRATE 50 MCG: 50 INJECTION, SOLUTION INTRAMUSCULAR; INTRAVENOUS at 08:58

## 2023-12-02 RX ADMIN — ALBUTEROL SULFATE 2 PUFF: 90 AEROSOL, METERED RESPIRATORY (INHALATION) at 18:06

## 2023-12-02 RX ADMIN — LIDOCAINE: 40 CREAM TOPICAL at 19:24

## 2023-12-02 RX ADMIN — CETIRIZINE HYDROCHLORIDE 10 MG: 10 TABLET, FILM COATED ORAL at 16:59

## 2023-12-02 RX ADMIN — FLUOXETINE 40 MG: 20 CAPSULE ORAL at 16:58

## 2023-12-02 RX ADMIN — MONTELUKAST 10 MG: 10 TABLET, FILM COATED ORAL at 21:22

## 2023-12-02 RX ADMIN — OXYCODONE HYDROCHLORIDE 5 MG: 5 TABLET ORAL at 18:25

## 2023-12-02 RX ADMIN — DOXYCYCLINE HYCLATE 100 MG: 100 CAPSULE ORAL at 19:32

## 2023-12-02 RX ADMIN — METHOCARBAMOL 500 MG: 500 TABLET ORAL at 18:13

## 2023-12-02 RX ADMIN — LORAZEPAM 0.5 MG: 2 INJECTION INTRAMUSCULAR; INTRAVENOUS at 10:28

## 2023-12-02 RX ADMIN — HYDROMORPHONE HYDROCHLORIDE 0.5 MG: 1 INJECTION, SOLUTION INTRAMUSCULAR; INTRAVENOUS; SUBCUTANEOUS at 09:24

## 2023-12-02 RX ADMIN — VALACYCLOVIR HYDROCHLORIDE 500 MG: 500 TABLET, FILM COATED ORAL at 16:58

## 2023-12-02 RX ADMIN — IOPAMIDOL 90 ML: 755 INJECTION, SOLUTION INTRAVENOUS at 10:22

## 2023-12-02 RX ADMIN — HYDROMORPHONE HYDROCHLORIDE 0.4 MG: 0.2 INJECTION, SOLUTION INTRAMUSCULAR; INTRAVENOUS; SUBCUTANEOUS at 21:17

## 2023-12-02 RX ADMIN — Medication 5 MG: at 21:23

## 2023-12-02 RX ADMIN — ACETAMINOPHEN 1000 MG: 500 TABLET ORAL at 13:22

## 2023-12-02 RX ADMIN — GADOBUTROL 9 ML: 604.72 INJECTION INTRAVENOUS at 11:59

## 2023-12-02 RX ADMIN — GABAPENTIN 100 MG: 100 CAPSULE ORAL at 18:13

## 2023-12-02 RX ADMIN — HYDROMORPHONE HYDROCHLORIDE 0.4 MG: 0.2 INJECTION, SOLUTION INTRAMUSCULAR; INTRAVENOUS; SUBCUTANEOUS at 16:03

## 2023-12-02 RX ADMIN — KETOROLAC TROMETHAMINE 15 MG: 15 INJECTION, SOLUTION INTRAMUSCULAR; INTRAVENOUS at 08:59

## 2023-12-02 RX ADMIN — HYDROMORPHONE HYDROCHLORIDE 0.5 MG: 1 INJECTION, SOLUTION INTRAMUSCULAR; INTRAVENOUS; SUBCUTANEOUS at 10:19

## 2023-12-02 RX ADMIN — ACETAMINOPHEN 975 MG: 325 TABLET ORAL at 20:06

## 2023-12-02 ASSESSMENT — ENCOUNTER SYMPTOMS
DIARRHEA: 0
CHILLS: 0
FEVER: 0
ABDOMINAL PAIN: 1
BACK PAIN: 1
SHORTNESS OF BREATH: 0
VOMITING: 0

## 2023-12-02 ASSESSMENT — ACTIVITIES OF DAILY LIVING (ADL)
ADLS_ACUITY_SCORE: 37
ADLS_ACUITY_SCORE: 37
ADLS_ACUITY_SCORE: 35
ADLS_ACUITY_SCORE: 37

## 2023-12-02 NOTE — ED NOTES
"[facility] ED Handoff Report    ED Chief Complaint: Back Pain    ED Diagnosis:  (M54.59) Intractable low back pain  Comment: Patient states last Saturday started having pain that has increased and never gone away  Plan: Neurosurg Consult       PMH:    Past Medical History:   Diagnosis Date    Backache, unspecified     lumbar back pain, degen disk disease    COPD (chronic obstructive pulmonary disease) (H)     I get bronchitis frequently    Cough variant asthma 07/07/2020    Depressive disorder     numerous yrs ago diagnosed with PTSD    Leiomyoma of uterus, unspecified 2003    s/p LAVH    Other genital herpes     HSV-2    Thyroid disease     Me    Unspecified sinusitis (chronic)         Code Status:  No Order     Falls Risk: No Band: Not applicable    Current Living Situation/Residence: lives with a significant other and lives in a house     Elimination Status: Continent: Yes     Activity Level: SBA    Patients Preferred Language:  English     Needed: No    Vital Signs:  BP (!) 147/73   Pulse 73   Temp 98  F (36.7  C) (Oral)   Resp 22   Ht 1.549 m (5' 1\")   Wt 90.7 kg (200 lb)   LMP 07/29/2002   SpO2 92%   BMI 37.79 kg/m       Cardiac Rhythm:   SR    Pain Score: 10/10    Is the Patient Confused:  No    Last Food or Drink: 12/02/23 at 0800    Focused Assessment:  a/o x3. From home independently. Assessment WDL x Pain. Back pain is unbearable.     Tests Performed: Done: Labs and Imaging    Treatments Provided:  Pain Control    Family Dynamics/Concerns: No    Family Updated On Visitor Policy: Yes    Plan of Care Communicated to Family: Yes    Who Was Updated about Plan of Care:     Belongings Checklist Done and Signed by Patient: No    Medications sent with patient: NA    Covid: asymptomatic , NA      Additional Information: Patient is having unbearable pain still. Chris has seen at bedside. Waiting on PRN Pain med orders.     RN: Mary Toro RN 12/2/2023 2:11 PM        "

## 2023-12-02 NOTE — ED TRIAGE NOTES
Patient presents with severe back pain. Patient had a colonoscopy last Firday 12/25, was seen in ER this past Thursday for post-op pain. UA was checked but negative. Patient states the pain is down right side of back, burning, unbearable.

## 2023-12-02 NOTE — CONSULTS
Neurosurgery Consult    Assessment  Right low back pain radiating to abdomen and right hip after slip one week ago.  MRI lumbar spine with degenerative changes and mild periarticular edema with normal inflammatory markers.    Plan:  No immediate surgical intervention indicated.  Admit for observation and pain control.        HPI  Mrs. Acosta presents to the ER with right low back pain, abdominal pain and right hip pain.    Last Friday (~8 days ago) the patient had a colonoscopy and had a polyp removed. On Saturday (~7 days ago) the patient developed right lower back pain radiating into her abdomen after standing all day at a function. She states she did have a slip on a step at the restaurant she was at but did not fall.    She notes the pain has been intermittent since then. Over the week, she notes the burning, stabbing pain has been getting worse. On Thursday (~2 days ago) the patient was seen in the ED with no acute findings. Today, the patient notes her right lower quadrant abdominal pain is very severe. She reports laying on her left side makes the pain worse and nothing makes the pain better. She denies radicular pain, no numbness, no fever, no left leg pain or left sided symptoms.       Medical history  COPD  Herpes  Depression    Exam  B/P: 147/73, T: 97.5, P: 73, R: 16   Awake and alert, appears uncomfortable, pain on palpation of lumbar spine buttock area on right, severe pain to even light tough to right hip, no significant hip pain with internal or external rotation of the hip.  Intact strength and sensation to lower extremities, pain with palpation to left lower abdominal area.    Imaging  Lumbar MRI  IMPRESSION:  1.  L4-L5 facet arthropathy with periarticular edema/enhancement. No evidence for cherelle joint destruction or epidural abscess/phlegmon.     2.  L5-S1 facet arthropathy with mild periarticular edema on right. No cherelle joint destruction or associated inflammatory process.     3.  L4-L5  central disc protrusion. Mild-to-moderate central canal stenosis.     4.  Normal distal thoracic cord, conus medullaris and cauda equina nerves.     5.  Lumbar spondylosis.     6.  Negative for discitis or osteomyelitis.    CT abd/pelvis    IMPRESSION:   1.  No acute abnormality to account for symptoms. No evidence of bowel perforation. No urinary tract calculus or hydronephrosis.  2.  Previous cholecystectomy and hysterectomy.    XR pelvis 11/30/23    IMPRESSION: Degenerative changes in the visualized spine. The SI  joints, pubic symphysis and hips appear normal and symmetric. There is  no evidence of fracture or osteonecrosis.    LABS:  CRP 3.5  WBC 9.8  Lactic acid 1.3  UA with leuk esterase.    Discussed with Dr. Blas.    Mariaelena Arrington, MPAS  [facility] Neurosurgery  [address] [address]    Tel [phone]  Pager [phone]

## 2023-12-02 NOTE — PHARMACY-ADMISSION MEDICATION HISTORY
Updated admission med rec.  Takes cetirizine 10mg qday instead fexofenadine.  Rojas Rocha [facility] on 12/2/2023 at 3:26 PM

## 2023-12-02 NOTE — H&P
[facility]    History and Physical - Hospitalist Service       Date of Admission:  12/2/2023    Assessment & Plan      Nidia Acosta is a 63 year old female admitted on 12/2/2023. She has a history of hypothyroidism, asthma, seasonal allergy, depression, insomnia presented with right lower quadrant pain with radiation to the right hip and back.  Patient had colonoscopy on 11/24 and the next day she developed the above symptoms after standing all day at a function.  The pain is ill-defined-combination of cramping, burning, stabbing, 9 out of 10.  She has been using Tylenol and ibuprofen but pain has been persistent.  Denied any fever, chills, dysuria, diarrhea.  Had some nausea and an episode of vomiting this morning.  No bleeding.  Had bowel movement the previous night with no color change.  No trauma to the right hip but had missed a step. Patient is taking doxycycline for acute sinusitis (Day 7/14)  In the ED, it was 3.2 which corrected to 1.3 . CRP was 3.8 WBC was 9.8  CT abdomen/pelvis no acute abnormalities.   MRI of the lumbar spine showed L4-L5, L5-S1 facet arthropathy with periarticular edema or enhancement.  Negative for discitis or osteomyelitis.  ED spoke with neurosurgery who will see the patient.  Patient will be admitted for intractable pain.    Right lower quadrant abdominal pain  -- Right lower quadrant tenderness. Pain has no association with eating.  -- CT abdomen/pelvis no acute abnormalities.  -- analgesics  -- Pain seems out of proportion to radiologic findings- discussed with radiologist- ovaries look ok. US- renal ordered to r/o early hydronephrosis not seen on CT.    Right lower back pain  Right hip pain  --11/30 x-ray of the pelvis with right hip showed no evidence of fracture or osteonecrosis  -- 12/02 MRI-lumbar spine :showed L4-L5, L5-S1 facet arthropathy with periarticular edema or enhancement.    -- had slipped on a step a week ago.  -- Neurosurgery  "consulted in the ED  -- Analgesics  -- Pain management consult  -- MRI-rt hip pending  -- Discussed with patient and boyfriend regarding plan of care.  Addendum:  9:11pm: re-evluated the patient twice- Pain regimen consists of scheduled tylenol, hydrocodone po and hydromorphone iv prn, robaxin, gabapentin, Lidocain cream prn. Will defer NSAIDs due to interaction with fluoxetine. So far the regimen seems to work. Will add melatonin for sleep.    Lactic acidosis- resolved  -- likely dehydration. No e/o infection. Crp: 3.8. No leukocytosis.  - Ucx and Bcx pending.    Abnormal U/A: leukocyte esterase +  -- No dysuria  -- await Ucx. No antibiotics for now    Sinusitis  -- c/w PTA doxycycline last day on 12/05-discussed with pharmacist    Hypothyroidism  -- c/w PTA levothyroxine    Asthma/seasonal allergies  Depression  -- PTA albuterol, cetirizine, montelukast    Obesity  BMI: 37.79       Observation Goals: -diagnostic tests and consults completed and resulted, -vital signs normal or at patient baseline, Nurse to notify provider when observation goals have been met and patient is ready for discharge.  Diet: Regular Diet Adult  DVT Prophylaxis: Pneumatic Compression Devices  Ramesh Catheter: Not present  Lines: None     Cardiac Monitoring: None  Code Status: Full Code    Clinically Significant Risk Factors Present on Admission                       # Obesity: Estimated body mass index is 37.79 kg/m  as calculated from the following:    Height as of this encounter: 1.549 m (5' 1\").    Weight as of this encounter: 90.7 kg (200 lb).       # Asthma: noted on problem list        Disposition Plan      Expected Discharge Date: 12/03/2023                  Leslie Rahman MD  Hospitalist Service  [facility]  Securely message with [employer] (more info)  Text page via [employer] Paging/Directory     ______________________________________________________________________    Chief Complaint   Right lower quadrant " pain  Right hip, low back pain    History is obtained from the patient    History of Present Illness   Nidia Acosta is a 63 year old female who  has a history of hypothyroidism, asthma, seasonal allergy, depression, insomnia presented with right lower quadrant pain with radiation to the right hip and back.  Patient had colonoscopy on 11/24 and the next day she developed the above symptoms after standing all day at a function.  The pain is ill-defined-combination of cramping, burning, stabbing, 9 out of 10.  She has been using Tylenol and ibuprofen but pain has been persistent.  Denied any fever, chills, dysuria, diarrhea.  Had some nausea and an episode of vomiting this morning.  No bleeding.  Had bowel movement the previous night with no color change.  No trauma to the right hip but had missed a step. Patient is taking doxycycline for acute sinusitis (Day 7/14)  In the ED, it was 3.2 which corrected to 1.3 . CRP was 3.8 WBC was 9.8 CT abdomen/pelvis no acute abnormalities.   MRI of the lumbar spine showed L4-L5, L5-S1 facet arthropathy with periarticular edema or enhancement.  Negative for discitis or osteomyelitis.  ED spoke with neurosurgery who will see the patient.  Patient will be admitted for intractable pain.      Past Medical History    Past Medical History:   Diagnosis Date    Backache, unspecified     lumbar back pain, degen disk disease    COPD (chronic obstructive pulmonary disease) (H)     I get bronchitis frequently    Cough variant asthma 07/07/2020    Depressive disorder     numerous yrs ago diagnosed with PTSD    Leiomyoma of uterus, unspecified 2003    s/p LAVH    Other genital herpes     HSV-2    Thyroid disease     Me    Unspecified sinusitis (chronic)        Past Surgical History   Past Surgical History:   Procedure Laterality Date    BACK SURGERY      COLONOSCOPY  04/05/2013    Procedure: COLONOSCOPY;  Colonoscopy  ;  Surgeon: Laura Ruff MD;  Location: [facility]     COLONOSCOPY N/A 11/24/2023    Procedure: COLONOSCOPY, FLEXIBLE, WITH LESION REMOVAL USING SNARE;  Surgeon: Luciano Villalobos MD;  Location: [facility]    HEAD & NECK SURGERY  06/2019    Discectomy with fusion    HYSTERECTOMY, PAP NO LONGER INDICATED  07/2003    LAVH for fibroids    LAPAROSCOPIC CHOLECYSTECTOMY N/A 08/21/2019    Procedure: CHOLECYSTECTOMY, LAPAROSCOPIC;  Surgeon: Kwabena Stephenson MD;  Location: [facility]    LAPAROSCOPIC LYSIS ADHESIONS  1985    LASIK  10/2004    TONSILLECTOMY & ADENOIDECTOMY  age 18    TUBAL LIGATION  04/1994       Prior to Admission Medications   Prior to Admission Medications   Prescriptions Last Dose Informant Patient Reported? Taking?   FLUoxetine (PROZAC) 40 MG capsule 12/1/2023 at am  No Yes   Sig: Take 1 capsule (40 mg) by mouth daily   NONFORMULARY 12/1/2023 at am Self Yes Yes   Sig: Take 4 capsules by mouth daily Sulfurzyme -  combines wolfberry with MSM, a naturally occurring organic form of dietary sulfur needed by our bodies every day to maintain the structure of proteins, protect cells and cell membranes, replenish the connections between cells, and preserve the molecular framework of connective tissue.   albuterol (PROAIR HFA/PROVENTIL HFA/VENTOLIN HFA) 108 (90 Base) MCG/ACT inhaler Unknown at prn  No Yes   Sig: Inhale 2 puffs into the lungs every 6 hours   cetirizine (ZYRTEC) 10 MG tablet 12/1/2023 at am  Yes Yes   Sig: Take 10 mg by mouth daily   doxycycline hyclate (VIBRAMYCIN) 100 MG capsule 12/1/2023 at pm  Yes Yes   Sig: Take 100 mg by mouth 2 times daily   levothyroxine (EUTHYROX) 125 MCG tablet 12/1/2023 at am  No Yes   Sig: Take 1 tablet (125 mcg) by mouth daily   montelukast (SINGULAIR) 10 MG tablet 12/1/2023 at pm  No Yes   Sig: Take 1 tablet (10 mg) by mouth at bedtime   ondansetron (ZOFRAN ODT) 4 MG ODT tab Unknown at prn  No Yes   Sig: Take 1 tablet (4 mg) by mouth every 8 hours as needed for nausea or vomiting   oxyCODONE (ROXICODONE) 5 MG tablet 12/1/2023 at pm   No Yes   Sig: Take 1 tablet (5 mg) by mouth every 6 hours as needed for pain   triamcinolone (ARISTOCORT HP) 0.5 % external cream 12/1/2023 at pm  No Yes   Sig: Apply topically 2 times daily   valACYclovir (VALTREX) 500 MG tablet 12/1/2023 at am  No Yes   Sig: Take 1 tablet (500 mg) by mouth daily      Facility-Administered Medications: None        Review of Systems    The 10 point Review of Systems is negative other than noted in the HPI or here.      Physical Exam   Vital Signs: Temp: 97.9  F (36.6  C) Temp src: Oral BP: (!) 141/70 Pulse: 74   Resp: 16 SpO2: 98 % O2 Device: None (Room air)    Weight: 200 lbs 0 oz    GEN: Alert and oriented. Not in acute distress.  HEENT: Atraumatic, mucous membrane- moist and pink.  Chest: Bilateral air entry.  CVS: S1S2 regular.   Abdomen: Soft. RLQ tenderness, non-distended. No organomegaly. No guarding or rigidity. Bowel sounds active.   Extremities: No pedal edema. Right hip tenderness  CNS: No involuntary movements.  Skin: no cyanosis or clubbing.     Medical Decision Making             Data

## 2023-12-02 NOTE — PHARMACY-ADMISSION MEDICATION HISTORY
Pharmacist Admission Medication History    Admission medication history is complete. The information provided in this note is only as accurate as the sources available at the time of the update.    Information Source(s): Patient via in-person    Pertinent Information: none    Changes made to PTA medication list:  Added: doxycycline  Deleted: None  Changed: None    Medication Affordability:  Not including over the counter (OTC) medications, was there a time in the past 3 months when you did not take your medications as prescribed because of cost?: No    Allergies reviewed with patient and updates made in EHR: yes    Medication History Completed By: Tiffanie Ricci [facility] 12/2/2023 2:33 PM    PTA Med List   Medication Sig Last Dose    albuterol (PROAIR HFA/PROVENTIL HFA/VENTOLIN HFA) 108 (90 Base) MCG/ACT inhaler Inhale 2 puffs into the lungs every 6 hours Unknown at prn    doxycycline hyclate (VIBRAMYCIN) 100 MG capsule Take 100 mg by mouth 2 times daily 12/1/2023 at pm    Fexofenadine HCl (ALLEGRA PO) Take by mouth daily 12/1/2023 at am    FLUoxetine (PROZAC) 40 MG capsule Take 1 capsule (40 mg) by mouth daily 12/1/2023 at am    levothyroxine (EUTHYROX) 125 MCG tablet Take 1 tablet (125 mcg) by mouth daily 12/1/2023 at am    montelukast (SINGULAIR) 10 MG tablet Take 1 tablet (10 mg) by mouth at bedtime 12/1/2023 at pm    NONFORMULARY Take 4 capsules by mouth daily Sulfurzyme -  combines wolfberry with MSM, a naturally occurring organic form of dietary sulfur needed by our bodies every day to maintain the structure of proteins, protect cells and cell membranes, replenish the connections between cells, and preserve the molecular framework of connective tissue. 12/1/2023 at am    ondansetron (ZOFRAN ODT) 4 MG ODT tab Take 1 tablet (4 mg) by mouth every 8 hours as needed for nausea or vomiting Unknown at prn    oxyCODONE (ROXICODONE) 5 MG tablet Take 1 tablet (5 mg) by mouth every 6 hours as needed for pain  12/1/2023 at pm    triamcinolone (ARISTOCORT HP) 0.5 % external cream Apply topically 2 times daily 12/1/2023 at pm    valACYclovir (VALTREX) 500 MG tablet Take 1 tablet (500 mg) by mouth daily 12/1/2023 at am

## 2023-12-02 NOTE — ED PROVIDER NOTES
EMERGENCY DEPARTMENT ENCOUNTER      NAME: Nidia Acosta  AGE: 63 year old female  YOB: 1960  MRN: [id number]  EVALUATION DATE & TIME: 12/2/2023  8:26 AM    PCP: Merlene Reyes    ED PROVIDER: Nati Smith M.D.      CHIEF COMPLAINT     Chief Complaint   Patient presents with    Back Pain         FINAL IMPRESSION:     1. Intractable low back pain    2. Facet arthropathy, lumbar          MEDICAL DECISION MAKING:       Pertinent Labs & Imaging studies reviewed. (See chart for details)    63 year old female presents to the Emergency Department for evaluation of back pain    History  Supplemental history from partner  External Record(s) review as documented below    Exam  Patient is nontoxic she is widened in bed with pain.  Pointing to the right buttocks area on the right side of the abdomen.  Her exam is without signs of surgical abdomen.  Does not have midline cervical tenderness palpation.  Negative straight leg test.  No evidence of cyanosis of the right lower extremity.    Differential Diagnosis include but not limited to perforation, kidney stone, obstruction, appendicitis, shingles, discitis among others.      Vital Signs: hypertension  EKG: none  Imaging: I independently interpreted CT abdomen no free air.Formal read by radiologist.  Home meds: Reviewed  ED meds: Toradol, morphine, Dilaudid, Ativan, Tylenol  Fluids: Normal saline  Labs:  K 4.1  Cr 0.68  Wbc 9.8  Hgb 13.6  platelets 288  Lactic acid 3.5-1.3      Clinical Impression and Decision Making    63-year-old female presents complaining of pain above the right back to start radiate around to the right side of the abdomen.  It started on Saturday a week ago.  She had a colonoscopy on Friday.  Initially patient was writhing in pain screaming benign abdominal exam no evidence of neurological deficits or cyanosis of the right lower extremity.  Her lactic acid was 3.5 and given that recent colonoscopy at that she was perforated so  antibiotics were started.  Generally her CT reveals no inflammation no perforation or appendicitis.    Does not have midline lumbar back pain pain is low.  Consider shingles no evidence of rashes.  She had a hysterectomy.  She has intact femoral pulses intact dorsalis pedis pulses able to plantarflex and dorsiflex no fecal or urine incontinence.    Berto lactic acid normal.  She is afebrile with a normal white cell count.  MRI of the lumbar spine reveals no discitis no abscess.  Some degenerative disease.  Open neurosurgery no evidence of neurosurgical issues they be glad to see patient.  Will admit her for pain control.    In addition to the work out documented, I considered the following work up CTA leg.  She has intact pulses there is no cyanosis.  CT reveals normal aorta.  No leg pain.  Pain is on the bottom of the right back to send the right lower quadrant.    ED Course as of 12/02/23 1421   Sat Dec 02, 2023   1417 Hold Specimen: [facility]         Medical Decision Making    History:  Supplemental history from: Documented in chart, if applicable  External Record(s) reviewed: Documented in chart, if applicable.    Work Up:  Chart documentation includes differential considered and any EKGs or imaging independently interpreted by provider, where specified.  In additional to work up documented, I considered the following work up: Documented in chart, if applicable.    External consultation:  Discussion of management with another provider: Documented in chart, if applicable    Complicating factors:  Care impacted by chronic illness: Hyperlipidemia  Care affected by social determinants of health: N/A    Disposition considerations: Admit.          Review of External Records  Hospital/Clinic:[facility].   Date:11/30/23  Xrays remarkable for 2 mm anterolisthesis of L5 and S1 secondary to moderate advanced facet arthropathy. No other imaging.     External Consultation  Pharmacy  Hospitalist - Dr. Chen  Neurosurgery -  Mariaelena    ED COURSE   8:45 AM I met the patient and performed my initial interview and exam.   9:17 AM Spoke with pharmacy  10:03 AM I rechecked and updated the patient   12:53 PM paged neurosurgery for patient's symptoms   12:54 PM I rechecked and updated the patient   1:08 PM Spoke with john Ferrell regarding patient plan of care. They will come and assess the patient   1:25 PM Spoke with Dr. Chen, hospitalist who accepts the patient for admission     At the conclusion of the encounter I discussed the results of all of the tests and the disposition. The questions were answered. The patient and boyfriend acknowledged understanding and was agreeable with the care plan.         MEDICATIONS GIVEN IN THE EMERGENCY:     Medications   ketorolac (TORADOL) injection 15 mg (15 mg Intravenous $Given 12/2/23 0859)   fentaNYL (PF) (SUBLIMAZE) injection 50 mcg (50 mcg Intravenous $Given 12/2/23 0858)   HYDROmorphone (PF) (DILAUDID) injection 0.5 mg (0.5 mg Intravenous $Given 12/2/23 0924)   ciprofloxacin (CIPRO) infusion 400 mg (0 mg Intravenous Stopped 12/2/23 1127)   HYDROmorphone (PF) (DILAUDID) injection 0.5 mg (0.5 mg Intravenous $Given 12/2/23 1019)   iopamidol (ISOVUE-370) solution 90 mL (90 mLs Intravenous $Given 12/2/23 1022)   LORazepam (ATIVAN) injection 0.5 mg (0.5 mg Intravenous $Given 12/2/23 1028)   gadobutrol (GADAVIST) injection 9 mL (9 mLs Intravenous $Given 12/2/23 1159)   ondansetron (ZOFRAN) injection 4 mg (4 mg Intravenous $Given 12/2/23 1220)   morphine (PF) injection 4 mg (4 mg Intravenous $Given 12/2/23 1301)   acetaminophen (TYLENOL) tablet 1,000 mg (1,000 mg Oral $Given 12/2/23 1322)       NEW PRESCRIPTIONS STARTED AT TODAY'S ER VISIT     New Prescriptions    No medications on file          =================================================================    HPI     Patient information was obtained from: patient     Use of : N/A      Nidia TIFFANY Acosta is a 63 year old female  who presents by walk in for evaluation of abdominal pain.     Last Friday (~8 days ago) the patient had a colonoscopy and had a polyp removed. On Saturday (~7 days ago) the patient developed right lower back pain radiating into her abdomen after standing all day at a function.She notes the pain has been intermittent since then. Over the week, she notes the burning, stabbing pain has been getting worse. On Thursday (~2 days ago) the patient was seen in the ED with no acute findings. Today, the patient notes her right lower quadrant abdominal pain is very severe. She reports laying on her left side makes the pain worse and nothing makes the pain better. The patient denies any chest pain, shortness of breath, rash, leg pain, or any other complaints at this time.     REVIEW OF SYSTEMS   Review of Systems   Constitutional:  Negative for chills and fever.   Respiratory:  Negative for shortness of breath.    Cardiovascular:  Negative for chest pain.   Gastrointestinal:  Positive for abdominal pain. Negative for diarrhea and vomiting.   Musculoskeletal:  Positive for back pain.   All other systems reviewed and are negative.       PAST MEDICAL HISTORY:     Past Medical History:   Diagnosis Date    Backache, unspecified     lumbar back pain, degen disk disease    COPD (chronic obstructive pulmonary disease) (H)     I get bronchitis frequently    Cough variant asthma 07/07/2020    Depressive disorder     numerous yrs ago diagnosed with PTSD    Leiomyoma of uterus, unspecified 2003    s/p LAVH    Other genital herpes     HSV-2    Thyroid disease     Me    Unspecified sinusitis (chronic)        PAST SURGICAL HISTORY:     Past Surgical History:   Procedure Laterality Date    BACK SURGERY      COLONOSCOPY  04/05/2013    Procedure: COLONOSCOPY;  Colonoscopy  ;  Surgeon: Laura Ruff MD;  Location: [facility]    COLONOSCOPY N/A 11/24/2023    Procedure: COLONOSCOPY, FLEXIBLE, WITH LESION REMOVAL USING SNARE;  Surgeon:  "Luciano Villalobos MD;  Location: [facility]    HEAD & NECK SURGERY  06/2019    Discectomy with fusion    HYSTERECTOMY, PAP NO LONGER INDICATED  07/2003    LAVH for fibroids    LAPAROSCOPIC CHOLECYSTECTOMY N/A 08/21/2019    Procedure: CHOLECYSTECTOMY, LAPAROSCOPIC;  Surgeon: Kwabena Stephenson MD;  Location: [facility]    LAPAROSCOPIC LYSIS ADHESIONS  1985    LASIK  10/2004    TONSILLECTOMY & ADENOIDECTOMY  age 18    TUBAL LIGATION  04/1994         CURRENT MEDICATIONS:   albuterol (PROAIR HFA/PROVENTIL HFA/VENTOLIN HFA) 108 (90 Base) MCG/ACT inhaler  Fexofenadine HCl (ALLEGRA PO)  FLUoxetine (PROZAC) 40 MG capsule  levothyroxine (EUTHYROX) 125 MCG tablet  montelukast (SINGULAIR) 10 MG tablet  NONFORMULARY  ondansetron (ZOFRAN ODT) 4 MG ODT tab  oxyCODONE (ROXICODONE) 5 MG tablet  triamcinolone (ARISTOCORT HP) 0.5 % external cream  valACYclovir (VALTREX) 500 MG tablet         ALLERGIES:     Allergies   Allergen Reactions    Adhesive Tape Itching    Bees Anaphylaxis    Codeine Difficulty breathing    Latex Dermatitis    Penicillin [Penicillins] Difficulty breathing    Seasonal Allergies        FAMILY HISTORY:     Family History   Problem Relation Age of Onset    Arthritis Mother     Allergies Mother         otc meds    Depression Mother     Alzheimer Disease Mother     Hyperlipidemia Mother     Osteoporosis Mother     Diabetes Mother     Heart Disease Mother 80        mitral valve issues    Obesity Mother     Hypertension Father         on med    C.A.D. Father         has had 2 MI\"S, possible surgery    Alcohol/Drug Father     Arthritis Father     Hyperlipidemia Father     Substance Abuse Father     Alcohol/Drug Brother     Substance Abuse Brother     Heart Disease Maternal Grandmother     Osteoporosis Maternal Grandmother     Heart Disease Maternal Grandfather     Heart Disease Paternal Grandmother     Diabetes Paternal Grandmother     Obesity Paternal Grandmother     Heart Disease Paternal Grandfather     Alcohol/Drug Son         " "son in recovery    Hypertension Brother     Hyperlipidemia Brother     Substance Abuse Other         Son    Hyperlipidemia Brother     Depression Brother        SOCIAL HISTORY:     Social History     Socioeconomic History    Marital status:     Number of children: 6   Occupational History    Occupation:      Employer: [employer]   Tobacco Use    Smoking status: Former     Packs/day: 0.50     Years: 1.00     Additional pack years: 0.00     Total pack years: 0.50     Types: Cigarettes     Quit date: 1986     Years since quittin.9    Smokeless tobacco: Never   Substance and Sexual Activity    Alcohol use: Yes     Comment: 1-5 drinks weekly    Drug use: No    Sexual activity: Yes     Partners: Male     Birth control/protection: Female Surgical     Comment: Historectomy   Other Topics Concern    Parent/sibling w/ CABG, MI or angioplasty before 65F 55M? No     Comment: father, brother       VITALS:   BP (!) 147/73   Pulse 73   Temp 98  F (36.7  C) (Oral)   Resp 22   Ht 1.549 m (5' 1\")   Wt 90.7 kg (200 lb)   LMP 2002   SpO2 92%   BMI 37.79 kg/m      PHYSICAL EXAM     Physical Exam  Vitals and nursing note reviewed. Exam conducted with a chaperone present.   Constitutional:       General: She is in acute distress.      Appearance: She is not ill-appearing or diaphoretic.   Abdominal:          Comments: Location of pain.  There is no guarding no rebound no peritoneal signs.  2+ femoral pulses.   Musculoskeletal:         General: No signs of injury.        Back:       Right lower leg: No edema.      Left lower leg: No edema.      Comments: Location of pain is on the lower lumbar area above the buttocks.  No erythema no edema no midline lumbar pain.         Physical Exam   Constitutional: Non toxic. Appears in pain.     Head: Atraumatic.     Nose: Nose normal.     Mouth/Throat: Oropharynx is clear and moist.     Eyes: EOM are normal. Pupils are equal, round, and " reactive to light.     Ears: Bilateral pearly white tympanic membranes.    Neck: Normal range of motion. Neck supple.     Cardiovascular: Normal rate, regular rhythm and normal heart sounds.      Pulmonary/Chest: Normal effort  and breath sounds normal.     Abdominal: soft. Tenderness to right buttocks and right lower quadrant.     Musculoskeletal: Normal range of motion. No midline tenderness.     Neurological: Moves upper and lower extremities equally.    Lymphatics: no edema    : NA    Skin: Skin is warm and dry.     Psychiatric: Normal mood and affect. Behavior is normal.       LAB:     All pertinent labs reviewed and interpreted.  Labs Ordered and Resulted from Time of ED Arrival to Time of ED Departure   BASIC METABOLIC PANEL - Abnormal       Result Value    Sodium 138      Potassium 4.1      Chloride 102      Carbon Dioxide (CO2) 21 (*)     Anion Gap 15      Urea Nitrogen 12.0      Creatinine 0.68      GFR Estimate >90      Calcium 9.9      Glucose 101 (*)    ROUTINE UA WITH MICROSCOPIC REFLEX TO CULTURE - Abnormal    Color Urine Colorless      Appearance Urine Hazy (*)     Glucose Urine Negative      Bilirubin Urine Negative      Ketones Urine Negative      Specific Gravity Urine 1.015      Blood Urine Negative      pH Urine 8.0 (*)     Protein Albumin Urine Negative      Urobilinogen Urine Normal      Nitrite Urine Negative      Leukocyte Esterase Urine 250 Riri/uL (*)     Bacteria Urine Few (*)     WBC Clumps Urine None Seen      RBC Urine 0      WBC Urine 3      Squamous Epithelials Urine 0     LACTIC ACID WHOLE BLOOD - Abnormal    Lactic Acid 3.2 (*)    CRP INFLAMMATION - Normal    CRP Inflammation 3.80     LACTIC ACID WHOLE BLOOD - Normal    Lactic Acid 1.3     CBC WITH PLATELETS AND DIFFERENTIAL    WBC Count 9.8      RBC Count 4.69      Hemoglobin 13.6      Hematocrit 41.0      MCV 87      MCH 29.0      MCHC 33.2      RDW 11.9      Platelet Count 288      % Neutrophils 33      % Lymphocytes 52      %  Monocytes 7      % Eosinophils 7      % Basophils 1      % Immature Granulocytes 0      NRBCs per 100 WBC 0      Absolute Neutrophils 3.3      Absolute Lymphocytes 5.0      Absolute Monocytes 0.7      Absolute Eosinophils 0.7      Absolute Basophils 0.1      Absolute Immature Granulocytes 0.0      Absolute NRBCs 0.0     URINE CULTURE        RADIOLOGY:     Reviewed all pertinent imaging. Please see official radiology report.  MR Lumbar Spine w/o & w Contrast   Final Result   IMPRESSION:   1.  L4-L5 facet arthropathy with periarticular edema/enhancement. No evidence for cherelle joint destruction or epidural abscess/phlegmon.      2.  L5-S1 facet arthropathy with mild periarticular edema on right. No cherelle joint destruction or associated inflammatory process.      3.  L4-L5 central disc protrusion. Mild-to-moderate central canal stenosis.      4.  Normal distal thoracic cord, conus medullaris and cauda equina nerves.      5.  Lumbar spondylosis.      6.  Negative for discitis or osteomyelitis.      CT Abdomen Pelvis w Contrast   Final Result   IMPRESSION:    1.  No acute abnormality to account for symptoms. No evidence of bowel perforation. No urinary tract calculus or hydronephrosis.   2.  Previous cholecystectomy and hysterectomy.      US Abdomen Complete    (Results Pending)        EKG:       I have independently reviewed and interpreted the EKG(s) documented above.      PROCEDURES:     Procedures      I, Chani Miranda, am serving as a scribe to document services personally performed by Dr. Smith based on my observation and the provider's statements to me. I, Nati Smith MD attest that Chani Miranda is acting in a scribe capacity, has observed my performance of the services and has documented them in accordance with my direction.    Nati Smith M.D.  Emergency Medicine  [facility] EMERGENCY DEPARTMENT  [facility] [facility]  [address]  [phone]  Dept: [phone]       Nati Smith MD  12/02/23 1423

## 2023-12-03 ENCOUNTER — APPOINTMENT (OUTPATIENT)
Dept: MRI IMAGING | Facility: HOSPITAL | Age: 63
End: 2023-12-03
Attending: STUDENT IN AN ORGANIZED HEALTH CARE EDUCATION/TRAINING PROGRAM
Payer: COMMERCIAL

## 2023-12-03 PROBLEM — J32.9 SINUSITIS: Status: ACTIVE | Noted: 2023-12-03

## 2023-12-03 PROBLEM — J45.20 MILD INTERMITTENT ASTHMA WITHOUT COMPLICATION: Status: ACTIVE | Noted: 2023-12-03

## 2023-12-03 LAB
ALBUMIN SERPL BCG-MCNC: 3.7 G/DL (ref 3.5–5.2)
ALP SERPL-CCNC: 89 U/L (ref 40–150)
ALT SERPL W P-5'-P-CCNC: 21 U/L (ref 0–50)
ANION GAP SERPL CALCULATED.3IONS-SCNC: 8 MMOL/L (ref 7–15)
AST SERPL W P-5'-P-CCNC: 19 U/L (ref 0–45)
BASOPHILS # BLD AUTO: 0.1 10E3/UL (ref 0–0.2)
BASOPHILS NFR BLD AUTO: 1 %
BILIRUB SERPL-MCNC: 0.4 MG/DL
BUN SERPL-MCNC: 8.7 MG/DL (ref 8–23)
CALCIUM SERPL-MCNC: 9.2 MG/DL (ref 8.8–10.2)
CHLORIDE SERPL-SCNC: 101 MMOL/L (ref 98–107)
CREAT SERPL-MCNC: 0.74 MG/DL (ref 0.51–0.95)
DEPRECATED HCO3 PLAS-SCNC: 27 MMOL/L (ref 22–29)
EGFRCR SERPLBLD CKD-EPI 2021: >90 ML/MIN/1.73M2
EOSINOPHIL # BLD AUTO: 0.3 10E3/UL (ref 0–0.7)
EOSINOPHIL NFR BLD AUTO: 3 %
ERYTHROCYTE [DISTWIDTH] IN BLOOD BY AUTOMATED COUNT: 12 % (ref 10–15)
GLUCOSE SERPL-MCNC: 99 MG/DL (ref 70–99)
HCT VFR BLD AUTO: 37.1 % (ref 35–47)
HGB BLD-MCNC: 12.2 G/DL (ref 11.7–15.7)
IMM GRANULOCYTES # BLD: 0 10E3/UL
IMM GRANULOCYTES NFR BLD: 0 %
LYMPHOCYTES # BLD AUTO: 3.9 10E3/UL (ref 0.8–5.3)
LYMPHOCYTES NFR BLD AUTO: 46 %
MCH RBC QN AUTO: 29.3 PG (ref 26.5–33)
MCHC RBC AUTO-ENTMCNC: 32.9 G/DL (ref 31.5–36.5)
MCV RBC AUTO: 89 FL (ref 78–100)
MONOCYTES # BLD AUTO: 0.6 10E3/UL (ref 0–1.3)
MONOCYTES NFR BLD AUTO: 8 %
NEUTROPHILS # BLD AUTO: 3.5 10E3/UL (ref 1.6–8.3)
NEUTROPHILS NFR BLD AUTO: 42 %
NRBC # BLD AUTO: 0 10E3/UL
NRBC BLD AUTO-RTO: 0 /100
PLATELET # BLD AUTO: 240 10E3/UL (ref 150–450)
POTASSIUM SERPL-SCNC: 3.8 MMOL/L (ref 3.4–5.3)
PROT SERPL-MCNC: 5.7 G/DL (ref 6.4–8.3)
RBC # BLD AUTO: 4.17 10E6/UL (ref 3.8–5.2)
SODIUM SERPL-SCNC: 136 MMOL/L (ref 135–145)
WBC # BLD AUTO: 8.3 10E3/UL (ref 4–11)

## 2023-12-03 PROCEDURE — 73721 MRI JNT OF LWR EXTRE W/O DYE: CPT | Mod: RT

## 2023-12-03 PROCEDURE — 96376 TX/PRO/DX INJ SAME DRUG ADON: CPT

## 2023-12-03 PROCEDURE — 250N000011 HC RX IP 250 OP 636: Mod: JZ | Performed by: INTERNAL MEDICINE

## 2023-12-03 PROCEDURE — 85025 COMPLETE CBC W/AUTO DIFF WBC: CPT | Performed by: STUDENT IN AN ORGANIZED HEALTH CARE EDUCATION/TRAINING PROGRAM

## 2023-12-03 PROCEDURE — G0378 HOSPITAL OBSERVATION PER HR: HCPCS

## 2023-12-03 PROCEDURE — 250N000011 HC RX IP 250 OP 636: Performed by: INTERNAL MEDICINE

## 2023-12-03 PROCEDURE — 250N000013 HC RX MED GY IP 250 OP 250 PS 637: Performed by: STUDENT IN AN ORGANIZED HEALTH CARE EDUCATION/TRAINING PROGRAM

## 2023-12-03 PROCEDURE — 99232 SBSQ HOSP IP/OBS MODERATE 35: CPT | Performed by: INTERNAL MEDICINE

## 2023-12-03 PROCEDURE — 36415 COLL VENOUS BLD VENIPUNCTURE: CPT | Performed by: STUDENT IN AN ORGANIZED HEALTH CARE EDUCATION/TRAINING PROGRAM

## 2023-12-03 PROCEDURE — 80053 COMPREHEN METABOLIC PANEL: CPT | Performed by: STUDENT IN AN ORGANIZED HEALTH CARE EDUCATION/TRAINING PROGRAM

## 2023-12-03 PROCEDURE — 250N000013 HC RX MED GY IP 250 OP 250 PS 637: Performed by: INTERNAL MEDICINE

## 2023-12-03 PROCEDURE — 250N000011 HC RX IP 250 OP 636: Mod: JZ | Performed by: STUDENT IN AN ORGANIZED HEALTH CARE EDUCATION/TRAINING PROGRAM

## 2023-12-03 PROCEDURE — 96372 THER/PROPH/DIAG INJ SC/IM: CPT | Performed by: INTERNAL MEDICINE

## 2023-12-03 RX ORDER — ENOXAPARIN SODIUM 100 MG/ML
40 INJECTION SUBCUTANEOUS EVERY 24 HOURS
Status: DISCONTINUED | OUTPATIENT
Start: 2023-12-03 | End: 2023-12-04 | Stop reason: HOSPADM

## 2023-12-03 RX ORDER — ACETAMINOPHEN 325 MG/1
975 TABLET ORAL EVERY 6 HOURS
Status: DISCONTINUED | OUTPATIENT
Start: 2023-12-03 | End: 2023-12-04 | Stop reason: HOSPADM

## 2023-12-03 RX ORDER — OXYCODONE HYDROCHLORIDE 5 MG/1
5 TABLET ORAL 4 TIMES DAILY PRN
Status: DISCONTINUED | OUTPATIENT
Start: 2023-12-03 | End: 2023-12-04 | Stop reason: HOSPADM

## 2023-12-03 RX ORDER — AMOXICILLIN 250 MG
1 CAPSULE ORAL 2 TIMES DAILY
Status: DISCONTINUED | OUTPATIENT
Start: 2023-12-03 | End: 2023-12-04 | Stop reason: HOSPADM

## 2023-12-03 RX ORDER — HYDROMORPHONE HCL IN WATER/PF 6 MG/30 ML
0.2 PATIENT CONTROLLED ANALGESIA SYRINGE INTRAVENOUS 4 TIMES DAILY PRN
Status: DISCONTINUED | OUTPATIENT
Start: 2023-12-03 | End: 2023-12-04 | Stop reason: HOSPADM

## 2023-12-03 RX ORDER — HYDROMORPHONE HCL IN WATER/PF 6 MG/30 ML
0.4 PATIENT CONTROLLED ANALGESIA SYRINGE INTRAVENOUS 4 TIMES DAILY PRN
Status: DISCONTINUED | OUTPATIENT
Start: 2023-12-03 | End: 2023-12-04 | Stop reason: HOSPADM

## 2023-12-03 RX ORDER — LORAZEPAM 2 MG/ML
0.5 INJECTION INTRAMUSCULAR ONCE
Status: COMPLETED | OUTPATIENT
Start: 2023-12-03 | End: 2023-12-03

## 2023-12-03 RX ORDER — BISACODYL 10 MG
10 SUPPOSITORY, RECTAL RECTAL DAILY PRN
Status: DISCONTINUED | OUTPATIENT
Start: 2023-12-03 | End: 2023-12-04 | Stop reason: HOSPADM

## 2023-12-03 RX ORDER — POLYETHYLENE GLYCOL 3350 17 G/17G
17 POWDER, FOR SOLUTION ORAL DAILY PRN
Status: DISCONTINUED | OUTPATIENT
Start: 2023-12-03 | End: 2023-12-04 | Stop reason: HOSPADM

## 2023-12-03 RX ORDER — GABAPENTIN 100 MG/1
200 CAPSULE ORAL 2 TIMES DAILY
Status: DISCONTINUED | OUTPATIENT
Start: 2023-12-03 | End: 2023-12-04

## 2023-12-03 RX ORDER — CELECOXIB 200 MG/1
200 CAPSULE ORAL DAILY
Status: DISCONTINUED | OUTPATIENT
Start: 2023-12-03 | End: 2023-12-04 | Stop reason: HOSPADM

## 2023-12-03 RX ADMIN — HYDROMORPHONE HYDROCHLORIDE 0.4 MG: 0.2 INJECTION, SOLUTION INTRAMUSCULAR; INTRAVENOUS; SUBCUTANEOUS at 06:58

## 2023-12-03 RX ADMIN — METHOCARBAMOL 500 MG: 500 TABLET ORAL at 20:16

## 2023-12-03 RX ADMIN — GABAPENTIN 100 MG: 100 CAPSULE ORAL at 08:54

## 2023-12-03 RX ADMIN — METHOCARBAMOL 500 MG: 500 TABLET ORAL at 08:54

## 2023-12-03 RX ADMIN — ENOXAPARIN SODIUM 40 MG: 40 INJECTION SUBCUTANEOUS at 22:39

## 2023-12-03 RX ADMIN — HYDROMORPHONE HYDROCHLORIDE 0.4 MG: 0.2 INJECTION, SOLUTION INTRAMUSCULAR; INTRAVENOUS; SUBCUTANEOUS at 11:18

## 2023-12-03 RX ADMIN — ACETAMINOPHEN 975 MG: 325 TABLET ORAL at 20:16

## 2023-12-03 RX ADMIN — VALACYCLOVIR HYDROCHLORIDE 500 MG: 500 TABLET, FILM COATED ORAL at 08:54

## 2023-12-03 RX ADMIN — GABAPENTIN 200 MG: 100 CAPSULE ORAL at 20:16

## 2023-12-03 RX ADMIN — LEVOTHYROXINE SODIUM 125 MCG: 125 TABLET ORAL at 21:25

## 2023-12-03 RX ADMIN — CETIRIZINE HYDROCHLORIDE 10 MG: 10 TABLET, FILM COATED ORAL at 08:54

## 2023-12-03 RX ADMIN — OXYCODONE HYDROCHLORIDE 5 MG: 5 TABLET ORAL at 04:53

## 2023-12-03 RX ADMIN — OXYCODONE HYDROCHLORIDE 5 MG: 5 TABLET ORAL at 12:27

## 2023-12-03 RX ADMIN — LORAZEPAM 0.5 MG: 2 INJECTION INTRAMUSCULAR; INTRAVENOUS at 12:45

## 2023-12-03 RX ADMIN — HYDROMORPHONE HYDROCHLORIDE 0.4 MG: 0.2 INJECTION, SOLUTION INTRAMUSCULAR; INTRAVENOUS; SUBCUTANEOUS at 04:00

## 2023-12-03 RX ADMIN — CELECOXIB 200 MG: 200 CAPSULE ORAL at 16:03

## 2023-12-03 RX ADMIN — OXYCODONE HYDROCHLORIDE 5 MG: 5 TABLET ORAL at 08:56

## 2023-12-03 RX ADMIN — Medication 5 MG: at 21:25

## 2023-12-03 RX ADMIN — METHOCARBAMOL 500 MG: 500 TABLET ORAL at 16:03

## 2023-12-03 RX ADMIN — HYDROMORPHONE HYDROCHLORIDE 0.4 MG: 0.2 INJECTION, SOLUTION INTRAMUSCULAR; INTRAVENOUS; SUBCUTANEOUS at 02:03

## 2023-12-03 RX ADMIN — OXYCODONE HYDROCHLORIDE 5 MG: 5 TABLET ORAL at 00:44

## 2023-12-03 RX ADMIN — HYDROMORPHONE HYDROCHLORIDE 0.4 MG: 0.2 INJECTION, SOLUTION INTRAMUSCULAR; INTRAVENOUS; SUBCUTANEOUS at 00:14

## 2023-12-03 RX ADMIN — DOXYCYCLINE HYCLATE 100 MG: 100 CAPSULE ORAL at 20:20

## 2023-12-03 RX ADMIN — DOXYCYCLINE HYCLATE 100 MG: 100 CAPSULE ORAL at 08:54

## 2023-12-03 RX ADMIN — SENNOSIDES AND DOCUSATE SODIUM 1 TABLET: 8.6; 5 TABLET ORAL at 20:15

## 2023-12-03 RX ADMIN — HYDROMORPHONE HYDROCHLORIDE 0.4 MG: 0.2 INJECTION, SOLUTION INTRAMUSCULAR; INTRAVENOUS; SUBCUTANEOUS at 08:53

## 2023-12-03 RX ADMIN — METHOCARBAMOL 500 MG: 500 TABLET ORAL at 12:28

## 2023-12-03 RX ADMIN — OXYCODONE HYDROCHLORIDE 5 MG: 5 TABLET ORAL at 16:56

## 2023-12-03 RX ADMIN — MONTELUKAST 10 MG: 10 TABLET, FILM COATED ORAL at 21:25

## 2023-12-03 RX ADMIN — ALBUTEROL SULFATE 2 PUFF: 90 AEROSOL, METERED RESPIRATORY (INHALATION) at 18:24

## 2023-12-03 RX ADMIN — FLUOXETINE 40 MG: 20 CAPSULE ORAL at 08:54

## 2023-12-03 RX ADMIN — ACETAMINOPHEN 975 MG: 325 TABLET ORAL at 08:54

## 2023-12-03 ASSESSMENT — ACTIVITIES OF DAILY LIVING (ADL)
ADLS_ACUITY_SCORE: 37
ADLS_ACUITY_SCORE: 39
ADLS_ACUITY_SCORE: 37
ADLS_ACUITY_SCORE: 37
ADLS_ACUITY_SCORE: 39
ADLS_ACUITY_SCORE: 37
ADLS_ACUITY_SCORE: 39
ADLS_ACUITY_SCORE: 37

## 2023-12-03 NOTE — PROGRESS NOTES
PRIMARY DIAGNOSIS: ACUTE PAIN  OUTPATIENT/OBSERVATION GOALS TO BE MET BEFORE DISCHARGE:  1. Pain Status: Improved but still requiring IV narcotics.    2. Return to near baseline physical activity: No    3. Cleared for discharge by consultants (if involved): No    Discharge Planner Nurse   Safe discharge environment identified: Yes  Barriers to discharge: Yes       Entered by: Cris Carmona RN 12/03/2023 5:05 AM     Please review provider order for any additional goals.   Nurse to notify provider when observation goals have been met and patient is ready for discharge.

## 2023-12-03 NOTE — PROGRESS NOTES
"[facility] ACUTE PAIN SERVICE CONSULTATION     Date of Admission:  12/2/2023  Date of Consult (When I saw the patient): 12/03/23  Physician requesting consult: Leslie Rahman MD    Reason for consult: intractable abdominal pain  Primary Care Physician: Merlene Reyes MD       Assessment/Plan:     Nidia Acosta is a 63 year old female who was admitted on 12/2/2023.  Pain team was asked to see the patient for intractable abdominal pain.     Pt has used x 9 IV prns and x 5 po prns in 24 hours. Right hip MRI planned for today. Per H&P, \"History of hypothyroidism, asthma, seasonal allergy, depression, insomnia presented with right lower quadrant pain with radiation to the right hip and back.  Patient had colonoscopy on 11/24 and the next day she developed the above symptoms after standing all day at a function.  The pain is ill-defined-combination of cramping, burning, stabbing, 9 out of 10.  She has been using Tylenol and ibuprofen but pain has been persistent.  Denied any fever, chills, dysuria, diarrhea.  Had some nausea and an episode of vomiting this morning.  No bleeding.  Had bowel movement the previous night with no color change. \"    CT abdomen/pelvis no acute abnormalities.   MRI of the lumbar spine showed L4-L5, L5-S1 facet arthropathy with periarticular edema or enhancement.  Negative for discitis or osteomyelitis.  ED spoke with neurosurgery who will see the patient.  Patient will be admitted for intractable pain.     Maximize adjuvants at this time until etiology found to warrant opioids- awaiting provider response to enter orders - will enter once hear back.     Pt feel pain meds are effective. Pt reporting pain started last Saturday pain in lower back after mis stepped down stairs, no fall, caught herself, pain worsened over the past week and no is in the hip/groin area. Pain 3/10 during assessment after returning from MRI, sedation present d/t benzo given prior to test, pain " described as constant, burning, jabbing, worse with movement, better with laying down. Start tapering off opioids    PLAN:   1) Pain is consistent with right lower quadrant abdominal pain, lower back pain, right hip pain,  US- renal ordered to r/o early hydronephrosis not seen on CT, CT abdomen/pelvis no acute abnormalities. , 11/30 x-ray of the pelvis with right hip showed no evidence of fracture or osteonecrosis  -- 12/02 MRI-lumbar spine :showed L4-L5, L5-S1 facet arthropathy with periarticular edema or enhancement.    -- had slipped on a step a week ago.  2)Multimodal Medication Therapy  Topical: offered lidocaine x 3 patches daily - declines - states it causes burning  NSAID'S: CrCl 81.9 ml/min - no indication to avoid nsaids - celebrex ordered per [facility] 200 mg po daily- agree with nsaid trial   Muscle Relaxants: Robaxin 500 mg po QID - just started yesterday - continue, can increase to 750 mg po QID if muscle spasms persistent  Adjuvants: APAP 975 mg po TID - increase to QID, gabapentin 100 mg po TID - increase to 200 mg po BID  Antidepressants/anxiolytics: fluoxetine 40 mg po daily  Opioids: oxycodone 2.5 - 5 mg po q4h prn - decrease to qid prn first line  IV Pain medication: dilaudid 0.2-0.4 mg IV q2h prn - decrease to qid prn second line  3)Non-medication interventions: per nursing  4)Constipation Prophylaxis: schedule s/d x 1 po BID, miralx prn, bisacodyl prn    -MN  pulled from system on 12/3/23. See fill history below.   Discharge Recommendations - We recommend prescribing the following at the time of discharge:  no opioids if no acute indication identified     History of Present Illness (HPI):       Nidia Acosta is a 63 year old female with past medical history as above.     Per MN  review, the patient does not have an opioid tolerance.     Reviewed medical record, labs, imaging, ED note, and care everywhere.     Home pain medications/psych medications/anticoagulation medications include:  prozac 40 mg po daily, oxycodone 5 mg po q6h prn, filled oxycodone 5 mg x 1 on 11/30/2023 #12 (3 day supply)      Jf Florez, PharmD, BCPS  Acute Care Pain Management Program [facility]   Page via online paging system or [employer]

## 2023-12-03 NOTE — PROGRESS NOTES
PRIMARY DIAGNOSIS: ACUTE PAIN  OUTPATIENT/OBSERVATION GOALS TO BE MET BEFORE DISCHARGE:  1. Pain Status: Improved but still requiring IV narcotics.    2. Return to near baseline physical activity: No    3. Cleared for discharge by consultants (if involved): No    Discharge Planner Nurse   Safe discharge environment identified: Yes  Barriers to discharge: Yes       Entered by: Key Hirsch RN 12/03/2023 2:31 PM   Patient went for a MRI late in the afternoon. Results pending. Still requiring max dose of pain medication.   Please review provider order for any additional goals.   Nurse to notify provider when observation goals have been met and patient is ready for discharge.

## 2023-12-03 NOTE — PROGRESS NOTES
PRIMARY DIAGNOSIS: ACUTE PAIN  OUTPATIENT/OBSERVATION GOALS TO BE MET BEFORE DISCHARGE:  1. Pain Status: Improved but still requiring IV narcotics.    2. Return to near baseline physical activity: No    3. Cleared for discharge by consultants (if involved): No    Discharge Planner Nurse   Safe discharge environment identified: Yes  Barriers to discharge: Yes       Entered by: Key Hirsch RN 12/03/2023 1:34 PM   Patient still endorses 10/10 pain in her right flank to hip. Requiring 0.4 mg Dilaudid every two hours. Plus 5 mg Oxycodone every four hours. Patient states that she is always in pain.   Please review provider order for any additional goals.   Nurse to notify provider when observation goals have been met and patient is ready for discharge.

## 2023-12-03 NOTE — PROGRESS NOTES
Neurosurgery progress note :    Patient states her pain is better today than it was yesterday although still has primarily right hip pain.  Today she describes the pain radiating into the right pelvic area.  Denies any back or buttock pain today.  Straight cath needed for retention.    On exam,  Awake and alert, appears more comfortable.  Significant pain on palpation of right hip, also has moderate pain on palpation of right pelvic area.  No pain on palpation of lumbar spine today.  Sensation intact.  Able to lift both legs off bed with good strength today.    PLAN:  MRI right hip planned for today.  NS will follow.    RAHUL Fajardo  [facility] Neurosurgery  [address]  [address] [place] [address]    Tel [phone]  Pager [phone]

## 2023-12-03 NOTE — PLAN OF CARE
"PRIMARY DIAGNOSIS: \"GENERIC\" NURSING  OUTPATIENT/OBSERVATION GOALS TO BE MET BEFORE DISCHARGE:  ADLs back to baseline: No    Activity and level of assistance: pt need assistance with activity because of severe pain.    Pain status: Improved but still requiring IV narcotics.    Return to near baseline physical activity: No     Discharge Planner Nurse   Safe discharge environment identified: Yes  Barriers to discharge: Yes       Entered by: Tracy Dowell RN 12/02/2023 6:50 PM     Please review provider order for any additional goals.   Nurse to notify provider when observation goals have been met and patient is ready for discharge.Goal Outcome Evaluation:                        "

## 2023-12-03 NOTE — PROGRESS NOTES
PRIMARY DIAGNOSIS: ACUTE PAIN  OUTPATIENT/OBSERVATION GOALS TO BE MET BEFORE DISCHARGE:  1. Pain Status: Patient reports 10/10 right hip pain. Patient is restless and agitated by pain. Given PRN dilaudid and oxycodone, awaiting to see if effective.    2. Return to near baseline physical activity: No    3. Cleared for discharge by consultants (if involved): No    Discharge Planner Nurse   Safe discharge environment identified: Yes  Barriers to discharge: Yes, severe right hip pain. Focus on pain control. MRI of hip planned for morning.       Entered by: Cris Carmona RN 12/03/2023 12:54 AM     Please review provider order for any additional goals.   Nurse to notify provider when observation goals have been met and patient is ready for discharge.

## 2023-12-03 NOTE — PROGRESS NOTES
0.5 mg IV x 1 of Ativan ordered prior to MRI.  Informed this is a routine for patient For claustrophobia

## 2023-12-04 ENCOUNTER — APPOINTMENT (OUTPATIENT)
Dept: PHYSICAL THERAPY | Facility: HOSPITAL | Age: 63
End: 2023-12-04
Attending: INTERNAL MEDICINE
Payer: COMMERCIAL

## 2023-12-04 ENCOUNTER — APPOINTMENT (OUTPATIENT)
Dept: RADIOLOGY | Facility: HOSPITAL | Age: 63
End: 2023-12-04
Attending: PHYSICIAN ASSISTANT
Payer: COMMERCIAL

## 2023-12-04 VITALS
WEIGHT: 200 LBS | HEART RATE: 77 BPM | TEMPERATURE: 97.8 F | SYSTOLIC BLOOD PRESSURE: 130 MMHG | RESPIRATION RATE: 18 BRPM | HEIGHT: 61 IN | BODY MASS INDEX: 37.76 KG/M2 | OXYGEN SATURATION: 95 % | DIASTOLIC BLOOD PRESSURE: 61 MMHG

## 2023-12-04 LAB — BACTERIA UR CULT: NORMAL

## 2023-12-04 PROCEDURE — 96376 TX/PRO/DX INJ SAME DRUG ADON: CPT

## 2023-12-04 PROCEDURE — 250N000013 HC RX MED GY IP 250 OP 250 PS 637: Performed by: INTERNAL MEDICINE

## 2023-12-04 PROCEDURE — 250N000011 HC RX IP 250 OP 636: Performed by: RADIOLOGY

## 2023-12-04 PROCEDURE — 97116 GAIT TRAINING THERAPY: CPT | Mod: GP

## 2023-12-04 PROCEDURE — 97161 PT EVAL LOW COMPLEX 20 MIN: CPT | Mod: GP

## 2023-12-04 PROCEDURE — 77002 NEEDLE LOCALIZATION BY XRAY: CPT

## 2023-12-04 PROCEDURE — G0378 HOSPITAL OBSERVATION PER HR: HCPCS

## 2023-12-04 PROCEDURE — 99239 HOSP IP/OBS DSCHRG MGMT >30: CPT | Performed by: INTERNAL MEDICINE

## 2023-12-04 PROCEDURE — 250N000013 HC RX MED GY IP 250 OP 250 PS 637: Performed by: STUDENT IN AN ORGANIZED HEALTH CARE EDUCATION/TRAINING PROGRAM

## 2023-12-04 PROCEDURE — G0008 ADMIN INFLUENZA VIRUS VAC: HCPCS | Performed by: INTERNAL MEDICINE

## 2023-12-04 PROCEDURE — 255N000002 HC RX 255 OP 636: Performed by: INTERNAL MEDICINE

## 2023-12-04 PROCEDURE — 250N000011 HC RX IP 250 OP 636: Mod: JZ | Performed by: INTERNAL MEDICINE

## 2023-12-04 PROCEDURE — 90686 IIV4 VACC NO PRSV 0.5 ML IM: CPT | Performed by: INTERNAL MEDICINE

## 2023-12-04 PROCEDURE — 999N000147 HC STATISTIC PT IP EVAL DEFER: Performed by: PHYSICAL THERAPIST

## 2023-12-04 RX ORDER — GABAPENTIN 100 MG/1
200 CAPSULE ORAL 2 TIMES DAILY
Qty: 20 CAPSULE | Refills: 0 | Status: SHIPPED | OUTPATIENT
Start: 2023-12-04 | End: 2023-12-06

## 2023-12-04 RX ORDER — METOPROLOL TARTRATE 25 MG/1
25 TABLET, FILM COATED ORAL ONCE
Status: DISCONTINUED | OUTPATIENT
Start: 2023-12-04 | End: 2023-12-04

## 2023-12-04 RX ORDER — TRIAMCINOLONE ACETONIDE 40 MG/ML
80 INJECTION, SUSPENSION INTRA-ARTICULAR; INTRAMUSCULAR ONCE
Status: COMPLETED | OUTPATIENT
Start: 2023-12-04 | End: 2023-12-04

## 2023-12-04 RX ORDER — GABAPENTIN 100 MG/1
200 CAPSULE ORAL 3 TIMES DAILY
Status: DISCONTINUED | OUTPATIENT
Start: 2023-12-04 | End: 2023-12-04 | Stop reason: HOSPADM

## 2023-12-04 RX ORDER — OXYCODONE HYDROCHLORIDE 5 MG/1
5 TABLET ORAL EVERY 6 HOURS PRN
Qty: 12 TABLET | Refills: 0 | Status: SHIPPED | OUTPATIENT
Start: 2023-12-04 | End: 2023-12-06

## 2023-12-04 RX ORDER — BUPIVACAINE HYDROCHLORIDE 2.5 MG/ML
5 INJECTION, SOLUTION INFILTRATION; PERINEURAL ONCE
Status: COMPLETED | OUTPATIENT
Start: 2023-12-04 | End: 2023-12-04

## 2023-12-04 RX ORDER — ACETAMINOPHEN 325 MG/1
975 TABLET ORAL EVERY 6 HOURS PRN
COMMUNITY
Start: 2023-12-04 | End: 2023-12-06

## 2023-12-04 RX ADMIN — HYDROMORPHONE HYDROCHLORIDE 0.4 MG: 0.2 INJECTION, SOLUTION INTRAMUSCULAR; INTRAVENOUS; SUBCUTANEOUS at 13:06

## 2023-12-04 RX ADMIN — INFLUENZA A VIRUS A/VICTORIA/4897/2022 IVR-238 (H1N1) ANTIGEN (FORMALDEHYDE INACTIVATED), INFLUENZA A VIRUS A/DARWIN/9/2021 SAN-010 (H3N2) ANTIGEN (FORMALDEHYDE INACTIVATED), INFLUENZA B VIRUS B/PHUKET/3073/2013 ANTIGEN (FORMALDEHYDE INACTIVATED), AND INFLUENZA B VIRUS B/MICHIGAN/01/2021 ANTIGEN (FORMALDEHYDE INACTIVATED) 0.5 ML: 15; 15; 15; 15 INJECTION, SUSPENSION INTRAMUSCULAR at 17:20

## 2023-12-04 RX ADMIN — DOXYCYCLINE HYCLATE 100 MG: 100 CAPSULE ORAL at 09:41

## 2023-12-04 RX ADMIN — OXYCODONE HYDROCHLORIDE 5 MG: 5 TABLET ORAL at 11:32

## 2023-12-04 RX ADMIN — BUPIVACAINE HYDROCHLORIDE 5 ML: 2.5 INJECTION, SOLUTION EPIDURAL; INFILTRATION; INTRACAUDAL; PERINEURAL at 16:19

## 2023-12-04 RX ADMIN — VALACYCLOVIR HYDROCHLORIDE 500 MG: 500 TABLET, FILM COATED ORAL at 09:41

## 2023-12-04 RX ADMIN — METHOCARBAMOL 500 MG: 500 TABLET ORAL at 17:18

## 2023-12-04 RX ADMIN — OXYCODONE HYDROCHLORIDE 5 MG: 5 TABLET ORAL at 05:18

## 2023-12-04 RX ADMIN — CELECOXIB 200 MG: 200 CAPSULE ORAL at 08:55

## 2023-12-04 RX ADMIN — TRIAMCINOLONE ACETONIDE 80 MG: 40 INJECTION, SUSPENSION INTRA-ARTICULAR; INTRAMUSCULAR at 16:22

## 2023-12-04 RX ADMIN — METHOCARBAMOL 500 MG: 500 TABLET ORAL at 12:21

## 2023-12-04 RX ADMIN — CETIRIZINE HYDROCHLORIDE 10 MG: 10 TABLET, FILM COATED ORAL at 08:55

## 2023-12-04 RX ADMIN — GABAPENTIN 200 MG: 100 CAPSULE ORAL at 08:55

## 2023-12-04 RX ADMIN — HYDROMORPHONE HYDROCHLORIDE 0.4 MG: 0.2 INJECTION, SOLUTION INTRAMUSCULAR; INTRAVENOUS; SUBCUTANEOUS at 06:09

## 2023-12-04 RX ADMIN — METHOCARBAMOL 500 MG: 500 TABLET ORAL at 08:55

## 2023-12-04 RX ADMIN — ACETAMINOPHEN 975 MG: 325 TABLET ORAL at 08:55

## 2023-12-04 RX ADMIN — SENNOSIDES AND DOCUSATE SODIUM 1 TABLET: 8.6; 5 TABLET ORAL at 08:55

## 2023-12-04 RX ADMIN — IOHEXOL 5 ML: 300 INJECTION, SOLUTION INTRAVENOUS at 16:13

## 2023-12-04 RX ADMIN — ALBUTEROL SULFATE 2 PUFF: 90 AEROSOL, METERED RESPIRATORY (INHALATION) at 05:18

## 2023-12-04 RX ADMIN — FLUOXETINE 40 MG: 20 CAPSULE ORAL at 08:55

## 2023-12-04 RX ADMIN — GABAPENTIN 200 MG: 100 CAPSULE ORAL at 13:05

## 2023-12-04 RX ADMIN — ACETAMINOPHEN 975 MG: 325 TABLET ORAL at 17:18

## 2023-12-04 RX ADMIN — ALBUTEROL SULFATE 2 PUFF: 90 AEROSOL, METERED RESPIRATORY (INHALATION) at 12:21

## 2023-12-04 ASSESSMENT — ACTIVITIES OF DAILY LIVING (ADL)
ADLS_ACUITY_SCORE: 37
ADLS_ACUITY_SCORE: 38
ADLS_ACUITY_SCORE: 37
ADLS_ACUITY_SCORE: 38
ADLS_ACUITY_SCORE: 37
ADLS_ACUITY_SCORE: 38
ADLS_ACUITY_SCORE: 38

## 2023-12-04 NOTE — PROGRESS NOTES
"Physical Therapy: Orders received. Chart reviewed and discussed with care team.? Physical Therapy not indicated due to patient declining. Spoke with patient, introduced rationale for physical therapy. Patient states \"I don't know what physical therapy is going to do for me right now\". Educated patient on physical therapy role. Patient adamantly declines assessment.? Defer discharge recommendations to medical team.? Will complete orders.     Catalina Montes, PT  12/4/2023    "

## 2023-12-04 NOTE — PROGRESS NOTES
Care Management Follow Up    Length of Stay (days): 0    Expected Discharge Date: 12/4/2023    Concerns to be Addressed:   Pending discharge orders.   Patient plan of care discussed at interdisciplinary rounds: Yes    Anticipated Discharge Disposition:  Home.     Anticipated Discharge Services:  None  Anticipated Discharge DME:  None    Patient/family educated on Medicare website which has current facility and service quality ratings:   NA  Education Provided on the Discharge Plan:   Per team  Patient/Family in Agreement with the Plan:   Yes    Referrals Placed by CM/SW:   None  Private pay costs discussed: Not applicable at this time.     Additional Information:  Per chart review, patient is  and independent with activities of daily living at baseline. She has declined therapy evaluations here. No care management needs identified.      Iram Ojeda RN

## 2023-12-04 NOTE — PROGRESS NOTES
[facility]    Medicine Progress Note - Hospitalist Service    Date of Admission:  12/2/2023    Assessment & Plan     64 yo F with h/o hypothyroidism, asthma, HLD, seasonal allergy, depression, insomnia who presented with right lower quadrant pain with radiation to the right hip and back.  Patient had colonoscopy on 11/24 and the next day she developed the above symptoms after standing all day at a function. Using Tylenol and ibuprofen but pain has been persistent.  No direct trauma to the right hip but had missed a step stumbling on step 1 week prior. Patient is taking doxycycline for acute sinusitis. CT abdomen/pelvis no acute abnormalities.   MRI of the lumbar spine showed L4-L5, L5-S1 facet arthropathy with periarticular edema or enhancement.  Negative for discitis or osteomyelitis. MRI hip shows chronic mild changes - nothing acute to explain new pain.  Will have ortho evaluate but this may all be related to incident with stumbling on the step 1 week prior and leading to back/hip pain now.       Principal Problem:    Intractable low back/Right hip/RLQ abd pain   - nothing on imaging (CT abd/pelv, MR L-spine and R hip, renal US) to explain sudden flareup of pain.  Will have PT/OT assess, pain team is assessing.  Get ortho eval  - hopefully discharge in AM  - trial celebrex, continue tylenol scheduled, gabapentin, robaxin, with oxycodone and IV dilaudid as needed    Active Problems:    Hypothyroidism due to acquired atrophy of thyroid  -continue levothyroxine as at home      Obesity    Mild major depression  -continue prozac      Lactic acidosis  - likely due to dehydration with N/V on admit  - resolved with hydration  - no sign of infection, no fevers, normal CRP and WBC      Mildly abnormal UA  - urine culture no growth at 24 hours  - no antibiotics      Hyperlipidemia LDL goal <130  - no meds currently      PTSD (post-traumatic stress disorder)  - continue prozac      Sinusitis  -  "continue doxycycline started 1 week PTA      Mild intermittent asthma without complication/seasonal allergies  - albuterol MDI  - singulair         Observation Goals: -diagnostic tests and consults completed and resulted, -vital signs normal or at patient baseline, Nurse to notify provider when observation goals have been met and patient is ready for discharge.  Diet: Regular Diet Adult    DVT Prophylaxis: Enoxaparin (Lovenox) SQ  Ramesh Catheter: Not present  Lines: None     Cardiac Monitoring: None  Code Status: Full Code      Clinically Significant Risk Factors Present on Admission                       # Obesity: Estimated body mass index is 37.79 kg/m  as calculated from the following:    Height as of this encounter: 1.549 m (5' 1\").    Weight as of this encounter: 90.7 kg (200 lb).       # Asthma: noted on problem list        Disposition Plan     Expected Discharge Date: 12/03/2023                    Kameron Issa MD  Hospitalist Service  [facility]  Securely message with [employer] (more info)  Text page via Dialog[employer] Paging/Directory   ______________________________________________________________________    Interval History   Still with a lot of hip pain laterally and some back pain  No CP/SOB/cough/N/V today    Physical Exam   Vital Signs: Temp: 98.1  F (36.7  C) Temp src: Oral BP: 128/67 Pulse: 79   Resp: 18 SpO2: 96 % O2 Device: None (Room air) Oxygen Delivery: 2 LPM  Weight: 200 lbs 0 oz    General Appearance: Older F lethargic in bed  Respiratory:  CTA  Cardiovascular: RRR S1S2  GI: +BS, soft, NT/ND  Skin: no rashes or lesions on exposed areas  Neuro: Lethargic but arouses easily, oriented, CN 2-12 intact, motor 5/5 throughout but exam is self-limited on RLE due to pain     Medical Decision Making       40 MINUTES SPENT BY ME on the date of service doing chart review, history, exam, documentation & further activities per the note.      Data     I have personally reviewed the following " data over the past 24 hrs:    8.3  \   12.2   / 240     136 101 8.7 /  99   3.8 27 0.74 \     ALT: 21 AST: 19 AP: 89 TBILI: 0.4   ALB: 3.7 TOT PROTEIN: 5.7 (L) LIPASE: N/A       Imaging results reviewed over the past 24 hrs:   Recent Results (from the past 24 hour(s))   MR Hip Right w/o Contrast    Narrative    EXAM: MR HIP RIGHT WITHOUT CONTRAST  LOCATION: [facility]  DATE: 12/03/2023    INDICATION: Severe right hip pain.  COMPARISON: None.  TECHNIQUE: Unenhanced.    FINDINGS:    RIGHT HIP: The right hip is negative for fracture or avascular necrosis.  -Labrum: Signal abnormality within the anterior labrum is likely secondary to a chronic tear. No detachment or paralabral cyst formation.   -Cartilage: Mild thinning of the articular cartilage on both sides of the joint without significant surface irregularity.  -Joint space: No effusion or synovitis.   -Joint capsule/ligaments: Intact joint capsule. Ligamentum teres is intact.  -Morphology: Alpha Angle is normal. No bony morphologic changes associated with femoroacetabular impingement.    LEFT HIP: The left hip is negative for fracture or avascular necrosis.    MUSCLES AND TENDONS:   -Gluteal: Mild right-sided gluteal tendon tendinopathy without evidence for tearing or trochanteric bursitis.  -Proximal hamstring: Mild bilateral hamstring tendinopathy without tearing.   -Iliopsoas: Mild right-sided iliopsoas tendinopathy of the distal attachment to the lesser trochanter. No tearing.  -Rectus femoris origin: No tear or tendinopathy.    BONES:   -The bony pelvis is negative for fracture.   -SI joints are normal. Visualized lumbar spine is normal.    SOFT TISSUES:   -Normal muscle bulk. No acute muscular injury.    INTRA-PELVIC CONTENTS:  -Visualized portions are normal.      Impression    IMPRESSION:  1.  Both hips negative for fracture or avascular necrosis.  2.  Chronic appearing tear of the anterior aspect of the right hip labrum. No  detachment.  3.  Mild thinning of the articular cartilage both sides of the right hip joint without significant surface irregularity.  4.  Mild right-sided gluteal tendon tendinopathy without evidence for tearing or trochanteric bursitis.  5.  Mild bilateral hamstring tendinopathy without tearing.  6.  Mild right-sided iliopsoas tendinopathy. No tearing.  7.  Bony pelvis negative for fracture.  8.  Intrapelvic contents negative.

## 2023-12-04 NOTE — CONSULTS
ORTHOPEDIC CONSULTATION    Consultation  Nidia Acosta,  1960, MRN [id number]    Intractable low back pain [M54.59]    PCP: Merlene Reyes, [phone]   Code status:  Full Code       Extended Emergency Contact Information  Primary Emergency Contact: [place] [address] [facility]  Home Phone: [phone]  Work Phone: [phone]  Mobile Phone: [phone]  Relation: Son  Secondary Emergency Contact: Alida Harrell (cousin)   [facility]  Home Phone: [phone]  Work Phone: none  Mobile Phone: none  Relation: Relative         IMPRESSION:  Right hip/low back pain, MRI showing mild degenerative changes and chronic labral tear     PLAN:  This patient was discussed with Dr. Sharma, on-call surgeon for [facility] and they are in agreement with the following plan.   -No indication for urgent surgical intervention at this time.  MRI only demonstrates mild degenerative changes and a chronic labral tear.  Her pain does seem to be out of proportion for MRI results however exam does indicate possibly pain coming from the hip.  Could consider getting a corticosteroid injection into the right hip to see if this gives her any further improvement.  Preferably this to be done in a outpatient basis however since that she is having considerable pain at this time making it difficult to discharge home I would recommend that we get a ultrasound-guided corticosteroid injection to be done by the radiologist team here in the hospital.  May discharge following injection from an orthopedic standpoint is nothing further to do from us  -Weightbearing as tolerated right lower extremity  -Continue current pain regimen  -Follow-up with one of our arthritis specialists in 1 to 2 weeks after discharge, if steroid provides good relief then may follow-up as needed when pain returns in the future.        Thank you for including [facility] in the care of Nidia Acosta.  It has been a pleasure participating in their care.        CHIEF COMPLAINT: Intractable low back pain    HISTORY OF PRESENT ILLNESS:  The patient is seen in orthopedic consultation at the request of Kameron Issa MD for right hip pain.  The patient is a 63 year old female with PMH significant for pain in her right hip.  She notes that pain began about 1 week ago.  She had a colonoscopy done on 11/20/2023.  The following day she was standing for an extended period of time and twisted her hip causing a lot of pain in the low back and anterior hip area.  She has been having ongoing pain and difficulty with weightbearing/ambulating since that time that worsened as the week went on.  On Thursday she went into the [place] emergency department and was prescribed oxycodone.  She was unable to obtain this medication from the pharmacy and had continued pain so she came into the [facility] [place] emergency department.  Currently she describes pain to the lateral and anterior aspect of her hip/groin area.  She does not have pain rating down her leg or numbness/tingling in her leg.  She is able to weight-bear and ambulate a few steps but movement does exacerbate her pain.  She was unable to work with physical therapy today due to the pain..      ALLERGIES:   Review of patient's allergies indicates   Allergies   Allergen Reactions    Adhesive Tape Itching    Bees Anaphylaxis    Codeine Difficulty breathing    Latex Dermatitis    Penicillin [Penicillins] Difficulty breathing    Seasonal Allergies          MEDICATIONS UPON ADMISSION:  Medications were reviewed.  They include:   (Not in a hospital admission)        SOCIAL HISTORY:   she  reports that she quit smoking about 37 years ago. Her smoking use included cigarettes. She has a 0.50 pack-year smoking history. She has never used smokeless tobacco. She reports current alcohol use. She reports that she does not use drugs.      FAMILY HISTORY:  family history includes Alcohol/Drug in  "her brother, father, and son; Allergies in her mother; Alzheimer Disease in her mother; Arthritis in her father and mother; C.A.D. in her father; Depression in her brother and mother; Diabetes in her mother and paternal grandmother; Heart Disease in her maternal grandfather, maternal grandmother, paternal grandfather, and paternal grandmother; Heart Disease (age of onset: 80) in her mother; Hyperlipidemia in her brother, brother, father, and mother; Hypertension in her brother and father; Obesity in her mother and paternal grandmother; Osteoporosis in her maternal grandmother and mother; Substance Abuse in her brother, father, and another family member.      REVIEW OF SYSTEMS:   Reviewed with patient. See HPI, otherwise negative       PHYSICAL EXAMINATION:  Vitals: /57 (BP Location: Left arm)   Pulse 84   Temp 98.5  F (36.9  C) (Oral)   Resp 18   Ht 1.549 m (5' 1\")   Wt 90.7 kg (200 lb)   LMP 07/29/2002   SpO2 96%   BMI 37.79 kg/m    General: On examination, the patient is resting comfortably, NAD, awake, and alert and oriented to person, place, time, and, and general circumstances   SKIN: There is no evidence of erythema, warmth, swelling, deformity, crepitus, break to the skin, open wound.  Pulses:  dorsalis pedis and posterior tibial pulse is intact and equal bilaterally  Sensation: intact and equal bilaterally to the distal lower extremities.  Tenderness: Severely tender to palpation of the anterior groin area, minimal tenderness palpation over the greater trochanter  ROM: Full knee extension/flexion without pain. Full hip flexion/abduction/adduction/IR/ER without pain. ankle DF/PF/inversion/eversion without pain.  Motor: TIB ANT, PF, ELH, QUAD, HF 5/5  Contralateral side= Full range of motion, Negative joint instability findings, 5/5 motor groups about the joint, Non-tender.       RADIOGRAPHIC EVALUATION:  Personally reviewed  EXAM: MR HIP RIGHT WITHOUT CONTRAST  LOCATION: [facility]  DATE: 12/03/2023     INDICATION: Severe right hip pain.  COMPARISON: None.  TECHNIQUE: Unenhanced.     FINDINGS:     RIGHT HIP: The right hip is negative for fracture or avascular necrosis.  -Labrum: Signal abnormality within the anterior labrum is likely secondary to a chronic tear. No detachment or paralabral cyst formation.   -Cartilage: Mild thinning of the articular cartilage on both sides of the joint without significant surface irregularity.  -Joint space: No effusion or synovitis.   -Joint capsule/ligaments: Intact joint capsule. Ligamentum teres is intact.  -Morphology: Alpha Angle is normal. No bony morphologic changes associated with femoroacetabular impingement.     LEFT HIP: The left hip is negative for fracture or avascular necrosis.     MUSCLES AND TENDONS:   -Gluteal: Mild right-sided gluteal tendon tendinopathy without evidence for tearing or trochanteric bursitis.  -Proximal hamstring: Mild bilateral hamstring tendinopathy without tearing.   -Iliopsoas: Mild right-sided iliopsoas tendinopathy of the distal attachment to the lesser trochanter. No tearing.  -Rectus femoris origin: No tear or tendinopathy.     BONES:   -The bony pelvis is negative for fracture.   -SI joints are normal. Visualized lumbar spine is normal.     SOFT TISSUES:   -Normal muscle bulk. No acute muscular injury.     INTRA-PELVIC CONTENTS:  -Visualized portions are normal.                                                                      IMPRESSION:  1.  Both hips negative for fracture or avascular necrosis.  2.  Chronic appearing tear of the anterior aspect of the right hip labrum. No detachment.  3.  Mild thinning of the articular cartilage both sides of the right hip joint without significant surface irregularity.  4.  Mild right-sided gluteal tendon tendinopathy without evidence for tearing or trochanteric bursitis.  5.  Mild bilateral hamstring tendinopathy without tearing.  6.  Mild right-sided iliopsoas  tendinopathy. No tearing.  7.  Bony pelvis negative for fracture.  8.  Intrapelvic contents negative.    PERTINENT LABS:  Personally reviewed  Recent Labs   Lab Test 12/03/23  0638   HGB 12.2            SANG FOREMAN PA-C  Date: 12/4/2023  Time: 10:57 AM  [facility]    CC1:   Kameron Issa MD

## 2023-12-04 NOTE — PROGRESS NOTES
PRIMARY DIAGNOSIS: ACUTE PAIN  OUTPATIENT/OBSERVATION GOALS TO BE MET BEFORE DISCHARGE:  1. Pain Status: Improved-controlled with oral pain medications.    2. Return to near baseline physical activity: No    3. Cleared for discharge by consultants (if involved): No    Discharge Planner Nurse   Safe discharge environment identified: No  Barriers to discharge: Yes       Entered by: Kinjal Cervantes RN 12/04/2023 5:25 AM     Please review provider order for any additional goals.   Nurse to notify provider when observation goals have been met and patient is ready for discharge.    Pt A&Ox4, VSS on RA. Pt c/o R hip and right groin pain, states feeling like a burning sensation. PRN oxycodone given w/ some relief and ice pack applied to R hip. Pt up with assist x1 to BSC. Inhaler given as ordered, denies SOB, O2 sats remain >95% on RA overnight. Pt is able to make her needs known.

## 2023-12-04 NOTE — PROGRESS NOTES
"[facility] ACUTE PAIN SERVICE    ([facility], [facility], [facility])  Daily PAIN Progress Note    Assessment/Plan:  Nidia Acosta is a 63 year old female who was admitted on 12/2/2023.  Pain team was asked to see the patient for intractable abdominal pain.     Per H&P, \"History of hypothyroidism, asthma, seasonal allergy, depression, insomnia presented with right lower quadrant pain with radiation to the right hip and back.  Patient had colonoscopy on 11/24 and the next day she developed the above symptoms after standing all day at a function.  The pain is ill-defined-combination of cramping, burning, stabbing, 9 out of 10.  She has been using Tylenol and ibuprofen but pain has been persistent.  Denied any fever, chills, dysuria, diarrhea.      CT abdomen/pelvis no acute abnormalities.   MRI of the lumbar spine showed L4-L5, L5-S1 facet arthropathy with periarticular edema or enhancement.  Negative for discitis or osteomyelitis.  ED spoke with neurosurgery who will see the patient.  Patient will be admitted for intractable pain.         Pt feel pain meds are effective, not sure what is helping more, Robaxin vs oxycodone.  Pt reporting pain started last Saturday pain in lower back after mis stepped down stairs, no fall, caught herself, pain worsened over the past week and no is in the hip/groin area. Pain 8/10 , aching, sharp, stabbing, burning pain right hip and right above groin area on right. Discussed with Ortho, may plan on doing steroid injection here while in the hospital. Will leave oxycodone at 5 mg for now, Ortho agreeable.      Discussed plan with Mihir Potter PA-C, [facility]    PLAN:   1) Pain is consistent with right hip and just above groin area on right.  US- renal ordered to r/o early hydronephrosis not seen on CT, CT abdomen/pelvis no acute abnormalities. , 11/30 x-ray of the pelvis with right hip showed no evidence of fracture or osteonecrosis  -- 12/02 MRI-lumbar " spine :showed L4-L5, L5-S1 facet arthropathy with periarticular edema or enhancement.    -- had slipped on a step a week ago.  2)Multimodal Medication Therapy  Topical: offered lidocaine x 3 patches daily - declines - states it causes burning  NSAID'S: CrCl 81.9 ml/min - no indication to avoid nsaids - celebrex ordered per [facility] 200 mg po daily- agree with nsaid trial   Muscle Relaxants: Robaxin 500 mg po QID, can increase to 750 mg po QID if muscle spasms persistent  Adjuvants: APAP 975 mg po TID - increase to QID, gabapentin 200 mg tid  Antidepressants/anxiolytics: fluoxetine 40 mg po daily  Opioids: oxycodone 2.5 - 5 mg po q4h prn - decrease to qid prn first line  IV Pain medication: dilaudid 0.2-0.4 mg IV qid prn , second line.  3)Non-medication interventions: per nursing  4)Constipation Prophylaxis: schedule s/d x 1 po BID, miralx prn, bisacodyl prn     Discharge Recommendations - We recommend prescribing the following at the time of discharge: NADINE [facility]  Acute Care Pain Management Program   Hours of pain coverage 7a-1700- after 1700 please call the house officer    [email or web address] (DONNA, Joes, Darrion, SD, RH)   Vocera text web console

## 2023-12-04 NOTE — CARE PLAN
PRIMARY Concern: acute pain (R hip/groin)  SAFETY RISK Concerns (fall risk, behaviors, etc.): fall risk      Isolation/Type: none  Tests/Procedures for NEXT shift: hip injection 1600  Consults? (Pending/following, signed-off?) PT  Where is patient from? (Home, TCU, etc.): home  Other Important info for NEXT shift: PRN pain meds and ice packs. Hip injection at 1600  Anticipated DC date & active delays: TBD  _______________________________________________________  Orientation/Cognitive: A&Ox4  Mobility Level/Assist Equipment: SBA  Antibiotics & Plan (IV/po, length of tx left): Doxycycline and Valacyclovir PO  Pain Management: reported 6/10 for pain, scheduled tylenol and PRN oxy & dilaudid given  Tele/VS/O2: VSS, 96% on RA  ABNL Lab/BG: none  Diet: regular  Bowel/Bladder: up to bedside commode  Skin Concerns: none  Drains/Devices: PIVSL

## 2023-12-04 NOTE — PROGRESS NOTES
"   12/04/23 1340   Appointment Info   Signing Clinician's Name / Credentials (PT) Steph Mcdowell, PT, DPT   Quick Adds   Quick Adds Certification   Living Environment   People in Home alone   Current Living Arrangements   (town house)   Home Accessibility stairs within home   Number of Stairs, Within Home, Primary greater than 10 stairs  (15)   Stair Railings, Within Home, Primary railing on left side (ascending)   Living Environment Comments does not have to complete stairs initially   General Information   Onset of Illness/Injury or Date of Surgery 12/02/23   Referring Physician Kameron Issa MD   Patient/Family Therapy Goals Statement (PT) Return to home   Pertinent History of Current Problem (include personal factors and/or comorbidities that impact the POC) Per Chart Review -\"63 year old female admitted on 12/2/2023. She has a history of hypothyroidism, asthma, seasonal allergy, depression, insomnia presented with right lower quadrant pain with radiation to the right hip and back\"   Existing Precautions/Restrictions weight bearing   Weight-Bearing Status - RLE weight-bearing as tolerated   Range of Motion (ROM)   Range of Motion ROM is WFL   Strength (Manual Muscle Testing)   Strength (Manual Muscle Testing) Deficits observed during functional mobility   Bed Mobility   Bed Mobility supine-sit;sit-supine   Supine-Sit [place] (Bed Mobility) supervision;verbal cues;contact guard   Sit-Supine [place] (Bed Mobility) supervision;verbal cues;contact guard   Transfers   Transfers sit-stand transfer   Maintains Weight-bearing Status (Transfers) able to maintain   Sit-Stand Transfer   Sit-Stand [place] (Transfers) supervision;verbal cues;contact guard   Assistive Device (Sit-Stand Transfers) walker, front-wheeled   Gait/Stairs (Locomotion)   [place] Level (Gait) supervision;verbal cues;contact guard   Assistive Device (Gait) walker, front-wheeled   Distance in Feet (Gait) 5   Pattern (Gait) " "step-through;swing-through   Deviations/Abnormal Patterns (Gait) gait speed decreased;tevin decreased;weight shifting decreased   Maintains Weight-bearing Status (Gait) able to maintain   Clinical Impression   Criteria for Skilled Therapeutic Intervention Yes, treatment indicated   PT Diagnosis (PT) Impaired Functional Mobility   Influenced by the following impairments weakness, impaired balance   Functional limitations due to impairments gait, transfers, stairs   Clinical Presentation (PT Evaluation Complexity) stable   Clinical Presentation Rationale pt presents as medically diagnosed   Clinical Decision Making (Complexity) low complexity   Planned Therapy Interventions (PT) balance training;gait training;home exercise program;transfer training;strengthening;stair training   Risk & Benefits of therapy have been explained evaluation/treatment results reviewed;patient   PT Total Evaluation Time   PT Eval, Low Complexity Minutes (41842) 10   Therapy Certification   Start of care date 12/04/23   Certification date from 12/04/23   Certification date to 12/11/23   Medical Diagnosis Per Chart Review -\"63 year old female admitted on 12/2/2023. She has a history of hypothyroidism, asthma, seasonal allergy, depression, insomnia presented with right lower quadrant pain with radiation to the right hip and back\"   Physical Therapy Goals   PT Frequency Daily   PT Predicted Duration/Target Date for Goal Attainment 12/11/23   PT Goals Transfers;Gait;Stairs   PT: Transfers Modified independent;Sit to/from stand;Assistive device   PT: Gait Modified independent;Rolling walker;Greater than 200 feet   PT: Stairs Modified independent;Greater than 10 stairs;Rail on left   Interventions   Interventions Quick Adds Gait Training;Therapeutic Activity   Therapeutic Activity   Treatment Detail/Skilled Intervention Sit to/from stand x1: cueing for safety/hand placement on stable surface/FWW management, SBA; Sit to supine x1: cueing for " "safety/technique, SBA   Gait Training   Gait Training Minutes (92018) 8   Symptoms Noted During/After Treatment (Gait Training) fatigue   Treatment Detail/Skilled Intervention cueing for safety/posture/picking up feet   Distance in Feet 200   [place] Level (Gait Training) contact guard   Physical Assistance Level (Gait Training) supervision;verbal cues   Weight Bearing (Gait Training) weight-bearing as tolerated   Assistive Device (Gait Training) rolling walker   Pattern Analysis (Gait Training) swing-through gait   Gait Analysis Deviations decreased tevin;decreased step length   Impairments (Gait Analysis/Training) strength decreased;balance impaired   PT Discharge Planning   PT Plan Stairs, gait with FWW, LE strength & balance   PT Discharge Recommendation (DC Rec) home with home care physical therapy   PT Rationale for DC Rec Pending pain management & decrease in lightheadedness reported from current pain medications, anticipate pt will be able to return to home with the addition of home PT for improving activity tolerance/strength & a FWW for added stability with movement   PT Brief overview of current status CGA with gait 200ft & SBA with transfers/bed mobility   PT Equipment Needed at Discharge walker, rolling   Total Session Time   Timed Code Treatment Minutes 8   Total Session Time (sum of timed and untimed services) 18     [facility]  OUTPATIENT PHYSICAL THERAPY EVALUATION  PLAN OF TREATMENT FOR OUTPATIENT REHABILITATION  (COMPLETE FOR INITIAL CLAIMS ONLY)  Patient's Last Name, First Name, M.I.  YOB: 1960  Nidia Acosta                        Provider's Name  [facility] Medical Record No.  [id number]                             Onset Date:  12/02/23   Start of Care Date:  12/04/23   Type:     _X_PT   ___OT   ___SLP Medical Diagnosis:  Per Chart Review -\"63 year old female admitted on 12/2/2023. She has a history of " "hypothyroidism, asthma, seasonal allergy, depression, insomnia presented with right lower quadrant pain with radiation to the right hip and back\"              PT Diagnosis:  Impaired Functional Mobility Visits from SOC:  1     See note for plan of treatment, functional goals and certification details    I CERTIFY THE NEED FOR THESE SERVICES FURNISHED UNDER        THIS PLAN OF TREATMENT AND WHILE UNDER MY CARE     (Physician co-signature of this document indicates review and certification of the therapy plan).              "

## 2023-12-04 NOTE — PROGRESS NOTES
PRIMARY DIAGNOSIS: ACUTE PAIN  OUTPATIENT/OBSERVATION GOALS TO BE MET BEFORE DISCHARGE:  1. Pain Status: Improved-controlled with oral pain medications.    2. Return to near baseline physical activity: No    3. Cleared for discharge by consultants (if involved): No    Discharge Planner Nurse   Safe discharge environment identified: No  Barriers to discharge: Yes       Entered by: Kinjal Cervantes RN 12/04/2023 1:23 AM     Please review provider order for any additional goals.   Nurse to notify provider when observation goals have been met and patient is ready for discharge.

## 2023-12-04 NOTE — CARE PLAN
PRIMARY DIAGNOSIS: ACUTE PAIN  OUTPATIENT/OBSERVATION GOALS TO BE MET BEFORE DISCHARGE:  1. Pain Status: Improved but still requiring IV narcotics.    2. Return to near baseline physical activity: No    3. Cleared for discharge by consultants (if involved): No    Discharge Planner Nurse   Safe discharge environment identified: No  Barriers to discharge: Yes       Entered by: Eliz Lebron RN 12/04/2023 10:18 AM     Please review provider order for any additional goals.   Nurse to notify provider when observation goals have been met and patient is ready for discharge.    Pt is A&Ox4 reported 6/10 for pain and denies SOB, dizziness or N/V. Pt ambulated to bedside commode. Initially refused PT, re-ordered by MD. Tylenol given.

## 2023-12-04 NOTE — PROGRESS NOTES
PRIMARY DIAGNOSIS: ACUTE PAIN  OUTPATIENT/OBSERVATION GOALS TO BE MET BEFORE DISCHARGE:  1. Pain Status: Improved-controlled with oral pain medications.    2. Return to near baseline physical activity: No    3. Cleared for discharge by consultants (if involved): No    Discharge Planner Nurse   Safe discharge environment identified: No  Barriers to discharge: Yes       Entered by: Kinjal Cervantes RN 12/04/2023 1:24 AM     Please review provider order for any additional goals.   Nurse to notify provider when observation goals have been met and patient is ready for discharge.    Pt A&Ox4, VSS on RA. Pt c/o R hip pain, pain managed with scheduled oral pain medications. Ice pack applied to R hip. Pt up with assist x1 to BSC. First dose of lovenox given as ordered. Pt is able to make her needs known.

## 2023-12-04 NOTE — CARE PLAN
PRIMARY DIAGNOSIS: ACUTE PAIN  OUTPATIENT/OBSERVATION GOALS TO BE MET BEFORE DISCHARGE:  1. Pain Status: Improved but still requiring IV narcotics.    2. Return to near baseline physical activity: No    3. Cleared for discharge by consultants (if involved): No    Discharge Planner Nurse   Safe discharge environment identified: Yes  Barriers to discharge: Yes       Entered by: Eliz Lebron RN 12/04/2023 1:16 PM     Please review provider order for any additional goals.   Nurse to notify provider when observation goals have been met and patient is ready for discharge.    Pt A&OX4, denies SOB, dizziness and N/V. Reported 6/10 pain, given IV dilaudid.

## 2023-12-04 NOTE — PLAN OF CARE
Problem: Pain Acute  Goal: Optimal Pain Control and Function  Outcome: Not Progressing  Intervention: Develop Pain Management Plan  Recent Flowsheet Documentation  Taken 12/3/2023 1656 by Iram Kaufman RN  Pain Management Interventions: medication (see MAR)  Taken 12/3/2023 1600 by Iram Kaufman RN  Pain Management Interventions: medication (see MAR)   PRN oxy given x1, reports no change in pain. Ice applied to hip.      Problem: Adult Inpatient Plan of Care  Goal: Optimal Comfort and Wellbeing  Outcome: Progressing  Intervention: Monitor Pain and Promote Comfort  Recent Flowsheet Documentation  Taken 12/3/2023 1656 by Iram Kaufman RN  Pain Management Interventions: medication (see MAR)  Taken 12/3/2023 1600 by Iram Kaufman RN  Pain Management Interventions: medication (see MAR)   Up to bedside commode after complaints of not being able to pee with purewick. Urinated when up on commode. Assist x1 to commode. Call light within reach, calls appropriately.

## 2023-12-04 NOTE — PROGRESS NOTES
Neurosurgery note    Chart reviewed    No acute events overnight, primarily reporting hip pain per chart review.  Lumbar imaging unremarkable.  No surgical intervention indicated from neurosurgical perspective.  Hip MRI was ordered, orthopedics has been consulted.    Neurosurgery will sign off.

## 2023-12-04 NOTE — DISCHARGE SUMMARY
"[facility]  Hospitalist Discharge Summary      Date of Admission:  12/2/2023  Date of Discharge:  12/4/2023  Discharging Provider: Kameron Issa MD  Discharge Service: Hospitalist Service    Discharge Diagnoses   Principal Problem:    Intractable low back pain  Active Problems:    Hypothyroidism due to acquired atrophy of thyroid    Obesity    Mild major depression (H)    Hyperlipidemia LDL goal <130    PTSD (post-traumatic stress disorder)    Sinusitis    Mild intermittent asthma without complication        Clinically Significant Risk Factors     # Obesity: Estimated body mass index is 37.79 kg/m  as calculated from the following:    Height as of this encounter: 1.549 m (5' 1\").    Weight as of this encounter: 90.7 kg (200 lb).       Follow-ups Needed After Discharge       Follow up with primary care provider, Merlene Reyes, within 7 days for hospital follow- up.  No follow up labs or test are needed.  Follow up with [facility] in 1-2 weeks.        Discharge Disposition   Discharged to home  Condition at discharge: Stable    Hospital Course   62 yo F with h/o MDD, obesity, hypothyroidism, HLD, PTSD, and mild asthma presented with severe low back and hip pain. Extensive workup with imaging did not find an obvious source.  Patient was seen by neurosurgery and ortho and ortho recommended an injection of the hip and the patient is feeling better and ready to go home.    Consultations This Hospital Stay   NEUROSURGERY IP CONSULT  PAIN MANAGEMENT ADULT IP CONSULT  PHYSICAL THERAPY ADULT IP CONSULT  ORTHOPEDIC SURGERY IP CONSULT  PHYSICAL THERAPY ADULT IP CONSULT    Code Status   Full Code    Time Spent on this Encounter   I, Kameron Issa MD, personally saw the patient today and spent greater than 30 minutes discharging this patient.       Kameron Issa MD  [facility] EMERGENCY DEPARTMENT  [address] [place] [id number]  Phone: " [phone]  ______________________________________________________________________    Physical Exam   Vital Signs: Temp: 97.8  F (36.6  C) Temp src: Oral BP: 130/61 Pulse: 77   Resp: 18 SpO2: 95 % O2 Device: None (Room air)    Weight: 200 lbs 0 oz  The patient was interviewed and examined on the day of discharge.         Primary Care Physician   Merlene Reyes    Discharge Orders      Reason for your hospital stay    Right hip pain     Follow-up and recommended labs and tests     Follow up with primary care provider, Merlene Reyes, within 7 days for hospital follow- up.  No follow up labs or test are needed.  Follow up with [facility] in 1-2 weeks.     Activity    Your activity upon discharge: activity as tolerated     Diet    Follow this diet upon discharge: Orders Placed This Encounter      Regular Diet Adult       Significant Results and Procedures   Results for orders placed or performed during the hospital encounter of 12/02/23   CT Abdomen Pelvis w Contrast    Narrative    EXAM: CT ABDOMEN PELVIS W CONTRAST  LOCATION: [facility]  DATE: 12/2/2023    INDICATION: Right lower back pain that radiates to the right lower quadrant recent colonoscopy and resection of sigmoid polyp. Evaluate for perforation versus kidney stone.  COMPARISON: 07/22/2023  TECHNIQUE: CT scan of the abdomen and pelvis was performed following injection of IV contrast. Multiplanar reformats were obtained. Dose reduction techniques were used.  CONTRAST: isovue 370 90ml    FINDINGS:   LOWER CHEST: Negative.    HEPATOBILIARY: Cholecystectomy. Normal liver.    PANCREAS: Normal.    SPLEEN: Normal.    ADRENAL GLANDS: Normal.    KIDNEYS/BLADDER: No significant mass, stone, or hydronephrosis. Normal bladder.    BOWEL: No obstruction or inflammatory change. Normal appendix.    LYMPH NODES: No lymphadenopathy.    VASCULATURE: No abdominal aortic aneurysm.    PELVIC ORGANS: Hysterectomy. No adnexal  mass nor abnormal fluid collection.    MUSCULOSKELETAL: No suspicious osseous lesions.      Impression    IMPRESSION:   1.  No acute abnormality to account for symptoms. No evidence of bowel perforation. No urinary tract calculus or hydronephrosis.  2.  Previous cholecystectomy and hysterectomy.   MR Lumbar Spine w/o & w Contrast    Narrative    EXAM: MR LUMBAR SPINE W/O and W CONTRAST  LOCATION: [facility]  DATE: 12/2/2023    INDICATION: Right hip pain radiates to the inguinal area.  Evaluate for discitis abscess  COMPARISON: CT abdomen pelvis 2 December 2023.  CONTRAST: 9ml  Gadavist  TECHNIQUE: Routine Lumbar Spine MRI without and with IV contrast.    FINDINGS:   Nomenclature is based on 5 lumbar type vertebral bodies. Normal vertebral body heights. Negative for fracture. Benign osseous hemangiomas at L2 and L4. Type II Modic endplate changes at L5-S1. 2 mm degenerative anterolisthesis at L5-S1. Otherwise normal   alignment. Normal distal spinal cord and cauda equina with conus medullaris at L1-L2. No extraspinal abnormality. Unremarkable visualized bony pelvis.    T9-T10: Disc desiccation. Patent central canal. Incompletely visualized foramina.    T10-T11: Ventral osteophyte. Facet DJD with mild right foraminal stenosis. Patent central canal and left foramen.    T11-T12: Mild facet arthropathy. Patent central canal and foramen. Unremarkable disc.    T12-L1: Normal disc height and signal. No herniation. Normal facets. No spinal canal or neural foraminal stenosis.     L1-L2: Disc desiccation and mild disc bulge. Minimal facet arthropathy. Bulging disc with mild right foraminal stenosis. Patent canal and left foramen.    L2-L3: Disc desiccation and mild posterior lateral disc bulge. Mild facet arthropathy. Patent central canal. Mild left and borderline right foraminal stenosis.     L3-L4: Broad-based disc bulge. Mild facet arthropathy. Mild right and minimal left foraminal stenosis. Trace  joint effusions.    L4-L5: Disc desiccation and shallow, broad-based central disc protrusion. Moderate to severe bilateral facet arthropathy with joint effusions and posteriorly directed synovial cysts. Periarticular edema. Minimal periarticular enhancement indicating   active facet arthropathy but no evidence for acute joint destruction or epidural/paraspinous inflammation. Mild/moderate left and right foraminal stenosis. Mild-to-moderate central canal stenosis.    L5-S1: Disc desiccation and mild disc bulge. Moderate facet arthropathy with minimal periarticular edema on the right. No evidence for joint destruction. No epidural inflammation or paraspinous inflammatory change. Patent central canal. Mild-to-moderate   left and mild right foraminal stenosis.      Impression    IMPRESSION:  1.  L4-L5 facet arthropathy with periarticular edema/enhancement. No evidence for cherelle joint destruction or epidural abscess/phlegmon.    2.  L5-S1 facet arthropathy with mild periarticular edema on right. No cherelle joint destruction or associated inflammatory process.    3.  L4-L5 central disc protrusion. Mild-to-moderate central canal stenosis.    4.  Normal distal thoracic cord, conus medullaris and cauda equina nerves.    5.  Lumbar spondylosis.    6.  Negative for discitis or osteomyelitis.   US Renal Complete Non-Vascular    Narrative    EXAM: US RENAL COMPLETE NON-VASCULAR  LOCATION: [facility]  DATE: 12/2/2023    INDICATION: right sided abdominal pain.  COMPARISON: 12/02/2023  TECHNIQUE: Routine Bilateral Renal and Bladder Ultrasound.    FINDINGS:    RIGHT KIDNEY: 11 x 4.3 x 4.6 cm. Normal without hydronephrosis or masses.     LEFT KIDNEY: 10.9 x 4.6 x 4.9 cm. Normal without hydronephrosis or masses.     BLADDER: Normal.      Impression    IMPRESSION:  1.  Normal kidney ultrasound.   MR Hip Right w/o Contrast    Narrative    EXAM: MR HIP RIGHT WITHOUT CONTRAST  LOCATION: [facility]  DATE: 12/03/2023    INDICATION: Severe right hip pain.  COMPARISON: None.  TECHNIQUE: Unenhanced.    FINDINGS:    RIGHT HIP: The right hip is negative for fracture or avascular necrosis.  -Labrum: Signal abnormality within the anterior labrum is likely secondary to a chronic tear. No detachment or paralabral cyst formation.   -Cartilage: Mild thinning of the articular cartilage on both sides of the joint without significant surface irregularity.  -Joint space: No effusion or synovitis.   -Joint capsule/ligaments: Intact joint capsule. Ligamentum teres is intact.  -Morphology: Alpha Angle is normal. No bony morphologic changes associated with femoroacetabular impingement.    LEFT HIP: The left hip is negative for fracture or avascular necrosis.    MUSCLES AND TENDONS:   -Gluteal: Mild right-sided gluteal tendon tendinopathy without evidence for tearing or trochanteric bursitis.  -Proximal hamstring: Mild bilateral hamstring tendinopathy without tearing.   -Iliopsoas: Mild right-sided iliopsoas tendinopathy of the distal attachment to the lesser trochanter. No tearing.  -Rectus femoris origin: No tear or tendinopathy.    BONES:   -The bony pelvis is negative for fracture.   -SI joints are normal. Visualized lumbar spine is normal.    SOFT TISSUES:   -Normal muscle bulk. No acute muscular injury.    INTRA-PELVIC CONTENTS:  -Visualized portions are normal.      Impression    IMPRESSION:  1.  Both hips negative for fracture or avascular necrosis.  2.  Chronic appearing tear of the anterior aspect of the right hip labrum. No detachment.  3.  Mild thinning of the articular cartilage both sides of the right hip joint without significant surface irregularity.  4.  Mild right-sided gluteal tendon tendinopathy without evidence for tearing or trochanteric bursitis.  5.  Mild bilateral hamstring tendinopathy without tearing.  6.  Mild right-sided iliopsoas tendinopathy. No tearing.  7.  Bony pelvis negative for  fracture.  8.  Intrapelvic contents negative.     XR Joint Injection Major Right    Narrative    EXAMS:  1. RIGHT HIP JOINT INJECTION  2. FLUOROSCOPIC GUIDANCE  LOCATION: [facility]  DATE: 12/4/2023    INDICATION: Pain.    PROCEDURE: Procedure and risks explained and consent received. The skin was prepped and draped in sterile fashion. 5 mL 1% lidocaine infused in local soft tissues.    Under direct fluoroscopic guidance, a 22 gauge spinal needle was introduced into the joint space. Position was confirmed with injection of 3 mL nonionic contrast material.    INJECTION:  2 mL of 40 mg per mL of Kenalog.    5 mL of 0.5% bupivacaine 5 mg/mL .     COMPLICATIONS: None.    Patient reported preinjection pain of 9 out of 10.  Postinjection pain was 0 out of 10.    SPECIMEN: None.    FLUOROSCOPIC TIME: 0.3 minutes.  NUMBER OF IMAGES: 1.      Impression    IMPRESSION:  Fluoroscopic-guided right hip joint injection.    Reference CPT Codes: 25858, 80064       Discharge Medications   Current Discharge Medication List        START taking these medications    Details   acetaminophen (TYLENOL) 325 MG tablet Take 3 tablets (975 mg) by mouth every 6 hours as needed for mild pain    Associated Diagnoses: Intractable low back pain      gabapentin (NEURONTIN) 100 MG capsule Take 2 capsules (200 mg) by mouth 2 times daily for 5 days  Qty: 20 capsule, Refills: 0    Associated Diagnoses: Intractable low back pain           CONTINUE these medications which have CHANGED    Details   oxyCODONE (ROXICODONE) 5 MG tablet Take 1 tablet (5 mg) by mouth every 6 hours as needed for pain  Qty: 12 tablet, Refills: 0    Associated Diagnoses: Intractable low back pain           CONTINUE these medications which have NOT CHANGED    Details   albuterol (PROAIR HFA/PROVENTIL HFA/VENTOLIN HFA) 108 (90 Base) MCG/ACT inhaler Inhale 2 puffs into the lungs every 6 hours  Qty: 18 g, Refills: 0    Comments: Pharmacy may dispense brand  covered by insurance (Proair, or proventil or ventolin or generic albuterol inhaler)  Associated Diagnoses: Cough variant asthma      cetirizine (ZYRTEC) 10 MG tablet Take 10 mg by mouth daily      doxycycline hyclate (VIBRAMYCIN) 100 MG capsule Take 100 mg by mouth 2 times daily      FLUoxetine (PROZAC) 40 MG capsule Take 1 capsule (40 mg) by mouth daily  Qty: 90 capsule, Refills: 2    Associated Diagnoses: Stress      levothyroxine (EUTHYROX) 125 MCG tablet Take 1 tablet (125 mcg) by mouth daily  Qty: 90 tablet, Refills: 2    Associated Diagnoses: Hypothyroidism due to acquired atrophy of thyroid      montelukast (SINGULAIR) 10 MG tablet Take 1 tablet (10 mg) by mouth at bedtime  Qty: 90 tablet, Refills: 2    Associated Diagnoses: Cough variant asthma      NONFORMULARY Take 4 capsules by mouth daily Sulfurzyme -  combines wolfberry with MSM, a naturally occurring organic form of dietary sulfur needed by our bodies every day to maintain the structure of proteins, protect cells and cell membranes, replenish the connections between cells, and preserve the molecular framework of connective tissue.      ondansetron (ZOFRAN ODT) 4 MG ODT tab Take 1 tablet (4 mg) by mouth every 8 hours as needed for nausea or vomiting  Qty: 10 tablet, Refills: 0      triamcinolone (ARISTOCORT HP) 0.5 % external cream Apply topically 2 times daily  Qty: 454 g, Refills: 0    Associated Diagnoses: Dyshidrotic foot dermatitis      valACYclovir (VALTREX) 500 MG tablet Take 1 tablet (500 mg) by mouth daily  Qty: 90 tablet, Refills: 2    Associated Diagnoses: Herpes simplex infection of genitourinary system           Allergies   Allergies   Allergen Reactions    Adhesive Tape Itching    Bees Anaphylaxis    Codeine Difficulty breathing    Latex Dermatitis    Penicillin [Penicillins] Difficulty breathing    Seasonal Allergies

## 2023-12-05 ENCOUNTER — APPOINTMENT (OUTPATIENT)
Dept: MRI IMAGING | Facility: HOSPITAL | Age: 63
End: 2023-12-05
Attending: STUDENT IN AN ORGANIZED HEALTH CARE EDUCATION/TRAINING PROGRAM
Payer: COMMERCIAL

## 2023-12-05 ENCOUNTER — HOSPITAL ENCOUNTER (OUTPATIENT)
Facility: HOSPITAL | Age: 63
Setting detail: OBSERVATION
Discharge: HOME | End: 2023-12-06
Attending: STUDENT IN AN ORGANIZED HEALTH CARE EDUCATION/TRAINING PROGRAM | Admitting: STUDENT IN AN ORGANIZED HEALTH CARE EDUCATION/TRAINING PROGRAM
Payer: COMMERCIAL

## 2023-12-05 ENCOUNTER — PATIENT OUTREACH (OUTPATIENT)
Dept: CARE COORDINATION | Facility: CLINIC | Age: 63
End: 2023-12-05
Payer: COMMERCIAL

## 2023-12-05 DIAGNOSIS — K59.00 CONSTIPATION, UNSPECIFIED CONSTIPATION TYPE: Primary | ICD-10-CM

## 2023-12-05 DIAGNOSIS — M54.59 INTRACTABLE LOW BACK PAIN: ICD-10-CM

## 2023-12-05 DIAGNOSIS — M25.551 HIP PAIN, RIGHT: ICD-10-CM

## 2023-12-05 LAB
ALBUMIN SERPL BCG-MCNC: 4.2 G/DL (ref 3.5–5.2)
ALP SERPL-CCNC: 94 U/L (ref 40–150)
ALT SERPL W P-5'-P-CCNC: 28 U/L (ref 0–50)
ANION GAP SERPL CALCULATED.3IONS-SCNC: 13 MMOL/L (ref 7–15)
AST SERPL W P-5'-P-CCNC: 32 U/L (ref 0–45)
BASOPHILS # BLD AUTO: 0 10E3/UL (ref 0–0.2)
BASOPHILS NFR BLD AUTO: 0 %
BILIRUB SERPL-MCNC: 0.2 MG/DL
BUN SERPL-MCNC: 13.6 MG/DL (ref 8–23)
CALCIUM SERPL-MCNC: 9.6 MG/DL (ref 8.8–10.2)
CHLORIDE SERPL-SCNC: 99 MMOL/L (ref 98–107)
CREAT SERPL-MCNC: 0.58 MG/DL (ref 0.51–0.95)
DEPRECATED HCO3 PLAS-SCNC: 20 MMOL/L (ref 22–29)
EGFRCR SERPLBLD CKD-EPI 2021: >90 ML/MIN/1.73M2
EOSINOPHIL # BLD AUTO: 0 10E3/UL (ref 0–0.7)
EOSINOPHIL NFR BLD AUTO: 0 %
ERYTHROCYTE [DISTWIDTH] IN BLOOD BY AUTOMATED COUNT: 11.9 % (ref 10–15)
GLUCOSE SERPL-MCNC: 159 MG/DL (ref 70–99)
HCT VFR BLD AUTO: 39.8 % (ref 35–47)
HGB BLD-MCNC: 13.7 G/DL (ref 11.7–15.7)
IMM GRANULOCYTES # BLD: 0.1 10E3/UL
IMM GRANULOCYTES NFR BLD: 1 %
LYMPHOCYTES # BLD AUTO: 2.2 10E3/UL (ref 0.8–5.3)
LYMPHOCYTES NFR BLD AUTO: 20 %
MCH RBC QN AUTO: 29.3 PG (ref 26.5–33)
MCHC RBC AUTO-ENTMCNC: 34.4 G/DL (ref 31.5–36.5)
MCV RBC AUTO: 85 FL (ref 78–100)
MONOCYTES # BLD AUTO: 0.4 10E3/UL (ref 0–1.3)
MONOCYTES NFR BLD AUTO: 4 %
NEUTROPHILS # BLD AUTO: 8.4 10E3/UL (ref 1.6–8.3)
NEUTROPHILS NFR BLD AUTO: 75 %
NRBC # BLD AUTO: 0 10E3/UL
NRBC BLD AUTO-RTO: 0 /100
PLATELET # BLD AUTO: 294 10E3/UL (ref 150–450)
POTASSIUM SERPL-SCNC: 4 MMOL/L (ref 3.4–5.3)
PROT SERPL-MCNC: 6.9 G/DL (ref 6.4–8.3)
RBC # BLD AUTO: 4.67 10E6/UL (ref 3.8–5.2)
SODIUM SERPL-SCNC: 132 MMOL/L (ref 135–145)
WBC # BLD AUTO: 11.2 10E3/UL (ref 4–11)

## 2023-12-05 PROCEDURE — 96375 TX/PRO/DX INJ NEW DRUG ADDON: CPT

## 2023-12-05 PROCEDURE — 99285 EMERGENCY DEPT VISIT HI MDM: CPT | Mod: 25

## 2023-12-05 PROCEDURE — 85025 COMPLETE CBC W/AUTO DIFF WBC: CPT | Performed by: STUDENT IN AN ORGANIZED HEALTH CARE EDUCATION/TRAINING PROGRAM

## 2023-12-05 PROCEDURE — 96374 THER/PROPH/DIAG INJ IV PUSH: CPT

## 2023-12-05 PROCEDURE — 36415 COLL VENOUS BLD VENIPUNCTURE: CPT | Performed by: STUDENT IN AN ORGANIZED HEALTH CARE EDUCATION/TRAINING PROGRAM

## 2023-12-05 PROCEDURE — 82040 ASSAY OF SERUM ALBUMIN: CPT | Performed by: STUDENT IN AN ORGANIZED HEALTH CARE EDUCATION/TRAINING PROGRAM

## 2023-12-05 PROCEDURE — 73723 MRI JOINT LWR EXTR W/O&W/DYE: CPT | Mod: RT

## 2023-12-05 PROCEDURE — 250N000011 HC RX IP 250 OP 636: Mod: JZ | Performed by: STUDENT IN AN ORGANIZED HEALTH CARE EDUCATION/TRAINING PROGRAM

## 2023-12-05 RX ORDER — HYDROMORPHONE HYDROCHLORIDE 1 MG/ML
0.5 INJECTION, SOLUTION INTRAMUSCULAR; INTRAVENOUS; SUBCUTANEOUS
Status: DISCONTINUED | OUTPATIENT
Start: 2023-12-05 | End: 2023-12-06 | Stop reason: HOSPADM

## 2023-12-05 RX ORDER — ONDANSETRON 2 MG/ML
4 INJECTION INTRAMUSCULAR; INTRAVENOUS ONCE
Status: COMPLETED | OUTPATIENT
Start: 2023-12-05 | End: 2023-12-05

## 2023-12-05 RX ORDER — GADOBUTROL 604.72 MG/ML
9 INJECTION INTRAVENOUS ONCE
Status: COMPLETED | OUTPATIENT
Start: 2023-12-06 | End: 2023-12-06

## 2023-12-05 RX ADMIN — HYDROMORPHONE HYDROCHLORIDE 1 MG: 1 INJECTION, SOLUTION INTRAMUSCULAR; INTRAVENOUS; SUBCUTANEOUS at 22:19

## 2023-12-05 RX ADMIN — ONDANSETRON 4 MG: 2 INJECTION INTRAMUSCULAR; INTRAVENOUS at 22:18

## 2023-12-05 ASSESSMENT — ACTIVITIES OF DAILY LIVING (ADL): ADLS_ACUITY_SCORE: 35

## 2023-12-05 NOTE — PROGRESS NOTES
Clinic Care Coordination Contact  [facility]: Post-Discharge Note  SITUATION                                                      Admission:    Admission Date: 12/02/23   Reason for Admission: Right hip pain  Discharge:   Discharge Date: 12/04/23  Discharge Diagnosis: Right hip pain    BACKGROUND                                                      Per hospital discharge summary and inpatient provider notes:    63-year-old female presents complaining of pain above the right back to start radiate around to the right side of the abdomen.  It started on Saturday a week ago.  She had a colonoscopy on Friday.  Initially patient was writhing in pain screaming benign abdominal exam no evidence of neurological deficits or cyanosis of the right lower extremity.  Her lactic acid was 3.5 and given that recent colonoscopy at that she was perforated so antibiotics were started.  Generally her CT reveals no inflammation no perforation or appendicitis.     Does not have midline lumbar back pain pain is low.  Consider shingles no evidence of rashes.  She had a hysterectomy.  She has intact femoral pulses intact dorsalis pedis pulses able to plantarflex and dorsiflex no fecal or urine incontinence.    ASSESSMENT           Discharge Assessment  How are you doing now that you are home?: I am doing okay. I still have some pain.  How are your symptoms? (Red Flag symptoms escalate to triage hotline per guidelines): Improved  Do you feel your condition is stable enough to be safe at home until your provider visit?: Yes  Does the patient have their discharge instructions? : Yes  Does the patient have questions regarding their discharge instructions? : No  Were you started on any new medications or were there changes to any of your previous medications? : Yes  Does the patient have all of their medications?: Yes  Do you have questions regarding any of your medications? : No  Discharge follow-up appointment scheduled within 14  calendar days? : No (Pt will contact PCP)  Is patient agreeable to assistance with scheduling? : No                PLAN                                                      Outpatient Plan:  Follow up with primary care provider, Merlene Reyes, within 7 days for  hospital follow- up. No follow up labs or test are needed.  Follow up with [facility] in 1-2 weeks.  Future Appointments   Date Time Provider Department Center   1/5/2024  3:00 PM Valentina Sánchez APRN [facility]         For any urgent concerns, please contact our 24 hour nurse triage line: [phone] ([phone])         NILDA White  [phone]  Connected Care Resource [facility]

## 2023-12-05 NOTE — LETTER
[facility] EMERGENCY DEPARTMENT  [address] [address]  Phone: [phone]    December 6, 2023        Nidia Acosta  [address] [address]          To whom it may concern:    RE: Nidia TIFFANY Dave    Ms. Acosta was admitted at our hospital twice since 12/02/2023 until today. We recommend Ms. Acosta to work from home for about one week.    Please contact me for questions or concerns.      Sincerely,      Leslie Rahman MD

## 2023-12-05 NOTE — LETTER
[facility] EMERGENCY DEPARTMENT  [address] [address]  Phone: [phone]    December 6, 2023        Nidia Acosta  [address] [address]          To whom it may concern:    RE: Nidia Acosta    Patient was admitted at our hospital twice since 12/02/2023 until today. We recommend Ms. Acosta works from home for about one week.    Please contact me for questions or concerns.      Sincerely,      Leslie Rahman MD

## 2023-12-05 NOTE — PROGRESS NOTES
Physical Therapy Discharge Summary    Reason for therapy discharge:    Discharged to home.    Progress towards therapy goal(s). See goals on Care Plan in [facility] electronic health record for goal details.  Goals not met.  Barriers to achieving goals:   discharge from facility.    Therapy recommendation(s):    Continued therapy is recommended.  Rationale/Recommendations:  Home PT.

## 2023-12-05 NOTE — PROGRESS NOTES
Pt discharged to home accompanied by staff via wheelchair at 1800. Discharge education and medications reviewed with pt and family. Belongings sent with pt.

## 2023-12-06 VITALS
DIASTOLIC BLOOD PRESSURE: 63 MMHG | BODY MASS INDEX: 37.79 KG/M2 | SYSTOLIC BLOOD PRESSURE: 133 MMHG | OXYGEN SATURATION: 97 % | HEIGHT: 61 IN | RESPIRATION RATE: 15 BRPM | TEMPERATURE: 98.3 F | HEART RATE: 69 BPM

## 2023-12-06 PROBLEM — M25.551 HIP PAIN, RIGHT: Status: ACTIVE | Noted: 2023-12-06

## 2023-12-06 LAB
ANION GAP SERPL CALCULATED.3IONS-SCNC: 6 MMOL/L (ref 7–15)
BUN SERPL-MCNC: 11.5 MG/DL (ref 8–23)
CALCIUM SERPL-MCNC: 9.8 MG/DL (ref 8.8–10.2)
CHLORIDE SERPL-SCNC: 103 MMOL/L (ref 98–107)
CREAT SERPL-MCNC: 0.63 MG/DL (ref 0.51–0.95)
DEPRECATED HCO3 PLAS-SCNC: 27 MMOL/L (ref 22–29)
EGFRCR SERPLBLD CKD-EPI 2021: >90 ML/MIN/1.73M2
ERYTHROCYTE [DISTWIDTH] IN BLOOD BY AUTOMATED COUNT: 12.1 % (ref 10–15)
GLUCOSE SERPL-MCNC: 127 MG/DL (ref 70–99)
HCT VFR BLD AUTO: 41.9 % (ref 35–47)
HGB BLD-MCNC: 13.7 G/DL (ref 11.7–15.7)
MCH RBC QN AUTO: 29.2 PG (ref 26.5–33)
MCHC RBC AUTO-ENTMCNC: 32.7 G/DL (ref 31.5–36.5)
MCV RBC AUTO: 89 FL (ref 78–100)
PLATELET # BLD AUTO: 303 10E3/UL (ref 150–450)
POTASSIUM SERPL-SCNC: 5.3 MMOL/L (ref 3.4–5.3)
RBC # BLD AUTO: 4.69 10E6/UL (ref 3.8–5.2)
SODIUM SERPL-SCNC: 136 MMOL/L (ref 135–145)
WBC # BLD AUTO: 11.9 10E3/UL (ref 4–11)

## 2023-12-06 PROCEDURE — 99236 HOSP IP/OBS SAME DATE HI 85: CPT | Performed by: INTERNAL MEDICINE

## 2023-12-06 PROCEDURE — 250N000011 HC RX IP 250 OP 636: Performed by: STUDENT IN AN ORGANIZED HEALTH CARE EDUCATION/TRAINING PROGRAM

## 2023-12-06 PROCEDURE — 85027 COMPLETE CBC AUTOMATED: CPT | Performed by: INTERNAL MEDICINE

## 2023-12-06 PROCEDURE — 258N000003 HC RX IP 258 OP 636: Performed by: INTERNAL MEDICINE

## 2023-12-06 PROCEDURE — 96376 TX/PRO/DX INJ SAME DRUG ADON: CPT

## 2023-12-06 PROCEDURE — 80048 BASIC METABOLIC PNL TOTAL CA: CPT | Performed by: INTERNAL MEDICINE

## 2023-12-06 PROCEDURE — 250N000011 HC RX IP 250 OP 636: Mod: JZ | Performed by: INTERNAL MEDICINE

## 2023-12-06 PROCEDURE — G0378 HOSPITAL OBSERVATION PER HR: HCPCS

## 2023-12-06 PROCEDURE — 250N000013 HC RX MED GY IP 250 OP 250 PS 637: Performed by: PHYSICIAN ASSISTANT

## 2023-12-06 PROCEDURE — 96372 THER/PROPH/DIAG INJ SC/IM: CPT | Performed by: INTERNAL MEDICINE

## 2023-12-06 PROCEDURE — 99222 1ST HOSP IP/OBS MODERATE 55: CPT | Performed by: CLINICAL NURSE SPECIALIST

## 2023-12-06 PROCEDURE — 255N000002 HC RX 255 OP 636: Mod: JZ | Performed by: STUDENT IN AN ORGANIZED HEALTH CARE EDUCATION/TRAINING PROGRAM

## 2023-12-06 PROCEDURE — 36415 COLL VENOUS BLD VENIPUNCTURE: CPT | Performed by: INTERNAL MEDICINE

## 2023-12-06 PROCEDURE — 99207 PR APP CREDIT; MD BILLING SHARED VISIT: CPT | Performed by: CLINICAL NURSE SPECIALIST

## 2023-12-06 PROCEDURE — 96361 HYDRATE IV INFUSION ADD-ON: CPT

## 2023-12-06 PROCEDURE — 250N000013 HC RX MED GY IP 250 OP 250 PS 637: Performed by: STUDENT IN AN ORGANIZED HEALTH CARE EDUCATION/TRAINING PROGRAM

## 2023-12-06 PROCEDURE — 99207 PR APP CREDIT; MD BILLING SHARED VISIT: CPT | Performed by: STUDENT IN AN ORGANIZED HEALTH CARE EDUCATION/TRAINING PROGRAM

## 2023-12-06 PROCEDURE — A9585 GADOBUTROL INJECTION: HCPCS | Mod: JZ | Performed by: STUDENT IN AN ORGANIZED HEALTH CARE EDUCATION/TRAINING PROGRAM

## 2023-12-06 RX ORDER — HYDROMORPHONE HYDROCHLORIDE 1 MG/ML
0.5 INJECTION, SOLUTION INTRAMUSCULAR; INTRAVENOUS; SUBCUTANEOUS EVERY 6 HOURS PRN
Status: DISCONTINUED | OUTPATIENT
Start: 2023-12-06 | End: 2023-12-06 | Stop reason: HOSPADM

## 2023-12-06 RX ORDER — GABAPENTIN 100 MG/1
200 CAPSULE ORAL 2 TIMES DAILY
Qty: 56 CAPSULE | Refills: 0 | Status: SHIPPED | OUTPATIENT
Start: 2023-12-06 | End: 2024-01-04

## 2023-12-06 RX ORDER — SODIUM CHLORIDE 9 MG/ML
INJECTION, SOLUTION INTRAVENOUS CONTINUOUS
Status: DISCONTINUED | OUTPATIENT
Start: 2023-12-06 | End: 2023-12-06

## 2023-12-06 RX ORDER — METHOCARBAMOL 500 MG/1
500 TABLET, FILM COATED ORAL EVERY 6 HOURS
Qty: 16 TABLET | Refills: 0 | Status: DISCONTINUED | OUTPATIENT
Start: 2023-12-06 | End: 2023-12-06 | Stop reason: HOSPADM

## 2023-12-06 RX ORDER — ACETAMINOPHEN 650 MG/1
650 SUPPOSITORY RECTAL EVERY 4 HOURS PRN
Status: DISCONTINUED | OUTPATIENT
Start: 2023-12-06 | End: 2023-12-06

## 2023-12-06 RX ORDER — ACETAMINOPHEN 325 MG/1
650 TABLET ORAL EVERY 4 HOURS PRN
Status: DISCONTINUED | OUTPATIENT
Start: 2023-12-06 | End: 2023-12-06

## 2023-12-06 RX ORDER — AMOXICILLIN 250 MG
1 CAPSULE ORAL 2 TIMES DAILY PRN
Qty: 30 TABLET | Refills: 0 | Status: SHIPPED | OUTPATIENT
Start: 2023-12-06 | End: 2024-04-18

## 2023-12-06 RX ORDER — OXYCODONE HYDROCHLORIDE 5 MG/1
5-10 TABLET ORAL EVERY 4 HOURS PRN
Qty: 32 TABLET | Refills: 0 | Status: SHIPPED | OUTPATIENT
Start: 2023-12-06 | End: 2023-12-10

## 2023-12-06 RX ORDER — OXYCODONE HYDROCHLORIDE 5 MG/1
5 TABLET ORAL EVERY 4 HOURS PRN
Status: DISCONTINUED | OUTPATIENT
Start: 2023-12-06 | End: 2023-12-06 | Stop reason: HOSPADM

## 2023-12-06 RX ORDER — HYDROMORPHONE HYDROCHLORIDE 1 MG/ML
0.5 INJECTION, SOLUTION INTRAMUSCULAR; INTRAVENOUS; SUBCUTANEOUS ONCE
Status: COMPLETED | OUTPATIENT
Start: 2023-12-06 | End: 2023-12-06

## 2023-12-06 RX ORDER — ALBUTEROL SULFATE 90 UG/1
2 AEROSOL, METERED RESPIRATORY (INHALATION) EVERY 6 HOURS PRN
Status: DISCONTINUED | OUTPATIENT
Start: 2023-12-06 | End: 2023-12-06 | Stop reason: HOSPADM

## 2023-12-06 RX ORDER — ACETAMINOPHEN 325 MG/1
975 TABLET ORAL EVERY 6 HOURS PRN
Status: DISCONTINUED | OUTPATIENT
Start: 2023-12-06 | End: 2023-12-06 | Stop reason: HOSPADM

## 2023-12-06 RX ORDER — AMOXICILLIN 250 MG
2 CAPSULE ORAL 2 TIMES DAILY PRN
Status: DISCONTINUED | OUTPATIENT
Start: 2023-12-06 | End: 2023-12-06 | Stop reason: HOSPADM

## 2023-12-06 RX ORDER — OXYCODONE HYDROCHLORIDE 5 MG/1
5-10 TABLET ORAL EVERY 4 HOURS PRN
Qty: 32 TABLET | Refills: 0 | Status: CANCELLED | OUTPATIENT
Start: 2023-12-06 | End: 2023-12-10

## 2023-12-06 RX ORDER — OXYCODONE HYDROCHLORIDE 5 MG/1
10 TABLET ORAL EVERY 6 HOURS PRN
Status: DISCONTINUED | OUTPATIENT
Start: 2023-12-06 | End: 2023-12-06

## 2023-12-06 RX ORDER — ONDANSETRON 4 MG/1
4 TABLET, ORALLY DISINTEGRATING ORAL EVERY 6 HOURS PRN
Status: DISCONTINUED | OUTPATIENT
Start: 2023-12-06 | End: 2023-12-06 | Stop reason: HOSPADM

## 2023-12-06 RX ORDER — METHOCARBAMOL 500 MG/1
500 TABLET, FILM COATED ORAL 4 TIMES DAILY PRN
Status: DISCONTINUED | OUTPATIENT
Start: 2023-12-06 | End: 2023-12-06

## 2023-12-06 RX ORDER — GABAPENTIN 100 MG/1
200 CAPSULE ORAL 2 TIMES DAILY
Status: DISCONTINUED | OUTPATIENT
Start: 2023-12-06 | End: 2023-12-06 | Stop reason: HOSPADM

## 2023-12-06 RX ORDER — HYDROMORPHONE HYDROCHLORIDE 1 MG/ML
0.5 INJECTION, SOLUTION INTRAMUSCULAR; INTRAVENOUS; SUBCUTANEOUS
Status: DISCONTINUED | OUTPATIENT
Start: 2023-12-06 | End: 2023-12-06

## 2023-12-06 RX ORDER — CETIRIZINE HYDROCHLORIDE 10 MG/1
10 TABLET ORAL DAILY
Status: DISCONTINUED | OUTPATIENT
Start: 2023-12-06 | End: 2023-12-06 | Stop reason: HOSPADM

## 2023-12-06 RX ORDER — ENOXAPARIN SODIUM 100 MG/ML
40 INJECTION SUBCUTANEOUS DAILY
Status: DISCONTINUED | OUTPATIENT
Start: 2023-12-06 | End: 2023-12-06 | Stop reason: HOSPADM

## 2023-12-06 RX ORDER — LEVOTHYROXINE SODIUM 125 UG/1
125 TABLET ORAL
Status: DISCONTINUED | OUTPATIENT
Start: 2023-12-06 | End: 2023-12-06 | Stop reason: HOSPADM

## 2023-12-06 RX ORDER — METHOCARBAMOL 500 MG/1
500 TABLET, FILM COATED ORAL 4 TIMES DAILY PRN
Qty: 30 TABLET | Refills: 0 | Status: SHIPPED | OUTPATIENT
Start: 2023-12-10 | End: 2024-01-04

## 2023-12-06 RX ORDER — METHOCARBAMOL 500 MG/1
500 TABLET, FILM COATED ORAL 4 TIMES DAILY PRN
Status: DISCONTINUED | OUTPATIENT
Start: 2023-12-10 | End: 2023-12-06 | Stop reason: HOSPADM

## 2023-12-06 RX ORDER — MONTELUKAST SODIUM 10 MG/1
10 TABLET ORAL AT BEDTIME
Status: DISCONTINUED | OUTPATIENT
Start: 2023-12-06 | End: 2023-12-06 | Stop reason: HOSPADM

## 2023-12-06 RX ORDER — VALACYCLOVIR HYDROCHLORIDE 500 MG/1
500 TABLET, FILM COATED ORAL DAILY
Status: DISCONTINUED | OUTPATIENT
Start: 2023-12-06 | End: 2023-12-06 | Stop reason: HOSPADM

## 2023-12-06 RX ORDER — ACETAMINOPHEN 325 MG/1
975 TABLET ORAL EVERY 8 HOURS PRN
Qty: 90 TABLET | Refills: 0 | Status: SHIPPED | OUTPATIENT
Start: 2023-12-06

## 2023-12-06 RX ORDER — METHOCARBAMOL 500 MG/1
500 TABLET, FILM COATED ORAL EVERY 6 HOURS
Qty: 16 TABLET | Refills: 0 | Status: SHIPPED | OUTPATIENT
Start: 2023-12-06 | End: 2024-01-04

## 2023-12-06 RX ORDER — AMOXICILLIN 250 MG
1 CAPSULE ORAL 2 TIMES DAILY PRN
Status: DISCONTINUED | OUTPATIENT
Start: 2023-12-06 | End: 2023-12-06 | Stop reason: HOSPADM

## 2023-12-06 RX ORDER — ONDANSETRON 2 MG/ML
4 INJECTION INTRAMUSCULAR; INTRAVENOUS EVERY 6 HOURS PRN
Status: DISCONTINUED | OUTPATIENT
Start: 2023-12-06 | End: 2023-12-06 | Stop reason: HOSPADM

## 2023-12-06 RX ADMIN — METHOCARBAMOL 500 MG: 500 TABLET ORAL at 11:31

## 2023-12-06 RX ADMIN — CETIRIZINE HYDROCHLORIDE 10 MG: 10 TABLET, FILM COATED ORAL at 07:49

## 2023-12-06 RX ADMIN — HYDROMORPHONE HYDROCHLORIDE 0.5 MG: 1 INJECTION, SOLUTION INTRAMUSCULAR; INTRAVENOUS; SUBCUTANEOUS at 11:37

## 2023-12-06 RX ADMIN — HYDROMORPHONE HYDROCHLORIDE 0.5 MG: 1 INJECTION, SOLUTION INTRAMUSCULAR; INTRAVENOUS; SUBCUTANEOUS at 00:44

## 2023-12-06 RX ADMIN — GADOBUTROL 9 ML: 604.72 INJECTION INTRAVENOUS at 00:01

## 2023-12-06 RX ADMIN — GABAPENTIN 200 MG: 100 CAPSULE ORAL at 07:49

## 2023-12-06 RX ADMIN — OXYCODONE HYDROCHLORIDE 5 MG: 5 TABLET ORAL at 07:36

## 2023-12-06 RX ADMIN — ENOXAPARIN SODIUM 40 MG: 40 INJECTION SUBCUTANEOUS at 07:37

## 2023-12-06 RX ADMIN — LEVOTHYROXINE SODIUM 125 MCG: 125 TABLET ORAL at 07:49

## 2023-12-06 RX ADMIN — OXYCODONE HYDROCHLORIDE 5 MG: 5 TABLET ORAL at 11:30

## 2023-12-06 RX ADMIN — FLUOXETINE 40 MG: 20 CAPSULE ORAL at 07:49

## 2023-12-06 RX ADMIN — VALACYCLOVIR HYDROCHLORIDE 500 MG: 500 TABLET, FILM COATED ORAL at 07:49

## 2023-12-06 RX ADMIN — SODIUM CHLORIDE: 9 INJECTION, SOLUTION INTRAVENOUS at 03:16

## 2023-12-06 RX ADMIN — HYDROMORPHONE HYDROCHLORIDE 0.5 MG: 1 INJECTION, SOLUTION INTRAMUSCULAR; INTRAVENOUS; SUBCUTANEOUS at 03:17

## 2023-12-06 ASSESSMENT — ACTIVITIES OF DAILY LIVING (ADL)
ADLS_ACUITY_SCORE: 35
ADLS_ACUITY_SCORE: 37
ADLS_ACUITY_SCORE: 37
ADLS_ACUITY_SCORE: 35

## 2023-12-06 NOTE — ED PROVIDER NOTES
Emergency Department Encounter         FINAL IMPRESSION:  HIP PAIN      ED COURSE AND MEDICAL DECISION MAKING       ED Course as of 12/05/23 2108   Tue Dec 05, 2023   2107 Patient is a obese 63-year-old who was discharged yesterday for right hip pain.  Found have a right anterior labral tear.  Seem to have a difficult hospital course for pain control.  Was eventually discharged after cortisone injection.  States she was walking down the stairs tonight when her hip gave out and she began having worsening pain again.  States  it is worse than previous episodes.  Normal sensation.  Normal pulses in the extremity.  No abdominal pain.  States the pain radiates into the right groin.  Unable to do a skin examination as patient is in a cot in the waiting room.  Denies any chest pain, shortness of breath or abdominal pain.  Plan for MRI, basic labs pain medication reevaluate   - patient unable to walk, plan for admission    Additional ED Course Timeline:  8:50 PM I met with the patient, obtained history, performed an initial exam, and discussed options and plan for diagnostics and treatment here in the ED.   1:14 AM I updated the patient regarding their work-up findings.   1:51 AM I Spoke with Dr. Estevez, Hospitalist. We further discussed the patient's case and reviewed ED work-up so far. She agrees to admit the patient.                         Medical Decision Making    History:  Supplemental history from: Documented in chart, if applicable and Family Member/Significant Other  External Record(s) reviewed: Documented in chart, if applicable. and Inpatient Record: ED and hospitalization at [facility] 12/02/2023-12/04/2023    Work Up:  Chart documentation includes differential considered and any EKGs or imaging independently interpreted by provider, where specified.  In additional to work up documented, I considered the following work up: Documented in chart, if applicable.    External consultation:  Discussion of  management with another provider: Documented in chart, if applicable and Hospitalist    Complicating factors:  Care impacted by chronic illness: Chronic Lung Disease  Care affected by social determinants of health: Access to Medical Care    Disposition considerations: Admit.                  EK      Critical Care     Performed by: Alvarado Schmitz or    Authorized by: Alvarado Schmitz  Total critical care time:  minutes  Critical care was necessary to treat or prevent imminent or life-threatening deterioration of the following conditions:   Critical care was time spent personally by me on the following activities: development of treatment plan with patient or surrogate, discussions with consultants, examination of patient, evaluation of patient's response to treatment, obtaining history from patient or surrogate, ordering and performing treatments and interventions, ordering and review of laboratory studies, ordering and review of radiographic studies, re-evaluation of patient's condition and monitoring for potential decompensation.  Critical care time was exclusive of separately billable procedures and treating other patients.'    At the conclusion of the encounter I discussed the results of all the tests and the disposition. The questions were answered. The patient or family acknowledged understanding and was agreeable with the care plan.                  MEDICATIONS GIVEN IN THE EMERGENCY DEPARTMENT:  Medications - No data to display    NEW PRESCRIPTIONS STARTED AT TODAY'S ED VISIT:  New Prescriptions    No medications on file       HPI     Patient information obtained from: The patient    Use of : N/A    Nidia Acosta is a 63 year old female with a pertinent history of HLD who presents to this ED drop-off for evaluation of hip pain.    Per chart review, the patient was hospitalized at [facility] ED from 12/02/2023 for intractable low back pain. MRI lumbar spine revealed no discitis or abscess, but some  degenerative disease was noted. CT abdomen pelvis revealed no acute abnormality to account for symptoms. She ended up being admitted and was discharged on 12/04/2023    The patient was discharged yesterday afternoon after receiving a cortisoid injection with some relief of her right hip pain. She states her pain recurred suddenly later today while walking down the stairs. She denied falling down the stairs. She further notes her right hip pain wraps around the right groin at this time. Her next dose of pain medication is due at 9 PM. No complaints of back pain and any other medical complaints or concerns at this time.    REVIEW OF SYSTEMS:  Review of Systems   Constitutional: Negative for fever, malaise  HEENT: Negative runny nose, sore throat, ear pain, neck pain  Respiratory: Negative for shortness of breath, cough, congestion  Cardiovascular: Negative for chest pain, leg edema  Gastrointestinal: Negative for abdominal distention, abdominal pain, constipation, vomiting, nausea, diarrhea  Genitourinary: Negative for dysuria and hematuria.   Integument: Negative for rash, skin breakdown  Neurological: Negative for paresthesias, weakness, headache.  Musculoskeletal: Positive for right hip pain that radiates to the right groin.      All other systems reviewed and are negative.          MEDICAL HISTORY     Past Medical History:   Diagnosis Date    Backache, unspecified     COPD (chronic obstructive pulmonary disease) (H)     Cough variant asthma 07/07/2020    Depressive disorder     Leiomyoma of uterus, unspecified 2003    Other genital herpes     Thyroid disease     Unspecified sinusitis (chronic)        Past Surgical History:   Procedure Laterality Date    BACK SURGERY      COLONOSCOPY  04/05/2013    Procedure: COLONOSCOPY;  Colonoscopy  ;  Surgeon: Laura Ruff MD;  Location: [facility]    COLONOSCOPY N/A 11/24/2023    Procedure: COLONOSCOPY, FLEXIBLE, WITH LESION REMOVAL USING SNARE;  Surgeon: Griselda  Luciano VILLALTA MD;  Location: [facility]    HEAD & NECK SURGERY  2019    Discectomy with fusion    HYSTERECTOMY, PAP NO LONGER INDICATED  2003    LAVH for fibroids    LAPAROSCOPIC CHOLECYSTECTOMY N/A 2019    Procedure: CHOLECYSTECTOMY, LAPAROSCOPIC;  Surgeon: Kwabena Stephenson MD;  Location: [facility]    LAPAROSCOPIC LYSIS ADHESIONS  1985    LASIK  10/2004    TONSILLECTOMY & ADENOIDECTOMY  age 18    TUBAL LIGATION  1994       Social History     Tobacco Use    Smoking status: Former     Packs/day: 0.50     Years: 1.00     Additional pack years: 0.00     Total pack years: 0.50     Types: Cigarettes     Quit date: 1986     Years since quittin.9    Smokeless tobacco: Never   Substance Use Topics    Alcohol use: Yes     Comment: 1-5 drinks weekly    Drug use: No       acetaminophen (TYLENOL) 325 MG tablet  albuterol (PROAIR HFA/PROVENTIL HFA/VENTOLIN HFA) 108 (90 Base) MCG/ACT inhaler  cetirizine (ZYRTEC) 10 MG tablet  doxycycline hyclate (VIBRAMYCIN) 100 MG capsule  FLUoxetine (PROZAC) 40 MG capsule  gabapentin (NEURONTIN) 100 MG capsule  levothyroxine (EUTHYROX) 125 MCG tablet  montelukast (SINGULAIR) 10 MG tablet  NONFORMULARY  ondansetron (ZOFRAN ODT) 4 MG ODT tab  oxyCODONE (ROXICODONE) 5 MG tablet  triamcinolone (ARISTOCORT HP) 0.5 % external cream  valACYclovir (VALTREX) 500 MG tablet            PHYSICAL EXAM     BP (!) 167/74   Pulse 88   Temp 98.8  F (37.1  C) (Temporal)   Resp 22   LMP 2002   SpO2 98%       PHYSICAL EXAM:     General: Patient appears well, nontoxic, comfortable  HEENT: Moist mucous membranes,  No head trauma.    Cardiovascular: Normal rate, normal rhythm, no extremity edema.  No appreciable murmur.  Respiratory: No signs of respiratory distress, lungs are clear to auscultation bilaterally with no wheezes rhonchi or rales.  Abdominal: Soft, nontender, nondistended, no palpable masses, no guarding, no rebound  Musculoskeletal: decreased range of motion of the right hip.  Normal  pulses.  Soft compartments.  Neurological: Alert and oriented, grossly neurologically intact.  Psychological: Normal affect and mood.  Integument: No rashes appreciated          RESULTS       Labs Ordered and Resulted from Time of ED Arrival to Time of ED Departure - No data to display    No orders to display                     PROCEDURES:  Procedures:  Procedures       IRamos am serving as a scribe to document services personally performed by Alvarado Schmitz DO, based on my observations and the provider's statements to me.  I, Alvarado Schmitz DO, attest that Ramos Khan is acting in a scribe capacity, has observed my performance of the services and has documented them in accordance with my direction.    Alvarado Schmitz DO  Emergency Medicine  [facility] EMERGENCY DEPARTMENT       Alvarado Schmitz DO  12/06/23 0446

## 2023-12-06 NOTE — PROGRESS NOTES
PRIMARY DIAGNOSIS: ACUTE PAIN  OUTPATIENT/OBSERVATION GOALS TO BE MET BEFORE DISCHARGE:  1. Pain Status: Improved-controlled with oral pain medications.    2. Return to near baseline physical activity: Yes    3. Cleared for discharge by consultants (if involved): Yes    Discharge Planner Nurse   Safe discharge environment identified: Yes  Barriers to discharge: No       Entered by: Key Hirsch RN 12/06/2023 2:46 PM   Pain consult today, otherwise uneventful shift.  Please review provider order for any additional goals.   Nurse to notify provider when observation goals have been met and patient is ready for discharge.

## 2023-12-06 NOTE — DISCHARGE INSTRUCTIONS
"Pain Service recommendations:  Follow up /Discharge Recommendations -   We recommend prescribing the following at the time of discharge:    Robaxin 500 mg every 6 hours for 4 days then four times daily prn (Could scheduled the Robaxin with the tylenol)  Tylenol extra strength tid (keep tylenol dose at no more than 3000 mg or extra strength tylenol 500 mg tablets 2 tablets three times per day  Gabapentin 200 mg bid for 2 weeks   Oxycodone 5-10 mg four times daily as needed.  5 mg for moderate pain \"4-6\" and 10 mg for severe pain \"7-10\"  Oxycodone  5 mg dose and if pain persists Okay to repeat 5 mg dose after one hour  Daily stool softener/laxative   "

## 2023-12-06 NOTE — PROGRESS NOTES
PRIMARY DIAGNOSIS: ACUTE PAIN  OUTPATIENT/OBSERVATION GOALS TO BE MET BEFORE DISCHARGE:  1. Pain Status: Improved-controlled with oral pain medications.    2. Return to near baseline physical activity: Yes    3. Cleared for discharge by consultants (if involved): N/A    Discharge Planner Nurse   Safe discharge environment identified: Yes  Barriers to discharge: Yes       Entered by: Key Hirsch RN 12/06/2023 10:22 AM   Patient transferred this morning from Main ED.   Please review provider order for any additional goals.   Nurse to notify provider when observation goals have been met and patient is ready for discharge.

## 2023-12-06 NOTE — ED NOTES
Pt reports 10/10 pain to right hip.  Burning, stabbing, cramping.  States does not travel down her leg.  Denies numbness or tingling. Able to wiggle toes.

## 2023-12-06 NOTE — H&P
"[facility]    History and Physical - Hospitalist Service       Date of Admission:  12/5/2023    Assessment & Plan      Nidia Acosta is a 63 year old female admitted on 12/5/2023. She was admitted with a right hip pain.    Assessment and plan  Right hip pain  inability to walk  --Pain control  --Orthopedic service consult    Mild hyponatremia  --IV fluid and monitor serum sodium      Elevated glucose without diagnosis of diabetes  --Likely iatrogenic had steroid injections prior to discharge the last 24 hours  --Data blood sugars     Observation Goals: -diagnostic tests and consults completed and resulted, -vital signs normal or at patient baseline, Nurse to notify provider when observation goals have been met and patient is ready for discharge.  Diet: Combination Diet Regular Diet Adult; No Caffeine Diet  DVT Prophylaxis: Enoxaparin (Lovenox) SQ  Ramesh Catheter: Not present  Lines: None     Cardiac Monitoring: None  Code Status: Full Code    Clinically Significant Risk Factors Present on Admission                       # Obesity: Estimated body mass index is 37.79 kg/m  as calculated from the following:    Height as of this encounter: 1.549 m (5' 1\").    Weight as of 12/2/23: 90.7 kg (200 lb).       # Asthma: noted on problem list        Disposition Plan      Expected Discharge Date: 12/07/2023                  Alicia Estevez MD  Hospitalist Service  [facility]  Securely message with [employer] (more info)  Text page via [facility] Paging/Directory     ______________________________________________________________________    Chief Complaint   Right hip pain buttock pain    History is obtained from the patient    History of Present Illness   Nidia Acosta is a 63 year old female who presented to the ER with complaints of right hip pain.  She returns to the ER within 24 hours of discharge.  She endorsed after the steroid injection she has been able to move " around she got home was able to do some ADLs however suddenly started out with the right hip pain again when she has not been able to walk or move her leg much.  She localizes the pain to the lateral aspect of the hip.  MRI repeated no acute findings hospitalist consulted for admission for pain control      Past Medical History    Past Medical History:   Diagnosis Date    Backache, unspecified     lumbar back pain, degen disk disease    COPD (chronic obstructive pulmonary disease) (H)     I get bronchitis frequently    Cough variant asthma 07/07/2020    Depressive disorder     numerous yrs ago diagnosed with PTSD    Leiomyoma of uterus, unspecified 2003    s/p LAVH    Other genital herpes     HSV-2    Thyroid disease     Me    Unspecified sinusitis (chronic)        Past Surgical History   Past Surgical History:   Procedure Laterality Date    BACK SURGERY      COLONOSCOPY  04/05/2013    Procedure: COLONOSCOPY;  Colonoscopy  ;  Surgeon: Laura Ruff MD;  Location: [facility]    COLONOSCOPY N/A 11/24/2023    Procedure: COLONOSCOPY, FLEXIBLE, WITH LESION REMOVAL USING SNARE;  Surgeon: Luciano Villalobos MD;  Location: [facility]    HEAD & NECK SURGERY  06/2019    Discectomy with fusion    HYSTERECTOMY, PAP NO LONGER INDICATED  07/2003    LAVH for fibroids    LAPAROSCOPIC CHOLECYSTECTOMY N/A 08/21/2019    Procedure: CHOLECYSTECTOMY, LAPAROSCOPIC;  Surgeon: Kwabena Stephenson MD;  Location: [facility]    LAPAROSCOPIC LYSIS ADHESIONS  1985    LASIK  10/2004    TONSILLECTOMY & ADENOIDECTOMY  age 18    TUBAL LIGATION  04/1994       Prior to Admission Medications   Prior to Admission Medications   Prescriptions Last Dose Informant Patient Reported? Taking?   FLUoxetine (PROZAC) 40 MG capsule   No No   Sig: Take 1 capsule (40 mg) by mouth daily   NONFORMULARY  Self Yes No   Sig: Take 4 capsules by mouth daily Sulfurzyme -  combines wolfberry with MSM, a naturally occurring organic form of dietary sulfur needed by our bodies every  day to maintain the structure of proteins, protect cells and cell membranes, replenish the connections between cells, and preserve the molecular framework of connective tissue.   acetaminophen (TYLENOL) 325 MG tablet   No No   Sig: Take 3 tablets (975 mg) by mouth every 6 hours as needed for mild pain   albuterol (PROAIR HFA/PROVENTIL HFA/VENTOLIN HFA) 108 (90 Base) MCG/ACT inhaler   No No   Sig: Inhale 2 puffs into the lungs every 6 hours   cetirizine (ZYRTEC) 10 MG tablet   Yes No   Sig: Take 10 mg by mouth daily   doxycycline hyclate (VIBRAMYCIN) 100 MG capsule   Yes No   Sig: Take 100 mg by mouth 2 times daily   gabapentin (NEURONTIN) 100 MG capsule   No No   Sig: Take 2 capsules (200 mg) by mouth 2 times daily for 5 days   levothyroxine (EUTHYROX) 125 MCG tablet   No No   Sig: Take 1 tablet (125 mcg) by mouth daily   montelukast (SINGULAIR) 10 MG tablet   No No   Sig: Take 1 tablet (10 mg) by mouth at bedtime   ondansetron (ZOFRAN ODT) 4 MG ODT tab   No No   Sig: Take 1 tablet (4 mg) by mouth every 8 hours as needed for nausea or vomiting   oxyCODONE (ROXICODONE) 5 MG tablet   No No   Sig: Take 1 tablet (5 mg) by mouth every 6 hours as needed for pain   triamcinolone (ARISTOCORT HP) 0.5 % external cream   No No   Sig: Apply topically 2 times daily   valACYclovir (VALTREX) 500 MG tablet   No No   Sig: Take 1 tablet (500 mg) by mouth daily      Facility-Administered Medications: None        Social History   I have reviewed this patient's social history and updated it with pertinent information if needed.  Social History     Tobacco Use    Smoking status: Former     Packs/day: 0.50     Years: 1.00     Additional pack years: 0.00     Total pack years: 0.50     Types: Cigarettes     Quit date: 1986     Years since quittin.9    Smokeless tobacco: Never   Substance Use Topics    Alcohol use: Yes     Comment: 1-5 drinks weekly    Drug use: No         Family History   I have reviewed this patient's family  "history and updated it with pertinent information if needed.  Family History   Problem Relation Age of Onset    Arthritis Mother     Allergies Mother         otc meds    Depression Mother     Alzheimer Disease Mother     Hyperlipidemia Mother     Osteoporosis Mother     Diabetes Mother     Heart Disease Mother 80        mitral valve issues    Obesity Mother     Hypertension Father         on med    C.A.D. Father         has had 2 MI\"S, possible surgery    Alcohol/Drug Father     Arthritis Father     Hyperlipidemia Father     Substance Abuse Father     Alcohol/Drug Brother     Substance Abuse Brother     Heart Disease Maternal Grandmother     Osteoporosis Maternal Grandmother     Heart Disease Maternal Grandfather     Heart Disease Paternal Grandmother     Diabetes Paternal Grandmother     Obesity Paternal Grandmother     Heart Disease Paternal Grandfather     Alcohol/Drug Son         son in recovery    Hypertension Brother     Hyperlipidemia Brother     Substance Abuse Other         Son    Hyperlipidemia Brother     Depression Brother          Allergies   Allergies   Allergen Reactions    Adhesive Tape Itching    Bees Anaphylaxis    Codeine Difficulty breathing    Latex Dermatitis    Penicillin [Penicillins] Difficulty breathing    Seasonal Allergies         Physical Exam   Vital Signs: Temp: 98.8  F (37.1  C) Temp src: Temporal BP: (!) 142/73 Pulse: 73   Resp: 22 SpO2: 94 % O2 Device: None (Room air)    Weight: 0 lbs 0 oz      General: Awake and alert, obese  Eyes: Pupils reactive to light  HENT: Atraumatic, oral mucosa moist  Neck: No masses, or swelling  Pulmonary: Good air entry, clear to auscultation  CVS: Heart sounds 1 and 2 present, regular rhythm, rate, no murmur  GI/ Abdomen: Soft , not tender , not distended, bowel sounds ++  : No valenzuela   JABARI: Normal inspection, no muscle spasm, right hip tenderness  Skin: No rash, skin intact  Extremities: No edema  Neuro: Alert and oriented, follows command, speech " normal, no focal weakness  Psych: Normal mood and affect      Medical Decision Making       55 MINUTES SPENT BY ME on the date of service doing chart review, history, exam, documentation & further activities per the note.      Data     I have personally reviewed the following data over the past 24 hrs:    11.2 (H)  \   13.7   / 294     132 (L) 99 13.6 /  159 (H)   4.0 20 (L) 0.58 \     ALT: 28 AST: 32 AP: 94 TBILI: 0.2   ALB: 4.2 TOT PROTEIN: 6.9 LIPASE: N/A       Imaging results reviewed over the past 24 hrs:   Recent Results (from the past 24 hour(s))   MR Hip Right w/o & w Contrast    Narrative    EXAM: MR HIP RIGHT W/O and W CONTRAST  LOCATION: [facility]  DATE: 12/6/2023    INDICATION: Right hip pain after fall. Recent right hip joint injection.  COMPARISON: MRI right hip 12/03/2023  TECHNIQUE: Routine. Additional postgadolinium T1 sequences were obtained.  IV CONTRAST: 9ml Gadavist    FINDINGS:    RIGHT HIP:   -Labrum: Degenerative tearing of the labrum. No paralabral cyst.   -Cartilage: Mild chondral thinning, without focal chondral defect.  -Joint space: Trace right hip joint fluid. No evidence of synovitis.   -Joint capsule/ligaments: Intact joint capsule. Ligamentum teres is intact.    LEFT HIP: No fracture, osteonecrosis, or joint effusion.    MUSCLES AND TENDONS:   -Gluteal: No tendon tear or tendinopathy. No trochanteric bursitis.  -Proximal hamstring: Mild tendinopathy of the bilateral hamstring tendon origins with low-grade partial tearing.   -Iliopsoas: No tendon tear or tendinopathy. No bursitis.  -Rectus femoris origin: No tear or tendinopathy.    BONES:   -No fracture or dislocation.  - No concerning marrow replacing lesion. No abnormal enhancement.  -Sacroiliac joints appear normal.    SOFT TISSUES:   -Normal muscle bulk. No acute muscular injury.    INTRA-PELVIC CONTENTS:  -Visualized portions are unremarkable.      Impression    IMPRESSION:  1.  Trace right hip joint  fluid, likely related to recent joint injection. No evidence of synovitis.    2.  No acute fracture or dislocation.

## 2023-12-06 NOTE — ED TRIAGE NOTES
Patient was discharge from the hospital yesterday. She received a cortisone shot in her right hip and was able to walk into the house when she got home.  Today the pain is worse and she is writhing in pain in triage. She states the pain is worse then when she first came to the hospital.     Triage Assessment (Adult)       Row Name 12/05/23 2043          Triage Assessment    Airway WDL WDL        Respiratory WDL    Respiratory WDL WDL        Cardiac WDL    Cardiac WDL WDL        Peripheral/Neurovascular WDL    Peripheral Neurovascular WDL X        Cognitive/Neuro/Behavioral WDL    Cognitive/Neuro/Behavioral WDL WDL

## 2023-12-06 NOTE — DISCHARGE SUMMARY
"[facility]  Hospitalist Discharge Summary      Date of Admission:  12/5/2023  Date of Discharge:  12/6/2023  Discharging Provider: Leslie Rahman MD  Discharge Service: Hospitalist Service    Discharge Diagnoses   Right hip pain    Clinically Significant Risk Factors     # Obesity: Estimated body mass index is 37.79 kg/m  as calculated from the following:    Height as of this encounter: 1.549 m (5' 1\").    Weight as of 12/2/23: 90.7 kg (200 lb).       Follow-ups Needed After Discharge   Follow-up Appointments     Follow-up and recommended labs and tests       Follow up with primary care provider, Merlene Reyes, within 7 days for   hospital follow- up and regarding new diagnosis.  No follow up labs or   test are needed. Follow up with orthopedics clinic in one week.            Unresulted Labs Ordered in the Past 30 Days of this Admission       Date and Time Order Name Status Description    12/2/2023  4:44 PM Blood Culture Hand, Right Preliminary     12/2/2023  4:44 PM Blood Culture Hand, Right Preliminary         These results will be followed up by PCP     Discharge Disposition   Discharged to home  Condition at discharge: Stable    Hospital Course   Nidia Acosta is a 63 year old female admitted on 12/5/2023. She was discharged 2 days ago after being admitted for abdominal pain and right hip pain.  Moderate showed mild degenerative changes and chronic labral tear.  She had received a right hip steroid injection and was discharged.  She came back with right hip pain.  Abdominal pain resolved.  [facility] MRI showed no new changes.  Orthopedic consultation was done-recommending follow-up with arthritis surgeon and at [place] [facility] clinic in 1 week.  Pain management team was consulted and made some pain medication adjustment.  Patient is clinically and hemodynamically stable for discharge to home.  Medication reconciliation was done.  Medication sent to patient's preferred " pharmacy.  Follow-up appointments and recommendations as shown below.  Patient verbalized understanding and agreed to plan of care.  All questions were answered.    Consultations This Hospital Stay   ORTHOPEDIC SURGERY IP CONSULT  PAIN MANAGEMENT ADULT IP CONSULT    Code Status   Full Code    Time Spent on this Encounter   I, Leslie Rahman MD, personally saw the patient today and spent greater than 30 minutes discharging this patient.       Leslie Rahman MD  [facility] EMERGENCY DEPARTMENT  [address] [place] [id number]  Phone: [phone]  ______________________________________________________________________    Physical Exam   Vital Signs: Temp: 98.3  F (36.8  C) Temp src: Oral BP: 133/63 Pulse: 69   Resp: 15 SpO2: 97 % O2 Device: None (Room air)    Weight: 0 lbs 0 oz  GEN: Alert and oriented. Not in acute distress.  HEENT: Atraumatic, mucous membrane- moist and pink.  Chest: Bilateral air entry.  CVS: S1S2 regular.   Abdomen: Soft. Non-tender, non-distended. No organomegaly. No guarding or rigidity. Bowel sounds active.   Extremities: No pedal edema.  CNS: No involuntary movements.  Skin: no cyanosis or clubbing.        Primary Care Physician   Merlene Reyes    Discharge Orders      Reason for your hospital stay    Right hip pain     Follow-up and recommended labs and tests     Follow up with primary care provider, Merlene Reyes, within 7 days for hospital follow- up and regarding new diagnosis.  No follow up labs or test are needed. Follow up with orthopedics clinic in one week.     Activity    Your activity upon discharge: activity as tolerated     Diet    Follow this diet upon discharge: Orders Placed This Encounter      Combination Diet Regular Diet Adult; No Caffeine Diet       Significant Results and Procedures       Discharge Medications   Current Discharge Medication List        START taking these medications    Details   !! methocarbamol (ROBAXIN)  "500 MG tablet Take 1 tablet (500 mg) by mouth every 6 hours  Qty: 16 tablet, Refills: 0    Associated Diagnoses: Hip pain, right      !! methocarbamol (ROBAXIN) 500 MG tablet Take 1 tablet (500 mg) by mouth 4 times daily as needed for muscle spasms Start on 12/10 after the scheduled doses have been taken for 4 days.  Qty: 30 tablet, Refills: 0    Associated Diagnoses: Hip pain, right      senna-docusate (SENOKOT-S/PERICOLACE) 8.6-50 MG tablet Take 1 tablet by mouth 2 times daily as needed for constipation  Qty: 30 tablet, Refills: 0    Associated Diagnoses: Constipation, unspecified constipation type       !! - Potential duplicate medications found. Please discuss with provider.        CONTINUE these medications which have CHANGED    Details   acetaminophen (TYLENOL) 325 MG tablet Take 3 tablets (975 mg) by mouth every 8 hours as needed for mild pain  Qty: 90 tablet, Refills: 0    Associated Diagnoses: Intractable low back pain      gabapentin (NEURONTIN) 100 MG capsule Take 2 capsules (200 mg) by mouth 2 times daily for 14 days  Qty: 56 capsule, Refills: 0    Associated Diagnoses: Intractable low back pain      oxyCODONE (ROXICODONE) 5 MG tablet Take 1-2 tablets (5-10 mg) by mouth every 4 hours as needed for pain or moderate to severe pain  Qty: 32 tablet, Refills: 0    Comments: 5 mg for moderate pain \"4-6\" and 10 mg for severe pain \"7-10\" Maximum of 8 tablets per day  Associated Diagnoses: Intractable low back pain           CONTINUE these medications which have NOT CHANGED    Details   albuterol (PROAIR HFA/PROVENTIL HFA/VENTOLIN HFA) 108 (90 Base) MCG/ACT inhaler Inhale 2 puffs into the lungs every 6 hours  Qty: 18 g, Refills: 0    Comments: Pharmacy may dispense brand covered by insurance (Proair, or proventil or ventolin or generic albuterol inhaler)  Associated Diagnoses: Cough variant asthma      cetirizine (ZYRTEC) 10 MG tablet Take 10 mg by mouth daily      FLUoxetine (PROZAC) 40 MG capsule Take 1 capsule " (40 mg) by mouth daily  Qty: 90 capsule, Refills: 2    Associated Diagnoses: Stress      levothyroxine (EUTHYROX) 125 MCG tablet Take 1 tablet (125 mcg) by mouth daily  Qty: 90 tablet, Refills: 2    Associated Diagnoses: Hypothyroidism due to acquired atrophy of thyroid      montelukast (SINGULAIR) 10 MG tablet Take 1 tablet (10 mg) by mouth at bedtime  Qty: 90 tablet, Refills: 2    Associated Diagnoses: Cough variant asthma      NONFORMULARY Take 4 capsules by mouth daily Sulfurzyme -  combines wolfberry with MSM, a naturally occurring organic form of dietary sulfur needed by our bodies every day to maintain the structure of proteins, protect cells and cell membranes, replenish the connections between cells, and preserve the molecular framework of connective tissue.      ondansetron (ZOFRAN ODT) 4 MG ODT tab Take 1 tablet (4 mg) by mouth every 8 hours as needed for nausea or vomiting  Qty: 10 tablet, Refills: 0      triamcinolone (ARISTOCORT HP) 0.5 % external cream Apply topically 2 times daily  Qty: 454 g, Refills: 0    Associated Diagnoses: Dyshidrotic foot dermatitis      valACYclovir (VALTREX) 500 MG tablet Take 1 tablet (500 mg) by mouth daily  Qty: 90 tablet, Refills: 2    Associated Diagnoses: Herpes simplex infection of genitourinary system           Allergies   Allergies   Allergen Reactions    Adhesive Tape Itching    Bees Anaphylaxis    Codeine Difficulty breathing    Latex Dermatitis    Penicillin [Penicillins] Difficulty breathing    Seasonal Allergies

## 2023-12-06 NOTE — ED NOTES
Bed: JNED-33  Expected date: 12/6/23  Expected time: 7:53 AM  Means of arrival:   Comments:  [facility] PT ED 18 JErenCEren

## 2023-12-06 NOTE — CONSULTS
ORTHOPEDIC CONSULTATION    Consultation  Nidia Acosta,  1960, MRN [id number]    Hip pain, right [M25.551]    PCP: Merlene Reyes, [phone]   Code status:  Full Code       Extended Emergency Contact Information  Primary Emergency Contact: [place] [address] [facility]  Mobile Phone: [phone]  Relation: Son  Secondary Emergency Contact: Alida Harrell (cousin)   [place]  Mobile Phone: [phone]  Relation: Relative         IMPRESSION:  Right hip/low back pain, MRI showing mild degenerative changes and chronic labral tear      PLAN:  This patient was discussed with Dr. Chapman, on-call surgeon for [facility] and they are in agreement with the following plan.   - No indication for urgent surgical intervention, MRI reviewed and no changes compared to previous MRI.  MRI does not demonstrate any indication for surgery.  - She did have relief following her injection in the short term which would likely be relief from the anesthetic.  Per Dr. hCapman, nothing on imaging would explain severity of pain coming from her hip at this time.  However, as she did get short term relief after the hip injection, we would expect that the steroid may still provide longer term relief once it kicks in.  This on average may take 3-7 days.  - I discussed with the patient that from an orthopedic standpoint there is nothing further for us to do.  Current goal is pain control.  If able to find regimen providing adequate pain control, may discharge home from our perspective and wait for corticosteroid to take effect which will hopefully provide longer lasting pain relief.  Would include robaxin in pain regimen due to muscle spasming.  - Follow up with an arthritis surgeon at [facility] in 1 week following discharge.    Thank you for including [facility] in the care of Nidia Acosta. It has been a pleasure participating in their care.        CHIEF  COMPLAINT: Hip pain, right    HISTORY OF PRESENT ILLNESS:  The patient is seen in orthopedic consultation at the request of Leslie Rahman MD for right hip pain.  The patient is a 63 year old female.    Patient is seen in the ED as she has returned for ongoing right hip pain.  She notes that pain began about 1 week ago.  She had a colonoscopy done on 11/20/2023.  The following day she was standing for an extended period of time and twisted her hip causing a lot of pain in the low back and anterior hip area.  She has been having ongoing pain and difficulty with weightbearing/ambulating since that time that worsened as the week went on.  On Thursday she went into the [place] emergency department and was prescribed oxycodone.  She was unable to obtain this medication from the pharmacy and had continued pain so she came into the [facility] emergency department.  She was seen by myself that day and and US guided intraarticular hip corticosteroid injection performed.  She notes that she had great relief of pain afterwards and was discharged home.  She continued to have relief until yesterday evening when she was walking and had sudden onset of the same pain she was having prior to injection.  Oxycodone is only sometimes helping.  She feels some muscle spasming around right hip as well.    Currently she describes pain to the lateral and anterior aspect of her hip/groin area.  She does not have pain rating down her leg or numbness/tingling in her leg.  She is able to weight-bear and ambulate a few steps but movement does exacerbate her pain.        ALLERGIES:   Review of patient's allergies indicates   Allergies   Allergen Reactions    Adhesive Tape Itching    Bees Anaphylaxis    Codeine Difficulty breathing    Latex Dermatitis    Penicillin [Penicillins] Difficulty breathing    Seasonal Allergies          MEDICATIONS UPON ADMISSION:  Medications were reviewed.  They include:   (Not in a hospital  "admission)        SOCIAL HISTORY:   she  reports that she quit smoking about 37 years ago. Her smoking use included cigarettes. She has a 0.50 pack-year smoking history. She has never used smokeless tobacco. She reports current alcohol use. She reports that she does not use drugs.      FAMILY HISTORY:  family history includes Alcohol/Drug in her brother, father, and son; Allergies in her mother; Alzheimer Disease in her mother; Arthritis in her father and mother; C.A.D. in her father; Depression in her brother and mother; Diabetes in her mother and paternal grandmother; Heart Disease in her maternal grandfather, maternal grandmother, paternal grandfather, and paternal grandmother; Heart Disease (age of onset: 80) in her mother; Hyperlipidemia in her brother, brother, father, and mother; Hypertension in her brother and father; Obesity in her mother and paternal grandmother; Osteoporosis in her maternal grandmother and mother; Substance Abuse in her brother, father, and another family member.      REVIEW OF SYSTEMS:   Reviewed with patient. See HPI, otherwise negative       PHYSICAL EXAMINATION:  Vitals: BP (!) 171/77   Pulse 65   Temp 98.1  F (36.7  C) (Oral)   Resp 22   Ht 1.549 m (5' 1\")   LMP 07/29/2002   SpO2 97%   BMI 37.79 kg/m    General: On examination, the patient is resting comfortably, NAD, awake, and alert and oriented to person, place, time, and, and general circumstances   SKIN: There is no evidence of erythema, warmth, swelling, deformity, crepitus, break to the skin, open wound.  Pulses:  dorsalis pedis and posterior tibial pulse is intact and equal bilaterally  Sensation: intact and equal bilaterally to the distal lower extremities.  Tenderness: Severely tender to palpation of the anterior groin area, minimal tenderness palpation over the greater trochanter  ROM: Full knee extension/flexion without pain. Full hip flexion/abduction/adduction/IR/ER without pain. ankle DF/PF/inversion/eversion " without pain.  Motor: TIB ANT, PF, ELH, QUAD, HF 5/5  Contralateral side= Full range of motion, Negative joint instability findings, 5/5 motor groups about the joint, Non-tender.       RADIOGRAPHIC EVALUATION:  Personally reviewed  EXAM: MR HIP RIGHT WITHOUT CONTRAST  LOCATION: [facility]  DATE: 12/03/2023     INDICATION: Severe right hip pain.  COMPARISON: None.  TECHNIQUE: Unenhanced.     FINDINGS:     RIGHT HIP: The right hip is negative for fracture or avascular necrosis.  -Labrum: Signal abnormality within the anterior labrum is likely secondary to a chronic tear. No detachment or paralabral cyst formation.   -Cartilage: Mild thinning of the articular cartilage on both sides of the joint without significant surface irregularity.  -Joint space: No effusion or synovitis.   -Joint capsule/ligaments: Intact joint capsule. Ligamentum teres is intact.  -Morphology: Alpha Angle is normal. No bony morphologic changes associated with femoroacetabular impingement.     LEFT HIP: The left hip is negative for fracture or avascular necrosis.     MUSCLES AND TENDONS:   -Gluteal: Mild right-sided gluteal tendon tendinopathy without evidence for tearing or trochanteric bursitis.  -Proximal hamstring: Mild bilateral hamstring tendinopathy without tearing.   -Iliopsoas: Mild right-sided iliopsoas tendinopathy of the distal attachment to the lesser trochanter. No tearing.  -Rectus femoris origin: No tear or tendinopathy.     BONES:   -The bony pelvis is negative for fracture.   -SI joints are normal. Visualized lumbar spine is normal.     SOFT TISSUES:   -Normal muscle bulk. No acute muscular injury.     INTRA-PELVIC CONTENTS:  -Visualized portions are normal.                                                                      IMPRESSION:  1.  Both hips negative for fracture or avascular necrosis.  2.  Chronic appearing tear of the anterior aspect of the right hip labrum. No detachment.  3.  Mild  thinning of the articular cartilage both sides of the right hip joint without significant surface irregularity.  4.  Mild right-sided gluteal tendon tendinopathy without evidence for tearing or trochanteric bursitis.  5.  Mild bilateral hamstring tendinopathy without tearing.  6.  Mild right-sided iliopsoas tendinopathy. No tearing.  7.  Bony pelvis negative for fracture.  8.  Intrapelvic contents negative.      EXAM: MR HIP RIGHT W/O and W CONTRAST  LOCATION: [facility]  DATE: 12/6/2023     INDICATION: Right hip pain after fall. Recent right hip joint injection.  COMPARISON: MRI right hip 12/03/2023  TECHNIQUE: Routine. Additional postgadolinium T1 sequences were obtained.  IV CONTRAST: 9ml Gadavist     FINDINGS:     RIGHT HIP:   -Labrum: Degenerative tearing of the labrum. No paralabral cyst.   -Cartilage: Mild chondral thinning, without focal chondral defect.  -Joint space: Trace right hip joint fluid. No evidence of synovitis.   -Joint capsule/ligaments: Intact joint capsule. Ligamentum teres is intact.     LEFT HIP: No fracture, osteonecrosis, or joint effusion.     MUSCLES AND TENDONS:   -Gluteal: No tendon tear or tendinopathy. No trochanteric bursitis.  -Proximal hamstring: Mild tendinopathy of the bilateral hamstring tendon origins with low-grade partial tearing.   -Iliopsoas: No tendon tear or tendinopathy. No bursitis.  -Rectus femoris origin: No tear or tendinopathy.     BONES:   -No fracture or dislocation.  - No concerning marrow replacing lesion. No abnormal enhancement.  -Sacroiliac joints appear normal.     SOFT TISSUES:   -Normal muscle bulk. No acute muscular injury.     INTRA-PELVIC CONTENTS:  -Visualized portions are unremarkable.                                                                      IMPRESSION:  1.  Trace right hip joint fluid, likely related to recent joint injection. No evidence of synovitis.     2.  No acute fracture or dislocation.      PERTINENT  LABS:  Personally reviewed  Recent Labs   Lab Test 12/06/23  0818   HGB 13.7            SANG FOREMAN PA-C  Date: 12/6/2023  Time: 9:01 AM  [facility]    CC1:   Leslie Rahman MD

## 2023-12-06 NOTE — ED NOTES
"[facility] ED Handoff Report    ED Chief Complaint: Right hip Pain     ED Diagnosis:  (M25.551) Hip pain, right  Comment:   Plan:        PMH:    Past Medical History:   Diagnosis Date    Backache, unspecified     lumbar back pain, degen disk disease    COPD (chronic obstructive pulmonary disease) (H)     I get bronchitis frequently    Cough variant asthma 07/07/2020    Depressive disorder     numerous yrs ago diagnosed with PTSD    Leiomyoma of uterus, unspecified 2003    s/p LAVH    Other genital herpes     HSV-2    Thyroid disease     Me    Unspecified sinusitis (chronic)         Code Status:  Full Code     Falls Risk: Yes Band: Applied    Current Living Situation/Residence: lives alone     Elimination Status: Continent: Yes     Activity Level: SBA    Patients Preferred Language:  English     Needed: No    Vital Signs:  BP (!) 145/68   Pulse 72   Temp 98.1  F (36.7  C) (Oral)   Resp 22   Ht 1.549 m (5' 1\")   LMP 07/29/2002   SpO2 93%   BMI 37.79 kg/m       Cardiac Rhythm:     Pain Score: 5/10    Is the Patient Confused:  No    Last Food or Drink: 12/06/23 at Breakfast     Focused Assessment:  Pt complains of right hip pain. MRI showed Labrum: Degenerative tearing of the labrum.  She was just discharged on Monday for this condition. She had a cortisone shot in right hip prior to discharge, it was not effective in pain management. Ortho consulted. Last dose of pain medication was oxycodone 5 mg at 0736.     Tests Performed: Done: Labs and Imaging    Treatments Provided:  pain control     Family Dynamics/Concerns: No    Family Updated On Visitor Policy: Yes    Plan of Care Communicated to Family: No    Who Was Updated about Plan of Care: patient       Medications sent with patient:     Additional Information:     RN: Moira Suarez RN 12/6/2023 7:54 AM       "

## 2023-12-06 NOTE — PLAN OF CARE
Goal Outcome Evaluation:         Patient discharging home. Discharge instructions to be given to patient. Patient family will transport.

## 2023-12-06 NOTE — MEDICATION SCRIBE - ADMISSION MEDICATION HISTORY
Medication Scribe Admission Medication History    Admission medication history is complete. The information provided in this note is only as accurate as the sources available at the time of the update.    Information Source(s): Patient, Hospital records, and CareEverywhere/SureScripts via in-person    Pertinent Information: Pt was admitted to the hospital 12/2/2023, reports that all medications from then were still being taken but tonight's evening doses were not.     Changes made to PTA medication list:  Added: None  Deleted: Doxycycline complete, per pt.  Changed: None    Medication Affordability:  Not including over the counter (OTC) medications, was there a time in the past 3 months when you did not take your medications as prescribed because of cost?: No    Allergies reviewed with patient and updates made in EHR: yes    Medication History Completed By: Souleymane Subramanian 12/6/2023 3:14 AM  Prior to Admission medications    Medication Sig Last Dose Taking? Auth Provider Long Term End Date   acetaminophen (TYLENOL) 325 MG tablet Take 3 tablets (975 mg) by mouth every 6 hours as needed for mild pain 12/5/2023 at prn am Yes Kameron Issa MD     albuterol (PROAIR HFA/PROVENTIL HFA/VENTOLIN HFA) 108 (90 Base) MCG/ACT inhaler Inhale 2 puffs into the lungs every 6 hours Past Week at prn Yes Merlene Reyes MD Yes    cetirizine (ZYRTEC) 10 MG tablet Take 10 mg by mouth daily 12/5/2023 Yes Unknown, Entered By History     FLUoxetine (PROZAC) 40 MG capsule Take 1 capsule (40 mg) by mouth daily 12/5/2023 at am Yes Merlene Reyes MD Yes    gabapentin (NEURONTIN) 100 MG capsule Take 2 capsules (200 mg) by mouth 2 times daily for 5 days 12/5/2023 at am Yes Kameron Issa MD Yes 12/9/23   levothyroxine (EUTHYROX) 125 MCG tablet Take 1 tablet (125 mcg) by mouth daily 12/5/2023 at am Yes Merlene Reyes MD Yes    montelukast (SINGULAIR) 10 MG tablet Take 1 tablet (10 mg) by mouth at bedtime Past Week at pm mon Yes Amy  Merlene HANNAH MD Yes    NONFORMULARY Take 4 capsules by mouth daily Sulfurzyme -  combines wolfberry with MSM, a naturally occurring organic form of dietary sulfur needed by our bodies every day to maintain the structure of proteins, protect cells and cell membranes, replenish the connections between cells, and preserve the molecular framework of connective tissue. 12/5/2023 at am Yes Reported, Patient     ondansetron (ZOFRAN ODT) 4 MG ODT tab Take 1 tablet (4 mg) by mouth every 8 hours as needed for nausea or vomiting Past Week at prn Yes Nicole Velarde MD     oxyCODONE (ROXICODONE) 5 MG tablet Take 1 tablet (5 mg) by mouth every 6 hours as needed for pain 12/5/2023 at am Yes Kameron Issa MD     triamcinolone (ARISTOCORT HP) 0.5 % external cream Apply topically 2 times daily Past Week at prn Yes Merlene Reyes MD     valACYclovir (VALTREX) 500 MG tablet Take 1 tablet (500 mg) by mouth daily 12/5/2023 at am Yes Merlene Reyes MD Yes

## 2023-12-07 ENCOUNTER — TELEPHONE (OUTPATIENT)
Dept: FAMILY MEDICINE | Facility: CLINIC | Age: 63
End: 2023-12-07
Payer: COMMERCIAL

## 2023-12-07 LAB
BACTERIA BLD CULT: NO GROWTH
BACTERIA BLD CULT: NO GROWTH

## 2023-12-07 NOTE — TELEPHONE ENCOUNTER
Reason for Call:  Appointment Request    Patient requesting this type of appt:  Hospital/ED Follow-Up     Requested provider: Merlene Reyes    Reason patient unable to be scheduled: Not within requested timeframe    When does patient want to be seen/preferred time: 3-7 days    Comments:     Could we send this information to you in [facility] or would you prefer to receive a phone call?:   Patient would prefer a phone call   Okay to leave a detailed message?: Yes at Cell number on file:    Telephone Information:   Mobile [phone]       Call taken on 12/7/2023 at 8:29 AM by Norma Parsons

## 2023-12-07 NOTE — TELEPHONE ENCOUNTER
Call placed to patient  Hospital follow-up appointment scheduled with Dr. Reyes for 12/13/2023 at 1540     Patient verbalized understanding  No further questions/concerns    Alirio Sommer RN

## 2023-12-13 ENCOUNTER — OFFICE VISIT (OUTPATIENT)
Dept: FAMILY MEDICINE | Facility: CLINIC | Age: 63
End: 2023-12-13
Payer: COMMERCIAL

## 2023-12-13 VITALS
HEIGHT: 61 IN | BODY MASS INDEX: 37.19 KG/M2 | WEIGHT: 197 LBS | TEMPERATURE: 99 F | SYSTOLIC BLOOD PRESSURE: 126 MMHG | HEART RATE: 95 BPM | DIASTOLIC BLOOD PRESSURE: 78 MMHG | OXYGEN SATURATION: 98 %

## 2023-12-13 DIAGNOSIS — E66.812 CLASS 2 SEVERE OBESITY DUE TO EXCESS CALORIES WITH SERIOUS COMORBIDITY AND BODY MASS INDEX (BMI) OF 37.0 TO 37.9 IN ADULT (H): ICD-10-CM

## 2023-12-13 DIAGNOSIS — R10.9 ABDOMINAL WALL PAIN: Primary | ICD-10-CM

## 2023-12-13 DIAGNOSIS — E66.01 CLASS 2 SEVERE OBESITY DUE TO EXCESS CALORIES WITH SERIOUS COMORBIDITY AND BODY MASS INDEX (BMI) OF 37.0 TO 37.9 IN ADULT (H): ICD-10-CM

## 2023-12-13 DIAGNOSIS — F33.42 RECURRENT MAJOR DEPRESSION IN COMPLETE REMISSION (H): ICD-10-CM

## 2023-12-13 PROCEDURE — 99214 OFFICE O/P EST MOD 30 MIN: CPT | Performed by: FAMILY MEDICINE

## 2023-12-13 RX ORDER — DIAZEPAM 5 MG
TABLET ORAL
Qty: 30 TABLET | Refills: 1 | Status: SHIPPED | OUTPATIENT
Start: 2023-12-13 | End: 2024-04-18

## 2023-12-13 RX ORDER — GABAPENTIN 300 MG/1
300 CAPSULE ORAL 3 TIMES DAILY
Qty: 90 CAPSULE | Refills: 1 | Status: SHIPPED | OUTPATIENT
Start: 2023-12-13 | End: 2024-01-04

## 2023-12-13 RX ORDER — GABAPENTIN 100 MG/1
100 CAPSULE ORAL 3 TIMES DAILY
Qty: 90 CAPSULE | Refills: 1 | Status: SHIPPED | OUTPATIENT
Start: 2023-12-13 | End: 2024-01-04

## 2023-12-13 NOTE — PROGRESS NOTES
"  Assessment & Plan     Abdominal wall pain  Had negative work up so far. The pain is localozed to the right lower quadrant and is reproduced with light touch may be both back and muscle pain   - gabapentin (NEURONTIN) 300 MG capsule; Take 1 capsule (300 mg) by mouth 3 times daily  - gabapentin (NEURONTIN) 100 MG capsule; Take 1 capsule (100 mg) by mouth 3 times daily  - diazepam (VALIUM) 5 MG tablet; Take 1/2 - 1 tab up to 2 times daily    Recurrent major depression in complete remission (H24)  Stable     Class 2 severe obesity due to excess calories with serious comorbidity and body mass index (BMI) of 37.0 to 37.9 in adult (H)  She knows cannot exercise at present due to severe pain        MED REC REQUIRED  Post Medication Reconciliation Status: discharge medications reconciled and changed, per note/orders  BMI:   Estimated body mass index is 37.22 kg/m  as calculated from the following:    Height as of this encounter: 1.549 m (5' 1\").    Weight as of this encounter: 89.4 kg (197 lb).       Will have her try valium at night for muscle relaxer and for her pain up taper the gabapentin     Merlene Reyes MD  [facility] KATHLEEN Jacob is a 63 year old, presenting for the following health issues:  Hospital F/U        12/13/2023     3:44 PM   Additional Questions   Roomed by Bebe ECHAVARRIA CMA       [facility]         12/5/2023     2:06 PM   Post Discharge Outreach   Admission Date 12/2/2023   Reason for Admission Right hip pain   Discharge Date 12/4/2023   Discharge Diagnosis Right hip pain   How are you doing now that you are home? I am doing okay. I still have some pain.   How are your symptoms? (Red Flag symptoms escalate to triage hotline per guidelines) Improved   Do you feel your condition is stable enough to be safe at home until your provider visit? Yes   Does the patient have their discharge instructions?  Yes   Does the patient have questions regarding their discharge instructions?  No " "  Were you started on any new medications or were there changes to any of your previous medications?  Yes   Does the patient have all of their medications? Yes   Do you have questions regarding any of your medications?  No   Discharge follow-up appointment scheduled within 14 calendar days?  No     Hospital Follow-up Visit:    Summary of hospitalization:  [facility] discharge summary reviewed  Diagnostic Tests/Treatments reviewed.  Follow up needed: none  Other Healthcare Providers Involved in Patient s Care:         None  Update since discharge: fluctuating course.     She did have to drive today she has been able to work from home   There is no association with eating she has been having bms . She was having a hard time emptying the bladder and now   Plan of care communicated with patient               Review of Systems   As above       Objective    /78 (BP Location: Right arm, Patient Position: Sitting, Cuff Size: Adult Regular)   Pulse 95   Temp 99  F (37.2  C) (Tympanic)   Ht 1.549 m (5' 1\")   Wt 89.4 kg (197 lb)   LMP 07/29/2002   SpO2 98%   BMI 37.22 kg/m    Body mass index is 37.22 kg/m .  Physical Exam   GENERAL: alert, mod  distress, and over weight  ABDOMEN: tenderness from iliac crest to pubic symphysis pain + carnets and bowel sounds normal    Admission on 12/05/2023, Discharged on 12/06/2023   Component Date Value Ref Range Status    Sodium 12/05/2023 132 (L)  135 - 145 mmol/L Final    Reference intervals for this test were updated on 09/26/2023 to more accurately reflect our healthy population. There may be differences in the flagging of prior results with similar values performed with this method. Interpretation of those prior results can be made in the context of the updated reference intervals.     Potassium 12/05/2023 4.0  3.4 - 5.3 mmol/L Final    Specimen slightly hemolyzed. The reported potassium value may be falsely elevated. Analysis of a non-hemolyzed specimen " (i.e. re-draw) may result in a lower potassium value.    Carbon Dioxide (CO2) 12/05/2023 20 (L)  22 - 29 mmol/L Final    Anion Gap 12/05/2023 13  7 - 15 mmol/L Final    Urea Nitrogen 12/05/2023 13.6  8.0 - 23.0 mg/dL Final    Creatinine 12/05/2023 0.58  0.51 - 0.95 mg/dL Final    GFR Estimate 12/05/2023 >90  >60 mL/min/1.73m2 Final    Calcium 12/05/2023 9.6  8.8 - 10.2 mg/dL Final    Chloride 12/05/2023 99  98 - 107 mmol/L Final    Glucose 12/05/2023 159 (H)  70 - 99 mg/dL Final    Alkaline Phosphatase 12/05/2023 94  40 - 150 U/L Final    Reference intervals for this test were updated on 11/14/2023 to more accurately reflect our healthy population. There may be differences in the flagging of prior results with similar values performed with this method. Interpretation of those prior results can be made in the context of the updated reference intervals.    AST 12/05/2023 32  0 - 45 U/L Final    Specimen is hemolyzed which can falsely elevate AST. Analysis of a non-hemolyzed specimen may result in a lower value.  Reference intervals for this test were updated on 6/12/2023 to more accurately reflect our healthy population. There may be differences in the flagging of prior results with similar values performed with this method. Interpretation of those prior results can be made in the context of the updated reference intervals.    ALT 12/05/2023 28  0 - 50 U/L Final    Reference intervals for this test were updated on 6/12/2023 to more accurately reflect our healthy population. There may be differences in the flagging of prior results with similar values performed with this method. Interpretation of those prior results can be made in the context of the updated reference intervals.      Protein Total 12/05/2023 6.9  6.4 - 8.3 g/dL Final    Albumin 12/05/2023 4.2  3.5 - 5.2 g/dL Final    Bilirubin Total 12/05/2023 0.2  <=1.2 mg/dL Final    WBC Count 12/05/2023 11.2 (H)  4.0 - 11.0 10e3/uL Final    RBC Count 12/05/2023 4.67   3.80 - 5.20 10e6/uL Final    Hemoglobin 12/05/2023 13.7  11.7 - 15.7 g/dL Final    Hematocrit 12/05/2023 39.8  35.0 - 47.0 % Final    MCV 12/05/2023 85  78 - 100 fL Final    MCH 12/05/2023 29.3  26.5 - 33.0 pg Final    MCHC 12/05/2023 34.4  31.5 - 36.5 g/dL Final    RDW 12/05/2023 11.9  10.0 - 15.0 % Final    Platelet Count 12/05/2023 294  150 - 450 10e3/uL Final    % Neutrophils 12/05/2023 75  % Final    % Lymphocytes 12/05/2023 20  % Final    % Monocytes 12/05/2023 4  % Final    % Eosinophils 12/05/2023 0  % Final    % Basophils 12/05/2023 0  % Final    % Immature Granulocytes 12/05/2023 1  % Final    NRBCs per 100 WBC 12/05/2023 0  <1 /100 Final    Absolute Neutrophils 12/05/2023 8.4 (H)  1.6 - 8.3 10e3/uL Final    Absolute Lymphocytes 12/05/2023 2.2  0.8 - 5.3 10e3/uL Final    Absolute Monocytes 12/05/2023 0.4  0.0 - 1.3 10e3/uL Final    Absolute Eosinophils 12/05/2023 0.0  0.0 - 0.7 10e3/uL Final    Absolute Basophils 12/05/2023 0.0  0.0 - 0.2 10e3/uL Final    Absolute Immature Granulocytes 12/05/2023 0.1  <=0.4 10e3/uL Final    Absolute NRBCs 12/05/2023 0.0  10e3/uL Final    Sodium 12/06/2023 136  135 - 145 mmol/L Final    Reference intervals for this test were updated on 09/26/2023 to more accurately reflect our healthy population. There may be differences in the flagging of prior results with similar values performed with this method. Interpretation of those prior results can be made in the context of the updated reference intervals.     Potassium 12/06/2023 5.3  3.4 - 5.3 mmol/L Final    Chloride 12/06/2023 103  98 - 107 mmol/L Final    Carbon Dioxide (CO2) 12/06/2023 27  22 - 29 mmol/L Final    Anion Gap 12/06/2023 6 (L)  7 - 15 mmol/L Final    Urea Nitrogen 12/06/2023 11.5  8.0 - 23.0 mg/dL Final    Creatinine 12/06/2023 0.63  0.51 - 0.95 mg/dL Final    GFR Estimate 12/06/2023 >90  >60 mL/min/1.73m2 Final    Calcium 12/06/2023 9.8  8.8 - 10.2 mg/dL Final    Glucose 12/06/2023 127 (H)  70 - 99 mg/dL Final     WBC Count 12/06/2023 11.9 (H)  4.0 - 11.0 10e3/uL Final    RBC Count 12/06/2023 4.69  3.80 - 5.20 10e6/uL Final    Hemoglobin 12/06/2023 13.7  11.7 - 15.7 g/dL Final    Hematocrit 12/06/2023 41.9  35.0 - 47.0 % Final    MCV 12/06/2023 89  78 - 100 fL Final    MCH 12/06/2023 29.2  26.5 - 33.0 pg Final    MCHC 12/06/2023 32.7  31.5 - 36.5 g/dL Final    RDW 12/06/2023 12.1  10.0 - 15.0 % Final    Platelet Count 12/06/2023 303  150 - 450 10e3/uL Final       Merlene Reyes M.D.

## 2023-12-15 ENCOUNTER — TRANSFERRED RECORDS (OUTPATIENT)
Dept: HEALTH INFORMATION MANAGEMENT | Facility: CLINIC | Age: 63
End: 2023-12-15
Payer: COMMERCIAL

## 2023-12-16 PROBLEM — E66.01 CLASS 2 SEVERE OBESITY DUE TO EXCESS CALORIES WITH SERIOUS COMORBIDITY IN ADULT (H): Status: ACTIVE | Noted: 2023-12-16

## 2023-12-16 PROBLEM — E66.01 MORBID OBESITY (H): Status: RESOLVED | Noted: 2019-09-24 | Resolved: 2023-12-16

## 2023-12-16 PROBLEM — E66.812 CLASS 2 SEVERE OBESITY DUE TO EXCESS CALORIES WITH SERIOUS COMORBIDITY IN ADULT (H): Status: ACTIVE | Noted: 2023-12-16

## 2023-12-20 ENCOUNTER — TRANSFERRED RECORDS (OUTPATIENT)
Dept: HEALTH INFORMATION MANAGEMENT | Facility: CLINIC | Age: 63
End: 2023-12-20
Payer: COMMERCIAL

## 2024-01-04 ENCOUNTER — OFFICE VISIT (OUTPATIENT)
Dept: FAMILY MEDICINE | Facility: CLINIC | Age: 64
End: 2024-01-04
Payer: COMMERCIAL

## 2024-01-04 VITALS
RESPIRATION RATE: 16 BRPM | DIASTOLIC BLOOD PRESSURE: 76 MMHG | SYSTOLIC BLOOD PRESSURE: 132 MMHG | HEART RATE: 100 BPM | OXYGEN SATURATION: 97 % | TEMPERATURE: 97.4 F

## 2024-01-04 DIAGNOSIS — E03.4 HYPOTHYROIDISM DUE TO ACQUIRED ATROPHY OF THYROID: ICD-10-CM

## 2024-01-04 DIAGNOSIS — Z01.818 PREOP GENERAL PHYSICAL EXAM: Primary | ICD-10-CM

## 2024-01-04 DIAGNOSIS — S73.191D TEAR OF RIGHT ACETABULAR LABRUM, SUBSEQUENT ENCOUNTER: ICD-10-CM

## 2024-01-04 DIAGNOSIS — R10.9 ABDOMINAL WALL PAIN: ICD-10-CM

## 2024-01-04 LAB
ANION GAP SERPL CALCULATED.3IONS-SCNC: 13 MMOL/L (ref 7–15)
BUN SERPL-MCNC: 15 MG/DL (ref 8–23)
CALCIUM SERPL-MCNC: 9.8 MG/DL (ref 8.8–10.2)
CHLORIDE SERPL-SCNC: 105 MMOL/L (ref 98–107)
CREAT SERPL-MCNC: 0.61 MG/DL (ref 0.51–0.95)
DEPRECATED HCO3 PLAS-SCNC: 22 MMOL/L (ref 22–29)
EGFRCR SERPLBLD CKD-EPI 2021: >90 ML/MIN/1.73M2
ERYTHROCYTE [DISTWIDTH] IN BLOOD BY AUTOMATED COUNT: 13 % (ref 10–15)
GLUCOSE SERPL-MCNC: 100 MG/DL (ref 70–99)
HCT VFR BLD AUTO: 40.3 % (ref 35–47)
HGB BLD-MCNC: 13.2 G/DL (ref 11.7–15.7)
MCH RBC QN AUTO: 29.1 PG (ref 26.5–33)
MCHC RBC AUTO-ENTMCNC: 32.8 G/DL (ref 31.5–36.5)
MCV RBC AUTO: 89 FL (ref 78–100)
PLATELET # BLD AUTO: 250 10E3/UL (ref 150–450)
POTASSIUM SERPL-SCNC: 4.4 MMOL/L (ref 3.4–5.3)
RBC # BLD AUTO: 4.53 10E6/UL (ref 3.8–5.2)
SODIUM SERPL-SCNC: 140 MMOL/L (ref 135–145)
T4 FREE SERPL-MCNC: 1.43 NG/DL (ref 0.9–1.7)
TSH SERPL DL<=0.005 MIU/L-ACNC: 0.11 UIU/ML (ref 0.3–4.2)
WBC # BLD AUTO: 5.1 10E3/UL (ref 4–11)

## 2024-01-04 PROCEDURE — 99214 OFFICE O/P EST MOD 30 MIN: CPT | Performed by: FAMILY MEDICINE

## 2024-01-04 PROCEDURE — 84439 ASSAY OF FREE THYROXINE: CPT | Performed by: FAMILY MEDICINE

## 2024-01-04 PROCEDURE — 36415 COLL VENOUS BLD VENIPUNCTURE: CPT | Performed by: FAMILY MEDICINE

## 2024-01-04 PROCEDURE — 80048 BASIC METABOLIC PNL TOTAL CA: CPT | Performed by: FAMILY MEDICINE

## 2024-01-04 PROCEDURE — 85027 COMPLETE CBC AUTOMATED: CPT | Performed by: FAMILY MEDICINE

## 2024-01-04 PROCEDURE — 84443 ASSAY THYROID STIM HORMONE: CPT | Performed by: FAMILY MEDICINE

## 2024-01-04 RX ORDER — GABAPENTIN 100 MG/1
100 CAPSULE ORAL 3 TIMES DAILY PRN
Qty: 90 CAPSULE | Refills: 0 | Status: SHIPPED | OUTPATIENT
Start: 2024-01-04 | End: 2024-05-03

## 2024-01-04 RX ORDER — FLUTICASONE PROPIONATE 50 MCG
2 SPRAY, SUSPENSION (ML) NASAL DAILY
COMMUNITY
Start: 2023-11-21 | End: 2024-04-18

## 2024-01-04 RX ORDER — GABAPENTIN 300 MG/1
600 CAPSULE ORAL 3 TIMES DAILY
Qty: 180 CAPSULE | Refills: 1 | Status: SHIPPED | OUTPATIENT
Start: 2024-01-04 | End: 2024-05-03

## 2024-01-04 ASSESSMENT — PATIENT HEALTH QUESTIONNAIRE - PHQ9
10. IF YOU CHECKED OFF ANY PROBLEMS, HOW DIFFICULT HAVE THESE PROBLEMS MADE IT FOR YOU TO DO YOUR WORK, TAKE CARE OF THINGS AT HOME, OR GET ALONG WITH OTHER PEOPLE: SOMEWHAT DIFFICULT
SUM OF ALL RESPONSES TO PHQ QUESTIONS 1-9: 6
SUM OF ALL RESPONSES TO PHQ QUESTIONS 1-9: 6

## 2024-01-04 ASSESSMENT — ASTHMA QUESTIONNAIRES: ACT_TOTALSCORE: 22

## 2024-01-04 NOTE — PATIENT INSTRUCTIONS
Preparing for Your Surgery  Getting started  A nurse will call you to review your health history and instructions. They will give you an arrival time based on your scheduled surgery time. Please be ready to share:  Your doctor's clinic name and phone number  Your medical, surgical, and anesthesia history  A list of allergies and sensitivities  A list of medicines, including herbal treatments and over-the-counter drugs  Whether the patient has a legal guardian (ask how to send us the papers in advance)  Please tell us if you're pregnant--or if there's any chance you might be pregnant. Some surgeries may injure a fetus (unborn baby), so they require a pregnancy test. Surgeries that are safe for a fetus don't always need a test, and you can choose whether to have one.   If you have a child who's having surgery, please ask for a copy of Preparing for Your Child's Surgery.    Preparing for surgery  Within 10 to 30 days of surgery: Have a pre-op exam (sometimes called an H&P, or History and Physical). This can be done at a clinic or pre-operative center.  If you're having a , you may not need this exam. Talk to your care team.  At your pre-op exam, talk to your care team about all medicines you take. If you need to stop any medicines before surgery, ask when to start taking them again.  We do this for your safety. Many medicines can make you bleed too much during surgery. Some change how well surgery (anesthesia) drugs work.  Call your insurance company to let them know you're having surgery. (If you don't have insurance, call [phone].)  Call your clinic if there's any change in your health. This includes signs of a cold or flu (sore throat, runny nose, cough, rash, fever). It also includes a scrape or scratch near the surgery site.  If you have questions on the day of surgery, call your hospital or surgery center.  Eating and drinking guidelines  For your safety: Unless your surgeon tells you otherwise,  follow the guidelines below.  Eat and drink as usual until 8 hours before you arrive for surgery. After that, no food or milk.  Drink clear liquids until 2 hours before you arrive. These are liquids you can see through, like water, Gatorade, and Propel Water. They also include plain black coffee and tea (no cream or milk), candy, and breath mints. You can spit out gum when you arrive.  If you drink alcohol: Stop drinking it the night before surgery.  If your care team tells you to take medicine on the morning of surgery, it's okay to take it with a sip of water.  Preventing infection  Shower or bathe the night before and morning of your surgery. Follow the instructions your clinic gave you. (If no instructions, use regular soap.)  Don't shave or clip hair near your surgery site. We'll remove the hair if needed.  Don't smoke or vape the morning of surgery. You may chew nicotine gum up to 2 hours before surgery. A nicotine patch is okay.  Note: Some surgeries require you to completely quit smoking and nicotine. Check with your surgeon.  Your care team will make every effort to keep you safe from infection. We will:  Clean our hands often with soap and water (or an alcohol-based hand rub).  Clean the skin at your surgery site with a special soap that kills germs.  Give you a special gown to keep you warm. (Cold raises the risk of infection.)  Wear special hair covers, masks, gowns and gloves during surgery.  Give antibiotic medicine, if prescribed. Not all surgeries need antibiotics.  What to bring on the day of surgery  Photo ID and insurance card  Copy of your health care directive, if you have one  Glasses and hearing aids (bring cases)  You can't wear contacts during surgery  Inhaler and eye drops, if you use them (tell us about these when you arrive)  CPAP machine or breathing device, if you use them  A few personal items, if spending the night  If you have . . .  A pacemaker, ICD (cardiac defibrillator) or other  implant: Bring the ID card.  An implanted stimulator: Bring the remote control.  A legal guardian: Bring a copy of the certified (court-stamped) guardianship papers.  Please remove any jewelry, including body piercings. Leave jewelry and other valuables at home.  If you're going home the day of surgery  You must have a responsible adult drive you home. They should stay with you overnight as well.  If you don't have someone to stay with you, and you aren't safe to go home alone, we may keep you overnight. Insurance often won't pay for this.  After surgery  If it's hard to control your pain or you need more pain medicine, please call your surgeon's office.  Questions?   If you have any questions for your care team, list them here: _________________________________________________________________________________________________________________________________________________________________________ ____________________________________ ____________________________________ ____________________________________  For informational purposes only. Not to replace the advice of your health care provider. Copyright   2003, 2019 [facility]. All rights reserved. Clinically reviewed by Carolina Aguilar MD. [employer] 375455 - REV 12/22.    How to Take Your Medication Before Surgery  - Take all of your medications before surgery except as noted below  - STOP taking all vitamins and herbal supplements 14 days before surgery.  Please bring your albuterol inhaler to your surgery tomorrow

## 2024-01-04 NOTE — PROGRESS NOTES
[facility]  [address] [facility] [address]  Phone: [phone]  Primary Provider: Merlene Reyes  Pre-op Performing Provider: LA NENA COTE      PREOPERATIVE EVALUATION:  Today's date: 1/4/2024    Nidia is a 63 year old, presenting for the following:  Pre-Op Exam        Surgical Information:  Surgery/Procedure: repair labral tear right hip   Surgery Location: [facility]  Surgeon: Dr. Paul  Surgery Date: 1/5/24  Time of Surgery: 11:00am  Where patient plans to recover: At home with family  Fax number for surgical facility: [phone]    Assessment & Plan     The proposed surgical procedure is considered INTERMEDIATE risk.      ICD-10-CM    1. Preop general physical exam  Z01.818 CBC with platelets     Basic metabolic panel  (Ca, Cl, CO2, Creat, Gluc, K, Na, BUN)     TSH with free T4 reflex     CBC with platelets     Basic metabolic panel  (Ca, Cl, CO2, Creat, Gluc, K, Na, BUN)     TSH with free T4 reflex      2. Tear of right acetabular labrum, subsequent encounter  S73.191D CBC with platelets     Basic metabolic panel  (Ca, Cl, CO2, Creat, Gluc, K, Na, BUN)     TSH with free T4 reflex     CBC with platelets     Basic metabolic panel  (Ca, Cl, CO2, Creat, Gluc, K, Na, BUN)     TSH with free T4 reflex      3. Abdominal wall pain  R10.9 gabapentin (NEURONTIN) 300 MG capsule     gabapentin (NEURONTIN) 100 MG capsule      4. Hypothyroidism due to acquired atrophy of thyroid  E03.4 TSH with free T4 reflex     TSH with free T4 reflex              Possible Sleep Apnea: Known KENNY, recently diagnosed.  CPAP on order, not yet available          Risks and Recommendations:  The patient has the following additional risks and recommendations for perioperative complications:  Obstructive Sleep Apnea:       Antiplatelet or Anticoagulation Medication Instructions:   - Patient is on no antiplatelet or anticoagulation medications.    Additional Medication Instructions:  Patient  is to take all scheduled medications on the day of surgery EXCEPT for modifications listed below:     - Opioids: Continue without modification.   - pregabalin, gabapentin: Continue without modification.   - SSRIs, SNRIs, TCAs, Antipsychotics: Continue without modification.    - rescue Inhaler: Continue PRN. Bring to hospital on the day of surgery.   - Herbal medications and vitamins: HOLD 14 days prior to surgery.    RECOMMENDATION:  APPROVAL GIVEN to proceed with proposed procedure, without further diagnostic evaluation.        Subjective       HPI related to upcoming procedure: labral tear R hip, planned repair        1/4/2024     8:44 AM   Preop Questions   1. Have you ever had a heart attack or stroke? No   2. Have you ever had surgery on your heart or blood vessels, such as a stent placement, a coronary artery bypass, or surgery on an artery in your head, neck, heart, or legs? No   3. Do you have chest pain with activity? No   4. Do you have a history of  heart failure? No   5. Do you currently have a cold, bronchitis or symptoms of other infection? No   6. Do you have a cough, shortness of breath, or wheezing? No   7. Do you or anyone in your family have previous history of blood clots? UNKNOWN - pt unsure if she might have had a blood clot in her teens.     8. Do you or does anyone in your family have a serious bleeding problem such as prolonged bleeding following surgeries or cuts? UNKNOWN - unaware of any significant issues.     9. Have you ever had problems with anemia or been told to take iron pills? YES - during pregnancy   10. Have you had any abnormal blood loss such as black, tarry or bloody stools, or abnormal vaginal bleeding? No   11. Have you ever had a blood transfusion? No   12. Are you willing to have a blood transfusion if it is medically needed before, during, or after your surgery? Yes   13. Have you or any of your relatives ever had problems with anesthesia? UNKNOWN - family history unknown  but no problems with numerous procedures for herself.     14. Do you have sleep apnea, excessive snoring or daytime drowsiness? YES - known sleep apnea, recently diagnosed and has a CPAP coming   14a. Do you have a CPAP machine? No - on order, arriving in the next few weeks.     15. Do you have any artifical heart valves or other implanted medical devices like a pacemaker, defibrillator, or continuous glucose monitor? No   16. Do you have artificial joints? No   17. Are you allergic to latex? YES: rash with exposure       Health Care Directive:  Patient does not have a Health Care Directive or Living Will: Discussed advance care planning with patient; information given to patient to review.    Preoperative Review of :  Oxycodone prescribed in Dec and rarely used. Still has a few tablets left.  Not taking diazepam but has on hand if needed.  Gabapentin taken regularly.   reviewed - controlled substances prescribed by other outside provider(s).           Status of Chronic Conditions:  See problem list for active medical problems.  Problems all longstanding and stable, except as noted/documented.  See ROS for pertinent symptoms related to these conditions.    Review of Systems  Constitutional, neuro, ENT, endocrine, pulmonary, cardiac, gastrointestinal, genitourinary, musculoskeletal, integument and psychiatric systems are negative, except as otherwise noted.    Patient Active Problem List    Diagnosis Date Noted    Class 2 severe obesity due to excess calories with serious comorbidity in adult (H) 12/16/2023     Priority: Medium    Hip pain, right 12/06/2023     Priority: Medium    Sinusitis 12/03/2023     Priority: Medium    Mild intermittent asthma without complication 12/03/2023     Priority: Medium    Intractable low back pain 12/02/2023     Priority: Medium    Left foot pain 09/08/2022     Priority: Medium    Injury of left ankle, subsequent encounter 09/08/2022     Priority: Medium    Recurrent major  depression in complete remission (H24) 04/11/2022     Priority: Medium    Cough variant asthma 06/10/2020     Priority: Medium    BMI 38.0-38.9,adult 06/25/2018     Priority: Medium    PTSD (post-traumatic stress disorder) 04/02/2014     Priority: Medium     Traumatic experiences related to murder of her sister a number of years ago, contributing to her depression.        H/O: hysterectomy 03/18/2013     Priority: Medium     Benign reasons.  Ovaries in tact.        POD (perioral dermatitis) 03/18/2013     Priority: Medium    Hyperlipidemia LDL goal <130 11/21/2011     Priority: Medium    Insomnia 11/21/2011     Priority: Medium    Mild major depression (H) 04/04/2011     Priority: Medium    Subdural hemorrhage (H) 08/26/2009     Priority: Medium     Bilateral, drained. Summer 2009.  See notes in EPIC.  Subacute.   Diagnosis updated by automated process. Provider to review and confirm.      Obesity 08/28/2006     Priority: Medium     Problem list name updated by automated process. Provider to review      Seasonal allergic rhinitis, unspecified trigger 01/23/2006     Priority: Medium     Problem list name updated by automated process. Provider to review      Herpes simplex infection of genitourinary system 07/22/2005     Priority: Medium    Disturbance in sleep behavior 07/22/2005     Priority: Medium     Problem list name updated by automated process. Provider to review      Hypothyroidism due to acquired atrophy of thyroid 07/22/2005     Priority: Medium     Problem list name updated by automated process. Provider to review        Past Medical History:   Diagnosis Date    Arthritis     Me, paternal & maternal    Backache, unspecified     lumbar back pain, degen disk disease    COPD (chronic obstructive pulmonary disease) (H)     I get bronchitis frequently    Cough variant asthma 07/07/2020    Depressive disorder     numerous yrs ago diagnosed with PTSD    Leiomyoma of uterus, unspecified 2003    s/p LAVH    Other  genital herpes     HSV-2    Thyroid disease     Me    Unspecified sinusitis (chronic)      Past Surgical History:   Procedure Laterality Date    BACK SURGERY      COLONOSCOPY  04/05/2013    Procedure: COLONOSCOPY;  Colonoscopy  ;  Surgeon: Laura Ruff MD;  Location: [facility]    COLONOSCOPY N/A 11/24/2023    Procedure: COLONOSCOPY, FLEXIBLE, WITH LESION REMOVAL USING SNARE;  Surgeon: Luciano Villalobos MD;  Location: [facility]    HEAD & NECK SURGERY  06/2019    Discectomy with fusion    HYSTERECTOMY, PAP NO LONGER INDICATED  07/2003    LAVH for fibroids    LAPAROSCOPIC CHOLECYSTECTOMY N/A 08/21/2019    Procedure: CHOLECYSTECTOMY, LAPAROSCOPIC;  Surgeon: Kwabena Stephenson MD;  Location: [facility]    LAPAROSCOPIC LYSIS ADHESIONS  1985    LASIK  10/2004    TONSILLECTOMY & ADENOIDECTOMY  age 18    TUBAL LIGATION  04/1994     Current Outpatient Medications   Medication Sig Dispense Refill    acetaminophen (TYLENOL) 325 MG tablet Take 3 tablets (975 mg) by mouth every 8 hours as needed for mild pain 90 tablet 0    albuterol (PROAIR HFA/PROVENTIL HFA/VENTOLIN HFA) 108 (90 Base) MCG/ACT inhaler Inhale 2 puffs into the lungs every 6 hours 18 g 0    cetirizine (ZYRTEC) 10 MG tablet Take 10 mg by mouth daily      diazepam (VALIUM) 5 MG tablet Take 1/2 - 1 tab up to 2 times daily 30 tablet 1    FLUoxetine (PROZAC) 40 MG capsule Take 1 capsule (40 mg) by mouth daily 90 capsule 2    fluticasone (FLONASE) 50 MCG/ACT nasal spray Spray 2 sprays into both nostrils daily      gabapentin (NEURONTIN) 100 MG capsule Take 1 capsule (100 mg) by mouth 3 times daily as needed for neuropathic pain 90 capsule 0    gabapentin (NEURONTIN) 300 MG capsule Take 2 capsules (600 mg) by mouth 3 times daily 180 capsule 1    levothyroxine (EUTHYROX) 125 MCG tablet Take 1 tablet (125 mcg) by mouth daily 90 tablet 2    montelukast (SINGULAIR) 10 MG tablet Take 1 tablet (10 mg) by mouth at bedtime 90 tablet 2    NONFORMULARY Take 4 capsules by mouth daily  "Sulfurzyme -  combines wolfberry with MSM, a naturally occurring organic form of dietary sulfur needed by our bodies every day to maintain the structure of proteins, protect cells and cell membranes, replenish the connections between cells, and preserve the molecular framework of connective tissue.      ondansetron (ZOFRAN ODT) 4 MG ODT tab Take 1 tablet (4 mg) by mouth every 8 hours as needed for nausea or vomiting 10 tablet 0    senna-docusate (SENOKOT-S/PERICOLACE) 8.6-50 MG tablet Take 1 tablet by mouth 2 times daily as needed for constipation 30 tablet 0    triamcinolone (ARISTOCORT HP) 0.5 % external cream Apply topically 2 times daily 454 g 0    valACYclovir (VALTREX) 500 MG tablet Take 1 tablet (500 mg) by mouth daily 90 tablet 2       Allergies   Allergen Reactions    Adhesive Tape Itching    Bees Anaphylaxis    Codeine Difficulty breathing    Latex Dermatitis    Penicillin [Penicillins] Difficulty breathing    Seasonal Allergies         Social History     Tobacco Use    Smoking status: Former     Packs/day: 0.50     Years: 1.00     Additional pack years: 0.00     Total pack years: 0.50     Types: Cigarettes     Quit date: 1986     Years since quittin.0    Smokeless tobacco: Never   Substance Use Topics    Alcohol use: Yes     Comment: 1-5 drinks weekly     Family History   Problem Relation Age of Onset    Arthritis Mother     Allergies Mother         otc meds    Depression Mother     Alzheimer Disease Mother     Hyperlipidemia Mother     Osteoporosis Mother             Diabetes Mother     Heart Disease Mother 80        mitral valve issues    Obesity Mother             Coronary Artery Disease Mother             Cerebrovascular Disease Mother             Hypertension Father         on med    C.A.D. Father         has had 2 MI\"S, possible surgery    Alcohol/Drug Father     Arthritis Father     Hyperlipidemia Father     Substance Abuse Father     Coronary Artery Disease " Father     Alcohol/Drug Brother     Substance Abuse Brother     Heart Disease Maternal Grandmother     Osteoporosis Maternal Grandmother             Heart Disease Maternal Grandfather     Heart Disease Paternal Grandmother     Diabetes Paternal Grandmother     Obesity Paternal Grandmother             Heart Disease Paternal Grandfather     Alcohol/Drug Son         son in recovery    Hypertension Brother     Hyperlipidemia Brother     Substance Abuse Other         Son    Hyperlipidemia Brother     Depression Brother     Hypertension Brother              History   Drug Use No         Objective     /76   Pulse 100   Temp 97.4  F (36.3  C) (Tympanic)   Resp 16   LMP 2002   SpO2 97%     Physical Exam    GENERAL APPEARANCE: healthy, alert and no distress     EYES: EOMI, PERRL     NECK: no adenopathy, no asymmetry, masses, or scars and thyroid normal to palpation     RESP: lungs clear to auscultation - no rales, rhonchi or wheezes     CV: regular rates and rhythm, normal S1 S2, no S3 or S4 and no murmur, click or rub     ABDOMEN:  soft, nontender, no HSM or masses and bowel sounds normal     NEURO: Normal strength and tone, sensory exam grossly normal, mentation intact and speech normal     PSYCH: mentation appears normal. and affect normal/bright     LYMPHATICS: No cervical adenopathy    Recent Labs   Lab Test 23  0818 23  2217   HGB 13.7 13.7    294    132*   POTASSIUM 5.3 4.0   CR 0.63 0.58        Diagnostics:  Labs pending at this time.  Results will be reviewed when available.   No EKG required, no history of coronary heart disease, significant arrhythmia, peripheral arterial disease or other structural heart disease.    Revised Cardiac Risk Index (RCRI):  The patient has the following serious cardiovascular risks for perioperative complications:   - No serious cardiac risks = 0 points     RCRI Interpretation: 0 points: Class I (very low risk - 0.4%  complication rate)         Signed Electronically by: Lindsey Gomez DO  Copy of this evaluation report is provided to requesting physician.      Answers submitted by the patient for this visit:  Patient Health Questionnaire (Submitted on 1/4/2024)  If you checked off any problems, how difficult have these problems made it for you to do your work, take care of things at home, or get along with other people?: Somewhat difficult  PHQ9 TOTAL SCORE: 6

## 2024-01-04 NOTE — COMMUNITY RESOURCES LIST (ENGLISH)
01/04/2024   [facility]  [facility]/A  For questions about this resource list or additional care needs, please contact your primary care clinic or care manager.  Phone: [phone]   Email: N/A   Address: [facility] [place] [address]   Hours: N/A        Hotlines and Helplines       Hotline - Housing crisis  1  [facility] Housing Resource Line Distance: 17.01 miles      Phone/Virtual   [address] [place] [address]  Language: English  Hours: Mon - Sun Open 24 Hours   Phone: (775) 502-4684 Website: https://www.[facility]./2689/Basic-Needs     2  [facility] Distance: 17.93 miles      Phone/Virtual   [address] [place] [address]  Language: English  Hours: Mon - Sun Open 24 Hours   Phone: (687) 123-1693 Email: communications@[facility].org Website: https://[facility].org/oursaviourshousing/          Housing       Coordinated Entry access point  3  [facility]  Office - [facility] Distance: 10.02 miles      Phone/Virtual   [address] [place] [address]  Language: English  Hours: Mon - Fri 8:30 AM - 12:00 PM , Mon - Fri 1:00 PM - 4:00 PM  Fees: Free   Phone: (571) 477-4285 Ext.2 Email: slava@[facility].org Website: https://www.[facility].org/usn/     4  [facility] Distance: 15.48 miles      In-Person, Phone/Virtual   [address] [place] [address]  Language: English  Hours: Mon - Fri 8:30 AM - 4:30 PM  Fees: Free   Phone: (472) 328-2881 Email: info@[facility].org Website: https://www.[facility].org/locations/[facility]/     Drop-in center or day shelter  5  [facility] Distance: 14.45 miles      In-Person   [address] [place] [address]  Language: English  Hours: Mon - Fri 9:00 AM - 4:00 PM  Fees: Free   Phone: (574) 773-5433 Email: brandi@[employer].org Website: http://[employer].org     6  [facility] Distance: 17.35 miles       In-Person   [address] [place] [address]  Language: English, Hmong, Monegasque, German  Hours: Mon - Thu 8:30 AM - 4:30 PM , Sat - Sun 9:00 AM - 12:00 PM  Fees: Free   Phone: (165) 409-4759 Email: info@[employer] Website: https://[employer]/     Housing search assistance  7  [facility] [employer] ([facility] - [facility] Rental Housing Distance: 10.01 miles      In-Person, Phone/Virtual   [address] [address]  Language: English  Hours: Mon - Fri 8:00 AM - 4:30 PM  Fees: Free   Phone: (437) 503-9642 Email: accap@[facility].org Website: http://www.accap.org     8  [facility] [employer] Distance: 14.65 miles      Phone/Virtual   [address] [facility] [address] [place] [address]  Language: English, German  Hours: Mon - Fri 9:00 AM - 5:00 PM  Fees: Free   Phone: (868) 596-4364 Email: services@[employer] Website: https://www.[employer]     Shelter for families  9  [facility] Distance: 2.35 miles      In-Person   [address] [place] [address]  Language: English  Hours: Mon - Fri 3:00 PM - 9:00 AM , Sat - Sun Open 24 Hours  Fees: Free   Phone: (782) 833-1491 Ext.1 Website: https://www.[facility].org/2020/07/03/emergency-family-shelter/     10  [facility] Distance: 24.41 miles      In-Person   [address] [place] [address]  Language: English  Hours: Mon - Sun Open 24 Hours  Fees: Free, Self Pay   Phone: (667) 720-8278 Email: Anel@[facility].org Website: http://www.[facility].org/     Shelter for individuals  11  [facility] [place] [facility] [place] [facility] Distance: 15.48 miles      In-Person   [address] [place] [address]  Language: English  Hours: Mon - Sun 5:00 PM - 10:00 AM  Fees: Free, Self Pay   Phone: (252) 927-9049 Email: info@[facility] Website:  https://www.[facility].org/locations/higher-[facility]/     12  [facility] Distance: 17.52 miles      In-Person   [address] [place] [address]  Language: English  Hours: Mon - Fri 4:00 PM - 9:00 AM  Fees: Free   Phone: (943) 997-8427 Email: jerilyn@[facility] Website: https://[facility] St[facility].org/[facility]/          Important Numbers & Websites       Emergency Services   911  [facility] Services   311  Poison Control   (361) 245-5124  Suicide Prevention Lifeline   (229) 550-9928 (TALK)  Child Abuse Hotline   (212) 389-3141 (4-A-Child)  Sexual Assault Hotline   (826) 238-1439 (HOPE)  National Runaway Safeline   (704) 447-8378 (RUNAWAY)  All-Options Talkline   (741) 324-7274  Substance Abuse Referral   (223) 886-1179 (HELP)

## 2024-01-04 NOTE — LETTER
My Asthma Action Plan    Name: Nidia Acosta   YOB: 1960  Date: 1/4/2024   My doctor: Lindsey Gomez, DO   My clinic: [facility]        My Rescue Medicine:   Albuterol inhaler (Proair/Ventolin/Proventil HFA)  2-4 puffs EVERY 4 HOURS as needed. Use a spacer if recommended by your provider.   My Asthma Severity:   Intermittent / Exercise Induced  Know your asthma triggers:   animal dander  humidity  strong odors and fumes  exercise or sports          GREEN ZONE   Good Control  I feel good  No cough or wheeze  Can work, sleep and play without asthma symptoms       Take your asthma control medicine every day.     If exercise triggers your asthma, take your rescue medication  15 minutes before exercise or sports, and  During exercise if you have asthma symptoms  Spacer to use with inhaler: If you have a spacer, make sure to use it with your inhaler             YELLOW ZONE Getting Worse  I have ANY of these:  I do not feel good  Cough or wheeze  Chest feels tight  Wake up at night   Keep taking your Green Zone medications  Start taking your rescue medicine:  every 20 minutes for up to 1 hour. Then every 4 hours for 24-48 hours.  If you stay in the Yellow Zone for more than 12-24 hours, contact your doctor.  If you do not return to the Green Zone in 12-24 hours or you get worse, start taking your oral steroid medicine if prescribed by your provider.           RED ZONE Medical Alert - Get Help  I have ANY of these:  I feel awful  Medicine is not helping  Breathing getting harder  Trouble walking or talking  Nose opens wide to breathe       Take your rescue medicine NOW  If your provider has prescribed an oral steroid medicine, start taking it NOW  Call your doctor NOW  If you are still in the Red Zone after 20 minutes and you have not reached your doctor:  Take your rescue medicine again and  Call 911 or go to the emergency room right away    See your regular doctor within 2 weeks  of an Emergency Room or Urgent Care visit for follow-up treatment.          Annual Reminders:  Meet with Asthma Educator,  Flu Shot in the Fall, consider Pneumonia Vaccination for patients with asthma (aged 19 and older).    Pharmacy:    [id number] - [place], [place] - [address]  [facility] - [place], [place] - [address]   [id number] - [place], [place] - [address] [place] [address] & [facility]    Electronically signed by Lindsey Gomez DO   Date: 01/04/24                    Asthma Triggers  How To Control Things That Make Your Asthma Worse    Triggers are things that make your asthma worse.  Look at the list below to help you find your triggers and   what you can do about them. You can help prevent asthma flare-ups by staying away from your triggers.      Trigger                                                          What you can do   Cigarette Smoke  Tobacco smoke can make asthma worse. Do not allow smoking in your home, car or around you.  Be sure no one smokes at a child s day care or school.  If you smoke, ask your health care provider for ways to help you quit.  Ask family members to quit too.  Ask your health care provider for a referral to Quit Plan to help you quit smoking, or call 0-933-582-PLAN.     Colds, Flu, Bronchitis  These are common triggers of asthma. Wash your hands often.  Don t touch your eyes, nose or mouth.  Get a flu shot every year.     Dust Mites  These are tiny bugs that live in cloth or carpet. They are too small to see. Wash sheets and blankets in hot water every week.   Encase pillows and mattress in dust mite proof covers.  Avoid having carpet if you can. If you have carpet, vacuum weekly.   Use a dust mask and HEPA vacuum.   Pollen and Outdoor Mold  Some people are allergic to trees, grass, or weed pollen, or molds. Try to keep your windows closed.  Limit time out doors when pollen count is high.   Ask you health  care provider about taking medicine during allergy season.     Animal Dander  Some people are allergic to skin flakes, urine or saliva from pets with fur or feathers. Keep pets with fur or feathers out of your home.    If you can t keep the pet outdoors, then keep the pet out of your bedroom.  Keep the bedroom door closed.  Keep pets off cloth furniture and away from stuffed toys.     Mice, Rats, and Cockroaches  Some people are allergic to the waste from these pests.   Cover food and garbage.  Clean up spills and food crumbs.  Store grease in the refrigerator.   Keep food out of the bedroom.   Indoor Mold  This can be a trigger if your home has high moisture. Fix leaking faucets, pipes, or other sources of water.   Clean moldy surfaces.  Dehumidify basement if it is damp and smelly.   Smoke, Strong Odors, and Sprays  These can reduce air quality. Stay away from strong odors and sprays, such as perfume, powder, hair spray, paints, smoke incense, paint, cleaning products, candles and new carpet.   Exercise or Sports  Some people with asthma have this trigger. Be active!  Ask your doctor about taking medicine before sports or exercise to prevent symptoms.    Warm up for 5-10 minutes before and after sports or exercise.     Other Triggers of Asthma  Cold air:  Cover your nose and mouth with a scarf.  Sometimes laughing or crying can be a trigger.  Some medicines and food can trigger asthma.

## 2024-01-05 ENCOUNTER — TRANSFERRED RECORDS (OUTPATIENT)
Dept: HEALTH INFORMATION MANAGEMENT | Facility: CLINIC | Age: 64
End: 2024-01-05

## 2024-01-17 ENCOUNTER — TRANSFERRED RECORDS (OUTPATIENT)
Dept: HEALTH INFORMATION MANAGEMENT | Facility: CLINIC | Age: 64
End: 2024-01-17
Payer: COMMERCIAL

## 2024-02-12 ENCOUNTER — TRANSFERRED RECORDS (OUTPATIENT)
Dept: HEALTH INFORMATION MANAGEMENT | Facility: CLINIC | Age: 64
End: 2024-02-12
Payer: COMMERCIAL

## 2024-03-25 ENCOUNTER — TRANSFERRED RECORDS (OUTPATIENT)
Dept: HEALTH INFORMATION MANAGEMENT | Facility: CLINIC | Age: 64
End: 2024-03-25
Payer: COMMERCIAL

## 2024-04-04 ENCOUNTER — MYC MEDICAL ADVICE (OUTPATIENT)
Dept: FAMILY MEDICINE | Facility: CLINIC | Age: 64
End: 2024-04-04
Payer: COMMERCIAL

## 2024-04-04 DIAGNOSIS — Z13.220 SCREENING FOR CHOLESTEROL LEVEL: Primary | ICD-10-CM

## 2024-04-17 ENCOUNTER — LAB (OUTPATIENT)
Dept: LAB | Facility: CLINIC | Age: 64
End: 2024-04-17
Payer: COMMERCIAL

## 2024-04-17 DIAGNOSIS — Z13.220 SCREENING FOR CHOLESTEROL LEVEL: ICD-10-CM

## 2024-04-17 PROCEDURE — 36415 COLL VENOUS BLD VENIPUNCTURE: CPT

## 2024-04-17 PROCEDURE — 80061 LIPID PANEL: CPT

## 2024-04-18 ENCOUNTER — OFFICE VISIT (OUTPATIENT)
Dept: FAMILY MEDICINE | Facility: CLINIC | Age: 64
End: 2024-04-18
Payer: COMMERCIAL

## 2024-04-18 VITALS
HEART RATE: 78 BPM | BODY MASS INDEX: 40.59 KG/M2 | HEIGHT: 61 IN | WEIGHT: 215 LBS | SYSTOLIC BLOOD PRESSURE: 130 MMHG | TEMPERATURE: 99 F | OXYGEN SATURATION: 98 % | DIASTOLIC BLOOD PRESSURE: 86 MMHG

## 2024-04-18 DIAGNOSIS — M79.2 PAROXYSMAL NERVE PAIN: Primary | ICD-10-CM

## 2024-04-18 DIAGNOSIS — R10.2 PELVIC PAIN IN FEMALE: ICD-10-CM

## 2024-04-18 DIAGNOSIS — E66.01 CLASS 2 SEVERE OBESITY DUE TO EXCESS CALORIES WITH SERIOUS COMORBIDITY AND BODY MASS INDEX (BMI) OF 37.0 TO 37.9 IN ADULT (H): ICD-10-CM

## 2024-04-18 DIAGNOSIS — Z13.220 SCREENING FOR CHOLESTEROL LEVEL: ICD-10-CM

## 2024-04-18 DIAGNOSIS — E66.812 CLASS 2 SEVERE OBESITY DUE TO EXCESS CALORIES WITH SERIOUS COMORBIDITY AND BODY MASS INDEX (BMI) OF 37.0 TO 37.9 IN ADULT (H): ICD-10-CM

## 2024-04-18 DIAGNOSIS — F33.42 RECURRENT MAJOR DEPRESSION IN COMPLETE REMISSION (H): ICD-10-CM

## 2024-04-18 DIAGNOSIS — F43.9 STRESS: ICD-10-CM

## 2024-04-18 DIAGNOSIS — L68.0 HIRSUTISM: ICD-10-CM

## 2024-04-18 DIAGNOSIS — J45.991 COUGH VARIANT ASTHMA: ICD-10-CM

## 2024-04-18 DIAGNOSIS — E03.4 HYPOTHYROIDISM DUE TO ACQUIRED ATROPHY OF THYROID: ICD-10-CM

## 2024-04-18 DIAGNOSIS — A60.00 HERPES SIMPLEX INFECTION OF GENITOURINARY SYSTEM: ICD-10-CM

## 2024-04-18 LAB
CHOLEST SERPL-MCNC: 212 MG/DL
FASTING STATUS PATIENT QL REPORTED: YES
HDLC SERPL-MCNC: 54 MG/DL
LDLC SERPL CALC-MCNC: 126 MG/DL
NONHDLC SERPL-MCNC: 158 MG/DL
TRIGL SERPL-MCNC: 160 MG/DL

## 2024-04-18 PROCEDURE — 99214 OFFICE O/P EST MOD 30 MIN: CPT | Performed by: FAMILY MEDICINE

## 2024-04-18 RX ORDER — BUPROPION HYDROCHLORIDE 100 MG/1
100 TABLET, EXTENDED RELEASE ORAL 2 TIMES DAILY
Qty: 60 TABLET | Refills: 1 | Status: SHIPPED | OUTPATIENT
Start: 2024-04-18 | End: 2024-06-09

## 2024-04-18 RX ORDER — PREGABALIN 50 MG/1
50 CAPSULE ORAL 3 TIMES DAILY
Qty: 90 CAPSULE | Refills: 2 | Status: SHIPPED | OUTPATIENT
Start: 2024-04-18 | End: 2024-09-18

## 2024-04-18 RX ORDER — LEVOTHYROXINE SODIUM 125 UG/1
125 TABLET ORAL DAILY
Qty: 90 TABLET | Refills: 3 | Status: SHIPPED | OUTPATIENT
Start: 2024-04-18

## 2024-04-18 RX ORDER — FLUOXETINE 40 MG/1
40 CAPSULE ORAL DAILY
Qty: 90 CAPSULE | Refills: 3 | Status: SHIPPED | OUTPATIENT
Start: 2024-04-18

## 2024-04-18 RX ORDER — SPIRONOLACTONE 50 MG/1
50 TABLET, FILM COATED ORAL DAILY
Qty: 90 TABLET | Refills: 1 | Status: SHIPPED | OUTPATIENT
Start: 2024-04-18 | End: 2024-07-24

## 2024-04-18 RX ORDER — VALACYCLOVIR HYDROCHLORIDE 500 MG/1
500 TABLET, FILM COATED ORAL DAILY
Qty: 90 TABLET | Refills: 3 | Status: SHIPPED | OUTPATIENT
Start: 2024-04-18

## 2024-04-18 RX ORDER — MONTELUKAST SODIUM 10 MG/1
10 TABLET ORAL AT BEDTIME
Qty: 90 TABLET | Refills: 3 | Status: SHIPPED | OUTPATIENT
Start: 2024-04-18

## 2024-04-18 RX ORDER — PHENTERMINE HYDROCHLORIDE 37.5 MG/1
37.5 CAPSULE ORAL EVERY MORNING
Qty: 30 CAPSULE | Refills: 2 | Status: SHIPPED | OUTPATIENT
Start: 2024-04-18 | End: 2024-07-24 | Stop reason: SINTOL

## 2024-04-18 RX ORDER — PREGABALIN 25 MG/1
25 CAPSULE ORAL 2 TIMES DAILY
Qty: 28 CAPSULE | Refills: 0 | Status: SHIPPED | OUTPATIENT
Start: 2024-04-18 | End: 2024-07-24 | Stop reason: DRUGHIGH

## 2024-04-18 ASSESSMENT — PATIENT HEALTH QUESTIONNAIRE - PHQ9
SUM OF ALL RESPONSES TO PHQ QUESTIONS 1-9: 6
10. IF YOU CHECKED OFF ANY PROBLEMS, HOW DIFFICULT HAVE THESE PROBLEMS MADE IT FOR YOU TO DO YOUR WORK, TAKE CARE OF THINGS AT HOME, OR GET ALONG WITH OTHER PEOPLE: SOMEWHAT DIFFICULT
SUM OF ALL RESPONSES TO PHQ QUESTIONS 1-9: 6

## 2024-04-18 NOTE — PROGRESS NOTES
"  Assessment & Plan     Paroxysmal nerve pain      Hirsutism  Will have her try this   - spironolactone (ALDACTONE) 50 MG tablet; Take 1 tablet (50 mg) by mouth daily    Pelvic pain in female  Will have her try this for the pain   - pregabalin (LYRICA) 25 MG capsule; Take 1 capsule (25 mg) by mouth 2 times daily for 14 days Then increase to 50 mg  - pregabalin (LYRICA) 50 MG capsule; Take 1 capsule (50 mg) by mouth 3 times daily    Screening for cholesterol level      Recurrent major depression in complete remission (H24)  Doing ok the recent stressor of her fathers reentrance in her life     Class 2 severe obesity due to excess calories with serious comorbidity and body mass index (BMI) of 37.0 to 37.9 in adult (H)  She can try this   - phentermine (ADIPEX-P) 37.5 MG capsule; Take 1 capsule (37.5 mg) by mouth every morning  - buPROPion (WELLBUTRIN SR) 100 MG 12 hr tablet; Take 1 tablet (100 mg) by mouth 2 times daily    Stress  cont  - FLUoxetine (PROZAC) 40 MG capsule; Take 1 capsule (40 mg) by mouth daily    Hypothyroidism due to acquired atrophy of thyroid  Cont   - levothyroxine (EUTHYROX) 125 MCG tablet; Take 1 tablet (125 mcg) by mouth daily    Cough variant asthma    - montelukast (SINGULAIR) 10 MG tablet; Take 1 tablet (10 mg) by mouth at bedtime  Well controlled   Herpes simplex infection of genitourinary system  Works well for suppression  - valACYclovir (VALTREX) 500 MG tablet; Take 1 tablet (500 mg) by mouth daily            BMI  Estimated body mass index is 40.62 kg/m  as calculated from the following:    Height as of this encounter: 1.549 m (5' 1\").    Weight as of this encounter: 97.5 kg (215 lb).   Starting medications       FUTURE APPOINTMENTS:       - make virtual visit appointment 4-6 weeks  Work on weight loss    Subjective   Nidia is a 63 year old, presenting for the following health issues:  Recheck Medication        4/18/2024     4:25 PM   Additional Questions   Roomed by Bebe ECHAVARRIA CMA "     History of Present Illness       Reason for visit:  Cholesterol weightloss med check estrogen levels    She eats 0-1 servings of fruits and vegetables daily.She consumes 1 sweetened beverage(s) daily.She exercises with enough effort to increase her heart rate 9 or less minutes per day.  She exercises with enough effort to increase her heart rate 3 or less days per week.   She is taking medications regularly.     Stopped the gabapentin - think she may be ok with out.     Still having hot shooting pain, like hot needle in her pelvic area and right hip area.     Depression and Anxiety   How are you doing with your depression since your last visit? Worsened her dad has contacted her and her brother .   How are you doing with your anxiety since your last visit?  Worsened   Are you having other symptoms that might be associated with depression or anxiety? pain  Have you had a significant life event? Relationship Concerns   Do you have any concerns with your use of alcohol or other drugs? No    Social History     Tobacco Use    Smoking status: Former     Current packs/day: 0.00     Average packs/day: 0.5 packs/day for 1 year (0.5 ttl pk-yrs)     Types: Cigarettes     Start date: 1985     Quit date: 1986     Years since quittin.3    Smokeless tobacco: Never   Substance Use Topics    Alcohol use: Yes     Comment: 1-5 drinks weekly    Drug use: No         2023     4:40 PM 2024     8:43 AM 2024     4:17 PM   PHQ   PHQ-9 Total Score 2 6 6   Q9: Thoughts of better off dead/self-harm past 2 weeks Not at all Not at all Not at all         2018     1:50 PM 3/24/2021     4:22 PM 2022     8:15 AM   CALI-7 SCORE   Total Score   19 (severe anxiety)   Total Score 6 5 19       Hypothyroidism Follow-up  Since last visit, patient describes the following symptoms: Weight stable, no hair loss, no skin changes, no constipation, no loose stools            History   Smoking Status    Former    Packs/day:  "0.50    Years: 1.00    Types: Cigarettes    Quit date: 1/1/1986   Smokeless Tobacco    Never     BP Readings from Last 1 Encounters:   04/18/24 130/86     Recent Labs   Lab Test 04/17/24  0750 09/25/19  0851   CHOL 212* 228*   HDL 54 51   * 145*   TRIG 160* 160*     No results for input(s): \"A1C\" in the last 74685 hours.    The 10-year ASCVD risk score (Alberto PRICE, et al., 2019) is: 4%    Values used to calculate the score:      Age: 63 years      Sex: Female      Is Non- : No      Diabetic: No      Tobacco smoker: No      Systolic Blood Pressure: 130 mmHg      Is BP treated: No      HDL Cholesterol: 54 mg/dL      Total Cholesterol: 212 mg/dL          Review of Systems  Constitutional, HEENT, cardiovascular, pulmonary, gi and gu systems are negative, except as otherwise noted.      Objective    /86 (BP Location: Right arm, Patient Position: Sitting, Cuff Size: Adult Large)   Pulse 78   Temp 99  F (37.2  C) (Tympanic)   Ht 1.549 m (5' 1\")   Wt 97.5 kg (215 lb)   LMP 07/29/2002   SpO2 98%   BMI 40.62 kg/m    Body mass index is 40.62 kg/m .  Physical Exam   GENERAL: alert and no distress  NECK: no adenopathy, no asymmetry, masses, or scars  RESP: lungs clear to auscultation - no rales, rhonchi or wheezes  CV: regular rate and rhythm, normal S1 S2, no S3 or S4, no murmur, click or rub, no peripheral edema  ABDOMEN: soft, nontender, no hepatosplenomegaly, no masses and bowel sounds normal  MS: no gross musculoskeletal defects noted, no edema  MENTAL STATUS EXAM:    1. Clinical observations: Nidia was clean and was adequately groomed. Nidia's emotional presentation was open and cooperative. She spoke clear and articulate. She maintained good eye contact and she was cooperative in answering questions.   2. She appeared to be well-oriented in all spheres with coherent, logical, goal directed, and relevent thinking.   3. Thought content: She denies no abnormal thought process.   4. " Affect and mood: Nidia's affect is described as tearful and her emotional attitude was open and cooperative. She reports the following sypmtoms: sad feeling or depressed, less energy than usual, feeling fatiqued, too much worry, nervous or tense feeling, feeling angry or frustrated, and dwelling on problems.    5. Sensorium and cognition: She was in contact with reality and oriented to time, place, and person.  She demonstrated no impairment in immediate, recent, or remote memory. Her insight was adequate and her  intelligence appeared to be average.      Lab on 04/17/2024   Component Date Value Ref Range Status    Cholesterol 04/17/2024 212 (H)  <200 mg/dL Final    Triglycerides 04/17/2024 160 (H)  <150 mg/dL Final    Direct Measure HDL 04/17/2024 54  >=50 mg/dL Final    LDL Cholesterol Calculated 04/17/2024 126 (H)  <=100 mg/dL Final    Non HDL Cholesterol 04/17/2024 158 (H)  <130 mg/dL Final    Patient Fasting > 8hrs? 04/17/2024 Yes   Final           Signed Electronically by: Merlene Reyes MD

## 2024-05-02 ENCOUNTER — VIRTUAL VISIT (OUTPATIENT)
Dept: FAMILY MEDICINE | Facility: CLINIC | Age: 64
End: 2024-05-02
Payer: COMMERCIAL

## 2024-05-02 DIAGNOSIS — R05.1 ACUTE COUGH: Primary | ICD-10-CM

## 2024-05-02 PROCEDURE — 99207 PR NO CHARGE LOS: CPT | Mod: 95 | Performed by: FAMILY MEDICINE

## 2024-05-02 ASSESSMENT — ENCOUNTER SYMPTOMS: COUGH: 1

## 2024-05-02 NOTE — PROGRESS NOTES
Nidia is a 64 year old who is being evaluated via a billable video visit.    How would you like to obtain your AVS? MyChart  If the video visit is dropped, the invitation should be resent by: Text to cell phone: [phone]  Will anyone else be joining your video visit? No      Reviewed with pt that this really needs to be an in person visit to assess her lungs.  She agrees.  Not able to come to clinic for at least 30 minutes as she is in [facility].  Offered tomorrow or UC this afternoon.  She prefers ppt tomorrow.  Will schedule.    Reviewed need to seek UC care this afternoon for acutely worsening symptoms.  She verbalized understanding.    Subjective   Nidia is a 64 year old, presenting for the following health issues:  Cough        5/2/2024    11:17 AM   Additional Questions   Roomed by Keyana GORDON   Accompanied by Self      Cough         Acute Illness  Acute illness concerns: cough  Onset/Duration: 2 weeks   Symptoms:  Fever: No  Chills/Sweats: No  Headache (location?): YES  Sinus Pressure: No  Conjunctivitis:  YES- dry, itchy   Ear Pain: YES: left ear feels plugged   Rhinorrhea: YES- mild   Congestion: YES- in chest, not sinus   Sore Throat: No  Cough: YES-productive of yellow sputum  Wheeze: YES  Decreased Appetite: No  Nausea: No  Vomiting: No  Diarrhea: No  Dysuria/Freq.: No  Dysuria or Hematuria: No  Fatigue/Achiness: YES- chest and back   Sick/Strep Exposure: No  Therapies tried and outcome: mucinex, ibuprofen, inhalers    Cough has been stable but now more productive. Has some SOB with stairs now.  No fevers.  Not feeling terrible but would like ti addressed as things have changed.

## 2024-05-02 NOTE — PROGRESS NOTES
"  A/P:      ICD-10-CM    1. Acute cough  R05.1 XR Chest 2 Views     predniSONE (DELTASONE) 20 MG tablet      2. Cough variant asthma  J45.991 predniSONE (DELTASONE) 20 MG tablet        Will treat for asthma exacerbation, no evidence of bacterial infection at this time.  Pt prefers lower dose of prednisone due to swelling on 40mg taken previously.    Reviewed signs and symptoms of worsening illness such as fever, worsened cough, increased SOB and .  She will seek in person care over the weekend if these occur.        Subjective   Nidia is a 64 year old, presenting for the following health issues:  Cough        5/2/2024    11:17 AM   Additional Questions   Roomed by Keyana GORDON   Accompanied by Self      HPI     Acute Illness  Acute illness concerns: cough  Onset/Duration: 2 weeks   Symptoms:  Fever: No  Chills/Sweats: No  Headache (location?): YES  Sinus Pressure: No  Conjunctivitis:  YES- dry, itchy   Ear Pain: YES: left ear feels plugged   Rhinorrhea: YES- mild   Congestion: YES- in chest, not sinus   Sore Throat: No  Cough: YES-productive of yellow sputum  Wheeze: YES  Decreased Appetite: No  Nausea: No  Vomiting: No  Diarrhea: No  Dysuria/Freq.: No  Dysuria or Hematuria: No  Fatigue/Achiness: YES- chest and back   Sick/Strep Exposure: No  Therapies tried and outcome: mucinex, ibuprofen, inhalers       Was in [place] 2 weeks ago and had a dry cough related to allergy.  Has had these coughs in the past.  Takes albuterol and singulair when that happens.  Continues those now.  Also takes zyrtec daily.    Now since Monday cough has been productive and sometimes has \"coughing fits\".  Can be fine for a long time and then have a fit.    Has some mild SOB with walking and activity.  Catches her breath with rest.  No fever or chills.  Does not feel sick.    Continue to use albuterol 3-4 times per day.  Usually notices more improvement when she uses it then she has been recently.  No CP, PND/orthopnea.  Cough is not keeping her " "awake at night.        Review of Systems  Constitutional, HEENT, cardiovascular, pulmonary, gi and gu systems are negative, except as otherwise noted.      Objective    /88   Pulse 98   Temp 97.5  F (36.4  C) (Tympanic)   Resp 20   Ht 1.543 m (5' 0.75\")   Wt 93.9 kg (207 lb)   LMP 07/29/2002   SpO2 98%   BMI 39.43 kg/m    Body mass index is 39.43 kg/m .  Physical Exam   GENERAL: alert and no distress  EYES: Eyes grossly normal to inspection, PERRL and conjunctivae and sclerae normal  HENT: ear canals and TM's normal, nose and mouth without ulcers or lesions  NECK: no adenopathy, no asymmetry, masses, or scars  RESP: lungs clear to auscultation - no rales, rhonchi or wheezes  CV: regular rate and rhythm, normal S1 S2, no S3 or S4, no murmur, click or rub, no peripheral edema  MS: no gross musculoskeletal defects noted, no edema  PSYCH: mentation appears normal, affect normal/bright    CXR - Reviewed and interpreted by me Normal- no infiltrates, effusions, pneumothoraces, cardiomegaly or masses        Signed Electronically by: Lindsey Gomez DO    "

## 2024-05-03 ENCOUNTER — ANCILLARY PROCEDURE (OUTPATIENT)
Dept: GENERAL RADIOLOGY | Facility: CLINIC | Age: 64
End: 2024-05-03
Attending: FAMILY MEDICINE
Payer: COMMERCIAL

## 2024-05-03 ENCOUNTER — OFFICE VISIT (OUTPATIENT)
Dept: FAMILY MEDICINE | Facility: CLINIC | Age: 64
End: 2024-05-03
Payer: COMMERCIAL

## 2024-05-03 VITALS
BODY MASS INDEX: 39.08 KG/M2 | RESPIRATION RATE: 20 BRPM | HEART RATE: 98 BPM | OXYGEN SATURATION: 98 % | TEMPERATURE: 97.5 F | SYSTOLIC BLOOD PRESSURE: 136 MMHG | DIASTOLIC BLOOD PRESSURE: 88 MMHG | WEIGHT: 207 LBS | HEIGHT: 61 IN

## 2024-05-03 DIAGNOSIS — R05.1 ACUTE COUGH: Primary | ICD-10-CM

## 2024-05-03 DIAGNOSIS — R05.1 ACUTE COUGH: ICD-10-CM

## 2024-05-03 DIAGNOSIS — J45.991 COUGH VARIANT ASTHMA: ICD-10-CM

## 2024-05-03 PROCEDURE — 99213 OFFICE O/P EST LOW 20 MIN: CPT | Performed by: FAMILY MEDICINE

## 2024-05-03 PROCEDURE — 71046 X-RAY EXAM CHEST 2 VIEWS: CPT | Mod: TC | Performed by: RADIOLOGY

## 2024-05-03 RX ORDER — PREDNISONE 20 MG/1
20 TABLET ORAL DAILY
Qty: 5 TABLET | Refills: 0 | Status: SHIPPED | OUTPATIENT
Start: 2024-05-03 | End: 2024-05-08

## 2024-05-13 ENCOUNTER — NURSE TRIAGE (OUTPATIENT)
Dept: FAMILY MEDICINE | Facility: CLINIC | Age: 64
End: 2024-05-13

## 2024-05-13 ENCOUNTER — MYC MEDICAL ADVICE (OUTPATIENT)
Dept: FAMILY MEDICINE | Facility: CLINIC | Age: 64
End: 2024-05-13

## 2024-05-14 ENCOUNTER — OFFICE VISIT (OUTPATIENT)
Dept: FAMILY MEDICINE | Facility: CLINIC | Age: 64
End: 2024-05-14
Payer: COMMERCIAL

## 2024-05-14 VITALS
WEIGHT: 205 LBS | OXYGEN SATURATION: 98 % | HEART RATE: 90 BPM | HEIGHT: 61 IN | TEMPERATURE: 98.4 F | DIASTOLIC BLOOD PRESSURE: 80 MMHG | SYSTOLIC BLOOD PRESSURE: 134 MMHG | BODY MASS INDEX: 38.71 KG/M2

## 2024-05-14 DIAGNOSIS — E66.812 CLASS 2 SEVERE OBESITY DUE TO EXCESS CALORIES WITH SERIOUS COMORBIDITY AND BODY MASS INDEX (BMI) OF 38.0 TO 38.9 IN ADULT (H): ICD-10-CM

## 2024-05-14 DIAGNOSIS — L50.9 URTICARIA: Primary | ICD-10-CM

## 2024-05-14 DIAGNOSIS — E66.01 CLASS 2 SEVERE OBESITY DUE TO EXCESS CALORIES WITH SERIOUS COMORBIDITY AND BODY MASS INDEX (BMI) OF 38.0 TO 38.9 IN ADULT (H): ICD-10-CM

## 2024-05-14 DIAGNOSIS — E78.5 HYPERLIPIDEMIA LDL GOAL <130: ICD-10-CM

## 2024-05-14 PROCEDURE — 99213 OFFICE O/P EST LOW 20 MIN: CPT | Performed by: FAMILY MEDICINE

## 2024-05-14 PROCEDURE — G2211 COMPLEX E/M VISIT ADD ON: HCPCS | Performed by: FAMILY MEDICINE

## 2024-05-14 RX ORDER — PHENTERMINE HYDROCHLORIDE 37.5 MG/1
37.5 TABLET ORAL
Qty: 30 TABLET | Refills: 1 | Status: SHIPPED | OUTPATIENT
Start: 2024-05-14 | End: 2024-07-24 | Stop reason: SINTOL

## 2024-05-14 RX ORDER — FLUOCINONIDE CREAM (EMULSIFIED BASE) 0.5 MG/G
CREAM TOPICAL
Qty: 30 G | Refills: 1 | Status: SHIPPED | OUTPATIENT
Start: 2024-05-14

## 2024-05-14 NOTE — PROGRESS NOTES
"  Assessment & Plan     Urticaria  Will have her try this .discussed skin thinning   - fluocinonide emulsified base (LIDEX-E) 0.05 % external cream; Apply to groin and and under breast 2 times per day for up to 2 weeks at a time    BMI 38.0-38.9,adult  Insuranc enot paying willsee if this is affordable for her .   - phentermine (ADIPEX-P) 37.5 MG tablet; Take 1 tablet (37.5 mg) by mouth every morning (before breakfast)    1. Urticaria    - fluocinonide emulsified base (LIDEX-E) 0.05 % external cream; Apply to groin and and under breast 2 times per day for up to 2 weeks at a time  Dispense: 30 g; Refill: 1    2. BMI 38.0-38.9,adult    - phentermine (ADIPEX-P) 37.5 MG tablet; Take 1 tablet (37.5 mg) by mouth every morning (before breakfast)  Dispense: 30 tablet; Refill: 1    3. Class 2 severe obesity due to excess calories with serious comorbidity and body mass index (BMI) of 38.0 to 38.9 in adult (H)      4. Hyperlipidemia LDL goal <130  Treatment of obeswity will help lower this       BMI  Estimated body mass index is 39.05 kg/m  as calculated from the following:    Height as of this encounter: 1.543 m (5' 0.75\").    Weight as of this encounter: 93 kg (205 lb).   Weight management plan: she is losing weight on the phentermine      If the rash is not improving. Let me know. Recheck the phentermine 3 months     Subjective   Nidia is a 64 year old, presenting for the following health issues:  Rash        5/14/2024     3:43 PM   Additional Questions   Roomed by ANTONIA Peterson       Rash  Onset/Duration: about a week ago, last Thursday  Description  Location: under breasts and under stomach apron, left arm.   Character: blotchy  Itching: moderate - worse after showing   Progression of Symptoms:  worsening  Accompanying signs and symptoms:   Fever: No  Body aches or joint pain: No  Sore throat symptoms: No  Recent cold symptoms: No  History:           Previous episodes of similar rash: None  New exposures:  " "None  Recent travel: No  Precipitating or alleviating factors:   Therapies tried and outcome: hydrocortisone cream -  effective      Wt Readings from Last 5 Encounters:   05/14/24 93 kg (205 lb)   05/03/24 93.9 kg (207 lb)   04/18/24 97.5 kg (215 lb)   12/13/23 89.4 kg (197 lb)   12/02/23 90.7 kg (200 lb)   Here to follow up on the weight medications . The phentermine is helping her lose weight. She also has been having an acute rash under the breasts and in the groin. She has some response with the over the counter hc but it has not really controlled this       Review of Systems  Constitutional, HEENT, cardiovascular, pulmonary, gi and gu systems are negative, except as otherwise noted.      Objective    /80 (BP Location: Right arm, Patient Position: Sitting, Cuff Size: Adult Large)   Pulse 90   Temp 98.4  F (36.9  C) (Tympanic)   Ht 1.543 m (5' 0.75\")   Wt 93 kg (205 lb)   LMP 07/29/2002   SpO2 98%   BMI 39.05 kg/m    Body mass index is 39.05 kg/m .  Physical Exam   GENERAL: alert and no distress  SKIN: erythematous rash non tender under both breasts and also in the pannus region  PSYCH: mentation appears normal, affect normal/bright    No results found for any visits on 05/14/24.        Signed Electronically by: Merlene Reyes MD    "

## 2024-05-22 ENCOUNTER — TELEPHONE (OUTPATIENT)
Dept: FAMILY MEDICINE | Facility: CLINIC | Age: 64
End: 2024-05-22
Payer: COMMERCIAL

## 2024-05-22 NOTE — TELEPHONE ENCOUNTER
Prior Authorization Retail Medication Request    Medication/Dose: Zepbound  Diagnosis and ICD code (if different than what is on RX):    BMI 38.0-38.9,adult [Z68.38]        New/renewal/insurance change PA/secondary ins. PA:  Previously Tried and Failed:    Rationale:      Insurance   Covermymeds:  Key: [id number]  Last Name: Dave  : 1960    Pharmacy Information (if different than what is on RX)  Name:  [facility]  Phone:  [phone]  Fax:[phone]

## 2024-06-04 NOTE — TELEPHONE ENCOUNTER
Retail Pharmacy Prior Authorization Team   Phone: [phone]    PA Initiation    Medication: ZEPBOUND 5 MG/0.5ML SC SOAJ  Insurance Company: [employer] - Phone [phone] Fax [phone]  Pharmacy Filling the Rx: [facility] [address] [place] [address] - [address]  Filling Pharmacy Phone: [phone]  Filling Pharmacy Fax:    Start Date: 6/4/2024    Brought up CMM key, it shows for the 5mg, checked on the 2.5mg RX and it shows that there is already an approval on file for the medications.

## 2024-06-07 ENCOUNTER — MYC MEDICAL ADVICE (OUTPATIENT)
Dept: FAMILY MEDICINE | Facility: CLINIC | Age: 64
End: 2024-06-07
Payer: COMMERCIAL

## 2024-06-08 DIAGNOSIS — E66.01 CLASS 2 SEVERE OBESITY DUE TO EXCESS CALORIES WITH SERIOUS COMORBIDITY AND BODY MASS INDEX (BMI) OF 37.0 TO 37.9 IN ADULT (H): ICD-10-CM

## 2024-06-08 DIAGNOSIS — E66.812 CLASS 2 SEVERE OBESITY DUE TO EXCESS CALORIES WITH SERIOUS COMORBIDITY AND BODY MASS INDEX (BMI) OF 37.0 TO 37.9 IN ADULT (H): ICD-10-CM

## 2024-06-09 RX ORDER — BUPROPION HYDROCHLORIDE 100 MG/1
100 TABLET, EXTENDED RELEASE ORAL 2 TIMES DAILY
Qty: 60 TABLET | Refills: 0 | Status: SHIPPED | OUTPATIENT
Start: 2024-06-09 | End: 2024-07-09

## 2024-06-11 ENCOUNTER — PATIENT OUTREACH (OUTPATIENT)
Dept: CARE COORDINATION | Facility: CLINIC | Age: 64
End: 2024-06-11
Payer: COMMERCIAL

## 2024-07-05 DIAGNOSIS — E66.812 CLASS 2 SEVERE OBESITY DUE TO EXCESS CALORIES WITH SERIOUS COMORBIDITY AND BODY MASS INDEX (BMI) OF 37.0 TO 37.9 IN ADULT (H): ICD-10-CM

## 2024-07-05 DIAGNOSIS — E66.01 CLASS 2 SEVERE OBESITY DUE TO EXCESS CALORIES WITH SERIOUS COMORBIDITY AND BODY MASS INDEX (BMI) OF 37.0 TO 37.9 IN ADULT (H): ICD-10-CM

## 2024-07-09 RX ORDER — BUPROPION HYDROCHLORIDE 100 MG/1
100 TABLET, EXTENDED RELEASE ORAL 2 TIMES DAILY
Qty: 180 TABLET | Refills: 0 | Status: SHIPPED | OUTPATIENT
Start: 2024-07-09 | End: 2024-07-24

## 2024-07-24 ENCOUNTER — OFFICE VISIT (OUTPATIENT)
Dept: FAMILY MEDICINE | Facility: CLINIC | Age: 64
End: 2024-07-24
Payer: COMMERCIAL

## 2024-07-24 VITALS
HEIGHT: 61 IN | WEIGHT: 187 LBS | HEART RATE: 82 BPM | OXYGEN SATURATION: 97 % | DIASTOLIC BLOOD PRESSURE: 80 MMHG | SYSTOLIC BLOOD PRESSURE: 126 MMHG | TEMPERATURE: 99.3 F | BODY MASS INDEX: 35.3 KG/M2

## 2024-07-24 DIAGNOSIS — E66.812 CLASS 2 SEVERE OBESITY DUE TO EXCESS CALORIES WITH SERIOUS COMORBIDITY AND BODY MASS INDEX (BMI) OF 37.0 TO 37.9 IN ADULT (H): ICD-10-CM

## 2024-07-24 DIAGNOSIS — L68.0 HIRSUTISM: ICD-10-CM

## 2024-07-24 DIAGNOSIS — E66.01 CLASS 2 SEVERE OBESITY DUE TO EXCESS CALORIES WITH SERIOUS COMORBIDITY AND BODY MASS INDEX (BMI) OF 37.0 TO 37.9 IN ADULT (H): ICD-10-CM

## 2024-07-24 DIAGNOSIS — Z12.31 ENCOUNTER FOR SCREENING MAMMOGRAM FOR MALIGNANT NEOPLASM OF BREAST: Primary | ICD-10-CM

## 2024-07-24 DIAGNOSIS — J45.991 COUGH VARIANT ASTHMA: ICD-10-CM

## 2024-07-24 DIAGNOSIS — Z00.00 ENCOUNTER FOR PREVENTIVE HEALTH EXAMINATION: ICD-10-CM

## 2024-07-24 PROCEDURE — 99396 PREV VISIT EST AGE 40-64: CPT | Performed by: FAMILY MEDICINE

## 2024-07-24 PROCEDURE — 99214 OFFICE O/P EST MOD 30 MIN: CPT | Mod: 25 | Performed by: FAMILY MEDICINE

## 2024-07-24 RX ORDER — ALBUTEROL SULFATE 90 UG/1
2 AEROSOL, METERED RESPIRATORY (INHALATION) EVERY 6 HOURS
Qty: 18 G | Refills: 0 | Status: SHIPPED | OUTPATIENT
Start: 2024-07-24

## 2024-07-24 RX ORDER — SPIRONOLACTONE 50 MG/1
50 TABLET, FILM COATED ORAL DAILY
Qty: 90 TABLET | Refills: 1 | Status: SHIPPED | OUTPATIENT
Start: 2024-07-24

## 2024-07-24 RX ORDER — BUPROPION HYDROCHLORIDE 100 MG/1
100 TABLET, EXTENDED RELEASE ORAL 2 TIMES DAILY
Qty: 180 TABLET | Refills: 1 | Status: SHIPPED | OUTPATIENT
Start: 2024-07-24

## 2024-07-24 SDOH — HEALTH STABILITY: PHYSICAL HEALTH: ON AVERAGE, HOW MANY DAYS PER WEEK DO YOU ENGAGE IN MODERATE TO STRENUOUS EXERCISE (LIKE A BRISK WALK)?: 4 DAYS

## 2024-07-24 SDOH — HEALTH STABILITY: PHYSICAL HEALTH: ON AVERAGE, HOW MANY MINUTES DO YOU ENGAGE IN EXERCISE AT THIS LEVEL?: 10 MIN

## 2024-07-24 ASSESSMENT — PATIENT HEALTH QUESTIONNAIRE - PHQ9
10. IF YOU CHECKED OFF ANY PROBLEMS, HOW DIFFICULT HAVE THESE PROBLEMS MADE IT FOR YOU TO DO YOUR WORK, TAKE CARE OF THINGS AT HOME, OR GET ALONG WITH OTHER PEOPLE: NOT DIFFICULT AT ALL
SUM OF ALL RESPONSES TO PHQ QUESTIONS 1-9: 0
SUM OF ALL RESPONSES TO PHQ QUESTIONS 1-9: 0

## 2024-07-24 ASSESSMENT — ASTHMA QUESTIONNAIRES
ACT_TOTALSCORE: 24
QUESTION_2 LAST FOUR WEEKS HOW OFTEN HAVE YOU HAD SHORTNESS OF BREATH: NOT AT ALL
QUESTION_3 LAST FOUR WEEKS HOW OFTEN DID YOUR ASTHMA SYMPTOMS (WHEEZING, COUGHING, SHORTNESS OF BREATH, CHEST TIGHTNESS OR PAIN) WAKE YOU UP AT NIGHT OR EARLIER THAN USUAL IN THE MORNING: NOT AT ALL
QUESTION_1 LAST FOUR WEEKS HOW MUCH OF THE TIME DID YOUR ASTHMA KEEP YOU FROM GETTING AS MUCH DONE AT WORK, SCHOOL OR AT HOME: NONE OF THE TIME
ACT_TOTALSCORE: 24
QUESTION_5 LAST FOUR WEEKS HOW WOULD YOU RATE YOUR ASTHMA CONTROL: WELL CONTROLLED
QUESTION_4 LAST FOUR WEEKS HOW OFTEN HAVE YOU USED YOUR RESCUE INHALER OR NEBULIZER MEDICATION (SUCH AS ALBUTEROL): NOT AT ALL

## 2024-07-24 ASSESSMENT — SOCIAL DETERMINANTS OF HEALTH (SDOH): HOW OFTEN DO YOU GET TOGETHER WITH FRIENDS OR RELATIVES?: THREE TIMES A WEEK

## 2024-07-24 NOTE — PROGRESS NOTES
Preventive Care Visit  [facility] KATHLEEN Reyes MD, Family Medicine  Jul 24, 2024      Assessment & Plan     Class 2 severe obesity due to excess calories with serious comorbidity and body mass index (BMI) of 37.0 to 37.9 in adult (H)  Cont   - buPROPion (WELLBUTRIN SR) 100 MG 12 hr tablet; Take 1 tablet (100 mg) by mouth 2 times daily  - tirzepatide-Weight Management (ZEPBOUND) 7.5 MG/0.5ML prefilled pen; Inject 0.5 mLs (7.5 mg) subcutaneously every 7 days    Cough variant asthma  Well controlled   - albuterol (PROAIR HFA/PROVENTIL HFA/VENTOLIN HFA) 108 (90 Base) MCG/ACT inhaler; Inhale 2 puffs into the lungs every 6 hours    Hirsutism  Helping   - spironolactone (ALDACTONE) 50 MG tablet; Take 1 tablet (50 mg) by mouth daily    Encounter for screening mammogram for malignant neoplasm of breast  Due for this   - MA Screen Bilateral w/Ant; Future    Patient has been advised of split billing requirements and indicates understanding: Yes        Counseling  Appropriate preventive services were addressed with this patient via screening, questionnaire, or discussion as appropriate for fall prevention, nutrition, physical activity, Tobacco-use cessation, social engagement, weight loss and cognition.  Checklist reviewing preventive services available has been given to the patient.      Regular exercise    Oliverio Jacob is a 64 year old, presenting for the following:  Physical        7/24/2024     4:24 PM   Additional Questions   Roomed by Bebe ECHAVARRIA CMA        Health Care Directive  Patient does not have a Health Care Directive or Living Will: Discussed advance care planning with patient; information given to patient to review.    HPI    Discuss Lyrica dose and the pain.    .she  Wt Readings from Last 5 Encounters:   07/24/24 84.8 kg (187 lb)   05/14/24 93 kg (205 lb)   05/03/24 93.9 kg (207 lb)   04/18/24 97.5 kg (215 lb)   12/13/23 89.4 kg (197 lb)             Wt Readings from Last 10  Encounters:   07/24/24 84.8 kg (187 lb)   05/14/24 93 kg (205 lb)   05/03/24 93.9 kg (207 lb)   04/18/24 97.5 kg (215 lb)   12/13/23 89.4 kg (197 lb)   12/02/23 90.7 kg (200 lb)   11/30/23 91.6 kg (202 lb)   11/24/23 88 kg (194 lb)   07/22/23 87.1 kg (192 lb)   07/11/23 87.1 kg (192 lb)           7/24/2024   General Health   How would you rate your overall physical health? Good   Feel stress (tense, anxious, or unable to sleep) Only a little      (!) STRESS CONCERN      7/24/2024   Nutrition   Three or more servings of calcium each day? Yes   Diet: Regular (no restrictions)   How many servings of fruit and vegetables per day? (!) 2-3   How many sweetened beverages each day? 0-1            7/24/2024   Exercise   Days per week of moderate/strenous exercise 4 days   Average minutes spent exercising at this level 10 min            7/24/2024   Social Factors   Frequency of gathering with friends or relatives Three times a week   Worry food won't last until get money to buy more No   Food not last or not have enough money for food? No   Do you have housing? (Housing is defined as stable permanent housing and does not include staying ouside in a car, in a tent, in an abandoned building, in an overnight shelter, or couch-surfing.) Yes   Are you worried about losing your housing? No   Lack of transportation? No   Unable to get utilities (heat,electricity)? No            7/24/2024   Fall Risk   Fallen 2 or more times in the past year? No   Trouble with walking or balance? No             7/24/2024   Dental   Dentist two times every year? Yes            7/24/2024   TB Screening   Were you born outside of the [place]? No          Today's PHQ-9 Score:       7/24/2024     4:09 PM   PHQ-9 SCORE   PHQ-9 Total Score MyChart 0   PHQ-9 Total Score 0         7/24/2024   Substance Use   Alcohol more than 3/day or more than 7/wk No   Do you use any other substances recreationally? No        Social History     Tobacco Use    Smoking status:  Former     Current packs/day: 0.00     Average packs/day: 0.5 packs/day for 1 year (0.5 ttl pk-yrs)     Types: Cigarettes     Start date: 1985     Quit date: 1986     Years since quittin.5    Smokeless tobacco: Never   Substance Use Topics    Alcohol use: Yes     Comment: 1-5 drinks weekly    Drug use: No           2023   LAST FHS-7 RESULTS   1st degree relative breast or ovarian cancer No   Any relative bilateral breast cancer No   Any male have breast cancer No   Any ONE woman have BOTH breast AND ovarian cancer No   Any woman with breast cancer before 50yrs No   2 or more relatives with breast AND/OR ovarian cancer No   2 or more relatives with breast AND/OR bowel cancer No           Mammogram Screening - Mammogram every 1-2 years updated in Health Maintenance based on mutual decision making        2024   STI Screening   New sexual partner(s) since last STI/HIV test? No        History of abnormal Pap smear:         2007    12:00 AM 2005    12:00 AM   PAP / HPV   PAP (Historical) NIL  NIL      ASCVD Risk   The 10-year ASCVD risk score (Alberto PRICE, et al., 2019) is: 4.7%    Values used to calculate the score:      Age: 64 years      Sex: Female      Is Non- : No      Diabetic: No      Tobacco smoker: No      Systolic Blood Pressure: 134 mmHg      Is BP treated: No      HDL Cholesterol: 54 mg/dL      Total Cholesterol: 212 mg/dL           Reviewed and updated as needed this visit by Provider                    Past Medical History:   Diagnosis Date    Arthritis     Me, paternal & maternal    Backache, unspecified     lumbar back pain, degen disk disease    COPD (chronic obstructive pulmonary disease) (H)     I get bronchitis frequently    Cough variant asthma 2020    Depressive disorder     numerous yrs ago diagnosed with PTSD    Leiomyoma of uterus, unspecified     s/p LAVH    Other genital herpes     HSV-2    Thyroid disease     Me     "Unspecified sinusitis (chronic)          Review of Systems  Constitutional, HEENT, cardiovascular, pulmonary, gi and gu systems are negative, except as otherwise noted.     Objective    Exam  LMP 07/29/2002    Estimated body mass index is 39.05 kg/m  as calculated from the following:    Height as of 5/14/24: 1.543 m (5' 0.75\").    Weight as of 5/14/24: 93 kg (205 lb).    Physical Exam  GENERAL: alert and no distress  EYES: Eyes grossly normal to inspection, PERRL and conjunctivae and sclerae normal  HENT: ear canals and TM's normal, nose and mouth without ulcers or lesions  NECK: no adenopathy, no asymmetry, masses, or scars  RESP: lungs clear to auscultation - no rales, rhonchi or wheezes  CV: regular rate and rhythm, normal S1 S2, no S3 or S4, no murmur, click or rub, no peripheral edema  ABDOMEN: soft, nontender, no hepatosplenomegaly, no masses and bowel sounds normal  MS: no gross musculoskeletal defects noted, no edema  SKIN: no suspicious lesions or rashes  NEURO: Normal strength and tone, mentation intact and speech normal  PSYCH: mentation appears normal, affect normal/bright        Signed Electronically by: Merlene Reyes MD    "

## 2024-08-29 ENCOUNTER — OFFICE VISIT (OUTPATIENT)
Dept: FAMILY MEDICINE | Facility: CLINIC | Age: 64
End: 2024-08-29
Payer: COMMERCIAL

## 2024-08-29 VITALS
HEIGHT: 61 IN | WEIGHT: 185 LBS | OXYGEN SATURATION: 98 % | DIASTOLIC BLOOD PRESSURE: 83 MMHG | HEART RATE: 81 BPM | SYSTOLIC BLOOD PRESSURE: 126 MMHG | BODY MASS INDEX: 34.93 KG/M2 | TEMPERATURE: 97.5 F

## 2024-08-29 DIAGNOSIS — J01.01 ACUTE RECURRENT MAXILLARY SINUSITIS: Primary | ICD-10-CM

## 2024-08-29 DIAGNOSIS — J45.40 MODERATE PERSISTENT ASTHMA WITHOUT COMPLICATION: ICD-10-CM

## 2024-08-29 PROCEDURE — G2211 COMPLEX E/M VISIT ADD ON: HCPCS | Performed by: STUDENT IN AN ORGANIZED HEALTH CARE EDUCATION/TRAINING PROGRAM

## 2024-08-29 PROCEDURE — 99213 OFFICE O/P EST LOW 20 MIN: CPT | Performed by: STUDENT IN AN ORGANIZED HEALTH CARE EDUCATION/TRAINING PROGRAM

## 2024-08-29 RX ORDER — FLUTICASONE PROPIONATE 50 MCG
SPRAY, SUSPENSION (ML) NASAL
COMMUNITY
Start: 2024-06-08

## 2024-08-29 RX ORDER — DOXYCYCLINE 100 MG/1
100 CAPSULE ORAL 2 TIMES DAILY
Qty: 10 CAPSULE | Refills: 0 | Status: SHIPPED | OUTPATIENT
Start: 2024-08-29 | End: 2024-09-03

## 2024-08-29 RX ORDER — FLUTICASONE PROPIONATE AND SALMETEROL 250; 50 UG/1; UG/1
1 POWDER RESPIRATORY (INHALATION) EVERY 12 HOURS
Qty: 60 EACH | Refills: 0 | Status: SHIPPED | OUTPATIENT
Start: 2024-08-29

## 2024-08-29 NOTE — PROGRESS NOTES
Assessment & Plan     Acute recurrent maxillary sinusitis  > At this time I did inform the patient that given the duration of her symptoms I have a lower suspicion for a bacterial infection however given her history of requiring antibiotics in the past, and her clinical appearance we will send a prescription for doxycycline for the patient to have on hand, encouraged her to continue with supportive cares unless her symptoms last longer than 7-10 days or drastically worsen  -Okay to continue with over-the-counter Flonase, allergy medications, Afia-[place] (patient reports Sudafed does not help)  - doxycycline hyclate (VIBRAMYCIN) 100 MG capsule; Take 1 capsule (100 mg) by mouth 2 times daily for 5 days.    Moderate persistent asthma without complication  > The patient has been taking albuterol for her asthma as needed, and has been requiring more frequent use of this medication, per updated Disha guidelines will send a prescription for an ICS/LABA   - fluticasone-salmeterol (ADVAIR) 250-50 MCG/ACT inhaler; Inhale 1 puff into the lungs every 12 hours.  - patient aware to rinse her mouth after use to limit risk for oral thrush    The longitudinal plan of care for the diagnosis(es)/condition(s) as documented were addressed during this visit. Due to the added complexity in care, I will continue to support Nidia in the subsequent management and with ongoing continuity of care.      Subjective   Nidia is a 64 year old, presenting for the following health issues:  Sinus Problem and Ear Problem      8/29/2024     1:53 PM   Additional Questions   Roomed by Arabella LUCAS CMA   Accompanied by self     History of Present Illness       Reason for visit:  Possible sinus infection ear pain L  Symptom onset:  1-3 days ago  Symptoms include:  Itchy eyes, sinus drainage, coughing up phlem (yellow to green)  Symptom intensity:  Moderate  Symptom progression:  Worsening  Had these symptoms before:  Yes  Has tried/received treatment for these  "symptoms:  Yes  Previous treatment was successful:  Yes  Prior treatment description:  Flonase antibiotic rest   She is taking medications regularly.    Patient admits that it's hard to tell when her symptoms began, she admits she was outside all weekend, she had asthma issues with the weather and being outside   On Monday her symptoms began worsening and last night 8/28/24 her left ear was bothering her   \"I can cough crap up\" but admits it is hard for her to breathe through her nose   She has been using Flonase and drinking a lot of water   Yesterday she took jason seltzer day on Tuesday 8/27/24 she took an jason selzter night medication to help her sleep    Patient reports she gets sinus infections at least once or twice per year  and states \"nothing over the counter touches it\" she uses a neti pot, Flonase, and hot packs for her symptoms, admits that when her symptoms are like this she often requires an antibiotic   Last year she [facility] to the [facility] and was told she had a sinus infection and was given a doxycycline which cleared up her infection   She reports taking zyrtec twice daily, states that \"sudafed doesn't do a thing for me\" has been taking mucinex before bed   Patient states that she has been using her albuterol inhaler at least once or twice daily since her symptoms began      Review of Systems   Constitutional:  Negative for chills and fever.   HENT:  Negative for ear pain.    Eyes:  Negative for pain.   Respiratory:  Negative for cough.    Cardiovascular:  Negative for chest pain.   Gastrointestinal:  Negative for abdominal pain.   Genitourinary:  Negative for dysuria.   Musculoskeletal:  Negative for neck pain.   Skin:  Negative for rash.   Neurological:  Negative for headaches.           Objective    /83   Pulse 81   Temp 97.5  F (36.4  C) (Tympanic)   Ht 1.543 m (5' 0.75\")   Wt 83.9 kg (185 lb)   LMP 07/29/2002   SpO2 98%   BMI 35.24 kg/m    Body mass index is 35.24 " "kg/m .  Physical Exam  Constitutional:       General: She is not in acute distress.  HENT:      Head: Normocephalic and atraumatic.      Right Ear: Ear canal and external ear normal. There is no impacted cerumen.      Left Ear: Ear canal and external ear normal. There is no impacted cerumen.      Ears:      Comments: Effusion in bilateral ears      Mouth/Throat:      Mouth: Mucous membranes are moist.      Pharynx: Oropharynx is clear. No oropharyngeal exudate or posterior oropharyngeal erythema.      Comments: Would keep coughing when saying \"ahh\" stating she felt significant draining in the back of her throat that she would gag on   Eyes:      Extraocular Movements: Extraocular movements intact.   Cardiovascular:      Rate and Rhythm: Normal rate and regular rhythm.      Heart sounds: Normal heart sounds.   Pulmonary:      Effort: Pulmonary effort is normal. No respiratory distress.      Breath sounds: Normal breath sounds. No wheezing or rhonchi.   Abdominal:      Palpations: Abdomen is soft. There is no mass.      Tenderness: There is no abdominal tenderness.   Musculoskeletal:         General: No deformity. Normal range of motion.      Cervical back: Normal range of motion and neck supple.   Skin:     General: Skin is warm.      Findings: No rash.   Neurological:      General: No focal deficit present.      Mental Status: She is alert and oriented to person, place, and time.   Psychiatric:         Mood and Affect: Mood normal.                Signed Electronically by: RUTHIE DAVILA MD    "

## 2024-09-11 ENCOUNTER — ANCILLARY PROCEDURE (OUTPATIENT)
Dept: MAMMOGRAPHY | Facility: CLINIC | Age: 64
End: 2024-09-11
Attending: FAMILY MEDICINE
Payer: COMMERCIAL

## 2024-09-11 DIAGNOSIS — Z12.31 ENCOUNTER FOR SCREENING MAMMOGRAM FOR MALIGNANT NEOPLASM OF BREAST: ICD-10-CM

## 2024-09-11 PROCEDURE — 77063 BREAST TOMOSYNTHESIS BI: CPT

## 2024-09-12 ENCOUNTER — MYC MEDICAL ADVICE (OUTPATIENT)
Dept: FAMILY MEDICINE | Facility: CLINIC | Age: 64
End: 2024-09-12
Payer: COMMERCIAL

## 2024-09-13 NOTE — TELEPHONE ENCOUNTER
I am planning on being in person on Monday starting at 1000. Please reach out to her and see if she can be seen at any of those 10-1 appts. Merlene Reyes M.D.

## 2024-09-14 DIAGNOSIS — E66.01 CLASS 2 SEVERE OBESITY DUE TO EXCESS CALORIES WITH SERIOUS COMORBIDITY AND BODY MASS INDEX (BMI) OF 37.0 TO 37.9 IN ADULT (H): ICD-10-CM

## 2024-09-14 DIAGNOSIS — E66.812 CLASS 2 SEVERE OBESITY DUE TO EXCESS CALORIES WITH SERIOUS COMORBIDITY AND BODY MASS INDEX (BMI) OF 37.0 TO 37.9 IN ADULT (H): ICD-10-CM

## 2024-09-16 RX ORDER — TIRZEPATIDE 7.5 MG/.5ML
INJECTION, SOLUTION SUBCUTANEOUS
Qty: 4 ML | Refills: 0 | Status: SHIPPED | OUTPATIENT
Start: 2024-09-16

## 2024-09-18 ENCOUNTER — OFFICE VISIT (OUTPATIENT)
Dept: FAMILY MEDICINE | Facility: CLINIC | Age: 64
End: 2024-09-18
Payer: COMMERCIAL

## 2024-09-18 VITALS
HEIGHT: 61 IN | OXYGEN SATURATION: 98 % | TEMPERATURE: 97.9 F | SYSTOLIC BLOOD PRESSURE: 114 MMHG | DIASTOLIC BLOOD PRESSURE: 76 MMHG | HEART RATE: 82 BPM | BODY MASS INDEX: 35.12 KG/M2 | WEIGHT: 186 LBS

## 2024-09-18 DIAGNOSIS — R05.2 SUBACUTE COUGH: ICD-10-CM

## 2024-09-18 DIAGNOSIS — Z82.49 FAMILY HISTORY OF PREMATURE CORONARY HEART DISEASE: ICD-10-CM

## 2024-09-18 DIAGNOSIS — B37.0 THRUSH: Primary | ICD-10-CM

## 2024-09-18 DIAGNOSIS — Z82.5 FAMILY HISTORY OF EMPHYSEMA: ICD-10-CM

## 2024-09-18 PROCEDURE — 99213 OFFICE O/P EST LOW 20 MIN: CPT | Performed by: FAMILY MEDICINE

## 2024-09-18 PROCEDURE — 99000 SPECIMEN HANDLING OFFICE-LAB: CPT | Performed by: FAMILY MEDICINE

## 2024-09-18 PROCEDURE — G2211 COMPLEX E/M VISIT ADD ON: HCPCS | Performed by: FAMILY MEDICINE

## 2024-09-18 PROCEDURE — 36415 COLL VENOUS BLD VENIPUNCTURE: CPT | Performed by: FAMILY MEDICINE

## 2024-09-18 PROCEDURE — 82104 ALPHA-1-ANTITRYPSIN PHENO: CPT | Mod: 90 | Performed by: FAMILY MEDICINE

## 2024-09-18 PROCEDURE — 82103 ALPHA-1-ANTITRYPSIN TOTAL: CPT | Mod: 90 | Performed by: FAMILY MEDICINE

## 2024-09-18 RX ORDER — DEXAMETHASONE 4 MG/1
8 TABLET ORAL
Qty: 6 TABLET | Refills: 0 | Status: SHIPPED | OUTPATIENT
Start: 2024-09-18 | End: 2024-09-21

## 2024-09-18 RX ORDER — NYSTATIN 100000/ML
500000 SUSPENSION, ORAL (FINAL DOSE FORM) ORAL 4 TIMES DAILY
Qty: 120 ML | Refills: 1 | Status: SHIPPED | OUTPATIENT
Start: 2024-09-18

## 2024-09-18 NOTE — PROGRESS NOTES
Assessment & Plan     Thrush  Will treat for now. She can stop the steroid inhaler   - nystatin (MYCOSTATIN) 525230 UNIT/ML suspension; Take 5 mLs (500,000 Units) by mouth 4 times daily.    Subacute cough  Should help using inhaled steroids is not inrier  - dexAMETHasone (DECADRON) 4 MG tablet; Take 2 tablets (8 mg) by mouth daily with food for 3 days.    Family history of emphysema  Results for orders placed or performed in visit on 09/18/24   Alpha 1 Antitrypsin     Status: None   Result Value Ref Range    A1A Phenotype M1M1     Alpha-1-Antitrypsin 140 90 - 200 mg/dL       - Alpha 1 Antitrypsin; Future  - Alpha 1 Antitrypsin    Family history of premature coronary heart disease  She is requesting this and it is reasonable   - CT Coronary Calcium Scan; Future            FUTURE APPOINTMENTS:       - Follow-up for annual visit or as needed    Subjective   Nidia is a 64 year old, presenting for the following health issues:  Sinus Problem        9/18/2024     9:57 AM   Additional Questions   Roomed by Bebe ECHAVARRIA CMA     History of Present Illness       Reason for visit:  Sinuses    She eats 2-3 servings of fruits and vegetables daily.She consumes 0 sweetened beverage(s) daily.She exercises with enough effort to increase her heart rate 10 to 19 minutes per day.  She exercises with enough effort to increase her heart rate 5 days per week.   She is taking medications regularly.     Stopped advair due to getting what appeared to be thrush on her tongue.   Discuss inhalers   Discuss family history             Symptoms: cc Present Absent Comment   Fever/Chills   x    Fatigue  x     Headache  x     Muscle or Body  Aches   x    Eye Irritation  x  White manuela flashes in the side of eye a few times   Sneezing       Nasal Danny/Drg x      Sinus Pressure/Pain x      Dental pain   x    Sore Throat   x    Swollen Glands   x    Ear Pain/Fullness  x  Plugged    Cough x      Wheeze   x    Chest Discomfort   x    Shortness of breath   x  "   Abdominal pain   x    Emesis    x    Diarrhea   x    Other  x  Voice comes and goes     Symptom duration:  1 month, se   Symptom severity:     Treatments tried:  Mucinex, sinus rinse, week of antibiotics,    Contacts:       Had ct scan sinus and she had fluid on the left maxillary sinus               Review of Systems  Constitutional, HEENT, cardiovascular, pulmonary, gi and gu systems are negative, except as otherwise noted.      Objective    /76 (BP Location: Right arm, Patient Position: Sitting, Cuff Size: Adult Large)   Pulse 82   Temp 97.9  F (36.6  C) (Tympanic)   Ht 1.543 m (5' 0.75\")   Wt 84.4 kg (186 lb)   LMP 07/29/2002   SpO2 98%   BMI 35.43 kg/m    Body mass index is 35.43 kg/m .  Physical Exam   GENERAL: alert and no distress  EYES: Eyes grossly normal to inspection, PERRL and conjunctivae and sclerae normal  HENT: normal cephalic/atraumatic, both ears: clear effusion, nose and mouth without ulcers or lesions, oropharynx clear, and oral mucous membranes moist  NECK: no adenopathy, no asymmetry, masses, or scars  RESP: lungs clear to auscultation - no rales, rhonchi or wheezes  CV: regular rate and rhythm, normal S1 S2, no S3 or S4, no murmur, click or rub, no peripheral edema  ABDOMEN: soft, nontender, no hepatosplenomegaly, no masses and bowel sounds normal  MS: no gross musculoskeletal defects noted, no edema            Signed Electronically by: Merlene Reyes MD    "

## 2024-09-21 LAB
A1AT PHENOTYP SERPL-IMP: NORMAL
A1AT SERPL-MCNC: 140 MG/DL

## 2024-09-27 ENCOUNTER — HOSPITAL ENCOUNTER (OUTPATIENT)
Dept: CT IMAGING | Facility: CLINIC | Age: 64
Discharge: HOME | End: 2024-09-27
Attending: FAMILY MEDICINE | Admitting: FAMILY MEDICINE
Payer: COMMERCIAL

## 2024-09-27 DIAGNOSIS — Z82.49 FAMILY HISTORY OF PREMATURE CORONARY HEART DISEASE: ICD-10-CM

## 2024-09-27 PROCEDURE — 75571 CT HRT W/O DYE W/CA TEST: CPT

## 2024-09-27 PROCEDURE — 75571 CT HRT W/O DYE W/CA TEST: CPT | Mod: 26 | Performed by: INTERNAL MEDICINE

## 2024-09-30 LAB
CV CALCIUM SCORE AGATSTON LM: 0
CV CALCIUM SCORING AGATSON LAD: 1
CV CALCIUM SCORING AGATSTON CX: 2
CV CALCIUM SCORING AGATSTON RCA: 0
CV CALCIUM SCORING AGATSTON TOTAL: 3

## 2024-10-09 ENCOUNTER — TELEPHONE (OUTPATIENT)
Dept: FAMILY MEDICINE | Facility: CLINIC | Age: 64
End: 2024-10-09
Payer: COMMERCIAL

## 2024-10-09 NOTE — TELEPHONE ENCOUNTER
Prior Authorization Retail Medication Request    Medication/Dose: Zepbound 7.5mg  Diagnosis and ICD code (if different than what is on RX):    New/renewal/insurance change PA/secondary ins. PA:  Previously Tried and Failed:    Rationale:      Insurance   Primary:   Insurance ID:      Secondary (if applicable):  Insurance ID:      Pharmacy Information (if different than what is on RX)  Name:    Phone:    Fax:    Clinic Information  Preferred routing pool for dept communication:

## 2024-10-14 NOTE — TELEPHONE ENCOUNTER
Central Prior Authorization Team   Phone: [phone]    PA Initiation    Medication: Zepbound  Insurance Company: [employer] - Phone [phone] Fax [phone]  Pharmacy Filling the Rx: [facility] [address] [place] [address] - [address]  Filling Pharmacy Phone: [phone]  Filling Pharmacy Fax:    Start Date: 10/14/2024

## 2024-10-14 NOTE — TELEPHONE ENCOUNTER
PRIOR AUTHORIZATION DENIED    Medication: Zepbound    Denial Date: 10/14/2024    Denial Rational:  Per insurance, medication is excluded from patient's benefit plan and will not be covered. Review and appeal are not available because of this exclusion.          Appeal Information:  N/A

## 2024-10-17 ENCOUNTER — OFFICE VISIT (OUTPATIENT)
Dept: FAMILY MEDICINE | Facility: CLINIC | Age: 64
End: 2024-10-17
Payer: COMMERCIAL

## 2024-10-17 VITALS
DIASTOLIC BLOOD PRESSURE: 78 MMHG | WEIGHT: 183 LBS | BODY MASS INDEX: 34.55 KG/M2 | TEMPERATURE: 97.8 F | OXYGEN SATURATION: 100 % | HEIGHT: 61 IN | SYSTOLIC BLOOD PRESSURE: 120 MMHG | HEART RATE: 92 BPM

## 2024-10-17 DIAGNOSIS — R93.1 AGATSTON CAC SCORE, <100: ICD-10-CM

## 2024-10-17 DIAGNOSIS — E78.5 HYPERLIPIDEMIA LDL GOAL <130: Primary | ICD-10-CM

## 2024-10-17 PROCEDURE — G2211 COMPLEX E/M VISIT ADD ON: HCPCS | Performed by: FAMILY MEDICINE

## 2024-10-17 PROCEDURE — 99213 OFFICE O/P EST LOW 20 MIN: CPT | Performed by: FAMILY MEDICINE

## 2024-10-17 RX ORDER — PHENTERMINE HYDROCHLORIDE 37.5 MG/1
37.5 TABLET ORAL
Qty: 30 TABLET | Refills: 1 | Status: CANCELLED | OUTPATIENT
Start: 2024-10-17

## 2024-10-17 RX ORDER — ATORVASTATIN CALCIUM 10 MG/1
10 TABLET, FILM COATED ORAL DAILY
Qty: 90 TABLET | Refills: 3 | Status: SHIPPED | OUTPATIENT
Start: 2024-10-17 | End: 2024-11-08

## 2024-10-17 RX ORDER — PHENTERMINE HYDROCHLORIDE 37.5 MG/1
37.5 TABLET ORAL
Qty: 30 TABLET | Refills: 1 | COMMUNITY
Start: 2024-10-17 | End: 2024-10-17

## 2024-10-17 NOTE — PROGRESS NOTES
"  Assessment & Plan     Hyperlipidemia LDL goal <130  Start this follow up with cardiology for further work up   - Adult Cardiology Eval  Referral; Future  - atorvastatin (LIPITOR) 10 MG tablet; Take 1 tablet (10 mg) by mouth daily.    BMI 34.0-34.9,adult  Medications have worked. Thank you insurance/ others from letting her keep on them     Agatston CAC score, <100  Moderate risk will have her consult for follow up hernandez   - Adult Cardiology Eval  Referral; Future            CONSULTATION/REFERRAL to cardiology      Subjective   Nidia is a 64 year old, presenting for the following health issues:  Results        10/17/2024     8:51 AM   Additional Questions   Roomed by Bebe ECHAVARRIA CMA     History of Present Illness       Reason for visit:  Ct scan results   She is taking medications regularly.     Insurance is no longer covering Zepbound - has one more shot left. Would like to switch back to the Phentermine.   The zepbound     She is willing to start the statin discussed follow up for further recommendations.   Her score puts her at higher risk. But with her weight loss she has improved her health overall]  The 10-year ASCVD risk score (Alberto DK, et al., 2019) is: 3.8%    Values used to calculate the score:      Age: 64 years      Sex: Female      Is Non- : No      Diabetic: No      Tobacco smoker: No      Systolic Blood Pressure: 120 mmHg      Is BP treated: No      HDL Cholesterol: 54 mg/dL      Total Cholesterol: 212 mg/dL  Based on [place] her risk is low. She is still ok with seeing cardiology for more specific       Review of Systems  Constitutional, HEENT, cardiovascular, pulmonary, gi and gu systems are negative, except as otherwise noted.      Objective    /78 (BP Location: Right arm, Patient Position: Sitting, Cuff Size: Adult Regular)   Pulse 92   Temp 97.8  F (36.6  C) (Tympanic)   Ht 1.543 m (5' 0.75\")   Wt 83 kg (183 lb)   LMP 07/29/2002   SpO2 100%  "  BMI 34.86 kg/m    Body mass index is 34.86 kg/m .  Physical Exam   GENERAL: alert and no distress  RESP: lungs clear to auscultation - no rales, rhonchi or wheezes  CV: regular rate and rhythm, normal S1 S2, no S3 or S4, no murmur, click or rub, no peripheral edema   PSYCH: mentation appears normal and anxious    No results found for any visits on 10/17/24.        Signed Electronically by: Merlene Reyes MD

## 2024-11-08 ENCOUNTER — OFFICE VISIT (OUTPATIENT)
Dept: CARDIOLOGY | Facility: CLINIC | Age: 64
End: 2024-11-08
Attending: FAMILY MEDICINE
Payer: COMMERCIAL

## 2024-11-08 VITALS
DIASTOLIC BLOOD PRESSURE: 66 MMHG | RESPIRATION RATE: 16 BRPM | SYSTOLIC BLOOD PRESSURE: 110 MMHG | WEIGHT: 179 LBS | OXYGEN SATURATION: 98 % | HEART RATE: 86 BPM | BODY MASS INDEX: 34.1 KG/M2

## 2024-11-08 DIAGNOSIS — E78.5 HYPERLIPIDEMIA LDL GOAL <130: ICD-10-CM

## 2024-11-08 DIAGNOSIS — R93.1 AGATSTON CAC SCORE, <100: ICD-10-CM

## 2024-11-08 DIAGNOSIS — I25.119 CORONARY ARTERY DISEASE INVOLVING NATIVE CORONARY ARTERY OF NATIVE HEART WITH ANGINA PECTORIS (H): Primary | ICD-10-CM

## 2024-11-08 LAB
CHOLEST SERPL-MCNC: 177 MG/DL
EST. AVERAGE GLUCOSE BLD GHB EST-MCNC: 97 MG/DL
FASTING STATUS PATIENT QL REPORTED: YES
HBA1C MFR BLD: 5 %
HDLC SERPL-MCNC: 58 MG/DL
LDLC SERPL CALC-MCNC: 104 MG/DL
NONHDLC SERPL-MCNC: 119 MG/DL
TRIGL SERPL-MCNC: 77 MG/DL

## 2024-11-08 PROCEDURE — 83036 HEMOGLOBIN GLYCOSYLATED A1C: CPT | Performed by: INTERNAL MEDICINE

## 2024-11-08 PROCEDURE — G2211 COMPLEX E/M VISIT ADD ON: HCPCS | Performed by: INTERNAL MEDICINE

## 2024-11-08 PROCEDURE — 36415 COLL VENOUS BLD VENIPUNCTURE: CPT | Performed by: INTERNAL MEDICINE

## 2024-11-08 PROCEDURE — 99204 OFFICE O/P NEW MOD 45 MIN: CPT | Performed by: INTERNAL MEDICINE

## 2024-11-08 PROCEDURE — 80061 LIPID PANEL: CPT | Performed by: INTERNAL MEDICINE

## 2024-11-08 NOTE — LETTER
11/8/2024    Merlene Reyes MD  [address] [facility] [address]    RE: Nidia Acosta       Dear Colleague,     I had the pleasure of seeing Nidia Acosta in the [facility].        Thank you, Dr. Reyes, for asking [facility] to evaluate Ms. Nidia Acosta.      Assessment/Recommendations   Assessment:    Coronary calcification, mild, consistent with coronary atherosclerosis  Dyspnea probably primarily related to asthma and obesity, rule out anginal equivalent  Hypercholesterolemia, untreated /history of atorvastatin intolerance  Asthma    Plan:  Fasting lipids and A1c today  Rechallenge with rosuvastatin, dose based on levels of LDL cholesterol  I stressed the importance of healthy lifestyle including proper diet and regular exercise    Stress echo to rule out cardiac etiology of exertional shortness of breath    Further recommendation when results of the test available  The longitudinal plan of care for the diagnosis(es)/condition(s) as documented were addressed during this visit. Due to the added complexity in care, I will continue to support Nidia in the subsequent management and with ongoing continuity of care.      History of Present Illness    Ms. Nidia Acosta is a 64 year old female who presents for cardiac evaluation.  She is primarily concerned about her family history of heart disease.  Her younger brother had bypass surgery in her 50s and her parents had heart surgery in the 70s and 80s.  She is not known to have obstructive coronary artery disease.  She has never had a stress test or coronary angiogram.  She has been on and off cholesterol medication for years.  Most recently she was taking atorvastatin.  She developed muscle aching and stopped taking this medication after being on for 2 weeks    She has chronic exertional dyspnea.  She attributes dyspnea to her weight and asthma.  She denies classical angina.  She has not had weight  gain, PND, orthopnea.  She reports that with the medications she has lost 30 to 40 pounds.  She is hoping to be able to take phentermine.  She denies syncope or near syncope.  She has not had prolonged heart racing.    ECG: Personally reviewed.  2021 normal sinus rhythm within normal limits.    Coronary calcium scan: 2024   score of 3     Physical Examination Review of Systems   /66 (BP Location: Left arm, Patient Position: Sitting, Cuff Size: Adult Regular)   Pulse 86   Resp 16   Wt 81.2 kg (179 lb)   LMP 07/29/2002   SpO2 98%   BMI 34.10 kg/m    Body mass index is 34.1 kg/m .  Wt Readings from Last 3 Encounters:   11/08/24 81.2 kg (179 lb)   10/17/24 83 kg (183 lb)   09/18/24 84.4 kg (186 lb)     General Appearance:   Alert, cooperative, no distress, appears stated age   Head/ENT: Normocephalic, without obvious abnormality. Membranes moist      EYES:  no scleral icterus, normal conjunctivae   Neck: Supple, symmetrical, trachea midline, no adenopathy, thyroid: not enlarged, symmetric, no carotid bruit or JVD   Chest/Lungs:   Lungs are clear to auscultation, respirations unlabored. No tenderness or deformity    Cardiovascular:   Regular rhythm, S1, S2 normal, no murmur, rub or gallop.   Abdomen:  Soft, non-tender, bowel sounds active all four quadrants,  no masses, no organomegaly   Extremities: no cyanosis or clubbing. No edema   Skin: Skin color, texture, turgor normal, no rashes or lesions.    Psychiatric: Normal affect, calm   Neurologic: Alert and oriented x 3, moving all four extremities.     Encounter Vitals  BP: 110/66  Pulse: 86  Resp: 16  SpO2: 98 %  Weight: 81.2 kg (179 lb)                                          Lab Results    Chemistry/lipid CBC Cardiac Enzymes/BNP/TSH/INR   Recent Labs   Lab Test 04/17/24  0750   CHOL 212*   HDL 54   *   TRIG 160*     Recent Labs   Lab Test 04/17/24  0750 09/25/19  0851 05/30/17  1419   * 145* 101*     Recent Labs   Lab Test 01/04/24  1000  "     POTASSIUM 4.4   CHLORIDE 105   CO2 22   *   BUN 15.0   CR 0.61   GFRESTIMATED >90   ROHAN 9.8     Recent Labs   Lab Test 24  1000 23  0818 23  2217   CR 0.61 0.63 0.58     No results for input(s): \"A1C\" in the last 47952 hours.       Recent Labs   Lab Test 24  1000   WBC 5.1   HGB 13.2   HCT 40.3   MCV 89        Recent Labs   Lab Test 24  1000 23  0818 23  2217   HGB 13.2 13.7 13.7    No results for input(s): \"TROPONINI\" in the last 47500 hours.  No results for input(s): \"BNP\", \"NTBNPI\", \"NTBNP\" in the last 03466 hours.  Recent Labs   Lab Test 24  1000   TSH 0.11*     No results for input(s): \"INR\" in the last 14140 hours.     Medical History  Surgical History Family History Social History   Past Medical History:   Diagnosis Date     Arthritis     Me, paternal & maternal     Backache, unspecified     lumbar back pain, degen disk disease     COPD (chronic obstructive pulmonary disease) (H)     I get bronchitis frequently     Cough variant asthma 2020     Depressive disorder     numerous yrs ago diagnosed with PTSD     Leiomyoma of uterus, unspecified     s/p LAVH     Other genital herpes     HSV-2     Thyroid disease     Me     Unspecified sinusitis (chronic)       Brother had open heart surgery in his late 50s Social History     Socioeconomic History     Marital status:      Spouse name: Not on file     Number of children: 6     Years of education: Not on file     Highest education level: Not on file   Occupational History     Occupation:      Employer: [employer]   Tobacco Use     Smoking status: Former     Current packs/day: 0.00     Average packs/day: 0.5 packs/day for 1 year (0.5 ttl pk-yrs)     Types: Cigarettes     Start date: 1985     Quit date: 1986     Years since quittin.8     Smokeless tobacco: Never   Vaping Use     Vaping status: Never Used   Substance and Sexual " Activity     Alcohol use: Yes     Comment: 1-5 drinks weekly     Drug use: No     Sexual activity: Yes     Partners: Male     Birth control/protection: Female Surgical     Comment: Historectomy   Other Topics Concern     Parent/sibling w/ CABG, MI or angioplasty before 65F 55M? Yes   Social History Narrative     Not on file     Social Drivers of Health     Financial Resource Strain: Low Risk  (7/24/2024)    Financial Resource Strain      Within the past 12 months, have you or your family members you live with been unable to get utilities (heat, electricity) when it was really needed?: No   Food Insecurity: Low Risk  (7/24/2024)    Food Insecurity      Within the past 12 months, did you worry that your food would run out before you got money to buy more?: No      Within the past 12 months, did the food you bought just not last and you didn t have money to get more?: No   Transportation Needs: Low Risk  (7/24/2024)    Transportation Needs      Within the past 12 months, has lack of transportation kept you from medical appointments, getting your medicines, non-medical meetings or appointments, work, or from getting things that you need?: No   Physical Activity: Insufficiently Active (7/24/2024)    Exercise Vital Sign      Days of Exercise per Week: 4 days      Minutes of Exercise per Session: 10 min   Stress: No Stress Concern Present (7/24/2024)    [employer] - Occupational Stress Questionnaire      Feeling of Stress : Only a little   Social Connections: Unknown (7/24/2024)    Social Connection and Isolation Panel [NHANES]      Frequency of Communication with Friends and Family: Not on file      Frequency of Social Gatherings with Friends and Family: Three times a week      Attends Baptism Services: Not on file      Active Member of Clubs or Organizations: Not on file      Attends Club or Organization Meetings: Not on file      Marital Status: Not on file   Interpersonal Safety: Low  Risk  (8/29/2024)    Interpersonal Safety      Do you feel physically and emotionally safe where you currently live?: Yes      Within the past 12 months, have you been hit, slapped, kicked or otherwise physically hurt by someone?: No      Within the past 12 months, have you been humiliated or emotionally abused in other ways by your partner or ex-partner?: No   Housing Stability: Low Risk  (7/24/2024)    Housing Stability      Do you have housing? : Yes      Are you worried about losing your housing?: No         Medications  Allergies   Current Outpatient Medications   Medication Sig Dispense Refill     acetaminophen (TYLENOL) 325 MG tablet Take 3 tablets (975 mg) by mouth every 8 hours as needed for mild pain 90 tablet 0     albuterol (PROAIR HFA/PROVENTIL HFA/VENTOLIN HFA) 108 (90 Base) MCG/ACT inhaler Inhale 2 puffs into the lungs every 6 hours 18 g 0     buPROPion (WELLBUTRIN SR) 100 MG 12 hr tablet Take 1 tablet (100 mg) by mouth 2 times daily 180 tablet 1     cetirizine (ZYRTEC) 10 MG tablet Take 10 mg by mouth daily       FLUoxetine (PROZAC) 40 MG capsule Take 1 capsule (40 mg) by mouth daily 90 capsule 3     fluticasone (FLONASE) 50 MCG/ACT nasal spray        levothyroxine (EUTHYROX) 125 MCG tablet Take 1 tablet (125 mcg) by mouth daily 90 tablet 3     montelukast (SINGULAIR) 10 MG tablet Take 1 tablet (10 mg) by mouth at bedtime 90 tablet 3     NONFORMULARY Take 4 capsules by mouth daily Sulfurzyme -  combines wolfberry with MSM, a naturally occurring organic form of dietary sulfur needed by our bodies every day to maintain the structure of proteins, protect cells and cell membranes, replenish the connections between cells, and preserve the molecular framework of connective tissue.       nystatin (MYCOSTATIN) 562401 UNIT/ML suspension Take 5 mLs (500,000 Units) by mouth 4 times daily. 120 mL 1     spironolactone (ALDACTONE) 50 MG tablet Take 1 tablet (50 mg) by mouth daily 90 tablet 1     triamcinolone  (ARISTOCORT HP) 0.5 % external cream Apply topically 2 times daily 454 g 0     valACYclovir (VALTREX) 500 MG tablet Take 1 tablet (500 mg) by mouth daily 90 tablet 3     ZEPBOUND 7.5 MG/0.5ML prefilled pen INJECT 1 SYRINGE SUBCUTANEOUSLY ONCE A WEEK 4 mL 0     dexAMETHasone (DECADRON) 4 MG tablet Take 2 tablets (8 mg) by mouth daily with food for 3 days. 6 tablet 0     fluocinonide emulsified base (LIDEX-E) 0.05 % external cream Apply to groin and and under breast 2 times per day for up to 2 weeks at a time (Patient not taking: Reported on 11/8/2024) 30 g 1        Allergies   Allergen Reactions     Adhesive Tape Itching     Bees Anaphylaxis     Codeine Difficulty breathing     Latex Dermatitis     Penicillin [Penicillins] Difficulty breathing     Phentermine Hives     Seasonal Allergies                         Thank you for allowing me to participate in the care of your patient.      Sincerely,     Marcello Chirinos MD     [facility]  cc:   Merlene Reyes [place]  [address] [address] [place] [place] [address]

## 2024-11-08 NOTE — PROGRESS NOTES
Thank you, Dr. Reyes, for asking [facility] to evaluate Ms. Nidia Acosta.      Assessment/Recommendations   Assessment:    Coronary calcification, mild, consistent with coronary atherosclerosis  Dyspnea probably primarily related to asthma and obesity, rule out anginal equivalent  Hypercholesterolemia, untreated /history of atorvastatin intolerance  Asthma    Plan:  Fasting lipids and A1c today  Rechallenge with rosuvastatin, dose based on levels of LDL cholesterol  I stressed the importance of healthy lifestyle including proper diet and regular exercise    Stress echo to rule out cardiac etiology of exertional shortness of breath    Further recommendation when results of the test available  The longitudinal plan of care for the diagnosis(es)/condition(s) as documented were addressed during this visit. Due to the added complexity in care, I will continue to support Nidia in the subsequent management and with ongoing continuity of care.      History of Present Illness    Ms. Nidia Acosta is a 64 year old female who presents for cardiac evaluation.  She is primarily concerned about her family history of heart disease.  Her younger brother had bypass surgery in her 50s and her parents had heart surgery in the 70s and 80s.  She is not known to have obstructive coronary artery disease.  She has never had a stress test or coronary angiogram.  She has been on and off cholesterol medication for years.  Most recently she was taking atorvastatin.  She developed muscle aching and stopped taking this medication after being on for 2 weeks    She has chronic exertional dyspnea.  She attributes dyspnea to her weight and asthma.  She denies classical angina.  She has not had weight gain, PND, orthopnea.  She reports that with the medications she has lost 30 to 40 pounds.  She is hoping to be able to take phentermine.  She denies syncope or near syncope.  She has not had prolonged heart  racing.    ECG: Personally reviewed.  2021 normal sinus rhythm within normal limits.    Coronary calcium scan: 2024   score of 3     Physical Examination Review of Systems   /66 (BP Location: Left arm, Patient Position: Sitting, Cuff Size: Adult Regular)   Pulse 86   Resp 16   Wt 81.2 kg (179 lb)   LMP 07/29/2002   SpO2 98%   BMI 34.10 kg/m    Body mass index is 34.1 kg/m .  Wt Readings from Last 3 Encounters:   11/08/24 81.2 kg (179 lb)   10/17/24 83 kg (183 lb)   09/18/24 84.4 kg (186 lb)     General Appearance:   Alert, cooperative, no distress, appears stated age   Head/ENT: Normocephalic, without obvious abnormality. Membranes moist      EYES:  no scleral icterus, normal conjunctivae   Neck: Supple, symmetrical, trachea midline, no adenopathy, thyroid: not enlarged, symmetric, no carotid bruit or JVD   Chest/Lungs:   Lungs are clear to auscultation, respirations unlabored. No tenderness or deformity    Cardiovascular:   Regular rhythm, S1, S2 normal, no murmur, rub or gallop.   Abdomen:  Soft, non-tender, bowel sounds active all four quadrants,  no masses, no organomegaly   Extremities: no cyanosis or clubbing. No edema   Skin: Skin color, texture, turgor normal, no rashes or lesions.    Psychiatric: Normal affect, calm   Neurologic: Alert and oriented x 3, moving all four extremities.     Encounter Vitals  BP: 110/66  Pulse: 86  Resp: 16  SpO2: 98 %  Weight: 81.2 kg (179 lb)                                          Lab Results    Chemistry/lipid CBC Cardiac Enzymes/BNP/TSH/INR   Recent Labs   Lab Test 04/17/24  0750   CHOL 212*   HDL 54   *   TRIG 160*     Recent Labs   Lab Test 04/17/24  0750 09/25/19  0851 05/30/17  1419   * 145* 101*     Recent Labs   Lab Test 01/04/24  1000      POTASSIUM 4.4   CHLORIDE 105   CO2 22   *   BUN 15.0   CR 0.61   GFRESTIMATED >90   ROHAN 9.8     Recent Labs   Lab Test 01/04/24  1000 12/06/23  0818 12/05/23  2217   CR 0.61 0.63 0.58     No  "results for input(s): \"A1C\" in the last 64650 hours.       Recent Labs   Lab Test 24  1000   WBC 5.1   HGB 13.2   HCT 40.3   MCV 89        Recent Labs   Lab Test 24  1000 23  0818 23  2217   HGB 13.2 13.7 13.7    No results for input(s): \"TROPONINI\" in the last 87418 hours.  No results for input(s): \"BNP\", \"NTBNPI\", \"NTBNP\" in the last 74919 hours.  Recent Labs   Lab Test 24  1000   TSH 0.11*     No results for input(s): \"INR\" in the last 38282 hours.     Medical History  Surgical History Family History Social History   Past Medical History:   Diagnosis Date    Arthritis     Me, paternal & maternal    Backache, unspecified     lumbar back pain, degen disk disease    COPD (chronic obstructive pulmonary disease) (H)     I get bronchitis frequently    Cough variant asthma 2020    Depressive disorder     numerous yrs ago diagnosed with PTSD    Leiomyoma of uterus, unspecified     s/p LAVH    Other genital herpes     HSV-2    Thyroid disease     Me    Unspecified sinusitis (chronic)       Brother had open heart surgery in his late 50s Social History     Socioeconomic History    Marital status:      Spouse name: Not on file    Number of children: 6    Years of education: Not on file    Highest education level: Not on file   Occupational History    Occupation:      Employer: [employer]   Tobacco Use    Smoking status: Former     Current packs/day: 0.00     Average packs/day: 0.5 packs/day for 1 year (0.5 ttl pk-yrs)     Types: Cigarettes     Start date: 1985     Quit date: 1986     Years since quittin.8    Smokeless tobacco: Never   Vaping Use    Vaping status: Never Used   Substance and Sexual Activity    Alcohol use: Yes     Comment: 1-5 drinks weekly    Drug use: No    Sexual activity: Yes     Partners: Male     Birth control/protection: Female Surgical     Comment: Historectomy   Other Topics Concern    Parent/sibling " w/ CABG, MI or angioplasty before 65F 55M? Yes   Social History Narrative    Not on file     Social Drivers of Health     Financial Resource Strain: Low Risk  (7/24/2024)    Financial Resource Strain     Within the past 12 months, have you or your family members you live with been unable to get utilities (heat, electricity) when it was really needed?: No   Food Insecurity: Low Risk  (7/24/2024)    Food Insecurity     Within the past 12 months, did you worry that your food would run out before you got money to buy more?: No     Within the past 12 months, did the food you bought just not last and you didn t have money to get more?: No   Transportation Needs: Low Risk  (7/24/2024)    Transportation Needs     Within the past 12 months, has lack of transportation kept you from medical appointments, getting your medicines, non-medical meetings or appointments, work, or from getting things that you need?: No   Physical Activity: Insufficiently Active (7/24/2024)    Exercise Vital Sign     Days of Exercise per Week: 4 days     Minutes of Exercise per Session: 10 min   Stress: No Stress Concern Present (7/24/2024)    [employer] - Occupational Stress Questionnaire     Feeling of Stress : Only a little   Social Connections: Unknown (7/24/2024)    Social Connection and Isolation Panel [NHANES]     Frequency of Communication with Friends and Family: Not on file     Frequency of Social Gatherings with Friends and Family: Three times a week     Attends Congregational Services: Not on file     Active Member of Clubs or Organizations: Not on file     Attends Club or Organization Meetings: Not on file     Marital Status: Not on file   Interpersonal Safety: Low Risk  (8/29/2024)    Interpersonal Safety     Do you feel physically and emotionally safe where you currently live?: Yes     Within the past 12 months, have you been hit, slapped, kicked or otherwise physically hurt by someone?: No     Within the past  12 months, have you been humiliated or emotionally abused in other ways by your partner or ex-partner?: No   Housing Stability: Low Risk  (7/24/2024)    Housing Stability     Do you have housing? : Yes     Are you worried about losing your housing?: No         Medications  Allergies   Current Outpatient Medications   Medication Sig Dispense Refill    acetaminophen (TYLENOL) 325 MG tablet Take 3 tablets (975 mg) by mouth every 8 hours as needed for mild pain 90 tablet 0    albuterol (PROAIR HFA/PROVENTIL HFA/VENTOLIN HFA) 108 (90 Base) MCG/ACT inhaler Inhale 2 puffs into the lungs every 6 hours 18 g 0    buPROPion (WELLBUTRIN SR) 100 MG 12 hr tablet Take 1 tablet (100 mg) by mouth 2 times daily 180 tablet 1    cetirizine (ZYRTEC) 10 MG tablet Take 10 mg by mouth daily      FLUoxetine (PROZAC) 40 MG capsule Take 1 capsule (40 mg) by mouth daily 90 capsule 3    fluticasone (FLONASE) 50 MCG/ACT nasal spray       levothyroxine (EUTHYROX) 125 MCG tablet Take 1 tablet (125 mcg) by mouth daily 90 tablet 3    montelukast (SINGULAIR) 10 MG tablet Take 1 tablet (10 mg) by mouth at bedtime 90 tablet 3    NONFORMULARY Take 4 capsules by mouth daily Sulfurzyme -  combines wolfberry with MSM, a naturally occurring organic form of dietary sulfur needed by our bodies every day to maintain the structure of proteins, protect cells and cell membranes, replenish the connections between cells, and preserve the molecular framework of connective tissue.      nystatin (MYCOSTATIN) 744756 UNIT/ML suspension Take 5 mLs (500,000 Units) by mouth 4 times daily. 120 mL 1    spironolactone (ALDACTONE) 50 MG tablet Take 1 tablet (50 mg) by mouth daily 90 tablet 1    triamcinolone (ARISTOCORT HP) 0.5 % external cream Apply topically 2 times daily 454 g 0    valACYclovir (VALTREX) 500 MG tablet Take 1 tablet (500 mg) by mouth daily 90 tablet 3    ZEPBOUND 7.5 MG/0.5ML prefilled pen INJECT 1 SYRINGE SUBCUTANEOUSLY ONCE A WEEK 4 mL 0    dexAMETHasone  (DECADRON) 4 MG tablet Take 2 tablets (8 mg) by mouth daily with food for 3 days. 6 tablet 0    fluocinonide emulsified base (LIDEX-E) 0.05 % external cream Apply to groin and and under breast 2 times per day for up to 2 weeks at a time (Patient not taking: Reported on 11/8/2024) 30 g 1        Allergies   Allergen Reactions    Adhesive Tape Itching    Bees Anaphylaxis    Codeine Difficulty breathing    Latex Dermatitis    Penicillin [Penicillins] Difficulty breathing    Phentermine Hives    Seasonal Allergies

## 2024-11-08 NOTE — PATIENT INSTRUCTIONS
Nidia Acosta,    It was a pleasure to see you today at [facility].     My recommendations after this visit include:    Blood work today to check cholesterol and diabetes  Stress test  Should get 150-250 minutes of moderate  exercise every week    HAILE Chirinos MD, FACC, ADELSO

## 2024-11-22 ENCOUNTER — HOSPITAL ENCOUNTER (OUTPATIENT)
Dept: CARDIOLOGY | Facility: HOSPITAL | Age: 64
Discharge: HOME | End: 2024-11-22
Attending: INTERNAL MEDICINE | Admitting: INTERNAL MEDICINE
Payer: COMMERCIAL

## 2024-11-22 DIAGNOSIS — I25.119 CORONARY ARTERY DISEASE INVOLVING NATIVE CORONARY ARTERY OF NATIVE HEART WITH ANGINA PECTORIS (H): ICD-10-CM

## 2024-11-22 LAB
CV STRESS CURRENT BP HE: NORMAL
CV STRESS CURRENT HR HE: 100
CV STRESS CURRENT HR HE: 103
CV STRESS CURRENT HR HE: 114
CV STRESS CURRENT HR HE: 117
CV STRESS CURRENT HR HE: 137
CV STRESS CURRENT HR HE: 139
CV STRESS CURRENT HR HE: 141
CV STRESS CURRENT HR HE: 87
CV STRESS CURRENT HR HE: 88
CV STRESS CURRENT HR HE: 88
CV STRESS CURRENT HR HE: 89
CV STRESS CURRENT HR HE: 89
CV STRESS CURRENT HR HE: 90
CV STRESS CURRENT HR HE: 90
CV STRESS CURRENT HR HE: 91
CV STRESS CURRENT HR HE: 91
CV STRESS CURRENT HR HE: 92
CV STRESS CURRENT HR HE: 94
CV STRESS CURRENT HR HE: 95
CV STRESS CURRENT HR HE: 96
CV STRESS DEVIATION TIME HE: NORMAL
CV STRESS ECHO PERCENT HR HE: NORMAL
CV STRESS EXERCISE STAGE HE: NORMAL
CV STRESS EXERCISE STAGE REACHED HE: NORMAL
CV STRESS FINAL RESTING BP HE: NORMAL
CV STRESS FINAL RESTING HR HE: 88
CV STRESS MAX HR HE: 140
CV STRESS MAX TREADMILL GRADE HE: 1
CV STRESS MAX TREADMILL SPEED HE: 1.3
CV STRESS PEAK DIA BP HE: NORMAL
CV STRESS PEAK SYS BP HE: NORMAL
CV STRESS PHASE HE: NORMAL
CV STRESS PROTOCOL HE: NORMAL
CV STRESS REASON STOPPED HE: NORMAL
CV STRESS RESTING PT POSITION HE: NORMAL
CV STRESS ST DEVIATION AMOUNT HE: NORMAL
CV STRESS ST DEVIATION ELEVATION HE: NORMAL
CV STRESS ST EVELATION AMOUNT HE: NORMAL
CV STRESS SYMPTOMS HE: NORMAL
CV STRESS TEST TYPE HE: NORMAL
CV STRESS TOTAL STAGE TIME MIN 1 HE: NORMAL
STRESS ECHO BASELINE DIASTOLIC HE: 68
STRESS ECHO BASELINE HR: 92
STRESS ECHO BASELINE SYSTOLIC BP: 101
STRESS ECHO LAST STRESS DIASTOLIC BP: 76
STRESS ECHO LAST STRESS HR: 141
STRESS ECHO LAST STRESS SYSTOLIC BP: 119
STRESS ECHO POST ESTIMATED WORKLOAD: 4.3
STRESS ECHO POST EXERCISE DUR MIN: 2
STRESS ECHO POST EXERCISE DUR SEC: 30
STRESS ECHO TARGET HR: 134

## 2024-11-22 PROCEDURE — 93018 CV STRESS TEST I&R ONLY: CPT | Performed by: INTERNAL MEDICINE

## 2024-11-22 PROCEDURE — C8928 TTE W OR W/O FOL W/CON,STRES: HCPCS

## 2024-11-22 PROCEDURE — 93016 CV STRESS TEST SUPVJ ONLY: CPT | Performed by: INTERNAL MEDICINE

## 2024-11-22 PROCEDURE — 93350 STRESS TTE ONLY: CPT | Mod: 26 | Performed by: INTERNAL MEDICINE

## 2024-11-22 PROCEDURE — 93321 DOPPLER ECHO F-UP/LMTD STD: CPT | Mod: 26 | Performed by: INTERNAL MEDICINE

## 2024-11-22 PROCEDURE — 255N000002 HC RX 255 OP 636: Performed by: INTERNAL MEDICINE

## 2024-11-22 PROCEDURE — 93325 DOPPLER ECHO COLOR FLOW MAPG: CPT | Mod: 26 | Performed by: INTERNAL MEDICINE

## 2024-11-22 PROCEDURE — 93352 ADMIN ECG CONTRAST AGENT: CPT | Performed by: INTERNAL MEDICINE

## 2024-11-22 RX ADMIN — PERFLUTREN 4 ML: 6.52 INJECTION, SUSPENSION INTRAVENOUS at 09:03

## 2024-11-25 DIAGNOSIS — E78.5 HYPERLIPIDEMIA LDL GOAL <130: Primary | ICD-10-CM

## 2024-11-25 DIAGNOSIS — I25.119 CORONARY ARTERY DISEASE INVOLVING NATIVE CORONARY ARTERY OF NATIVE HEART WITH ANGINA PECTORIS (H): ICD-10-CM

## 2024-11-25 RX ORDER — ROSUVASTATIN CALCIUM 10 MG/1
10 TABLET, COATED ORAL DAILY
Qty: 90 TABLET | Refills: 1 | Status: SHIPPED | OUTPATIENT
Start: 2024-11-25

## 2024-11-25 RX ORDER — ROSUVASTATIN CALCIUM 10 MG/1
10 TABLET, COATED ORAL DAILY
Qty: 30 TABLET | Refills: 4 | Status: SHIPPED | OUTPATIENT
Start: 2024-11-25 | End: 2024-11-25

## 2024-12-03 ENCOUNTER — OFFICE VISIT (OUTPATIENT)
Dept: FAMILY MEDICINE | Facility: CLINIC | Age: 64
End: 2024-12-03
Payer: COMMERCIAL

## 2024-12-03 VITALS
SYSTOLIC BLOOD PRESSURE: 132 MMHG | HEART RATE: 100 BPM | BODY MASS INDEX: 33.79 KG/M2 | WEIGHT: 179 LBS | OXYGEN SATURATION: 98 % | TEMPERATURE: 98.5 F | DIASTOLIC BLOOD PRESSURE: 80 MMHG | HEIGHT: 61 IN

## 2024-12-03 DIAGNOSIS — F43.10 PTSD (POST-TRAUMATIC STRESS DISORDER): ICD-10-CM

## 2024-12-03 DIAGNOSIS — E66.811 CLASS 1 OBESITY: Primary | ICD-10-CM

## 2024-12-03 DIAGNOSIS — R41.9 COGNITIVE COMPLAINTS: ICD-10-CM

## 2024-12-03 PROBLEM — E66.812 CLASS 2 SEVERE OBESITY DUE TO EXCESS CALORIES WITH SERIOUS COMORBIDITY IN ADULT (H): Status: RESOLVED | Noted: 2023-12-16 | Resolved: 2024-12-03

## 2024-12-03 PROBLEM — E66.01 CLASS 2 SEVERE OBESITY DUE TO EXCESS CALORIES WITH SERIOUS COMORBIDITY IN ADULT (H): Status: RESOLVED | Noted: 2023-12-16 | Resolved: 2024-12-03

## 2024-12-03 PROCEDURE — 99213 OFFICE O/P EST LOW 20 MIN: CPT | Performed by: FAMILY MEDICINE

## 2024-12-03 PROCEDURE — G2211 COMPLEX E/M VISIT ADD ON: HCPCS | Performed by: FAMILY MEDICINE

## 2024-12-03 RX ORDER — PHENTERMINE HYDROCHLORIDE 37.5 MG/1
37.5 TABLET ORAL
Qty: 90 TABLET | Refills: 1 | Status: SHIPPED | OUTPATIENT
Start: 2024-12-03
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
## 2025-01-29 ENCOUNTER — TELEPHONE (OUTPATIENT)
Dept: FAMILY MEDICINE | Facility: CLINIC | Age: 65
End: 2025-01-29

## 2025-01-29 ENCOUNTER — OFFICE VISIT (OUTPATIENT)
Dept: FAMILY MEDICINE | Facility: CLINIC | Age: 65
End: 2025-01-29
Payer: COMMERCIAL

## 2025-01-29 VITALS
TEMPERATURE: 98.1 F | HEART RATE: 92 BPM | SYSTOLIC BLOOD PRESSURE: 126 MMHG | DIASTOLIC BLOOD PRESSURE: 89 MMHG | OXYGEN SATURATION: 99 %

## 2025-01-29 DIAGNOSIS — L68.0 HIRSUTISM: ICD-10-CM

## 2025-01-29 DIAGNOSIS — L30.1 DYSHIDROTIC ECZEMA: ICD-10-CM

## 2025-01-29 DIAGNOSIS — E03.4 HYPOTHYROIDISM DUE TO ACQUIRED ATROPHY OF THYROID: Primary | ICD-10-CM

## 2025-01-29 LAB
T4 FREE SERPL-MCNC: 1.61 NG/DL (ref 0.9–1.7)
TSH SERPL DL<=0.005 MIU/L-ACNC: 0.02 UIU/ML (ref 0.3–4.2)

## 2025-01-29 PROCEDURE — 90471 IMMUNIZATION ADMIN: CPT | Performed by: FAMILY MEDICINE

## 2025-01-29 PROCEDURE — 84443 ASSAY THYROID STIM HORMONE: CPT | Performed by: FAMILY MEDICINE

## 2025-01-29 PROCEDURE — 36415 COLL VENOUS BLD VENIPUNCTURE: CPT | Performed by: FAMILY MEDICINE

## 2025-01-29 PROCEDURE — 99214 OFFICE O/P EST MOD 30 MIN: CPT | Mod: 25 | Performed by: FAMILY MEDICINE

## 2025-01-29 PROCEDURE — 84439 ASSAY OF FREE THYROXINE: CPT | Performed by: FAMILY MEDICINE

## 2025-01-29 PROCEDURE — G2211 COMPLEX E/M VISIT ADD ON: HCPCS | Performed by: FAMILY MEDICINE

## 2025-01-29 PROCEDURE — 90677 PCV20 VACCINE IM: CPT | Performed by: FAMILY MEDICINE

## 2025-01-29 RX ORDER — SPIRONOLACTONE 50 MG/1
50 TABLET, FILM COATED ORAL 2 TIMES DAILY
Qty: 180 TABLET | Refills: 3 | Status: SHIPPED | OUTPATIENT
Start: 2025-01-29

## 2025-01-29 RX ORDER — SPIRONOLACTONE 50 MG/1
50 TABLET, FILM COATED ORAL DAILY
Qty: 180 TABLET | Refills: 3 | Status: SHIPPED | OUTPATIENT
Start: 2025-01-29 | End: 2025-01-29

## 2025-01-29 ASSESSMENT — PATIENT HEALTH QUESTIONNAIRE - PHQ9
10. IF YOU CHECKED OFF ANY PROBLEMS, HOW DIFFICULT HAVE THESE PROBLEMS MADE IT FOR YOU TO DO YOUR WORK, TAKE CARE OF THINGS AT HOME, OR GET ALONG WITH OTHER PEOPLE: NOT DIFFICULT AT ALL
SUM OF ALL RESPONSES TO PHQ QUESTIONS 1-9: 1
SUM OF ALL RESPONSES TO PHQ QUESTIONS 1-9: 1

## 2025-01-29 ASSESSMENT — ASTHMA QUESTIONNAIRES
QUESTION_5 LAST FOUR WEEKS HOW WOULD YOU RATE YOUR ASTHMA CONTROL: WELL CONTROLLED
ACT_TOTALSCORE: 23
QUESTION_1 LAST FOUR WEEKS HOW MUCH OF THE TIME DID YOUR ASTHMA KEEP YOU FROM GETTING AS MUCH DONE AT WORK, SCHOOL OR AT HOME: NONE OF THE TIME
QUESTION_4 LAST FOUR WEEKS HOW OFTEN HAVE YOU USED YOUR RESCUE INHALER OR NEBULIZER MEDICATION (SUCH AS ALBUTEROL): ONCE A WEEK OR LESS
QUESTION_3 LAST FOUR WEEKS HOW OFTEN DID YOUR ASTHMA SYMPTOMS (WHEEZING, COUGHING, SHORTNESS OF BREATH, CHEST TIGHTNESS OR PAIN) WAKE YOU UP AT NIGHT OR EARLIER THAN USUAL IN THE MORNING: NOT AT ALL
QUESTION_2 LAST FOUR WEEKS HOW OFTEN HAVE YOU HAD SHORTNESS OF BREATH: NOT AT ALL
ACT_TOTALSCORE: 23

## 2025-01-29 ASSESSMENT — PAIN SCALES - GENERAL: PAINLEVEL_OUTOF10: NO PAIN (0)

## 2025-01-29 NOTE — PROGRESS NOTES
{PROVIDER CHARTING PREFERENCE:730833}    Subjective   Nidia is a 64 year old, presenting for the following health issues:  Derm Problem and Health Maintenance (Declines vaccination )        1/29/2025     3:29 PM   Additional Questions   Roomed by Lala Hoskins CMA   Accompanied by Self     History of Present Illness       Reason for visit:  Redness on top of hands  Symptom onset:  3-7 days ago  Symptoms include:  Swelling like hives minimal itching  Symptom progression:  Worsening  Had these symptoms before:  No  What makes it worse:  Running under water  What makes it better:  Putting some fluocinide cream to see if would help. little       little relief   She is taking medications regularly.       {additonal problems for provider to add (Optional):883998}    {ROS Picklists (Optional):023198}      Objective    /89 (BP Location: Right arm, Patient Position: Sitting, Cuff Size: Adult Regular)   Pulse 92   Temp 98.1  F (36.7  C) (Oral)   LMP 07/29/2002   SpO2 99%   There is no height or weight on file to calculate BMI.  Physical Exam   {Exam List (Optional):556639}    {Diagnostic Test Results (Optional):873998}        Signed Electronically by: Merlene Reyes MD  {Email feedback regarding this note to [email or web address]   :155021}     Objective    /89 (BP Location: Right arm, Patient Position: Sitting, Cuff Size: Adult Regular)   Pulse 92   Temp 98.1  F (36.7  C) (Oral)   LMP 07/29/2002   SpO2 99%   There is no height or weight on file to calculate BMI.  Physical Exam   GENERAL: alert and no distress  MS: no gross musculoskeletal defects noted, no edema  SKIN: vesicle - hands between fingers on the back of the hand left more than right.  No evidence of infection  PSYCH: mentation appears normal, affect normal/bright            Signed Electronically by: Merlene Reyes MD

## 2025-01-29 NOTE — TELEPHONE ENCOUNTER
Dr. Reyes:    Patient called the clinic, she says she was using a new solution when mopping on Saturday and then today she noticed small white like blister areas in the webs of her first and second fingers of both hands, also has redness between the rest of the fingers on the right hand, she says it is painful and burning, not swollen, she uses Lidex_E cream and that takes the intensity of the burning like pain away for a short time then returns. Patient says she looked on the web and says it also could be scabies. Patient would really like to be seen today, patient is asking if a work in is possible. If not able to work in, is this appropriate for an E-visit?      Please advise.      RUBEN Arreola

## 2025-01-29 NOTE — TELEPHONE ENCOUNTER
Per Dr. Reyes:        Please call and see if she can be seen this afternoon.  Then what ever time she says is good for her just double book her into that thank you I personally despise Derm e-visits.  Also follow-up for mental health or starting any kind of medications are all kinds of things people want us to do so just a little cranky today Merlene Reyes M.D.       Patient is advised can be seen today, patient would like to be scheduled 3:15/30 today, she understands she is a work in.      RUBEN Arreola     She reports stable.  Monitored by specialist- no acute findings meriting change in the plan

## 2025-02-13 ENCOUNTER — LAB (OUTPATIENT)
Dept: CARDIOLOGY | Facility: CLINIC | Age: 65
End: 2025-02-13
Payer: COMMERCIAL

## 2025-02-13 DIAGNOSIS — E78.5 HYPERLIPIDEMIA LDL GOAL <130: ICD-10-CM

## 2025-02-13 DIAGNOSIS — I25.119 CORONARY ARTERY DISEASE INVOLVING NATIVE CORONARY ARTERY OF NATIVE HEART WITH ANGINA PECTORIS: ICD-10-CM

## 2025-02-13 LAB
CHOLEST SERPL-MCNC: 138 MG/DL
FASTING STATUS PATIENT QL REPORTED: YES
HDLC SERPL-MCNC: 64 MG/DL
LDLC SERPL CALC-MCNC: 56 MG/DL
NONHDLC SERPL-MCNC: 74 MG/DL
TRIGL SERPL-MCNC: 90 MG/DL

## 2025-02-18 ENCOUNTER — MYC MEDICAL ADVICE (OUTPATIENT)
Dept: FAMILY MEDICINE | Facility: CLINIC | Age: 65
End: 2025-02-18
Payer: COMMERCIAL

## 2025-02-19 NOTE — TELEPHONE ENCOUNTER
For change in medication the patient would either need to schedule for a different provider or wait until Dr. Reyes is back to discuss.    Thanks    EB

## 2025-03-31 DIAGNOSIS — A60.00 HERPES SIMPLEX INFECTION OF GENITOURINARY SYSTEM: ICD-10-CM

## 2025-04-01 RX ORDER — VALACYCLOVIR HYDROCHLORIDE 500 MG/1
500 TABLET, FILM COATED ORAL DAILY
Qty: 90 TABLET | Refills: 0 | Status: SHIPPED | OUTPATIENT
Start: 2025-04-01

## 2025-04-18 DIAGNOSIS — E66.812 CLASS 2 SEVERE OBESITY DUE TO EXCESS CALORIES WITH SERIOUS COMORBIDITY AND BODY MASS INDEX (BMI) OF 37.0 TO 37.9 IN ADULT (H): ICD-10-CM

## 2025-04-18 DIAGNOSIS — E66.01 CLASS 2 SEVERE OBESITY DUE TO EXCESS CALORIES WITH SERIOUS COMORBIDITY AND BODY MASS INDEX (BMI) OF 37.0 TO 37.9 IN ADULT (H): ICD-10-CM

## 2025-04-18 NOTE — TELEPHONE ENCOUNTER
Requested Prescriptions   Pending Prescriptions Disp Refills    buPROPion (WELLBUTRIN SR) 100 MG 12 hr tablet [Pharmacy Med Name: BUPROPION ER / SR 100MG TAB] 180 tablet 0     Sig: Take 1 tablet by mouth twice daily       Rx Protocol Bupropion Failed - 4/18/2025 10:20 AM        Failed - Medication is indicated for associated diagnosis     Medication is associated with one or more of the following diagnoses:  Anxiety  Bipolar Disorder  Depression  Smoking Cessation  Adjustment disorder with mixed anxiety and depressed mood  Adjustment disorder with anxiety  Tobacco user  Adjustment disorder with anxious mood  Attention deficit hyperactivity disorder          Passed - Medication is active on med list and the sig matches. RN to manually verify dose and sig if red X/fail.     If the protocol passes (green check), you do not need to verify med dose and sig.    A prescription matches if they are the same clinical intention.    For Example: once daily and every morning are the same.    The protocol can not identify upper and lower case letters as matching and will fail.     For Example: Take 1 tablet (50 mg) by mouth daily     TAKE 1 TABLET (50 MG) BY MOUTH DAILY    For all fails (red x), verify dose and sig.    If the refill does match what is on file, the RN can still proceed to approve the refill request.       If they do not match, route to the appropriate provider.             Passed - Recent (12 mo) or future (90 days) visit within the authorizing provider's specialty     The patient must have completed an in-person or virtual visit within the past 12 months or has a future visit scheduled within the next 90 days with the authorizing provider s specialty.  Urgent care and e-visits do not qualify as an office visit for this protocol.          Passed - Blood pressure on file in the past 12 months        Passed - Patient is age 18 or older        Passed - No active pregnancy on record        Passed - No positive  pregnancy test in past 12 months

## 2025-04-20 RX ORDER — BUPROPION HYDROCHLORIDE 100 MG/1
100 TABLET, EXTENDED RELEASE ORAL 2 TIMES DAILY
Qty: 180 TABLET | Refills: 0 | Status: SHIPPED | OUTPATIENT
Start: 2025-04-20

## 2025-06-24 ENCOUNTER — PATIENT OUTREACH (OUTPATIENT)
Dept: CARE COORDINATION | Facility: CLINIC | Age: 65
End: 2025-06-24
Payer: COMMERCIAL

## 2025-06-24 DIAGNOSIS — J45.991 COUGH VARIANT ASTHMA: ICD-10-CM

## 2025-06-24 DIAGNOSIS — F43.9 STRESS: ICD-10-CM

## 2025-06-24 DIAGNOSIS — E03.4 HYPOTHYROIDISM DUE TO ACQUIRED ATROPHY OF THYROID: ICD-10-CM

## 2025-06-24 RX ORDER — MONTELUKAST SODIUM 10 MG/1
1 TABLET ORAL AT BEDTIME
Qty: 90 TABLET | Refills: 0 | Status: SHIPPED | OUTPATIENT
Start: 2025-06-24

## 2025-06-24 RX ORDER — FLUOXETINE HYDROCHLORIDE 40 MG/1
40 CAPSULE ORAL DAILY
Qty: 90 CAPSULE | Refills: 0 | Status: SHIPPED | OUTPATIENT
Start: 2025-06-24

## 2025-06-24 RX ORDER — LEVOTHYROXINE SODIUM 125 UG/1
125 TABLET ORAL DAILY
Qty: 90 TABLET | Refills: 0 | Status: SHIPPED | OUTPATIENT
Start: 2025-06-24

## 2025-06-24 NOTE — TELEPHONE ENCOUNTER
Requested Prescriptions   Pending Prescriptions Disp Refills    levothyroxine (SYNTHROID/LEVOTHROID) 125 MCG tablet [Pharmacy Med Name: Levothyroxine Sodium 125 MCG Oral Tablet] 90 tablet 0     Sig: Take 1 tablet by mouth once daily       Thyroid Protocol Failed - 6/24/2025 10:35 AM        Failed - Normal TSH on file in past 12 months     Recent Labs   Lab Test 01/29/25  1609   TSH 0.02*              Passed - Patient is 12 years or older        Passed - Medication is active on med list and the sig matches. RN to manually verify dose and sig if red X/fail.     If the protocol passes (green check), you do not need to verify med dose and sig.    A prescription matches if they are the same clinical intention.    For Example: once daily and every morning are the same.    The protocol can not identify upper and lower case letters as matching and will fail.     For Example: Take 1 tablet (50 mg) by mouth daily     TAKE 1 TABLET (50 MG) BY MOUTH DAILY    For all fails (red x), verify dose and sig.    If the refill does match what is on file, the RN can still proceed to approve the refill request.       If they do not match, route to the appropriate provider.             Passed - Recent (12 month) or future (90 days) visit with authorizing provider's specialty (provided they have been seen in the past 15 months)     The patient must have completed an in-person or virtual visit within the past 12 months or has a future visit scheduled within the next 90 days with the authorizing provider s specialty.  Urgent care and e-visits do not qualify as an office visit for this protocol.          Passed - Medication indicated for associated diagnosis     Medication is associated with one or more of the following diagnoses:  Hypothyroidism  Thyroid stimulating hormone suppression therapy  Thyroid cancer  Acquired atrophy of thyroid          Passed - No active pregnancy on record     If patient is pregnant or has had a positive pregnancy  test, please check TSH.          Passed - No positive pregnancy test in past 12 months     If patient is pregnant or has had a positive pregnancy test, please check TSH.

## 2025-07-08 ENCOUNTER — PATIENT OUTREACH (OUTPATIENT)
Dept: CARE COORDINATION | Facility: CLINIC | Age: 65
End: 2025-07-08
Payer: COMMERCIAL

## 2025-07-21 DIAGNOSIS — E66.01 CLASS 2 SEVERE OBESITY DUE TO EXCESS CALORIES WITH SERIOUS COMORBIDITY AND BODY MASS INDEX (BMI) OF 37.0 TO 37.9 IN ADULT (H): ICD-10-CM

## 2025-07-21 DIAGNOSIS — E66.812 CLASS 2 SEVERE OBESITY DUE TO EXCESS CALORIES WITH SERIOUS COMORBIDITY AND BODY MASS INDEX (BMI) OF 37.0 TO 37.9 IN ADULT (H): ICD-10-CM

## 2025-07-21 RX ORDER — BUPROPION HYDROCHLORIDE 100 MG/1
100 TABLET, EXTENDED RELEASE ORAL 2 TIMES DAILY
Qty: 180 TABLET | Refills: 0 | Status: SHIPPED | OUTPATIENT
Start: 2025-07-21

## 2025-07-23 ENCOUNTER — OFFICE VISIT (OUTPATIENT)
Dept: FAMILY MEDICINE | Facility: CLINIC | Age: 65
End: 2025-07-23
Payer: COMMERCIAL

## 2025-07-23 VITALS
OXYGEN SATURATION: 99 % | WEIGHT: 183 LBS | HEIGHT: 61 IN | SYSTOLIC BLOOD PRESSURE: 132 MMHG | BODY MASS INDEX: 34.55 KG/M2 | HEART RATE: 67 BPM | TEMPERATURE: 98.7 F | DIASTOLIC BLOOD PRESSURE: 80 MMHG | RESPIRATION RATE: 16 BRPM

## 2025-07-23 DIAGNOSIS — Z00.00 PREVENTATIVE HEALTH CARE: ICD-10-CM

## 2025-07-23 DIAGNOSIS — E66.812 CLASS 2 SEVERE OBESITY DUE TO EXCESS CALORIES WITH SERIOUS COMORBIDITY AND BODY MASS INDEX (BMI) OF 37.0 TO 37.9 IN ADULT (H): ICD-10-CM

## 2025-07-23 DIAGNOSIS — E66.01 CLASS 2 SEVERE OBESITY DUE TO EXCESS CALORIES WITH SERIOUS COMORBIDITY AND BODY MASS INDEX (BMI) OF 37.0 TO 37.9 IN ADULT (H): ICD-10-CM

## 2025-07-23 DIAGNOSIS — L50.9 URTICARIA: ICD-10-CM

## 2025-07-23 DIAGNOSIS — L68.0 HIRSUTISM: ICD-10-CM

## 2025-07-23 DIAGNOSIS — A60.00 HERPES SIMPLEX INFECTION OF GENITOURINARY SYSTEM: ICD-10-CM

## 2025-07-23 DIAGNOSIS — J45.991 COUGH VARIANT ASTHMA: ICD-10-CM

## 2025-07-23 DIAGNOSIS — F33.42 RECURRENT MAJOR DEPRESSION IN COMPLETE REMISSION: ICD-10-CM

## 2025-07-23 DIAGNOSIS — E66.811 CLASS 1 OBESITY: ICD-10-CM

## 2025-07-23 DIAGNOSIS — E03.4 HYPOTHYROIDISM DUE TO ACQUIRED ATROPHY OF THYROID: ICD-10-CM

## 2025-07-23 DIAGNOSIS — F43.10 PTSD (POST-TRAUMATIC STRESS DISORDER): Primary | ICD-10-CM

## 2025-07-23 PROCEDURE — 3075F SYST BP GE 130 - 139MM HG: CPT | Performed by: FAMILY MEDICINE

## 2025-07-23 PROCEDURE — 99213 OFFICE O/P EST LOW 20 MIN: CPT | Mod: 25 | Performed by: FAMILY MEDICINE

## 2025-07-23 PROCEDURE — 99396 PREV VISIT EST AGE 40-64: CPT | Mod: 25 | Performed by: FAMILY MEDICINE

## 2025-07-23 PROCEDURE — 36415 COLL VENOUS BLD VENIPUNCTURE: CPT | Performed by: FAMILY MEDICINE

## 2025-07-23 PROCEDURE — 80048 BASIC METABOLIC PNL TOTAL CA: CPT | Performed by: FAMILY MEDICINE

## 2025-07-23 PROCEDURE — 3079F DIAST BP 80-89 MM HG: CPT | Performed by: FAMILY MEDICINE

## 2025-07-23 RX ORDER — ALBUTEROL SULFATE 90 UG/1
2 INHALANT RESPIRATORY (INHALATION) EVERY 6 HOURS
Qty: 18 G | Refills: 3 | Status: SHIPPED | OUTPATIENT
Start: 2025-07-23

## 2025-07-23 RX ORDER — DESVENLAFAXINE 50 MG/1
50 TABLET, FILM COATED, EXTENDED RELEASE ORAL DAILY
Qty: 30 TABLET | Refills: 4 | Status: SHIPPED | OUTPATIENT
Start: 2025-07-23

## 2025-07-23 RX ORDER — PHENTERMINE HYDROCHLORIDE 37.5 MG/1
37.5 TABLET ORAL
Qty: 90 TABLET | Refills: 3 | Status: SHIPPED | OUTPATIENT
Start: 2025-07-23

## 2025-07-23 RX ORDER — SPIRONOLACTONE 50 MG/1
50 TABLET, FILM COATED ORAL 2 TIMES DAILY
Qty: 180 TABLET | Refills: 3 | Status: SHIPPED | OUTPATIENT
Start: 2025-07-23

## 2025-07-23 RX ORDER — DESVENLAFAXINE 25 MG/1
25 TABLET, EXTENDED RELEASE ORAL DAILY
Qty: 7 TABLET | Refills: 0 | Status: SHIPPED | OUTPATIENT
Start: 2025-07-23 | End: 2025-07-30

## 2025-07-23 RX ORDER — FLUOCINONIDE 0.5 MG/G
CREAM TOPICAL
Qty: 30 G | Refills: 1 | Status: SHIPPED | OUTPATIENT
Start: 2025-07-23

## 2025-07-23 RX ORDER — MONTELUKAST SODIUM 10 MG/1
1 TABLET ORAL AT BEDTIME
Qty: 90 TABLET | Refills: 3 | Status: SHIPPED | OUTPATIENT
Start: 2025-07-23

## 2025-07-23 RX ORDER — VALACYCLOVIR HYDROCHLORIDE 500 MG/1
500 TABLET, FILM COATED ORAL DAILY
Qty: 90 TABLET | Refills: 3 | Status: SHIPPED | OUTPATIENT
Start: 2025-07-23

## 2025-07-23 RX ORDER — BUPROPION HYDROCHLORIDE 150 MG/1
150 TABLET, EXTENDED RELEASE ORAL 2 TIMES DAILY
Qty: 180 TABLET | Refills: 3 | Status: SHIPPED | OUTPATIENT
Start: 2025-07-23

## 2025-07-23 RX ORDER — BUPROPION HYDROCHLORIDE 100 MG/1
100 TABLET, EXTENDED RELEASE ORAL 2 TIMES DAILY
Qty: 180 TABLET | Refills: 1 | Status: CANCELLED | OUTPATIENT
Start: 2025-07-23

## 2025-07-23 RX ORDER — LEVOTHYROXINE SODIUM 125 UG/1
125 TABLET ORAL DAILY
Qty: 90 TABLET | Refills: 3 | Status: SHIPPED | OUTPATIENT
Start: 2025-07-23

## 2025-07-23 SDOH — HEALTH STABILITY: PHYSICAL HEALTH: ON AVERAGE, HOW MANY DAYS PER WEEK DO YOU ENGAGE IN MODERATE TO STRENUOUS EXERCISE (LIKE A BRISK WALK)?: 4 DAYS

## 2025-07-23 SDOH — HEALTH STABILITY: PHYSICAL HEALTH: ON AVERAGE, HOW MANY MINUTES DO YOU ENGAGE IN EXERCISE AT THIS LEVEL?: 10 MIN

## 2025-07-23 ASSESSMENT — ASTHMA QUESTIONNAIRES
ACT_TOTALSCORE: 20
QUESTION_4 LAST FOUR WEEKS HOW OFTEN HAVE YOU USED YOUR RESCUE INHALER OR NEBULIZER MEDICATION (SUCH AS ALBUTEROL): NOT AT ALL
QUESTION_2 LAST FOUR WEEKS HOW OFTEN HAVE YOU HAD SHORTNESS OF BREATH: MORE THAN ONCE A DAY
QUESTION_3 LAST FOUR WEEKS HOW OFTEN DID YOUR ASTHMA SYMPTOMS (WHEEZING, COUGHING, SHORTNESS OF BREATH, CHEST TIGHTNESS OR PAIN) WAKE YOU UP AT NIGHT OR EARLIER THAN USUAL IN THE MORNING: NOT AT ALL
QUESTION_5 LAST FOUR WEEKS HOW WOULD YOU RATE YOUR ASTHMA CONTROL: WELL CONTROLLED
QUESTION_1 LAST FOUR WEEKS HOW MUCH OF THE TIME DID YOUR ASTHMA KEEP YOU FROM GETTING AS MUCH DONE AT WORK, SCHOOL OR AT HOME: NONE OF THE TIME

## 2025-07-23 ASSESSMENT — SOCIAL DETERMINANTS OF HEALTH (SDOH): HOW OFTEN DO YOU GET TOGETHER WITH FRIENDS OR RELATIVES?: THREE TIMES A WEEK

## 2025-07-23 ASSESSMENT — PATIENT HEALTH QUESTIONNAIRE - PHQ9
SUM OF ALL RESPONSES TO PHQ QUESTIONS 1-9: 7
SUM OF ALL RESPONSES TO PHQ QUESTIONS 1-9: 7
10. IF YOU CHECKED OFF ANY PROBLEMS, HOW DIFFICULT HAVE THESE PROBLEMS MADE IT FOR YOU TO DO YOUR WORK, TAKE CARE OF THINGS AT HOME, OR GET ALONG WITH OTHER PEOPLE: NOT DIFFICULT AT ALL

## 2025-07-23 NOTE — PROGRESS NOTES
Preventive Care Visit  [facility] KATHLEEN Reyes MD, Family Medicine  Jul 23, 2025  {Provider  Link to SmartSet :000691}    {PROVIDER CHARTING PREFERENCE:564208}    Oliverio Jacob is a 65 year old, presenting for the following:  Physical        7/23/2025     3:48 PM   Additional Questions   Roomed by ANTONIA Peterson not helping with cravings   Discuss Prozac.     HPI    {SUPERLIST (Optional):488870}      Advance Care Planning  {(AWV REQUIRED) Advance Care Planning Reviewed:675862}        7/23/2025   General Health   How would you rate your overall physical health? Good   Feel stress (tense, anxious, or unable to sleep) Only a little   (!) STRESS CONCERN      7/23/2025   Nutrition   Three or more servings of calcium each day? (!) I DON'T KNOW   Diet: Regular (no restrictions)   How many servings of fruit and vegetables per day? (!) 2-3   How many sweetened beverages each day? 0-1         7/23/2025   Exercise   Days per week of moderate/strenous exercise 4 days   Average minutes spent exercising at this level 10 min         7/23/2025   Social Factors   Frequency of gathering with friends or relatives Three times a week   Worry food won't last until get money to buy more No   Food not last or not have enough money for food? No   Do you have housing? (Housing is defined as stable permanent housing and does not include staying outside in a car, in a tent, in an abandoned building, in an overnight shelter, or couch-surfing.) No   Are you worried about losing your housing? No   Lack of transportation? No   Unable to get utilities (heat,electricity)? No   Want help with housing or utility concern? No   (!) HOUSING CONCERN PRESENT      7/23/2025   Fall Risk   Fallen 2 or more times in the past year? No   Trouble with walking or balance? No          7/23/2025   Dental   Dentist two times every year? Yes       Today's PHQ-9 Score:       7/23/2025     9:51 AM   PHQ-9 SCORE   PHQ-9  Total Score MyChart 7 (Mild depression)   PHQ-9 Total Score 7        Patient-reported         2025   Substance Use   Alcohol more than 3/day or more than 7/wk No   Do you use any other substances recreationally? No     Social History     Tobacco Use    Smoking status: Former     Current packs/day: 0.00     Average packs/day: 0.5 packs/day for 1 year (0.5 ttl pk-yrs)     Types: Cigarettes     Start date: 1985     Quit date: 1986     Years since quittin.5    Smokeless tobacco: Never   Vaping Use    Vaping status: Never Used   Substance Use Topics    Alcohol use: Yes     Comment: 1-5 drinks weekly    Drug use: No     {Provider  If there are gaps in the social history shown above, please follow the link to update and then refresh the note Link to Social and Substance History :745060}      2024   LAST FHS-7 RESULTS   1st degree relative breast or ovarian cancer No   Any relative bilateral breast cancer No   Any male have breast cancer No   Any ONE woman have BOTH breast AND ovarian cancer No   Any woman with breast cancer before 50yrs No   2 or more relatives with breast AND/OR ovarian cancer No   2 or more relatives with breast AND/OR bowel cancer No     {If any of the questions to the FHS7 are answered yes, consider referral for genetic counseling.    Additional indications for genetic referral include personal history of breast or ovarian cancer, genetic mutation in 1st degree relative which increases risk of breast cancer including BRCA1, BRCA2, STEPHANIE, PALB 2, TP53, CHEK2, PTEN, CDH1, STK11 (per ACS) and/or 1st degree relative with history of pancreatic or high-risk prostate cancer (per NCCN):802814}   {Mammogram Decision Support (Optional):379581}        2025   STI Screening   New sexual partner(s) since last STI/HIV test? No     History of abnormal Pap smear: { :882435}        2007    12:00 AM 2005    12:00 AM   PAP / HPV   PAP (Historical) NIL  NIL      ASCVD Risk  "  The ASCVD Risk score (Alberto PRICE, et al., 2019) failed to calculate for the following reasons:    Risk score cannot be calculated because patient has a medical history suggesting prior/existing ASCVD    {Link to Fracture Risk Assessment Tool (Optional):223315}    {Provider  REQUIRED FOR AWV Use the storyboard to review patient history, after sections have been marked as reviewed, refresh note to capture documentation:291138}   Reviewed and updated as needed this visit by Provider                    {HISTORY OPTIONS (Optional):262832}    {ROS Picklists (Optional):615437}     Objective    Exam  /80 (BP Location: Right arm, Patient Position: Chair, Cuff Size: Adult Large)   Pulse 67   Temp 98.7  F (37.1  C) (Tympanic)   Resp 16   Ht 1.543 m (5' 0.75\")   Wt 83 kg (183 lb)   LMP 07/29/2002   SpO2 99%   BMI 34.86 kg/m     Estimated body mass index is 34.86 kg/m  as calculated from the following:    Height as of this encounter: 1.543 m (5' 0.75\").    Weight as of this encounter: 83 kg (183 lb).    Physical Exam  {Exam Choices (Optional):707886}        Signed Electronically by: Merlene Reyes MD  {Email feedback regarding this note to [email or web address]   :148488}  Answers submitted by the patient for this visit:  Patient Health Questionnaire (Submitted on 7/23/2025)  If you checked off any problems, how difficult have these problems made it for you to do your work, take care of things at home, or get along with other people?: Not difficult at all  PHQ9 TOTAL SCORE: 7    " "ASCVD Risk score (Alberto PRICE, et al., 2019) failed to calculate for the following reasons:    Risk score cannot be calculated because patient has a medical history suggesting prior/existing ASCVD           Reviewed and updated as needed this visit by Provider                    Past Medical History:   Diagnosis Date    Arthritis     Me, paternal & maternal    Backache, unspecified     lumbar back pain, degen disk disease    Cerebral infarction (H)     Mother during heart surgery    Congestive heart failure (H)     Mother/ her brother/ grandfather paternal    COPD (chronic obstructive pulmonary disease) (H)     I get bronchitis frequently    Cough variant asthma 07/07/2020    Depressive disorder     numerous yrs ago diagnosed with PTSD    Diabetes (H)     Family members    Heart disease     Parents    Hypertension     Parents /siblings / extended family members    Leiomyoma of uterus, unspecified 2003    s/p LAVH    Other genital herpes     HSV-2    Thyroid disease     Me    Unspecified sinusitis (chronic)          Review of Systems  Constitutional, HEENT, cardiovascular, pulmonary, gi and gu systems are negative, except as otherwise noted.     Objective    Exam  /80 (BP Location: Right arm, Patient Position: Chair, Cuff Size: Adult Large)   Pulse 67   Temp 98.7  F (37.1  C) (Tympanic)   Resp 16   Ht 1.543 m (5' 0.75\")   Wt 83 kg (183 lb)   LMP 07/29/2002   SpO2 99%   BMI 34.86 kg/m     Estimated body mass index is 34.86 kg/m  as calculated from the following:    Height as of this encounter: 1.543 m (5' 0.75\").    Weight as of this encounter: 83 kg (183 lb).    Physical Exam  GENERAL: alert and no distress  PSYCH: mentation appears normal, anxious, and judgement and insight intact        Signed Electronically by: Merlene Reyes MD    Answers submitted by the patient for this visit:  Patient Health Questionnaire (Submitted on 7/23/2025)  If you checked off any problems, how difficult have these " problems made it for you to do your work, take care of things at home, or get along with other people?: Not difficult at all  PHQ9 TOTAL SCORE: 7

## 2025-07-24 LAB
ANION GAP SERPL CALCULATED.3IONS-SCNC: 9 MMOL/L (ref 7–15)
BUN SERPL-MCNC: 16 MG/DL (ref 8–23)
CALCIUM SERPL-MCNC: 9.7 MG/DL (ref 8.8–10.4)
CHLORIDE SERPL-SCNC: 96 MMOL/L (ref 98–107)
CREAT SERPL-MCNC: 0.85 MG/DL (ref 0.51–0.95)
EGFRCR SERPLBLD CKD-EPI 2021: 77 ML/MIN/1.73M2
GLUCOSE SERPL-MCNC: 91 MG/DL (ref 70–99)
HCO3 SERPL-SCNC: 26 MMOL/L (ref 22–29)
POTASSIUM SERPL-SCNC: 5 MMOL/L (ref 3.4–5.3)
SODIUM SERPL-SCNC: 131 MMOL/L (ref 135–145)

## (undated) DEVICE — PREP CHLORAPREP 26ML TINTED ORANGE  260815

## (undated) DEVICE — CLIP APPLIER ENDO 5MM M/L LIGAMAX EL5ML

## (undated) DEVICE — ESU ENDO SCISSORS 5MM CVD 5DCS

## (undated) DEVICE — SOL NACL 0.9% IRRIG 1000ML BOTTLE 07138-09

## (undated) DEVICE — GOWN XLG DISP 9545

## (undated) DEVICE — ADH SKIN CLOSURE PREMIERPRO EXOFIN 1.0ML 3470

## (undated) DEVICE — GLOVE PROTEXIS W/NEU-THERA 8.0  2D73TE80

## (undated) DEVICE — SOL NACL 0.9% IRRIG 3000ML BAG 2B7477

## (undated) DEVICE — DRAPE POUCH INSTRUMENT 3 POCKET 1018L

## (undated) DEVICE — ENDO TROCAR FIRST ENTRY KII FIOS ADV FIX 05X100MM CFF03

## (undated) DEVICE — SU VICRYL 0 UR-6 27" J603H

## (undated) DEVICE — Device

## (undated) DEVICE — ENDO SNARE EXACTO COLD 9MM LOOP 2.4MMX230CM 00711115

## (undated) DEVICE — SUCTION IRRIGATION STRYKFLOW II W/TIP DISP 250-070-520

## (undated) DEVICE — ENDO TROCAR BLUNT TIP KII BALLOON 12X100MM C0R47

## (undated) DEVICE — SU VICRYL 4-0 FS-2 27" J422-H

## (undated) DEVICE — DECANTER VIAL 2006S

## (undated) DEVICE — ENDO TROCAR SLEEVE KII ADV FIXATION 05X100MM CFS02

## (undated) DEVICE — ESU HOLSTER PLASTIC DISP E2400

## (undated) DEVICE — ENDO POUCH UNIV RETRIEVAL SYSTEM INZII 10MM CD001

## (undated) DEVICE — ESU PENCIL W/COATED BLADE E2450H

## (undated) DEVICE — SOL WATER IRRIG 1000ML BOTTLE 07139-09

## (undated) DEVICE — ESU CORD MONOPOLAR 10'  E0510

## (undated) RX ORDER — BUPIVACAINE HYDROCHLORIDE AND EPINEPHRINE 5; 5 MG/ML; UG/ML
INJECTION, SOLUTION EPIDURAL; INTRACAUDAL; PERINEURAL
Status: DISPENSED
Start: 2019-08-21

## (undated) RX ORDER — GLYCOPYRROLATE 0.2 MG/ML
INJECTION, SOLUTION INTRAMUSCULAR; INTRAVENOUS
Status: DISPENSED
Start: 2023-11-24

## (undated) RX ORDER — TRIAMCINOLONE ACETONIDE 40 MG/ML
INJECTION, SUSPENSION INTRA-ARTICULAR; INTRAMUSCULAR
Status: DISPENSED
Start: 2023-12-04

## (undated) RX ORDER — PROPOFOL 10 MG/ML
INJECTION, EMULSION INTRAVENOUS
Status: DISPENSED
Start: 2023-11-24

## (undated) RX ORDER — DEXAMETHASONE SODIUM PHOSPHATE 4 MG/ML
INJECTION, SOLUTION INTRA-ARTICULAR; INTRALESIONAL; INTRAMUSCULAR; INTRAVENOUS; SOFT TISSUE
Status: DISPENSED
Start: 2019-08-21

## (undated) RX ORDER — LEVOFLOXACIN 5 MG/ML
INJECTION, SOLUTION INTRAVENOUS
Status: DISPENSED
Start: 2019-08-21

## (undated) RX ORDER — LIDOCAINE HYDROCHLORIDE 10 MG/ML
INJECTION, SOLUTION INFILTRATION; PERINEURAL
Status: DISPENSED
Start: 2023-11-24

## (undated) RX ORDER — FENTANYL CITRATE 50 UG/ML
INJECTION, SOLUTION INTRAMUSCULAR; INTRAVENOUS
Status: DISPENSED
Start: 2019-08-21

## (undated) RX ORDER — PROPOFOL 10 MG/ML
INJECTION, EMULSION INTRAVENOUS
Status: DISPENSED
Start: 2019-08-21

## (undated) RX ORDER — GABAPENTIN 300 MG/1
CAPSULE ORAL
Status: DISPENSED
Start: 2019-08-21

## (undated) RX ORDER — DIPHENHYDRAMINE HYDROCHLORIDE 50 MG/ML
INJECTION INTRAMUSCULAR; INTRAVENOUS
Status: DISPENSED
Start: 2019-08-21

## (undated) RX ORDER — LIDOCAINE HYDROCHLORIDE 10 MG/ML
INJECTION, SOLUTION EPIDURAL; INFILTRATION; INTRACAUDAL; PERINEURAL
Status: DISPENSED
Start: 2019-08-21

## (undated) RX ORDER — HYDROMORPHONE HYDROCHLORIDE 1 MG/ML
INJECTION, SOLUTION INTRAMUSCULAR; INTRAVENOUS; SUBCUTANEOUS
Status: DISPENSED
Start: 2019-08-21

## (undated) RX ORDER — KETOROLAC TROMETHAMINE 30 MG/ML
INJECTION, SOLUTION INTRAMUSCULAR; INTRAVENOUS
Status: DISPENSED
Start: 2019-08-21

## (undated) RX ORDER — ACETAMINOPHEN 325 MG/1
TABLET ORAL
Status: DISPENSED
Start: 2019-08-21

## (undated) RX ORDER — ONDANSETRON 2 MG/ML
INJECTION INTRAMUSCULAR; INTRAVENOUS
Status: DISPENSED
Start: 2019-08-21

## (undated) RX ORDER — PHENYLEPHRINE HCL IN 0.9% NACL 1 MG/10 ML
SYRINGE (ML) INTRAVENOUS
Status: DISPENSED
Start: 2019-08-21

## (undated) RX ORDER — BUPIVACAINE HYDROCHLORIDE 2.5 MG/ML
INJECTION, SOLUTION EPIDURAL; INFILTRATION; INTRACAUDAL
Status: DISPENSED
Start: 2023-12-04